# Patient Record
Sex: MALE | Race: BLACK OR AFRICAN AMERICAN | NOT HISPANIC OR LATINO | Employment: FULL TIME | URBAN - METROPOLITAN AREA
[De-identification: names, ages, dates, MRNs, and addresses within clinical notes are randomized per-mention and may not be internally consistent; named-entity substitution may affect disease eponyms.]

---

## 2024-02-22 ENCOUNTER — APPOINTMENT (OUTPATIENT)
Dept: CARDIOLOGY | Facility: HOSPITAL | Age: 54
End: 2024-02-22
Payer: COMMERCIAL

## 2024-02-22 ENCOUNTER — HOSPITAL ENCOUNTER (INPATIENT)
Facility: HOSPITAL | Age: 54
LOS: 2 days | Discharge: HOME | End: 2024-02-24
Attending: EMERGENCY MEDICINE | Admitting: INTERNAL MEDICINE
Payer: COMMERCIAL

## 2024-02-22 ENCOUNTER — APPOINTMENT (OUTPATIENT)
Dept: RADIOLOGY | Facility: HOSPITAL | Age: 54
End: 2024-02-22
Payer: COMMERCIAL

## 2024-02-22 DIAGNOSIS — K66.1 HEMOPERITONEUM: ICD-10-CM

## 2024-02-22 DIAGNOSIS — R10.84 GENERALIZED ABDOMINAL PAIN: Primary | ICD-10-CM

## 2024-02-22 DIAGNOSIS — R79.89 ELEVATED TROPONIN: ICD-10-CM

## 2024-02-22 DIAGNOSIS — I21.4 NSTEMI (NON-ST ELEVATED MYOCARDIAL INFARCTION) (MULTI): ICD-10-CM

## 2024-02-22 LAB
ABO GROUP (TYPE) IN BLOOD: NORMAL
ALBUMIN SERPL BCP-MCNC: 4.4 G/DL (ref 3.4–5)
ALP SERPL-CCNC: 47 U/L (ref 33–120)
ALT SERPL W P-5'-P-CCNC: 13 U/L (ref 10–52)
AMPHETAMINES UR QL SCN: ABNORMAL
AMPHETAMINES UR QL SCN: ABNORMAL
ANION GAP SERPL CALC-SCNC: 14 MMOL/L (ref 10–20)
ANTIBODY SCREEN: NORMAL
AORTIC VALVE MEAN GRADIENT: 8 MMHG
AORTIC VALVE PEAK VELOCITY: 1.85 M/S
APPEARANCE UR: CLEAR
APTT PPP: 22 SECONDS (ref 27–38)
AST SERPL W P-5'-P-CCNC: 24 U/L (ref 9–39)
AV PEAK GRADIENT: 13.7 MMHG
AVA (PEAK VEL): 3.41 CM2
AVA (VTI): 3.62 CM2
BARBITURATES UR QL SCN: ABNORMAL
BARBITURATES UR QL SCN: ABNORMAL
BASOPHILS # BLD AUTO: 0.02 X10*3/UL (ref 0–0.1)
BASOPHILS NFR BLD AUTO: 0.1 %
BENZODIAZ UR QL SCN: ABNORMAL
BENZODIAZ UR QL SCN: ABNORMAL
BILIRUB SERPL-MCNC: 0.8 MG/DL (ref 0–1.2)
BILIRUB UR STRIP.AUTO-MCNC: NEGATIVE MG/DL
BUN SERPL-MCNC: 31 MG/DL (ref 6–23)
BZE UR QL SCN: ABNORMAL
BZE UR QL SCN: ABNORMAL
CALCIUM SERPL-MCNC: 8.8 MG/DL (ref 8.6–10.3)
CANNABINOIDS UR QL SCN: ABNORMAL
CANNABINOIDS UR QL SCN: ABNORMAL
CARDIAC TROPONIN I PNL SERPL HS: 1074 NG/L (ref 0–20)
CARDIAC TROPONIN I PNL SERPL HS: 1103 NG/L (ref 0–20)
CARDIAC TROPONIN I PNL SERPL HS: 1252 NG/L (ref 0–20)
CARDIAC TROPONIN I PNL SERPL HS: 1300 NG/L (ref 0–20)
CHLORIDE SERPL-SCNC: 94 MMOL/L (ref 98–107)
CHOLEST SERPL-MCNC: 198 MG/DL (ref 0–199)
CHOLESTEROL/HDL RATIO: 5.6
CO2 SERPL-SCNC: 29 MMOL/L (ref 21–32)
COLOR UR: YELLOW
CREAT SERPL-MCNC: 1.14 MG/DL (ref 0.5–1.3)
CRP SERPL-MCNC: 1.12 MG/DL
EGFRCR SERPLBLD CKD-EPI 2021: 76 ML/MIN/1.73M*2
EJECTION FRACTION APICAL 4 CHAMBER: 70
EJECTION FRACTION: 68 %
EOSINOPHIL # BLD AUTO: 0 X10*3/UL (ref 0–0.7)
EOSINOPHIL NFR BLD AUTO: 0 %
ERYTHROCYTE [DISTWIDTH] IN BLOOD BY AUTOMATED COUNT: 15.1 % (ref 11.5–14.5)
ERYTHROCYTE [DISTWIDTH] IN BLOOD BY AUTOMATED COUNT: 15.2 % (ref 11.5–14.5)
ERYTHROCYTE [SEDIMENTATION RATE] IN BLOOD BY WESTERGREN METHOD: 28 MM/H (ref 0–20)
FENTANYL+NORFENTANYL UR QL SCN: ABNORMAL
FENTANYL+NORFENTANYL UR QL SCN: ABNORMAL
GLUCOSE BLD MANUAL STRIP-MCNC: 131 MG/DL (ref 74–99)
GLUCOSE SERPL-MCNC: 111 MG/DL (ref 74–99)
GLUCOSE UR STRIP.AUTO-MCNC: NEGATIVE MG/DL
HCT VFR BLD AUTO: 28.2 % (ref 41–52)
HCT VFR BLD AUTO: 36 % (ref 41–52)
HDLC SERPL-MCNC: 35.3 MG/DL
HGB BLD-MCNC: 11.7 G/DL (ref 13.5–17.5)
HGB BLD-MCNC: 9.5 G/DL (ref 13.5–17.5)
HOLD SPECIMEN: NORMAL
IMM GRANULOCYTES # BLD AUTO: 0.15 X10*3/UL (ref 0–0.7)
IMM GRANULOCYTES NFR BLD AUTO: 0.7 % (ref 0–0.9)
KETONES UR STRIP.AUTO-MCNC: ABNORMAL MG/DL
LACTATE SERPL-SCNC: 1.5 MMOL/L (ref 0.4–2)
LEFT ATRIUM VOLUME AREA LENGTH INDEX BSA: 23.1 ML/M2
LEFT VENTRICLE INTERNAL DIMENSION DIASTOLE: 4.4 CM (ref 3.5–6)
LEFT VENTRICULAR OUTFLOW TRACT DIAMETER: 2.2 CM
LEUKOCYTE ESTERASE UR QL STRIP.AUTO: NEGATIVE
LIPASE SERPL-CCNC: 51 U/L (ref 9–82)
LYMPHOCYTES # BLD AUTO: 2.86 X10*3/UL (ref 1.2–4.8)
LYMPHOCYTES NFR BLD AUTO: 13.8 %
MCH RBC QN AUTO: 22.5 PG (ref 26–34)
MCH RBC QN AUTO: 23.7 PG (ref 26–34)
MCHC RBC AUTO-ENTMCNC: 32.5 G/DL (ref 32–36)
MCHC RBC AUTO-ENTMCNC: 33.7 G/DL (ref 32–36)
MCV RBC AUTO: 69 FL (ref 80–100)
MCV RBC AUTO: 70 FL (ref 80–100)
MITRAL VALVE E/A RATIO: 0.75
MITRAL VALVE E/E' RATIO: 5.65
MONOCYTES # BLD AUTO: 2.18 X10*3/UL (ref 0.1–1)
MONOCYTES NFR BLD AUTO: 10.5 %
NEUTROPHILS # BLD AUTO: 15.48 X10*3/UL (ref 1.2–7.7)
NEUTROPHILS NFR BLD AUTO: 74.9 %
NITRITE UR QL STRIP.AUTO: NEGATIVE
NON-HDL CHOLESTEROL: 163 MG/DL (ref 0–149)
NRBC BLD-RTO: 0 /100 WBCS (ref 0–0)
NRBC BLD-RTO: 0.1 /100 WBCS (ref 0–0)
OPIATES UR QL SCN: ABNORMAL
OPIATES UR QL SCN: ABNORMAL
OXYCODONE+OXYMORPHONE UR QL SCN: ABNORMAL
OXYCODONE+OXYMORPHONE UR QL SCN: ABNORMAL
PCP UR QL SCN: ABNORMAL
PCP UR QL SCN: ABNORMAL
PH UR STRIP.AUTO: 6 [PH]
PLATELET # BLD AUTO: 229 X10*3/UL (ref 150–450)
PLATELET # BLD AUTO: 277 X10*3/UL (ref 150–450)
POTASSIUM SERPL-SCNC: 3.5 MMOL/L (ref 3.5–5.3)
PROT SERPL-MCNC: 7.7 G/DL (ref 6.4–8.2)
PROT UR STRIP.AUTO-MCNC: NEGATIVE MG/DL
RBC # BLD AUTO: 4.01 X10*6/UL (ref 4.5–5.9)
RBC # BLD AUTO: 5.2 X10*6/UL (ref 4.5–5.9)
RBC # UR STRIP.AUTO: NEGATIVE /UL
RH FACTOR (ANTIGEN D): NORMAL
RIGHT VENTRICLE FREE WALL PEAK S': 14.6 CM/S
SODIUM SERPL-SCNC: 133 MMOL/L (ref 136–145)
SP GR UR STRIP.AUTO: 1.02
TRICUSPID ANNULAR PLANE SYSTOLIC EXCURSION: 1.9 CM
TSH SERPL-ACNC: 1.28 MIU/L (ref 0.44–3.98)
UFH PPP CHRO-ACNC: 0.5 IU/ML
UROBILINOGEN UR STRIP.AUTO-MCNC: <2 MG/DL
WBC # BLD AUTO: 16.7 X10*3/UL (ref 4.4–11.3)
WBC # BLD AUTO: 20.7 X10*3/UL (ref 4.4–11.3)

## 2024-02-22 PROCEDURE — 74176 CT ABD & PELVIS W/O CONTRAST: CPT | Performed by: RADIOLOGY

## 2024-02-22 PROCEDURE — 76000 FLUOROSCOPY <1 HR PHYS/QHP: CPT | Performed by: RADIOLOGY

## 2024-02-22 PROCEDURE — 99291 CRITICAL CARE FIRST HOUR: CPT | Mod: 25 | Performed by: EMERGENCY MEDICINE

## 2024-02-22 PROCEDURE — 2500000004 HC RX 250 GENERAL PHARMACY W/ HCPCS (ALT 636 FOR OP/ED): Performed by: RADIOLOGY

## 2024-02-22 PROCEDURE — 80307 DRUG TEST PRSMV CHEM ANLYZR: CPT

## 2024-02-22 PROCEDURE — 85652 RBC SED RATE AUTOMATED: CPT | Performed by: NURSE PRACTITIONER

## 2024-02-22 PROCEDURE — 36415 COLL VENOUS BLD VENIPUNCTURE: CPT

## 2024-02-22 PROCEDURE — 84484 ASSAY OF TROPONIN QUANT: CPT

## 2024-02-22 PROCEDURE — 2500000004 HC RX 250 GENERAL PHARMACY W/ HCPCS (ALT 636 FOR OP/ED): Performed by: EMERGENCY MEDICINE

## 2024-02-22 PROCEDURE — 83605 ASSAY OF LACTIC ACID: CPT

## 2024-02-22 PROCEDURE — 2780000003 HC OR 278 NO HCPCS: Performed by: RADIOLOGY

## 2024-02-22 PROCEDURE — 71046 X-RAY EXAM CHEST 2 VIEWS: CPT

## 2024-02-22 PROCEDURE — 83690 ASSAY OF LIPASE: CPT

## 2024-02-22 PROCEDURE — 74176 CT ABD & PELVIS W/O CONTRAST: CPT

## 2024-02-22 PROCEDURE — 82947 ASSAY GLUCOSE BLOOD QUANT: CPT

## 2024-02-22 PROCEDURE — 36246 INS CATH ABD/L-EXT ART 2ND: CPT | Performed by: RADIOLOGY

## 2024-02-22 PROCEDURE — 99153 MOD SED SAME PHYS/QHP EA: CPT | Performed by: RADIOLOGY

## 2024-02-22 PROCEDURE — 93306 TTE W/DOPPLER COMPLETE: CPT

## 2024-02-22 PROCEDURE — 86140 C-REACTIVE PROTEIN: CPT | Performed by: NURSE PRACTITIONER

## 2024-02-22 PROCEDURE — 86900 BLOOD TYPING SEROLOGIC ABO: CPT

## 2024-02-22 PROCEDURE — 1200000002 HC GENERAL ROOM WITH TELEMETRY DAILY

## 2024-02-22 PROCEDURE — C1887 CATHETER, GUIDING: HCPCS | Performed by: RADIOLOGY

## 2024-02-22 PROCEDURE — 83718 ASSAY OF LIPOPROTEIN: CPT | Performed by: NURSE PRACTITIONER

## 2024-02-22 PROCEDURE — 37244 VASC EMBOLIZE/OCCLUDE BLEED: CPT | Performed by: RADIOLOGY

## 2024-02-22 PROCEDURE — 2500000004 HC RX 250 GENERAL PHARMACY W/ HCPCS (ALT 636 FOR OP/ED)

## 2024-02-22 PROCEDURE — 2550000001 HC RX 255 CONTRASTS: Performed by: RADIOLOGY

## 2024-02-22 PROCEDURE — 85730 THROMBOPLASTIN TIME PARTIAL: CPT | Performed by: EMERGENCY MEDICINE

## 2024-02-22 PROCEDURE — 81003 URINALYSIS AUTO W/O SCOPE: CPT

## 2024-02-22 PROCEDURE — 84443 ASSAY THYROID STIM HORMONE: CPT | Performed by: NURSE PRACTITIONER

## 2024-02-22 PROCEDURE — 96366 THER/PROPH/DIAG IV INF ADDON: CPT | Mod: 59

## 2024-02-22 PROCEDURE — 96375 TX/PRO/DX INJ NEW DRUG ADDON: CPT | Mod: 59

## 2024-02-22 PROCEDURE — 2500000005 HC RX 250 GENERAL PHARMACY W/O HCPCS: Performed by: RADIOLOGY

## 2024-02-22 PROCEDURE — 93005 ELECTROCARDIOGRAM TRACING: CPT

## 2024-02-22 PROCEDURE — 99152 MOD SED SAME PHYS/QHP 5/>YRS: CPT | Performed by: RADIOLOGY

## 2024-02-22 PROCEDURE — 76937 US GUIDE VASCULAR ACCESS: CPT | Performed by: RADIOLOGY

## 2024-02-22 PROCEDURE — 74174 CTA ABD&PLVS W/CONTRAST: CPT

## 2024-02-22 PROCEDURE — 85027 COMPLETE CBC AUTOMATED: CPT

## 2024-02-22 PROCEDURE — 99222 1ST HOSP IP/OBS MODERATE 55: CPT | Performed by: SURGERY

## 2024-02-22 PROCEDURE — 99223 1ST HOSP IP/OBS HIGH 75: CPT

## 2024-02-22 PROCEDURE — 96365 THER/PROPH/DIAG IV INF INIT: CPT | Mod: 59

## 2024-02-22 PROCEDURE — 85025 COMPLETE CBC W/AUTO DIFF WBC: CPT

## 2024-02-22 PROCEDURE — C1760 CLOSURE DEV, VASC: HCPCS | Performed by: RADIOLOGY

## 2024-02-22 PROCEDURE — 93306 TTE W/DOPPLER COMPLETE: CPT | Performed by: INTERNAL MEDICINE

## 2024-02-22 PROCEDURE — 04L43DZ OCCLUSION OF SPLENIC ARTERY WITH INTRALUMINAL DEVICE, PERCUTANEOUS APPROACH: ICD-10-PCS | Performed by: RADIOLOGY

## 2024-02-22 PROCEDURE — 85520 HEPARIN ASSAY: CPT | Performed by: EMERGENCY MEDICINE

## 2024-02-22 PROCEDURE — 71046 X-RAY EXAM CHEST 2 VIEWS: CPT | Performed by: RADIOLOGY

## 2024-02-22 PROCEDURE — 84484 ASSAY OF TROPONIN QUANT: CPT | Performed by: NURSE PRACTITIONER

## 2024-02-22 PROCEDURE — 2720000007 HC OR 272 NO HCPCS: Performed by: RADIOLOGY

## 2024-02-22 PROCEDURE — 80053 COMPREHEN METABOLIC PANEL: CPT

## 2024-02-22 PROCEDURE — 2500000004 HC RX 250 GENERAL PHARMACY W/ HCPCS (ALT 636 FOR OP/ED): Performed by: SURGERY

## 2024-02-22 PROCEDURE — 2550000001 HC RX 255 CONTRASTS: Performed by: EMERGENCY MEDICINE

## 2024-02-22 PROCEDURE — C1769 GUIDE WIRE: HCPCS | Performed by: RADIOLOGY

## 2024-02-22 PROCEDURE — 96376 TX/PRO/DX INJ SAME DRUG ADON: CPT | Mod: 59

## 2024-02-22 PROCEDURE — 71250 CT THORAX DX C-: CPT | Performed by: RADIOLOGY

## 2024-02-22 PROCEDURE — 80307 DRUG TEST PRSMV CHEM ANLYZR: CPT | Performed by: NURSE PRACTITIONER

## 2024-02-22 PROCEDURE — 99223 1ST HOSP IP/OBS HIGH 75: CPT | Performed by: INTERNAL MEDICINE

## 2024-02-22 PROCEDURE — 87040 BLOOD CULTURE FOR BACTERIA: CPT | Mod: AHULAB

## 2024-02-22 RX ORDER — ACETAMINOPHEN 650 MG/1
650 SUPPOSITORY RECTAL EVERY 4 HOURS PRN
Status: DISCONTINUED | OUTPATIENT
Start: 2024-02-22 | End: 2024-02-24 | Stop reason: HOSPADM

## 2024-02-22 RX ORDER — LIDOCAINE HYDROCHLORIDE 10 MG/ML
INJECTION, SOLUTION EPIDURAL; INFILTRATION; INTRACAUDAL; PERINEURAL AS NEEDED
Status: DISCONTINUED | OUTPATIENT
Start: 2024-02-22 | End: 2024-02-24 | Stop reason: HOSPADM

## 2024-02-22 RX ORDER — HEPARIN SODIUM 10000 [USP'U]/100ML
0-4000 INJECTION, SOLUTION INTRAVENOUS CONTINUOUS
Status: DISCONTINUED | OUTPATIENT
Start: 2024-02-22 | End: 2024-02-22

## 2024-02-22 RX ORDER — POLYETHYLENE GLYCOL 3350 17 G/17G
17 POWDER, FOR SOLUTION ORAL DAILY
Status: DISCONTINUED | OUTPATIENT
Start: 2024-02-23 | End: 2024-02-24 | Stop reason: HOSPADM

## 2024-02-22 RX ORDER — PROTAMINE SULFATE 10 MG/ML
25 INJECTION, SOLUTION INTRAVENOUS ONCE
Status: COMPLETED | OUTPATIENT
Start: 2024-02-22 | End: 2024-02-22

## 2024-02-22 RX ORDER — ACETAMINOPHEN 325 MG/1
650 TABLET ORAL EVERY 4 HOURS PRN
Status: DISCONTINUED | OUTPATIENT
Start: 2024-02-22 | End: 2024-02-24 | Stop reason: HOSPADM

## 2024-02-22 RX ORDER — ACETAMINOPHEN 160 MG/5ML
650 SOLUTION ORAL EVERY 4 HOURS PRN
Status: DISCONTINUED | OUTPATIENT
Start: 2024-02-22 | End: 2024-02-24 | Stop reason: HOSPADM

## 2024-02-22 RX ORDER — OMEPRAZOLE 40 MG/1
40 CAPSULE, DELAYED RELEASE ORAL DAILY PRN
COMMUNITY
End: 2024-03-17 | Stop reason: HOSPADM

## 2024-02-22 RX ORDER — HYDROMORPHONE HYDROCHLORIDE 1 MG/ML
1 INJECTION, SOLUTION INTRAMUSCULAR; INTRAVENOUS; SUBCUTANEOUS EVERY 30 MIN PRN
Status: COMPLETED | OUTPATIENT
Start: 2024-02-22 | End: 2024-02-23

## 2024-02-22 RX ORDER — FENTANYL CITRATE 50 UG/ML
INJECTION, SOLUTION INTRAMUSCULAR; INTRAVENOUS AS NEEDED
Status: DISCONTINUED | OUTPATIENT
Start: 2024-02-22 | End: 2024-02-24 | Stop reason: HOSPADM

## 2024-02-22 RX ORDER — SODIUM CHLORIDE 9 MG/ML
INJECTION, SOLUTION INTRAVENOUS CONTINUOUS PRN
Status: DISCONTINUED | OUTPATIENT
Start: 2024-02-22 | End: 2024-02-24 | Stop reason: HOSPADM

## 2024-02-22 RX ORDER — MORPHINE SULFATE 4 MG/ML
4 INJECTION, SOLUTION INTRAMUSCULAR; INTRAVENOUS ONCE
Status: COMPLETED | OUTPATIENT
Start: 2024-02-22 | End: 2024-02-22

## 2024-02-22 RX ORDER — HYDROMORPHONE HYDROCHLORIDE 1 MG/ML
INJECTION, SOLUTION INTRAMUSCULAR; INTRAVENOUS; SUBCUTANEOUS
Status: COMPLETED
Start: 2024-02-22 | End: 2024-02-22

## 2024-02-22 RX ORDER — HEPARIN SODIUM 5000 [USP'U]/ML
2000-4000 INJECTION, SOLUTION INTRAVENOUS; SUBCUTANEOUS EVERY 4 HOURS PRN
Status: DISCONTINUED | OUTPATIENT
Start: 2024-02-22 | End: 2024-02-22

## 2024-02-22 RX ORDER — MIDAZOLAM HYDROCHLORIDE 1 MG/ML
INJECTION, SOLUTION INTRAMUSCULAR; INTRAVENOUS AS NEEDED
Status: DISCONTINUED | OUTPATIENT
Start: 2024-02-22 | End: 2024-02-24 | Stop reason: HOSPADM

## 2024-02-22 RX ORDER — HEPARIN SODIUM 5000 [USP'U]/ML
4000 INJECTION, SOLUTION INTRAVENOUS; SUBCUTANEOUS ONCE
Status: COMPLETED | OUTPATIENT
Start: 2024-02-22 | End: 2024-02-22

## 2024-02-22 RX ORDER — ONDANSETRON HYDROCHLORIDE 2 MG/ML
4 INJECTION, SOLUTION INTRAVENOUS EVERY 30 MIN PRN
Status: DISCONTINUED | OUTPATIENT
Start: 2024-02-22 | End: 2024-02-24 | Stop reason: HOSPADM

## 2024-02-22 RX ORDER — ONDANSETRON HYDROCHLORIDE 2 MG/ML
4 INJECTION, SOLUTION INTRAVENOUS ONCE
Status: COMPLETED | OUTPATIENT
Start: 2024-02-22 | End: 2024-02-22

## 2024-02-22 RX ADMIN — PROTAMINE SULFATE 25 MG: 10 INJECTION, SOLUTION INTRAVENOUS at 17:54

## 2024-02-22 RX ADMIN — IOHEXOL 90 ML: 350 INJECTION, SOLUTION INTRAVENOUS at 15:52

## 2024-02-22 RX ADMIN — FENTANYL CITRATE 50 MCG: 50 INJECTION INTRAMUSCULAR; INTRAVENOUS at 22:06

## 2024-02-22 RX ADMIN — HYDROMORPHONE HYDROCHLORIDE 1 MG: 1 INJECTION, SOLUTION INTRAMUSCULAR; INTRAVENOUS; SUBCUTANEOUS at 12:21

## 2024-02-22 RX ADMIN — HEPARIN SODIUM 4000 UNITS: 5000 INJECTION INTRAVENOUS; SUBCUTANEOUS at 10:06

## 2024-02-22 RX ADMIN — ONDANSETRON 4 MG: 2 INJECTION INTRAMUSCULAR; INTRAVENOUS at 09:46

## 2024-02-22 RX ADMIN — MIDAZOLAM HYDROCHLORIDE 0.5 MG: 1 INJECTION, SOLUTION INTRAMUSCULAR; INTRAVENOUS at 22:06

## 2024-02-22 RX ADMIN — SODIUM CHLORIDE 1000 ML: 9 INJECTION, SOLUTION INTRAVENOUS at 09:46

## 2024-02-22 RX ADMIN — SODIUM CHLORIDE 100 ML/HR: 9 INJECTION, SOLUTION INTRAVENOUS at 21:30

## 2024-02-22 RX ADMIN — LIDOCAINE HYDROCHLORIDE 10 ML: 10 INJECTION, SOLUTION EPIDURAL; INFILTRATION; INTRACAUDAL; PERINEURAL at 21:38

## 2024-02-22 RX ADMIN — MIDAZOLAM HYDROCHLORIDE 0.5 MG: 1 INJECTION, SOLUTION INTRAMUSCULAR; INTRAVENOUS at 21:37

## 2024-02-22 RX ADMIN — MIDAZOLAM HYDROCHLORIDE 0.5 MG: 1 INJECTION, SOLUTION INTRAMUSCULAR; INTRAVENOUS at 22:27

## 2024-02-22 RX ADMIN — HYDROMORPHONE HYDROCHLORIDE 1 MG: 1 INJECTION, SOLUTION INTRAMUSCULAR; INTRAVENOUS; SUBCUTANEOUS at 19:40

## 2024-02-22 RX ADMIN — FENTANYL CITRATE 50 MCG: 50 INJECTION INTRAMUSCULAR; INTRAVENOUS at 22:27

## 2024-02-22 RX ADMIN — HEPARIN SODIUM 1000 UNITS/HR: 10000 INJECTION, SOLUTION INTRAVENOUS at 10:06

## 2024-02-22 RX ADMIN — MORPHINE SULFATE 4 MG: 4 INJECTION, SOLUTION INTRAMUSCULAR; INTRAVENOUS at 09:46

## 2024-02-22 RX ADMIN — IOHEXOL 50 ML: 350 INJECTION, SOLUTION INTRAVENOUS at 22:30

## 2024-02-22 RX ADMIN — FENTANYL CITRATE 50 MCG: 50 INJECTION INTRAMUSCULAR; INTRAVENOUS at 21:37

## 2024-02-22 SDOH — SOCIAL STABILITY: SOCIAL INSECURITY: HAS ANYONE EVER THREATENED TO HURT YOUR FAMILY OR YOUR PETS?: NO

## 2024-02-22 SDOH — SOCIAL STABILITY: SOCIAL NETWORK
DO YOU BELONG TO ANY CLUBS OR ORGANIZATIONS SUCH AS CHURCH GROUPS UNIONS, FRATERNAL OR ATHLETIC GROUPS, OR SCHOOL GROUPS?: NO

## 2024-02-22 SDOH — SOCIAL STABILITY: SOCIAL INSECURITY
WITHIN THE LAST YEAR, HAVE TO BEEN RAPED OR FORCED TO HAVE ANY KIND OF SEXUAL ACTIVITY BY YOUR PARTNER OR EX-PARTNER?: NO

## 2024-02-22 SDOH — HEALTH STABILITY: MENTAL HEALTH: HOW MANY STANDARD DRINKS CONTAINING ALCOHOL DO YOU HAVE ON A TYPICAL DAY?: PATIENT DOES NOT DRINK

## 2024-02-22 SDOH — ECONOMIC STABILITY: FOOD INSECURITY: WITHIN THE PAST 12 MONTHS, THE FOOD YOU BOUGHT JUST DIDN'T LAST AND YOU DIDN'T HAVE MONEY TO GET MORE.: NEVER TRUE

## 2024-02-22 SDOH — SOCIAL STABILITY: SOCIAL INSECURITY: DO YOU FEEL ANYONE HAS EXPLOITED OR TAKEN ADVANTAGE OF YOU FINANCIALLY OR OF YOUR PERSONAL PROPERTY?: NO

## 2024-02-22 SDOH — HEALTH STABILITY: MENTAL HEALTH: HOW OFTEN DO YOU HAVE A DRINK CONTAINING ALCOHOL?: NEVER

## 2024-02-22 SDOH — SOCIAL STABILITY: SOCIAL NETWORK
IN A TYPICAL WEEK, HOW MANY TIMES DO YOU TALK ON THE PHONE WITH FAMILY, FRIENDS, OR NEIGHBORS?: MORE THAN THREE TIMES A WEEK

## 2024-02-22 SDOH — SOCIAL STABILITY: SOCIAL NETWORK: HOW OFTEN DO YOU ATTENT MEETINGS OF THE CLUB OR ORGANIZATION YOU BELONG TO?: NEVER

## 2024-02-22 SDOH — SOCIAL STABILITY: SOCIAL INSECURITY: WITHIN THE LAST YEAR, HAVE YOU BEEN HUMILIATED OR EMOTIONALLY ABUSED IN OTHER WAYS BY YOUR PARTNER OR EX-PARTNER?: NO

## 2024-02-22 SDOH — SOCIAL STABILITY: SOCIAL INSECURITY: WITHIN THE LAST YEAR, HAVE YOU BEEN AFRAID OF YOUR PARTNER OR EX-PARTNER?: NO

## 2024-02-22 SDOH — ECONOMIC STABILITY: TRANSPORTATION INSECURITY
IN THE PAST 12 MONTHS, HAS THE LACK OF TRANSPORTATION KEPT YOU FROM MEDICAL APPOINTMENTS OR FROM GETTING MEDICATIONS?: NO

## 2024-02-22 SDOH — SOCIAL STABILITY: SOCIAL NETWORK: ARE YOU MARRIED, WIDOWED, DIVORCED, SEPARATED, NEVER MARRIED, OR LIVING WITH A PARTNER?: LIVING WITH PARTNER

## 2024-02-22 SDOH — ECONOMIC STABILITY: TRANSPORTATION INSECURITY
IN THE PAST 12 MONTHS, HAS LACK OF TRANSPORTATION KEPT YOU FROM MEETINGS, WORK, OR FROM GETTING THINGS NEEDED FOR DAILY LIVING?: NO

## 2024-02-22 SDOH — ECONOMIC STABILITY: INCOME INSECURITY: IN THE PAST 12 MONTHS, HAS THE ELECTRIC, GAS, OIL, OR WATER COMPANY THREATENED TO SHUT OFF SERVICE IN YOUR HOME?: NO

## 2024-02-22 SDOH — SOCIAL STABILITY: SOCIAL INSECURITY
WITHIN THE LAST YEAR, HAVE YOU BEEN KICKED, HIT, SLAPPED, OR OTHERWISE PHYSICALLY HURT BY YOUR PARTNER OR EX-PARTNER?: NO

## 2024-02-22 SDOH — ECONOMIC STABILITY: FOOD INSECURITY: WITHIN THE PAST 12 MONTHS, YOU WORRIED THAT YOUR FOOD WOULD RUN OUT BEFORE YOU GOT MONEY TO BUY MORE.: NEVER TRUE

## 2024-02-22 SDOH — ECONOMIC STABILITY: HOUSING INSECURITY
IN THE LAST 12 MONTHS, WAS THERE A TIME WHEN YOU DID NOT HAVE A STEADY PLACE TO SLEEP OR SLEPT IN A SHELTER (INCLUDING NOW)?: NO

## 2024-02-22 SDOH — SOCIAL STABILITY: SOCIAL INSECURITY: HAVE YOU HAD THOUGHTS OF HARMING ANYONE ELSE?: NO

## 2024-02-22 SDOH — HEALTH STABILITY: MENTAL HEALTH
STRESS IS WHEN SOMEONE FEELS TENSE, NERVOUS, ANXIOUS, OR CAN'T SLEEP AT NIGHT BECAUSE THEIR MIND IS TROUBLED. HOW STRESSED ARE YOU?: NOT AT ALL

## 2024-02-22 SDOH — HEALTH STABILITY: PHYSICAL HEALTH: ON AVERAGE, HOW MANY MINUTES DO YOU ENGAGE IN EXERCISE AT THIS LEVEL?: 30 MIN

## 2024-02-22 SDOH — HEALTH STABILITY: PHYSICAL HEALTH: ON AVERAGE, HOW MANY DAYS PER WEEK DO YOU ENGAGE IN MODERATE TO STRENUOUS EXERCISE (LIKE A BRISK WALK)?: 2 DAYS

## 2024-02-22 SDOH — HEALTH STABILITY: MENTAL HEALTH: HOW OFTEN DO YOU HAVE 6 OR MORE DRINKS ON ONE OCCASION?: NEVER

## 2024-02-22 SDOH — SOCIAL STABILITY: SOCIAL INSECURITY: ABUSE: ADULT

## 2024-02-22 SDOH — SOCIAL STABILITY: SOCIAL INSECURITY: ARE YOU OR HAVE YOU BEEN THREATENED OR ABUSED PHYSICALLY, EMOTIONALLY, OR SEXUALLY BY ANYONE?: NO

## 2024-02-22 SDOH — ECONOMIC STABILITY: HOUSING INSECURITY: IN THE LAST 12 MONTHS, HOW MANY PLACES HAVE YOU LIVED?: 1

## 2024-02-22 SDOH — SOCIAL STABILITY: SOCIAL INSECURITY: ARE THERE ANY APPARENT SIGNS OF INJURIES/BEHAVIORS THAT COULD BE RELATED TO ABUSE/NEGLECT?: NO

## 2024-02-22 SDOH — ECONOMIC STABILITY: INCOME INSECURITY: IN THE LAST 12 MONTHS, WAS THERE A TIME WHEN YOU WERE NOT ABLE TO PAY THE MORTGAGE OR RENT ON TIME?: NO

## 2024-02-22 SDOH — ECONOMIC STABILITY: INCOME INSECURITY: HOW HARD IS IT FOR YOU TO PAY FOR THE VERY BASICS LIKE FOOD, HOUSING, MEDICAL CARE, AND HEATING?: NOT HARD AT ALL

## 2024-02-22 SDOH — SOCIAL STABILITY: SOCIAL INSECURITY: DO YOU FEEL UNSAFE GOING BACK TO THE PLACE WHERE YOU ARE LIVING?: NO

## 2024-02-22 SDOH — SOCIAL STABILITY: SOCIAL NETWORK: HOW OFTEN DO YOU GET TOGETHER WITH FRIENDS OR RELATIVES?: MORE THAN THREE TIMES A WEEK

## 2024-02-22 SDOH — SOCIAL STABILITY: SOCIAL INSECURITY: WERE YOU ABLE TO COMPLETE ALL THE BEHAVIORAL HEALTH SCREENINGS?: YES

## 2024-02-22 SDOH — SOCIAL STABILITY: SOCIAL INSECURITY: DOES ANYONE TRY TO KEEP YOU FROM HAVING/CONTACTING OTHER FRIENDS OR DOING THINGS OUTSIDE YOUR HOME?: NO

## 2024-02-22 SDOH — SOCIAL STABILITY: SOCIAL NETWORK: HOW OFTEN DO YOU ATTEND CHURCH OR RELIGIOUS SERVICES?: NEVER

## 2024-02-22 ASSESSMENT — PATIENT HEALTH QUESTIONNAIRE - PHQ9
2. FEELING DOWN, DEPRESSED OR HOPELESS: NOT AT ALL
SUM OF ALL RESPONSES TO PHQ9 QUESTIONS 1 & 2: 0
1. LITTLE INTEREST OR PLEASURE IN DOING THINGS: NOT AT ALL

## 2024-02-22 ASSESSMENT — COGNITIVE AND FUNCTIONAL STATUS - GENERAL
MOBILITY SCORE: 18
STANDING UP FROM CHAIR USING ARMS: A LITTLE
HELP NEEDED FOR BATHING: A LITTLE
WALKING IN HOSPITAL ROOM: A LITTLE
EATING MEALS: A LITTLE
PERSONAL GROOMING: A LITTLE
WALKING IN HOSPITAL ROOM: A LITTLE
EATING MEALS: A LITTLE
MOVING FROM LYING ON BACK TO SITTING ON SIDE OF FLAT BED WITH BEDRAILS: A LITTLE
DAILY ACTIVITIY SCORE: 18
DRESSING REGULAR UPPER BODY CLOTHING: A LITTLE
PERSONAL GROOMING: A LITTLE
TOILETING: A LITTLE
MOVING TO AND FROM BED TO CHAIR: A LITTLE
CLIMB 3 TO 5 STEPS WITH RAILING: A LITTLE
DRESSING REGULAR LOWER BODY CLOTHING: A LITTLE
HELP NEEDED FOR BATHING: A LITTLE
TURNING FROM BACK TO SIDE WHILE IN FLAT BAD: A LITTLE
DRESSING REGULAR UPPER BODY CLOTHING: A LITTLE
TOILETING: A LITTLE
MOBILITY SCORE: 18
MOVING TO AND FROM BED TO CHAIR: A LITTLE
PATIENT BASELINE BEDBOUND: NO
PATIENT BASELINE BEDBOUND: NO
STANDING UP FROM CHAIR USING ARMS: A LITTLE
CLIMB 3 TO 5 STEPS WITH RAILING: A LITTLE
TURNING FROM BACK TO SIDE WHILE IN FLAT BAD: A LITTLE
DAILY ACTIVITIY SCORE: 18
DRESSING REGULAR LOWER BODY CLOTHING: A LITTLE
MOVING FROM LYING ON BACK TO SITTING ON SIDE OF FLAT BED WITH BEDRAILS: A LITTLE

## 2024-02-22 ASSESSMENT — PAIN SCALES - GENERAL
PAINLEVEL_OUTOF10: 4
PAINLEVEL_OUTOF10: 8
PAINLEVEL_OUTOF10: 10 - WORST POSSIBLE PAIN
PAINLEVEL_OUTOF10: 10 - WORST POSSIBLE PAIN
PAINLEVEL_OUTOF10: 4
PAINLEVEL_OUTOF10: 10 - WORST POSSIBLE PAIN

## 2024-02-22 ASSESSMENT — ACTIVITIES OF DAILY LIVING (ADL)
PATIENT'S MEMORY ADEQUATE TO SAFELY COMPLETE DAILY ACTIVITIES?: YES
GROOMING: INDEPENDENT
BATHING: INDEPENDENT
LACK_OF_TRANSPORTATION: NO
TOILETING: INDEPENDENT
FEEDING YOURSELF: INDEPENDENT
ADEQUATE_TO_COMPLETE_ADL: YES
DRESSING YOURSELF: INDEPENDENT
HEARING - RIGHT EAR: FUNCTIONAL
JUDGMENT_ADEQUATE_SAFELY_COMPLETE_DAILY_ACTIVITIES: YES
WALKS IN HOME: INDEPENDENT
HEARING - LEFT EAR: FUNCTIONAL

## 2024-02-22 ASSESSMENT — LIFESTYLE VARIABLES
SKIP TO QUESTIONS 9-10: 1
HOW OFTEN DO YOU HAVE A DRINK CONTAINING ALCOHOL: NEVER
SKIP TO QUESTIONS 9-10: 1
HOW MANY STANDARD DRINKS CONTAINING ALCOHOL DO YOU HAVE ON A TYPICAL DAY: PATIENT DOES NOT DRINK
AUDIT-C TOTAL SCORE: 0
HOW OFTEN DO YOU HAVE 6 OR MORE DRINKS ON ONE OCCASION: NEVER
AUDIT-C TOTAL SCORE: 0
AUDIT-C TOTAL SCORE: 0

## 2024-02-22 ASSESSMENT — PAIN - FUNCTIONAL ASSESSMENT
PAIN_FUNCTIONAL_ASSESSMENT: 0-10

## 2024-02-22 ASSESSMENT — ENCOUNTER SYMPTOMS
CARDIOVASCULAR NEGATIVE: 1
EYES NEGATIVE: 1
CONSTITUTIONAL NEGATIVE: 1
GASTROINTESTINAL NEGATIVE: 1
RESPIRATORY NEGATIVE: 1

## 2024-02-22 ASSESSMENT — COLUMBIA-SUICIDE SEVERITY RATING SCALE - C-SSRS
1. IN THE PAST MONTH, HAVE YOU WISHED YOU WERE DEAD OR WISHED YOU COULD GO TO SLEEP AND NOT WAKE UP?: NO
6. HAVE YOU EVER DONE ANYTHING, STARTED TO DO ANYTHING, OR PREPARED TO DO ANYTHING TO END YOUR LIFE?: NO
2. HAVE YOU ACTUALLY HAD ANY THOUGHTS OF KILLING YOURSELF?: NO

## 2024-02-22 ASSESSMENT — PAIN DESCRIPTION - LOCATION
LOCATION: ABDOMEN
LOCATION: ABDOMEN

## 2024-02-22 NOTE — CONSULTS
Inpatient consult to Cardiology  Consult performed by: DONTE Montgomery-CNP  Consult ordered by: Jennifer Ohara PA-C  Reason for consult: NSTEMI        History Of Present Illness:      Douglas Wilson is a 53 y.o. male with past medical history significant for hypertension, hyperlipidemia, BPH, opioid use disorder, obstructive sleep apnea, GERD, minor CAD per CTA 4/2022 presented to emergency room complaining of abdominal pain associated with nausea and vomiting. Lab work remarkable for troponin 1300, leukocytosis. He was started on Heparin gtt. Cardiology is consulted for evaluation of NSTEMI. He was also treated with Zofran, Morphine, IVF in ED.    Diffuse abdominal pain associated with nausea vomiting and poor p.o. intake which is started since 4 days ago and progressively got worse.  Today he also reports new onset left-sided chest pain, described as sharp, pressure without any other associated symptoms.  He is awake while describing his chest pain and states it has been happening intermittently since this morning, unsure how long it lasts.  Also complains of bilateral toes as well as fingers coolness and tingling which is also new since 3 days ago.  He denies any prior cardiac history.  He relapsed on Sunday and used heroin.  Denies any cocaine or other illicit drug use.  He has not been taking any statins even though he was advised to take at some point.  His family is present at bedside and reports him having difficulty urinating since 2 years ago.    He also complains of chills and generally feels unwell and weak.      Past Cardiology Tests (Last 3 Years):  EKG:  Results for orders placed during the hospital encounter of 02/22/24    ECG 12 lead (Preliminary)  This result has not been signed. Information might be incomplete.    Narrative  Normal sinus rhythm  Right atrial enlargement  Minimal voltage criteria for LVH, may be normal variant ( Sokolow-Miranda )  ST & T wave abnormality, consider lateral  ischemia  Abnormal ECG  When compared with ECG of 07-JUL-2022 00:38,  Previous ECG has undetermined rhythm, needs review  ST now depressed in Inferior leads  ST depression has replaced ST elevation in Lateral leads  T wave inversion now evident in Lateral leads    Echo:  January 5, 2022  1. The left ventricular systolic function is normal with a 55-60% estimated ejection fraction.   2. RVSP within normal limits.   3. Compared with study from 9/29/2020, no significant change.      Cath:  No results found for this or any previous visit.    CTA coronary arteries with heart flow 4/19/2022      1. Minor coronary artery disease.  2. The proximal LAD has a small/focal region of calcified plaque with  minimal luminal stenosis (<25%).  3. The LM, LCx and RCA have no significant atherosclerotic change or  stenotic disease.  4. Right-dominant coronary artery system.         Stress Test:  8/2022 [facility]      This was a normal exercise stress test without evidence of stress induced    ischemia.    Prognosis is suggested to be good from this test ( functional capacity >/=    10 METs ).      Authenticated by:      Electronically signed by AGGIE POLK MD(Interpreting    physician) on 08/11/2022 02:33 PM     Cardiac Imaging:  No results found for this or any previous visit.      Past Medical History:    Minimal non obstructive CAD per Coronary CTA 4/2022  HLD  HTN  LUC  Opioid use disorder     Past Surgical History:  He has a past surgical history that includes Other surgical history (01/24/2022) and CT angio coronary art with heartflow if score >30% (4/19/2022).      Social History:  Reports relapse and consume heroin this past Sunday    Family History:  Denies any premature CAD or sudden cardiac death     Allergies:  Patient has no known allergies.    ROS:  10 point review of systems including (Constitutional, Eyes, ENMT, Respiratory, Cardiac, Gastrointestinal, Neurological, Psychiatric, and Hematologic) was performed  "and is otherwise negative.    Objective Data:  Last Recorded Vitals:  Vitals:    24 0900 24 0930 24 1000 24 1030   BP: (!) 149/95 (!) 162/106 (!) 148/91 (!) 161/91   Pulse: 97 93 95 92   Resp: (!) 22 (!) 26 (!) 22 11   Temp:       SpO2: 99%  99% 98%   Weight:       Height:            Weight  Av.4 kg (175 lb)  Min: 79.4 kg (175 lb)  Max: 79.4 kg (175 lb)      LABS:  CMP:  Results from last 7 days   Lab Units 24  0824   SODIUM mmol/L 133*   POTASSIUM mmol/L 3.5   CHLORIDE mmol/L 94*   CO2 mmol/L 29   ANION GAP mmol/L 14   BUN mg/dL 31*   CREATININE mg/dL 1.14   EGFR mL/min/1.73m*2 76   ALBUMIN g/dL 4.4   ALT U/L 13   AST U/L 24   BILIRUBIN TOTAL mg/dL 0.8   LIPASE U/L 51     CBC:  Results from last 7 days   Lab Units 24  0824   WBC AUTO x10*3/uL 20.7*   HEMOGLOBIN g/dL 11.7*   HEMATOCRIT % 36.0*   PLATELETS AUTO x10*3/uL 277   MCV fL 69*     COAG:     ABO: No results found for: \"ABO\"  HEME/ENDO:     CARDIAC:   Results from last 7 days   Lab Units 24  0824   TROPHS ng/L 1,300*             Last I/O:  No intake or output data in the 24 hours ending 24 1033  Net IO Since Admission: No IO data has been entered for this period [24 1033]      Imaging Results:  ECG 12 lead    Result Date: 2024  Normal sinus rhythm Right atrial enlargement Minimal voltage criteria for LVH, may be normal variant ( Sokolow-Miranda ) ST & T wave abnormality, consider lateral ischemia Abnormal ECG When compared with ECG of 2022 00:38, Previous ECG has undetermined rhythm, needs review ST now depressed in Inferior leads ST depression has replaced ST elevation in Lateral leads T wave inversion now evident in Lateral leads    XR chest 2 views    Result Date: 2024  Interpreted By:  Bry Keller, STUDY: XR CHEST 2 VIEWS;  2024 9:09 am   INDICATION: Signs/Symptoms:abd pain vomiting leukocytosis.   COMPARISON: 2022   ACCESSION NUMBER(S): [id number]   ORDERING CLINICIAN: " OSCAR SILVEIRA   FINDINGS: CARDIOMEDIASTINAL SILHOUETTE AND VASCULATURE:   Cardiac size:  Within normal limits.   Aortic shadow:  Within normal limits.   Mediastinal contours: Within normal limits.   Pulmonary vasculature:  The central vasculature is unremarkable   LUNGS: Lungs are clear.   ABDOMEN AND OTHER FINDINGS: No remarkable upper abdominal findings.   BONES: No acute osseous changes.       1.  No active cardiopulmonary disease.  There has not been significant interval change from the prior exam.   Signed by: Bry Keller 2/22/2024 9:23 AM Dictation workstation:   [id number]      Inpatient Medications:  Scheduled medications   Medication Dose Route Frequency    sodium chloride  1,000 mL intravenous Once     PRN medications   Medication    heparin     Continuous Medications   Medication Dose Last Rate    heparin  0-4,000 Units/hr 1,000 Units/hr (02/22/24 1006)       Outpatient Medications:  No current outpatient medications    Physical Exam:  General:  Patient is awake, alert, and oriented.  He appears uncomfortable and complaining of ongoing abdominal pain.  HEENT:  Pupils equal and reactive.  Normocephalic.  Moist mucosa.    Neck:  No thyromegaly.  Normal Jugular Venous Pressure.  Cardiovascular:  Regular rate and rhythm.  Normal S1 and S2.  Pulmonary:  Clear to auscultation bilaterally.  Abdomen: Diffuse pain, faint bowel sounds  Lower Extremities: No edema, bilateral toes and fingers cool to touch, difficult to palpate pulses  Neurologic:  Cranial nerves intact.  No focal deficit.   Skin: Skin warm and dry, normal skin turgor.   Psychiatric: Anxious     Assessment/Plan     Douglas Wilson is a 53 y.o. male with past medical history significant for hypertension, hyperlipidemia, BPH, opioid use disorder, obstructive sleep apnea, GERD, minor CAD per CTA 4/2022 presented to emergency room complaining of abdominal pain associated with nausea and vomiting. Lab work remarkable for troponin 1300, leukocytosis. He  was started on Heparin gtt. Cardiology is consulted for evaluation of NSTEMI.    I reviewed ECG. SR with NS ST-T abnormalities. These abnormalities seen on pt's prior ECGs in  system.   I reviewed tele.  Sinus rhythm and no significant events.  I reviewed CXR radiology image and interpretation.    Elevated troponin 1300 on initial check, complaining of new onset chest pain this morning, somewhat labile describing his chest pain.  ECG negative for any acute ischemic changes.  Presenting symptoms overall concerning for some GI/ etiology also with leukocytosis, chills and diffuse abdominal pain.  Coronary CTA April 2022 showed minimal CAD.  No prior cardiac history.  Hypertension also given significant pain, continue to monitor and adjust meds as necessary  Dyslipidemia per prior history however has not been taking any statin  Substance abuse admits to relapse and how in consumption on Sunday  Leukocytosis/chills suspect underlying infectious/inflammatory etiology, workup in progress, defer per primary team    Recommendation    Check troponin to establish trend if remains flat or down trended no further check is needed  Checking echocardiogram given degree of troponin elevation  Checking lipids, inflammatory markers  Awaiting CT chest abdomen and pelvis, also complaining of bilateral toes and finger being cold and tingly  Chest pain vague description, low suspicion for MI due to CAD given minimal CAD reported in coronary CT 2 years ago however with degree of troponin elevation will need to further investigate as above.    Continue heparin drip for now and discontinue once CT chest abdomen and pelvis results back and no acute process reported.        Code Status:  No Order      DONTE Montgomery-KATHARINA    STAFF ADDENDUM:    Both the ZEYAD and I have had a face to face encounter with the patient today. I have examined the patient and edited the documented physical examination as necessary.  I personally reviewed the  patient's vital signs, telemetry, recent labs, medications, orders, EKGs, and pertinent cardiac imaging/ echocardiography.  I have reviewed the ZEYAD's encounter note, approve the ZEYAD's documentation and have edited the note to reflect my diagnostic and therapeutic plan.      53 y.o. male with past medical history significant for hypertension, hyperlipidemia, BPH, opioid use disorder, obstructive sleep apnea, GERD, minor CAD per CTA 4/2022 presented to emergency room complaining of abdominal pain associated with nausea and vomiting.  Initial troponin of 1300 on arrival and now around 1100.    He reports history of chronic heroin use and stopped using about 4 days ago.  Shortly thereafter symptoms started.  He has improved with Dilaudid here but continues to have abdominal pain, low-grade fever.  Mentions some mild sharp chest pain today.    Presentation to this point more consistent with acute abdominal pathology and may be demand ischemia.  Suspicion on the lower end this is true ACS although cannot dismiss current troponin level.  Agree with plan for CT abdomen and pelvis.  Continue heparin infusion for now and will also get updated echocardiogram.    Tremayne Sepulveda, DO

## 2024-02-22 NOTE — CONSULTS
Reason For Consult  Consults       History Of Present Illness  Douglas Wilson is a 53 y.o. male presenting with abdominal pain and chest discomfort.  History significant for heroin use Sunday patient does report a fall from a tub at some point following that not sure the date.  Reports history of alcohol tobacco and significant NSAID use.  During workup he was found to have significantly elevated troponins and imaging concerning for hemoperitoneum.  Cardiology is following.  On exam bedside reports minimal chest discomfort.  Ongoing abdominal pain.  He has been made n.p.o. but appetite is present and is passing gas.     Past Medical History  He has no past medical history on file.    Surgical History  He has a past surgical history that includes Other surgical history (01/24/2022) and CT angio coronary art with heartflow if score >30% (4/19/2022).     Social History  He has no history on file for tobacco use, alcohol use, and drug use.    Family History  No family history on file.     Allergies  Patient has no known allergies.    Review of Systems  Review of Systems   Constitutional: Negative.    HENT: Negative.     Eyes: Negative.    Respiratory: Negative.     Cardiovascular: Negative.    Gastrointestinal: Negative.    Genitourinary: Negative.    Skin: Negative.    All other systems reviewed and are negative.        Physical Exam  Physical Exam  Constitutional:       Appearance: Normal appearance.   HENT:      Head: Normocephalic.   Eyes:      Pupils: Pupils are equal, round, and reactive to light.   Cardiovascular:      Rate and Rhythm: Normal rate.   Pulmonary:      Effort: Pulmonary effort is normal.   Abdominal:      General: Abdomen is flat. Bowel sounds are normal.      Palpations: Abdomen is soft.      Comments: No concern for peritonitis currently.  Soft nondistended, no guarding   Skin:     General: Skin is warm.   Neurological:      General: No focal deficit present.      Mental Status: He is alert.        "    Last Recorded Vitals  Blood pressure 138/75, pulse 79, temperature 36.9 °C (98.4 °F), resp. rate 19, height 1.778 m (5' 10\"), weight 79.4 kg (175 lb), SpO2 96 %.    Relevant Results  CT angio chest abdomen pelvis    Result Date: 2/22/2024  Interpreted By:  Pillo Patel, STUDY: CT ANGIO CHEST ABDOMEN PELVIS;  2/22/2024 4:03 pm   INDICATION: Signs/Symptoms:abdominal pain, possible hemmorhaging into abdominal cavity.   COMPARISON: CT of the chest, abdomen, and pelvis without contrast from the same day taken at 1345 hours.   ACCESSION NUMBER(S): [id number]   ORDERING CLINICIAN: FRANCA WAY   TECHNIQUE: Axial non-contrast images of the chest, abdomen, and pelvis with coronal and sagittal reformatted images. Axial CT images of the chest, abdomen and pelvis after the intravenous administration of 90 mL of Omnipaque 350 using angiographic technique with coronal and sagittal reformatted images. MIP images were provided and reviewed. 3D reconstructions were created on a separate independent workstation and reviewed.   FINDINGS: VASCULATURE:   PULMONARY ARTERIES: No acute pulmonary embolism.   THORACIC AORTA: No thoracic aortic aneurysm or dissection. No significant thoracic aortic atherosclerosis.   ABDOMINAL AORTA: No abdominal aortic aneurysm or dissection. No significant abdominal aortic atherosclerosis.   ABDOMINAL AND PELVIC ARTERIES: There is minor calcific disease at the origin of the bilateral common iliac arteries without significant stenosis. There is marked tortuosity of the pelvic vascularity.     CT CHEST:   MEDIASTINUM AND LYMPH NODES: No enlarged intrathoracic or axillary lymph nodes. No pneumomediastinum.   HEART: Normal size. No coronary artery calcifications. No significant pericardial effusion.   LUNG, PLEURA, LARGE AIRWAYS: There is minor bibasilar atelectasis, right worse than left. There is opacification and mild wall thickening of the small airways leading to the posterior right lower lobe. " The central airways are otherwise patent. No consolidation, pulmonary edema, pleural effusion, or pneumothorax.   OSSEOUS STRUCTURES/CHEST WALL: No acute osseous abnormality.     CT ABDOMEN/PELVIS:   LIVER: The liver is mildly enlarged, but without focal parenchymal abnormality. There is a small amount of high-density fluid surrounding the liver, consistent with hemorrhage.   BILE DUCTS: No significant intrahepatic or extrahepatic dilatation.   GALLBLADDER: No significant abnormality.   PANCREAS: No significant abnormality.   SPLEEN: There is striated appearance of the spleen. There is a 6 mm ovoid hyperdensity, compatible with a small aneurysm. The spleen is surrounded by a small amount of hyperdense fluid consistent with hemorrhage. Delayed imaging does not demonstrate active contrast extravasation to suggest active hemorrhage.   ADRENALS: No significant abnormality.   KIDNEYS, URETERS, BLADDER: No significant abnormality. The distended urinary bladder is within normal limits.   REPRODUCTIVE ORGANS: No significant abnormality.   VESSELS: (See above). No additional significant abnormality.   RETROPERITONEUM/LYMPH NODES: No enlarged lymph nodes.   BOWEL/MESENTERY/PERITONEUM: No inflammatory bowel wall thickening or dilatation. Normal appendix. There is a small hiatal hernia.   There is a small to moderate amount of high-density fluid within the abdomen, along the paracolic gutters, and deep in the pelvis, consistent with hemorrhage.     ABDOMINAL WALL: No significant abnormality.   OSSEOUS STRUCTURES: No acute osseous abnormality.       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 The spleen has a striated appearance which is likely related to phase of contrast, if there is recent history of trauma, small lacerations can have a similar appearance.   Small to moderate amount of hemoperitoneum surrounding the spleen, liver, and extending into the pelvis.   Bibasilar atelectasis with bronchial wall  thickening and opacification of the small airways leading to the right lower lobe which can be seen in the setting of aspiration/mucoid secretions.   Signed by: Pillo Patel 2/22/2024 4:57 PM Dictation workstation:   [id number]    Transthoracic Echo (TTE) Complete    Result Date: 2/22/2024   [facility], [address] [place] [address]              Tel [phone] and Fax [phone] TRANSTHORACIC ECHOCARDIOGRAM REPORT  Patient Name:      DONALD Haney Physician:    [id number] Tremayne Sepulveda DO Study Date:        2/22/2024            Ordering Provider:    [id number] DYKES MRN/PID:           [id number]             Fellow: Accession#:        [id number]         Nurse: Date of Birth/Age: 1970 / 53 years Sonographer:          Nirmala Cee RDCS Gender:            M                    Additional Staff: Height:            177.80 cm            Admit Date:           2/22/2024 Weight:            79.38 kg             Admission Status:     Inpatient -                                                               Routine BSA / BMI:         1.97 m2 / 25.11      Encounter#:           [id number]                    kg/m2                                         Department Location:  [facility]                                                               Invasive Blood Pressure: 132 /67 mmHg Study Type:    TRANSTHORACIC ECHO (TTE) COMPLETE Diagnosis/ICD: Elevated Troponin-R79.89 Indication:    Elevated Troponin CPT Code:      Echo Complete w Full Doppler-[id number] Patient History: Pertinent History: HTN and Hyperlipidemia. Opioid Use Disorder, GERD. Study Detail: The following Echo studies were performed: 2D, M-Mode, Doppler and               color flow. Technically challenging study due to prominent lung               artifact.  PHYSICIAN INTERPRETATION: Left Ventricle:  The left ventricular systolic function is hyperdynamic, with an estimated ejection fraction of 70-75%. There are no regional wall motion abnormalities. The left ventricular cavity size is normal. Spectral Doppler shows a normal pattern of left ventricular diastolic filling. Left Atrium: The left atrium is normal in size. Right Ventricle: The right ventricle is normal in size. There is normal right ventricular global systolic function. Right Atrium: The right atrium is normal in size. Aortic Valve: The aortic valve appears structurally normal. There is no evidence of aortic valve regurgitation. The peak instantaneous gradient of the aortic valve is 13.7 mmHg. The mean gradient of the aortic valve is 8.0 mmHg. Mitral Valve: The mitral valve is normal in structure. There is no evidence of mitral valve regurgitation. Tricuspid Valve: The tricuspid valve is structurally normal. No evidence of tricuspid regurgitation. Pulmonic Valve: The pulmonic valve is structurally normal. There is no indication of pulmonic valve regurgitation. Pericardium: There is no pericardial effusion noted. Aorta: The aortic root is normal. In comparison to the previous echocardiogram(s): Compared with study from 1/5/2022,.  CONCLUSIONS:  1. Left ventricular systolic function is hyperdynamic with a 70-75% estimated ejection fraction.  2. No valvular abnormalities. QUANTITATIVE DATA SUMMARY: 2D MEASUREMENTS:                          Normal Ranges: Ao Root d:     3.20 cm   (2.0-3.7cm) LAs:           3.20 cm   (2.7-4.0cm) IVSd:          1.10 cm   (0.6-1.1cm) LVPWd:         1.00 cm   (0.6-1.1cm) LVIDd:         4.40 cm   (3.9-5.9cm) LVIDs:         2.70 cm LV Mass Index: 80.2 g/m2 LV % FS        38.6 % LA VOLUME:                               Normal Ranges: LA Vol A4C:        46.6 ml    (22+/-6mL/m2) LA Vol A2C:        44.3 ml LA Vol BP:         45.5 ml LA Vol Index A4C:  23.6ml/m2 LA Vol Index A2C:  22.5 ml/m2 LA Vol Index BP:   23.1 ml/m2 LA Area  A4C:       15.5 cm2 LA Area A2C:       15.1 cm2 LA Major Axis A4C: 4.4 cm LA Major Axis A2C: 4.4 cm LA Volume Index:   23.1 ml/m2 RA VOLUME BY A/L METHOD:                               Normal Ranges: RA Vol A4C:        41.9 ml    (8.3-19.5ml) RA Vol Index A4C:  21.2 ml/m2 RA Area A4C:       15.2 cm2 RA Major Axis A4C: 4.7 cm M-MODE MEASUREMENTS:                  Normal Ranges: Ao Root: 3.20 cm (2.0-3.7cm) LAs:     3.70 cm (2.7-4.0cm) AORTA MEASUREMENTS:                      Normal Ranges: Ao Sinus, d: 3.20 cm (2.1-3.5cm) Ao STJ, d:   2.75 cm (1.7-3.4cm) Asc Ao, d:   3.20 cm (2.1-3.4cm) LV SYSTOLIC FUNCTION BY 2D PLANIMETRY (MOD):                     Normal Ranges: EF-A4C View: 70.0 % (>=55%) EF-A2C View: 68.1 % EF-Biplane:  68.1 % LV DIASTOLIC FUNCTION:                              Normal Ranges: MV Peak E:        0.62 m/s   (0.7-1.2 m/s) MV Peak A:        0.82 m/s   (0.42-0.7 m/s) E/A Ratio:        0.75       (1.0-2.2) MV e'             0.11 m/s   (>8.0) MV lateral e'     0.11 m/s MV medial e'      0.05 m/s MV A Dur:         92.00 msec E/e' Ratio:       5.65       (<8.0) a'                0.11 m/s PulmV Sys Griffin:    80.00 cm/s PulmV [place] Griffin:   46.90 cm/s PulmV S/D Griffin:    1.70 PulmV A Revs Griffin: 35.00 cm/s PulmV A Revs Dur: 95.00 msec MITRAL VALVE:                 Normal Ranges: MV DT: 288 msec (150-240msec) AORTIC VALVE:                                    Normal Ranges: AoV Vmax:                1.85 m/s  (<=1.7m/s) AoV Peak P.7 mmHg (<20mmHg) AoV Mean P.0 mmHg  (1.7-11.5mmHg) LVOT Max Griffin:            1.66 m/s  (<=1.1m/s) AoV VTI:                 25.30 cm  (18-25cm) LVOT VTI:                24.10 cm LVOT Diameter:           2.20 cm   (1.8-2.4cm) AoV Area, VTI:           3.62 cm2  (2.5-5.5cm2) AoV Area,Vmax:           3.41 cm2  (2.5-4.5cm2) AoV Dimensionless Index: 0.95  RIGHT VENTRICLE: RV Basal 3.84 cm RV Mid   2.61 cm RV Major 7.2 cm TAPSE:   18.5 mm RV s'    0.15 m/s TRICUSPID  VALVE/RVSP:                           Normal Ranges: Est. RA Pressure: 3 mmHg IVC Diam:         1.33 cm PULMONIC VALVE:                         Normal Ranges: PV Accel Time: 50 msec  (>120ms) PV Max Griffin:    1.1 m/s  (0.6-0.9m/s) PV Max P.8 mmHg Pulmonary Veins: PulmV A Revs Dur: 95.00 msec PulmV A Revs Griffin: 35.00 cm/s PulmV [place] Griffin:   46.90 cm/s PulmV S/D Griffin:    1.70 PulmV Sys Griffin:    80.00 cm/s  [id number] Tremayne Sepulveda  Electronically signed on 2024 at 4:23:21 PM  ** Final **     CT chest abdomen pelvis wo IV contrast    Result Date: 2024  Interpreted By:  Elijah Gong, STUDY: CT CHEST ABDOMEN PELVIS WO CONTRAST; 2024 1:51 pm   INDICATION: Signs/Symptoms:abdominal pain, vomiting, elevated troponin, leukocytosis.   COMPARISON: CT of the abdomen and pelvis dated 08/15/2022   ACCESSION NUMBER(S): [id number]   ORDERING CLINICIAN: OSCAR SILVEIRA   TECHNIQUE: Contiguous axial CT images were obtained at 3mm slice thickness through the chest, abdomen and pelvis without intravenous contrast administration. Coronal and sagittal reconstructions at 3 mm slice thickness were then performed. Intravenous contrast was not given per the referring physician's request.   FINDINGS: The study is severely limited by the lack of intravenous contrast.   CHEST:   CHEST WALL AND LOWER NECK: Evaluation of the visualized neck base demonstrates no gross mass or lymphadenopathy.   MEDIASTINUM AND MICKY: There is no gross axillary or mediastinal lymphadenopathy identified. Evaluation of the micky is limited by the lack of intravenous contrast.   HEART: The heart is within normal limits for size. No pericardial effusion is identified.   VESSELS: The thoracic aorta is within normal limits for course and caliber, without evidence of aneurysm.   LUNG/PLEURA/LARGE AIRWAYS: Mild dependent atelectasis is seen in the lungs bilaterally. There is no focal infiltrate, pleural effusion or pneumothorax identified. No discrete pulmonary  nodules or masses are seen.   ABDOMEN:   LIVER: The liver is within normal limits for appearance, without evidence of focal masses.   BILE DUCTS: No definite intra or extrahepatic biliary dilatation is identified.   GALLBLADDER: The gallbladder is nondilated. No definite calcified gallstones are seen.   PANCREAS: The pancreas is within normal limits for appearance, without evidence of focal masses.   SPLEEN: The spleen is within normal limits for size. No focal splenic mass is seen.   ADRENAL GLANDS: No definite adrenal nodules or masses are seen bilaterally.   KIDNEYS AND URETERS: There is no hydronephrosis, hydroureter or renal/ ureteral calculus identified bilaterally. No definite focal renal mass is seen, though evaluation is severely limited by the lack of intravenous contrast..   PELVIS:   BLADDER: The urinary bladder is grossly unremarkable for CT appearance.   REPRODUCTIVE ORGANS: The prostate is within normal limits for appearance.   BOWEL: The colon and small bowel are within normal limits for course, caliber and appearance, without evidence of wall thickening or obstruction. The appendix is decompressed. No CT evidence of acute diverticulitis or appendicitis is seen.   VESSELS: Scattered atherosclerotic calcifications are seen throughout the infrarenal abdominal aorta and iliac arteries. The abdominal aorta is within normal limits for course, caliber and appearance, without evidence of aneurysm.   PERITONEUM/RETROPERITONEUM/LYMPH NODES: There is no free intraperitoneal air identified. Hyperdense free fluid is seen within the abdomen, greatest in the left upper quadrant, right lower quadrant and lower pelvis, concerning for intraperitoneal hemorrhage. No gross mesenteric or retroperitoneal lymphadenopathy is identified.   BONE AND SOFT TISSUE: There is no evidence of acute fracture identified. No evidence of abdominal wall mass or hernia is identified.       CHEST: 1. No focal infiltrate, pulmonary nodule  or lymphadenopathy identified.   ABDOMEN-PELVIS: 1. Hyperdense free fluid within the abdomen, concerning for intraperitoneal hemorrhage. The source of this hemorrhage cannot be elucidated on CT. 2. No evidence of bowel obstruction or free intraperitoneal air.   MACRO: None   Signed by: Elijah Gong 2/22/2024 2:09 PM Dictation workstation:   [id number]    ECG 12 lead    Result Date: 2/22/2024  Normal sinus rhythm Nonspecific ST and T wave abnormality Abnormal ECG When compared with ECG of 22-FEB-2024 08:52, (unconfirmed) Nonspecific T wave abnormality has replaced inverted T waves in Lateral leads QT has lengthened    ECG 12 lead    Result Date: 2/22/2024  Normal sinus rhythm Right atrial enlargement Minimal voltage criteria for LVH, may be normal variant ( Sokolow-Miranda ) ST & T wave abnormality, consider lateral ischemia Abnormal ECG When compared with ECG of 07-JUL-2022 00:38, Previous ECG has undetermined rhythm, needs review ST now depressed in Inferior leads ST depression has replaced ST elevation in Lateral leads T wave inversion now evident in Lateral leads    XR chest 2 views    Result Date: 2/22/2024  Interpreted By:  Bry Keller, STUDY: XR CHEST 2 VIEWS;  2/22/2024 9:09 am   INDICATION: Signs/Symptoms:abd pain vomiting leukocytosis.   COMPARISON: 07/06/2022   ACCESSION NUMBER(S): [id number]   ORDERING CLINICIAN: OSCAR SILVEIRA   FINDINGS: CARDIOMEDIASTINAL SILHOUETTE AND VASCULATURE:   Cardiac size:  Within normal limits.   Aortic shadow:  Within normal limits.   Mediastinal contours: Within normal limits.   Pulmonary vasculature:  The central vasculature is unremarkable   LUNGS: Lungs are clear.   ABDOMEN AND OTHER FINDINGS: No remarkable upper abdominal findings.   BONES: No acute osseous changes.       1.  No active cardiopulmonary disease.  There has not been significant interval change from the prior exam.   Signed by: Bry Keller 2/22/2024 9:23 AM Dictation workstation:   [id number]         Assessment/Plan   Problem List Items Addressed This Visit          Gastrointestinal and Abdominal    * (Principal) Generalized abdominal pain - Primary     Other Visit Diagnoses       Elevated troponin        Relevant Orders    Transthoracic Echo (TTE) Complete (Completed)    NSTEMI (non-ST elevated myocardial infarction) (CMS/HCC)        Hemoperitoneum               CT with concern for hemoperitoneum.  CTA without active extravasation, no significant increase in hemoperitoneum, suspected splenic aneurysm versus small laceration noted consistent with trauma earlier this week.  Discussed protamine administration for heparin reversal with both cardiology and pharmacy with plan to give 25 mg for heparin reversal given concern for potential ongoing bleeding.  Will repeat serial exams and H&H.  Will consult interventional radiology.  Discussed possibility of surgical intervention should bleeding persist    I spent 60 minutes in the professional and overall care of this patient.      Tanmay Paul MD

## 2024-02-22 NOTE — PROGRESS NOTES
02/22/24 1036   [facility] Disability Status   Are you deaf or do you have serious difficulty hearing? N   Are you blind or do you have serious difficulty seeing, even when wearing glasses? N   Because of a physical, mental, or emotional condition, do you have serious difficulty concentrating, remembering, or making decisions? (5 years old or older) N   Do you have serious difficulty walking or climbing stairs? N   Do you have serious difficulty dressing or bathing? N   Because of a physical, mental, or emotional condition, do you have serious difficulty doing errands alone such as visiting the doctor? N

## 2024-02-22 NOTE — Clinical Note
Sheath was exchanged in the right femoral artery with MICRO KIT, 5FR X 10CM,  0.018IN, W/40CM NI/AU TIP, ECHO.

## 2024-02-22 NOTE — ED TRIAGE NOTES
C/O DYSURIA/ABD PAIN/N/V, ONSET 4 DAYS, DENIES CP/SOB/PALPITATIONS/D/F/CHILLS, DENIES HEMATOCHEZIA/HEMATEMESIS/HEMATURIA

## 2024-02-22 NOTE — ED PROVIDER NOTES
HPI   Chief Complaint   Patient presents with    Abdominal Pain       HPI  HISTORY OF PRESENT ILLNESS:  53 y.o. male presenting to the ED with complaint of abdominal pain for the past 4 days.  He reports generalized abdominal pain that he describes as aching or stabbing, states it has been constant and progressively worsening, and severe.  Does not radiate into the back or flanks.  He also reports nausea, and states that he vomited 4 days ago when his symptoms began, but has not vomited since.  He states that he has not eating or drinking much due to the pain.  He states he had some constipation initially, but had a normal bowel limit this morning.  No melena or hematochezia.  No hematemesis.  He does endorse some urinary frequency, states that he was urinating more often than normal.  Denies dysuria, hematuria, or urgency.  Denies fevers or chills.  No chest pain or shortness of breath.  He denies ever having similar symptoms in the past.  He denies any history of prior abdominal surgeries.  Patient denies any past medical history.  He denies taking any daily medications.  He states that he does not drink alcohol, and does not smoke.  Denies use of any illicit substances.  No trauma or injury. No other complaints or symptoms voiced.    12 point review of systems was performed and is negative unless otherwise specified in HPI.    PMH: GERD, LUC, BPH, HLD, hx NSTEMI  Family history: noncontributory  Social history: non smoker, no ETOH, no illicit substances, hx cocaine dependence, hx nicotine dependence, hx opioid use  No past medical history on file.      Labs Reviewed   COMPREHENSIVE METABOLIC PANEL - Abnormal       Result Value    Glucose 111 (*)     Sodium 133 (*)     Potassium 3.5      Chloride 94 (*)     Bicarbonate 29      Anion Gap 14      Urea Nitrogen 31 (*)     Creatinine 1.14      eGFR 76      Calcium 8.8      Albumin 4.4      Alkaline Phosphatase 47      Total Protein 7.7      AST 24      Bilirubin,  Total 0.8      ALT 13     CBC WITH AUTO DIFFERENTIAL - Abnormal    WBC 20.7 (*)     nRBC 0.1 (*)     RBC 5.20      Hemoglobin 11.7 (*)     Hematocrit 36.0 (*)     MCV 69 (*)     MCH 22.5 (*)     MCHC 32.5      RDW 15.2 (*)     Platelets 277      Neutrophils % 74.9      Immature Granulocytes %, Automated 0.7      Lymphocytes % 13.8      Monocytes % 10.5      Eosinophils % 0.0      Basophils % 0.1      Neutrophils Absolute 15.48 (*)     Immature Granulocytes Absolute, Automated 0.15      Lymphocytes Absolute 2.86      Monocytes Absolute 2.18 (*)     Eosinophils Absolute 0.00      Basophils Absolute 0.02     URINALYSIS WITH REFLEX CULTURE AND MICROSCOPIC - Abnormal    Color, Urine Yellow      Appearance, Urine Clear      Specific Gravity, Urine 1.017      pH, Urine 6.0      Protein, Urine NEGATIVE      Glucose, Urine NEGATIVE      Blood, Urine NEGATIVE      Ketones, Urine 20 (1+) (*)     Bilirubin, Urine NEGATIVE      Urobilinogen, Urine <2.0      Nitrite, Urine NEGATIVE      Leukocyte Esterase, Urine NEGATIVE     DRUG SCREEN,URINE - Abnormal    Amphetamine Screen, Urine Presumptive Negative      Barbiturate Screen, Urine Presumptive Negative      Benzodiazepines Screen, Urine Presumptive Negative      Cannabinoid Screen, Urine Presumptive Positive (*)     Cocaine Metabolite Screen, Urine Presumptive Negative      Fentanyl Screen, Urine Presumptive Positive (*)     Opiate Screen, Urine Presumptive Positive (*)     Oxycodone Screen, Urine Presumptive Negative      PCP Screen, Urine Presumptive Negative      Narrative:     Drug screen results are presumptive and should not be used to assess   compliance with prescribed medication. Contact the performing [facility] laboratory   to add-on definitive confirmatory testing if clinically indicated.    Toxicology screening results are reported qualitatively. The concentration must   be greater than or equal to the cutoff to be reported as positive. The concentration   at which the  screening test can detect an individual drug or metabolite varies.   The absence of expected drug(s) and/or drug metabolite(s) may indicate non-compliance,   inappropriate timing of specimen collection relative to drug administration, poor drug   absorption, diluted/adulterated urine, or limitations of testing. For medical purposes   only; not valid for forensic use.    Interpretive questions should be directed to the laboratory medical directors.   TROPONIN I, HIGH SENSITIVITY - Abnormal    Troponin I, High Sensitivity 1,300 (*)     Narrative:     Less than 99th percentile of normal range cutoff-  Female and children under 18 years old <14 ng/L; Male <21 ng/L: Negative  Repeat testing should be performed if clinically indicated.     Female and children under 18 years old 14-50 ng/L; Male 21-50 ng/L:  Consistent with possible cardiac damage and possible increased clinical   risk. Serial measurements may help to assess extent of myocardial damage.     >50 ng/L: Consistent with cardiac damage, increased clinical risk and  myocardial infarction. Serial measurements may help assess extent of   myocardial damage.      NOTE: Children less than 1 year old may have higher baseline troponin   levels and results should be interpreted in conjunction with the overall   clinical context.     NOTE: Troponin I testing is performed using a different   testing methodology at [facility] than at other   [facility]. Direct result comparisons should only   be made within the same method.   SERIAL TROPONIN-INITIAL - Abnormal    Troponin I, High Sensitivity 1,074 (*)     Narrative:     Less than 99th percentile of normal range cutoff-  Female and children under 18 years old <14 ng/L; Male <21 ng/L: Negative  Repeat testing should be performed if clinically indicated.     Female and children under 18 years old 14-50 ng/L; Male 21-50 ng/L:  Consistent with possible cardiac damage and possible increased clinical   risk.  Serial measurements may help to assess extent of myocardial damage.     >50 ng/L: Consistent with cardiac damage, increased clinical risk and  myocardial infarction. Serial measurements may help assess extent of   myocardial damage.      NOTE: Children less than 1 year old may have higher baseline troponin   levels and results should be interpreted in conjunction with the overall   clinical context.     NOTE: Troponin I testing is performed using a different   testing methodology at [facility] than at other   [facility]. Direct result comparisons should only   be made within the same method.   SERIAL TROPONIN, 1 HOUR - Abnormal    Troponin I, High Sensitivity 1,103 (*)     Narrative:     Less than 99th percentile of normal range cutoff-  Female and children under 18 years old <14 ng/L; Male <21 ng/L: Negative  Repeat testing should be performed if clinically indicated.     Female and children under 18 years old 14-50 ng/L; Male 21-50 ng/L:  Consistent with possible cardiac damage and possible increased clinical   risk. Serial measurements may help to assess extent of myocardial damage.     >50 ng/L: Consistent with cardiac damage, increased clinical risk and  myocardial infarction. Serial measurements may help assess extent of   myocardial damage.      NOTE: Children less than 1 year old may have higher baseline troponin   levels and results should be interpreted in conjunction with the overall   clinical context.     NOTE: Troponin I testing is performed using a different   testing methodology at [facility] than at other   [facility]. Direct result comparisons should only   be made within the same method.   APTT - Abnormal    aPTT 22 (*)     Narrative:     The APTT is no longer used for monitoring Unfractionated Heparin Therapy. For monitoring Heparin Therapy, use the Heparin Assay.   LIPID PANEL NON-FASTING - Abnormal    Cholesterol 198      HDL-Cholesterol 35.3       Cholesterol/HDL Ratio 5.6      Non-HDL Cholesterol 163 (*)    C-REACTIVE PROTEIN - Abnormal    C-Reactive Protein 1.12 (*)    SEDIMENTATION RATE, AUTOMATED - Abnormal    Sedimentation Rate 28 (*)    DRUG SCREEN,URINE - Abnormal    Amphetamine Screen, Urine Presumptive Negative      Barbiturate Screen, Urine Presumptive Negative      Benzodiazepines Screen, Urine Presumptive Negative      Cannabinoid Screen, Urine Presumptive Positive (*)     Cocaine Metabolite Screen, Urine Presumptive Negative      Fentanyl Screen, Urine Presumptive Positive (*)     Opiate Screen, Urine Presumptive Positive (*)     Oxycodone Screen, Urine Presumptive Negative      PCP Screen, Urine Presumptive Negative      Narrative:     Drug screen results are presumptive and should not be used to assess   compliance with prescribed medication. Contact the performing [facility] laboratory   to add-on definitive confirmatory testing if clinically indicated.    Toxicology screening results are reported qualitatively. The concentration must   be greater than or equal to the cutoff to be reported as positive. The concentration   at which the screening test can detect an individual drug or metabolite varies.   The absence of expected drug(s) and/or drug metabolite(s) may indicate non-compliance,   inappropriate timing of specimen collection relative to drug administration, poor drug   absorption, diluted/adulterated urine, or limitations of testing. For medical purposes   only; not valid for forensic use.    Interpretive questions should be directed to the laboratory medical directors.   LACTATE - Normal    Lactate 1.5      Narrative:     Venipuncture immediately after or during the administration of Metamizole may lead to falsely low results. Testing should be performed immediately  prior to Metamizole dosing.   LIPASE - Normal    Lipase 51      Narrative:     Venipuncture immediately after or during the administration of Metamizole may lead to falsely low  results. Testing should be performed immediately prior to Metamizole dosing.   BLOOD CULTURE   BLOOD CULTURE   TROPONIN SERIES- (INITIAL, 1 HR)    Narrative:     The following orders were created for panel order Troponin I Series, High Sensitivity (0, 1 HR).  Procedure                               Abnormality         Status                     ---------                               -----------         ------                     Troponin I, High Sensiti...[[id number]]  Abnormal            Final result               Troponin, High Sensitivi...[[id number]]  Abnormal            Final result                 Please view results for these tests on the individual orders.   URINALYSIS WITH REFLEX CULTURE AND MICROSCOPIC    Narrative:     The following orders were created for panel order Urinalysis with Reflex Culture and Microscopic.  Procedure                               Abnormality         Status                     ---------                               -----------         ------                     Urinalysis with Reflex C...[[id number]]  Abnormal            Final result               Extra Urine Gray Tube[[id number]]                            In process                   Please view results for these tests on the individual orders.   EXTRA URINE GRAY TUBE   TROPONIN I, HIGH SENSITIVITY   TSH WITH REFLEX TO FREE T4 IF ABNORMAL     XR chest 2 views   Final Result   1.  No active cardiopulmonary disease.  There has not been   significant interval change from the prior exam.        Signed by: Bry Keller 2/22/2024 9:23 AM   Dictation workstation:   [id number]      CT chest abdomen pelvis wo IV contrast    (Results Pending)   Transthoracic Echo (TTE) Complete    (Results Pending)              [place] Coma Scale Score: 15                     Patient History   No past medical history on file.  Past Surgical History:   Procedure Laterality Date    CT ANGIO HEART CORONARY  4/19/2022    CT HEART CORONARY ANGIOGRAM 4/19/2022  [facility] EMERGENCY LEGACY    OTHER SURGICAL HISTORY  01/24/2022    Inguinal hernia repair laparoscopic     No family history on file.  Social History     Tobacco Use    Smoking status: Not on file    Smokeless tobacco: Not on file   Substance Use Topics    Alcohol use: Not on file    Drug use: Not on file       Physical Exam   ED Triage Vitals [02/22/24 0809]   Temperature Heart Rate Respirations BP   36.9 °C (98.4 °F) 100 20 137/71      Pulse Ox Temp src Heart Rate Source Patient Position   99 % -- -- --      BP Location FiO2 (%)     -- --       Physical Exam  Constitutional:       General: He is not in acute distress.     Appearance: He is ill-appearing. He is not toxic-appearing.   HENT:      Head: Normocephalic and atraumatic.      Nose: No congestion.      Mouth/Throat:      Mouth: Mucous membranes are moist.   Eyes:      General: No scleral icterus.     Extraocular Movements: Extraocular movements intact.   Cardiovascular:      Rate and Rhythm: Regular rhythm. Tachycardia present.      Pulses: Normal pulses.   Pulmonary:      Effort: Pulmonary effort is normal. No respiratory distress.   Abdominal:      General: There is no distension.      Palpations: Abdomen is soft. There is no mass or pulsatile mass.      Tenderness: There is generalized abdominal tenderness. There is guarding.   Musculoskeletal:         General: Normal range of motion.      Cervical back: Normal range of motion and neck supple. No rigidity.      Right lower leg: No edema.      Left lower leg: No edema.   Skin:     General: Skin is warm and dry.      Capillary Refill: Capillary refill takes less than 2 seconds.   Neurological:      General: No focal deficit present.      Mental Status: He is alert and oriented to person, place, and time.      Gait: Gait normal.   Psychiatric:         Mood and Affect: Mood normal.         Behavior: Behavior normal.         Judgment: Judgment normal.       ED Course & MDM   ED Course as of 02/22/24 173   Th Feb  22, 2024 0923 08:52 12 lead EKG interpreted by myself and my ED attending reveals sinus rhythm with a rate of 88 beats per minute.  Right atrial enlargement. High voltage QRS, possible LVH. There are ST depressions in V4-V6. There are no ST elevations or T wave inversions. No acute ischemic changes identified.  [EH]   1144 ECG 12 lead  Repeat ECG ordered interpreted by ED physician demonstrates sinus rhythm.  80 bpm.  Nonspecific ST flattening/depression in the anterior leads extending out laterally to some extent, but no acute ST elevation to suggest STEMI. [KS]   1444 CT scan showing intraperitoneal hemorrhage, discussed with cardiology who agree with stopping heparin with these new findings. Surgical team consulted and new CBC ordered. [EH]   1520 Spoke with CT tech to expedite patient's CTA. [EH]   1730 Consulted IR attending Dr. Joyce, who plans to take the patient for a splenic artery embolization tonight, and said no need for heparin reversal therapy at this time. [EH]      ED Course User Index  [EH] Jennifer Ohara PA-C  [KS] Willian Whalen MD MPH         Diagnoses as of 02/22/24 1731   Generalized abdominal pain   NSTEMI (non-ST elevated myocardial infarction) (CMS/HCC)   Hemoperitoneum       Medical Decision Making  ED course / MDM     Summary:  Patient presented with abdominal pain and nausea for the past 4 days.  Vital signs stable, patient appears uncomfortable, ill-appearing.  Generalized abdominal tenderness, guarding present.  No overt distention.  Lungs clear to auscultation, heart regular rate and rhythm.  Denies any drug or alcohol use, though per chart he has a history of substance abuse.  IV established, labs drawn.  Initial labs show a leukocytosis of 20.7, hemoglobin 11.7, minor electrolyte abnormalities, normal kidney and liver function.  Normal lactate and lipase.  Patient denied any illicit drug use, though urine drug screen shows positive cannabinoids, fentanyl, and opiates.  Initial  EKG shows ST depressions in lateral leads, not seen on prior EKGs.  Troponin elevated at 1300. Patient case discussed with ED attending Dr. Whalen, who also saw and evaluated the patient.   Cardiology attending Dr. Sepulveda consulted at this time, agrees with initiating heparin and plan for admission, with CT scan still pending.  Subsequent EKG shows no STEMI.  Second troponin 1074, third troponin 1103.  Pain controlled with  CT chest abdomen and pelvis shows hyperdense free fluid within the abdomen concerning for intraperitoneal hemorrhage.  Discussed with cardiology team again at this time who agree heparin may be stopped.  Repeat CBC also drawn.  Called surgical team, who are consulted on the case, evaluated the patient and are recommending urgent CTA of the abdomen.  Called CT techs who will prioritize the patient's CT scan. Repeat CBC shows hemoglobin decreased to 9.5.  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  Shows a small to moderate hemoperitoneum surrounding the spleen, liver, and extending into the pelvis.  Discussed with general surgery again, who recommends consulting interventional radiology.  Discussed with interventional radiology attending Dr. Joyce, recommending splenic artery embolization as the aneurysm is the most likely culprit of the hemoperitoneum, will plan to schedule the patient for embolization tonight.  No need for heparin reversal therapy per Dr. Joyce.  Patient remained stable, pain controlled, stable vitals. Agrees with plan for admission. Accepted to Dr. Banda's service.    Impression:  1. See diagnosis     Disposition: Admission    Patient seen and discussed with Dr. Whalen    This note has been transcribed using voice recognition and may contain grammatical errors, misplaced words, incorrect words, incorrect phrases or other errors.   Procedure  Critical Care    Performed by: Willian Whalen MD MPH  Authorized by: Willian GRAHAM  MD Koby MPH    Critical care provider statement:     Critical care time (minutes):  55    Critical care time was exclusive of:  Separately billable procedures and treating other patients    Critical care was necessary to treat or prevent imminent or life-threatening deterioration of the following conditions: NSTEMI, abdominal pain.    Care discussed with: admitting provider          ________________________________________________________________________    ED attending attestation note:    This patient was seen by the advanced practice provider/resident.  I have personally performed a substantive portion of the encounter.  I have seen and examined the patient, agree with the workup, evaluation, MDM, management and diagnosis.  The care plan has been discussed.    I personally saw the patient and made/improved the management of the plan and take responsibility for the patient management.    History: 53-year-old male, presents with abdominal pain and mild chest pressure.  States he had some type of COVID related cardiac issue, but no established cardiac disease.  Abdominal pain diffuse not localizable relatively severe nonradiating otherwise.  No significant bowel or bladder changes.  Exam: Heart regular.  No obvious murmurs or rubs.  Lungs anteriorly were clear.  Abdomen grossly tender in a diffuse fashion, mild voluntary guarding and slight rebound at times.  Bowel sounds normal to hyperactive.  No obvious masses.  MDM: Patient found to have nonischemic EKG on multiple checks, but markedly elevated troponin consistent with NSTEMI.  Still awaiting CT scan of abdomen pelvis, but discussed with cardiology will hospitalize for NSTEMI and abdominal pain.  Heparin initiated per recommendation of cardiology.      Willian Flores MD,MPH  [facility] Emergency Department  [phone]   _________________________________________________________________________      Jennifer Ohara PA-C  02/22/24 1734

## 2024-02-22 NOTE — PROGRESS NOTES
02/22/24 1042   Physical Activity   On average, how many days per week do you engage in moderate to strenuous exercise (like a brisk walk)? 2 days   On average, how many minutes do you engage in exercise at this level? 30 min   Financial Resource Strain   How hard is it for you to pay for the very basics like food, housing, medical care, and heating? Not hard   Housing Stability   In the last 12 months, was there a time when you were not able to pay the mortgage or rent on time? N   In the last 12 months, how many places have you lived? 1   In the last 12 months, was there a time when you did not have a steady place to sleep or slept in a shelter (including now)? N   Transportation Needs   In the past 12 months, has lack of transportation kept you from medical appointments or from getting medications? no   In the past 12 months, has lack of transportation kept you from meetings, work, or from getting things needed for daily living? No   Food Insecurity   Within the past 12 months, you worried that your food would run out before you got the money to buy more. Never true   Within the past 12 months, the food you bought just didn't last and you didn't have money to get more. Never true   Stress   Do you feel stress - tense, restless, nervous, or anxious, or unable to sleep at night because your mind is troubled all the time - these days? Not at all   Social Connections   In a typical week, how many times do you talk on the phone with family, friends, or neighbors? More than 3   How often do you get together with friends or relatives? More than 3   How often do you attend Mu-ism or Muslim services? Never   Do you belong to any clubs or organizations such as Mu-ism groups, unions, fraternal or athletic groups, or school groups? No   How often do you attend meetings of the clubs or organizations you belong to? Never   Are you , , , , never , or living with a partner? Living with    Intimate Partner Violence   Within the last year, have you been afraid of your partner or ex-partner? No   Within the last year, have you been humiliated or emotionally abused in other ways by your partner or ex-partner? No   Within the last year, have you been kicked, hit, slapped, or otherwise physically hurt by your partner or ex-partner? No   Within the last year, have you been raped or forced to have any kind of sexual activity by your partner or ex-partner? No   Alcohol Use   Q1: How often do you have a drink containing alcohol? Never   Q2: How many drinks containing alcohol do you have on a typical day when you are drinking? None   Q3: How often do you have six or more drinks on one occasion? Never   Utilities   In the past 12 months has the electric, gas, oil, or water company threatened to shut off services in your home? No

## 2024-02-22 NOTE — PROGRESS NOTES
Transitional Care Coordination Progress Note:  Plan per Medical/Surgical team: treatment of abd pain, N/V, elevated trops with zofran, IV morphine, Iv fluids, IV heparin gtt, cardio consult, CTSCAN pending   Status: ED  Payor source: caresource  Discharge disposition: Home with sign other   Potential Barriers: trop 1,300  ADOD: 2/24/2024  RICARDO Viramontes RN, BSN Transitional Care Coordinator ED# [phone]      02/22/24 1037   Discharge Planning   Living Arrangements Spouse/significant other   Support Systems Spouse/significant other;Parent   Assistance Needed cardio work up   Type of Residence Private residence   Number of Stairs to Enter Residence 1   Number of Stairs Within Residence 12  (to basement)   Do you have animals or pets at home? No   Home or Post Acute Services None   Patient expects to be discharged to: Home with sign other   Does the patient need discharge transport arranged? Yes   RoundTrip coordination needed? Yes   Has discharge transport been arranged? No   Financial Resource Strain   How hard is it for you to pay for the very basics like food, housing, medical care, and heating? Not hard   Housing Stability   In the last 12 months, was there a time when you were not able to pay the mortgage or rent on time? N   In the last 12 months, how many places have you lived? 1   In the last 12 months, was there a time when you did not have a steady place to sleep or slept in a shelter (including now)? N   Transportation Needs   In the past 12 months, has lack of transportation kept you from medical appointments or from getting medications? no   In the past 12 months, has lack of transportation kept you from meetings, work, or from getting things needed for daily living? No

## 2024-02-22 NOTE — ED NOTES
CHW talked to patient regarding not having a primary care physician. CHW asked the patient if he would be interested in a looking at a list of  physicians to choose from from future services. Patient agreed and was given a  list of  physicians, they are accepting new patients, they are taking [facility] and they are in the area where he lives. Patient expressed appreciation.      Marquita Sheth [facility]  02/22/24 [id number]

## 2024-02-22 NOTE — Clinical Note
Patient ID band present and verified. Patient contact is in patient room. Contact(s) present: family.

## 2024-02-22 NOTE — CONSULTS
"Reason For Consult  Possible intraperitoneal hemorrhage    History Of Present Illness  Douglas Wilson is a 53 y.o. male presenting with 4 days of intermittent sharp diffuse abdominal pain. He has no issues with flatus or bowel movements. He denied any nausea, vomiting, bleeding per rectum. He was initially discovered to have an NSTEMI and begun on heparin. He reports 1 PPD tobacco smoking for a \"long time\". Initial abdominal CT revealed hyperdense free fluid within the abdomen, concerning for intraperitoneal hemorrhage. 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     Past Medical History  He has no past medical history on file.    Surgical History  He has a past surgical history that includes Other surgical history (01/24/2022) and CT angio coronary art with heartflow if score >30% (4/19/2022).     Social History  He has no history on file for tobacco use, alcohol use, and drug use.    Family History  No family history on file.     Allergies  Patient has no known allergies.    Review of Systems  A full 10 point ROS was completed. Nothing positive other than what was mentioned in the HPI.       Physical Exam  Physical Exam  Constitutional:       Appearance: Normal appearance.   HENT:      Head: Normocephalic.      Nose: Nose normal.      Mouth/Throat:      Mouth: Mucous membranes are moist.   Eyes:      Extraocular Movements: Extraocular movements intact.      Pupils: Pupils are equal, round, and reactive to light.   Cardiovascular:      Rate and Rhythm: Normal rate and regular rhythm.      Pulses: Normal pulses.      Heart sounds: Normal heart sounds.   Pulmonary:      Effort: Pulmonary effort is normal.      Breath sounds: Normal breath sounds.   Abdominal:      General: Abdomen is flat. Bowel sounds are normal. There is no distension.      Palpations: Abdomen is soft.      Tenderness: There is abdominal " "tenderness (diffuse pain upon moderate palpation). There is guarding.   Musculoskeletal:         General: No swelling. Normal range of motion.      Cervical back: Normal range of motion.   Skin:     General: Skin is warm.      Capillary Refill: Capillary refill takes less than 2 seconds.   Neurological:      General: No focal deficit present.      Mental Status: He is alert and oriented to person, place, and time.   Psychiatric:         Mood and Affect: Mood normal.         Behavior: Behavior normal.         Thought Content: Thought content normal.         Judgment: Judgment normal.         Last Recorded Vitals  Blood pressure (!) 135/95, pulse 93, temperature 36.9 °C (98.4 °F), resp. rate (!) 32, height 1.778 m (5' 10\"), weight 79.4 kg (175 lb), SpO2 97 %.    Relevant Results  .Scheduled medications  perflutren lipid microspheres, 0.5-10 mL of dilution, intravenous, Once in imaging      Continuous medications     PRN medications  PRN medications: HYDROmorphone, ondansetron    .  Results for orders placed or performed during the hospital encounter of 02/22/24 (from the past 24 hour(s))   Lactate   Result Value Ref Range    Lactate 1.5 0.4 - 2.0 mmol/L   Lipase   Result Value Ref Range    Lipase 51 9 - 82 U/L   Comprehensive Metabolic Panel   Result Value Ref Range    Glucose 111 (H) 74 - 99 mg/dL    Sodium 133 (L) 136 - 145 mmol/L    Potassium 3.5 3.5 - 5.3 mmol/L    Chloride 94 (L) 98 - 107 mmol/L    Bicarbonate 29 21 - 32 mmol/L    Anion Gap 14 10 - 20 mmol/L    Urea Nitrogen 31 (H) 6 - 23 mg/dL    Creatinine 1.14 0.50 - 1.30 mg/dL    eGFR 76 >60 mL/min/1.73m*2    Calcium 8.8 8.6 - 10.3 mg/dL    Albumin 4.4 3.4 - 5.0 g/dL    Alkaline Phosphatase 47 33 - 120 U/L    Total Protein 7.7 6.4 - 8.2 g/dL    AST 24 9 - 39 U/L    Bilirubin, Total 0.8 0.0 - 1.2 mg/dL    ALT 13 10 - 52 U/L   CBC and Auto Differential   Result Value Ref Range    WBC 20.7 (H) 4.4 - 11.3 x10*3/uL    nRBC 0.1 (H) 0.0 - 0.0 /100 WBCs    RBC 5.20 " 4.50 - 5.90 x10*6/uL    Hemoglobin 11.7 (L) 13.5 - 17.5 g/dL    Hematocrit 36.0 (L) 41.0 - 52.0 %    MCV 69 (L) 80 - 100 fL    MCH 22.5 (L) 26.0 - 34.0 pg    MCHC 32.5 32.0 - 36.0 g/dL    RDW 15.2 (H) 11.5 - 14.5 %    Platelets 277 150 - 450 x10*3/uL    Neutrophils % 74.9 40.0 - 80.0 %    Immature Granulocytes %, Automated 0.7 0.0 - 0.9 %    Lymphocytes % 13.8 13.0 - 44.0 %    Monocytes % 10.5 2.0 - 10.0 %    Eosinophils % 0.0 0.0 - 6.0 %    Basophils % 0.1 0.0 - 2.0 %    Neutrophils Absolute 15.48 (H) 1.20 - 7.70 x10*3/uL    Immature Granulocytes Absolute, Automated 0.15 0.00 - 0.70 x10*3/uL    Lymphocytes Absolute 2.86 1.20 - 4.80 x10*3/uL    Monocytes Absolute 2.18 (H) 0.10 - 1.00 x10*3/uL    Eosinophils Absolute 0.00 0.00 - 0.70 x10*3/uL    Basophils Absolute 0.02 0.00 - 0.10 x10*3/uL   Troponin I, High Sensitivity   Result Value Ref Range    Troponin I, High Sensitivity 1,300 (HH) 0 - 20 ng/L   Sedimentation rate, automated   Result Value Ref Range    Sedimentation Rate 28 (H) 0 - 20 mm/h   ECG 12 lead   Result Value Ref Range    Ventricular Rate 88 BPM    Atrial Rate 88 BPM    NJ Interval 128 ms    QRS Duration 76 ms    QT Interval 336 ms    QTC Calculation(Bazett) 406 ms    P Axis 83 degrees    R Axis -1 degrees    T Axis 101 degrees    QRS Count 15 beats    Q Onset 225 ms    P Onset 161 ms    P Offset 204 ms    T Offset 393 ms    QTC Fredericia 381 ms   Blood Culture    Specimen: Peripheral Venipuncture; Blood culture   Result Value Ref Range    Blood Culture Loaded on Instrument - Culture in progress    Blood Culture    Specimen: Peripheral Venipuncture; Blood culture   Result Value Ref Range    Blood Culture Loaded on Instrument - Culture in progress    Troponin I, High Sensitivity, Initial   Result Value Ref Range    Troponin I, High Sensitivity 1,074 (HH) 0 - 20 ng/L   Lipid Panel Non-Fasting   Result Value Ref Range    Cholesterol 198 0 - 199 mg/dL    HDL-Cholesterol 35.3 mg/dL    Cholesterol/HDL Ratio  5.6     Non-HDL Cholesterol 163 (H) 0 - 149 mg/dL   C-reactive protein   Result Value Ref Range    C-Reactive Protein 1.12 (H) <1.00 mg/dL   TSH with reflex to Free T4 if abnormal   Result Value Ref Range    Thyroid Stimulating Hormone 1.28 0.44 - 3.98 mIU/L   aPTT - baseline   Result Value Ref Range    aPTT 22 (L) 27 - 38 seconds   ECG 12 lead   Result Value Ref Range    Ventricular Rate 94 BPM    Atrial Rate 94 BPM    NM Interval 140 ms    QRS Duration 74 ms    QT Interval 362 ms    QTC Calculation(Bazett) 452 ms    P Axis 76 degrees    R Axis 6 degrees    T Axis 104 degrees    QRS Count 15 beats    Q Onset 225 ms    P Onset 155 ms    P Offset 208 ms    T Offset 406 ms    QTC Fredericia 420 ms   Urinalysis with Reflex Culture and Microscopic   Result Value Ref Range    Color, Urine Yellow Straw, Yellow    Appearance, Urine Clear Clear    Specific Gravity, Urine 1.017 1.005 - 1.035    pH, Urine 6.0 5.0, 5.5, 6.0, 6.5, 7.0, 7.5, 8.0    Protein, Urine NEGATIVE NEGATIVE mg/dL    Glucose, Urine NEGATIVE NEGATIVE mg/dL    Blood, Urine NEGATIVE NEGATIVE    Ketones, Urine 20 (1+) (A) NEGATIVE mg/dL    Bilirubin, Urine NEGATIVE NEGATIVE    Urobilinogen, Urine <2.0 <2.0 mg/dL    Nitrite, Urine NEGATIVE NEGATIVE    Leukocyte Esterase, Urine NEGATIVE NEGATIVE   Drug Screen, Urine   Result Value Ref Range    Amphetamine Screen, Urine Presumptive Negative Presumptive Negative    Barbiturate Screen, Urine Presumptive Negative Presumptive Negative    Benzodiazepines Screen, Urine Presumptive Negative Presumptive Negative    Cannabinoid Screen, Urine Presumptive Positive (A) Presumptive Negative    Cocaine Metabolite Screen, Urine Presumptive Negative Presumptive Negative    Fentanyl Screen, Urine Presumptive Positive (A) Presumptive Negative    Opiate Screen, Urine Presumptive Positive (A) Presumptive Negative    Oxycodone Screen, Urine Presumptive Negative Presumptive Negative    PCP Screen, Urine Presumptive Negative  Presumptive Negative   Troponin, High Sensitivity, 1 Hour   Result Value Ref Range    Troponin I, High Sensitivity 1,103 (HH) 0 - 20 ng/L   Drug Screen, Urine   Result Value Ref Range    Amphetamine Screen, Urine Presumptive Negative Presumptive Negative    Barbiturate Screen, Urine Presumptive Negative Presumptive Negative    Benzodiazepines Screen, Urine Presumptive Negative Presumptive Negative    Cannabinoid Screen, Urine Presumptive Positive (A) Presumptive Negative    Cocaine Metabolite Screen, Urine Presumptive Negative Presumptive Negative    Fentanyl Screen, Urine Presumptive Positive (A) Presumptive Negative    Opiate Screen, Urine Presumptive Positive (A) Presumptive Negative    Oxycodone Screen, Urine Presumptive Negative Presumptive Negative    PCP Screen, Urine Presumptive Negative Presumptive Negative   Heparin Assay   Result Value Ref Range    Heparin Unfractionated 0.5 See Comment Below for Therapeutic Ranges IU/mL   Troponin I, High Sensitivity   Result Value Ref Range    Troponin I, High Sensitivity 1,252 (HH) 0 - 20 ng/L   Transthoracic Echo (TTE) Complete   Result Value Ref Range    AV pk norberto 1.85 m/s    AV mn grad 8.0 mmHg    LVOT diam 2.20 cm    LV biplane EF 68 %    MV avg E/e' ratio 5.65     MV E/A ratio 0.75     LA vol index A/L 23.1 ml/m2    Tricuspid annular plane systolic excursion 1.9 cm    RV free wall pk S' 14.60 cm/s    LVIDd 4.40 cm    Aortic Valve Area by Continuity of VTI 3.62 cm2    Aortic Valve Area by Continuity of Peak Velocity 3.41 cm2    AV pk grad 13.7 mmHg    LV A4C EF 70.0    CBC   Result Value Ref Range    WBC 16.7 (H) 4.4 - 11.3 x10*3/uL    nRBC 0.0 0.0 - 0.0 /100 WBCs    RBC 4.01 (L) 4.50 - 5.90 x10*6/uL    Hemoglobin 9.5 (L) 13.5 - 17.5 g/dL    Hematocrit 28.2 (L) 41.0 - 52.0 %    MCV 70 (L) 80 - 100 fL    MCH 23.7 (L) 26.0 - 34.0 pg    MCHC 33.7 32.0 - 36.0 g/dL    RDW 15.1 (H) 11.5 - 14.5 %    Platelets 229 150 - 450 x10*3/uL     .  CT angio chest abdomen pelvis    Final Result   There is a 6 mm aneurysm in the caudal aspect of the spleen. The   spleen has a striated appearance which is likely related to phase of   contrast, if there is recent history of trauma, small lacerations can   have a similar appearance.        Small to moderate amount of hemoperitoneum surrounding the spleen,   liver, and extending into the pelvis.        Bibasilar atelectasis with bronchial wall thickening and   opacification of the small airways leading to the right lower lobe   which can be seen in the setting of aspiration/mucoid secretions.        Signed by: Pillo Patel 2/22/2024 4:57 PM   Dictation workstation:   [id number]      Transthoracic Echo (TTE) Complete   Final Result      CT chest abdomen pelvis wo IV contrast   Final Result   CHEST:   1. No focal infiltrate, pulmonary nodule or lymphadenopathy   identified.        ABDOMEN-PELVIS:   1. Hyperdense free fluid within the abdomen, concerning for   intraperitoneal hemorrhage. The source of this hemorrhage cannot be   elucidated on CT.   2. No evidence of bowel obstruction or free intraperitoneal air.        MACRO:   None        Signed by: Elijah Gong 2/22/2024 2:09 PM   Dictation workstation:   [id number]      XR chest 2 views   Final Result   1.  No active cardiopulmonary disease.  There has not been   significant interval change from the prior exam.        Signed by: Bry Keller 2/22/2024 9:23 AM   Dictation workstation:   [id number]      Consult to Interventional Radiology    (Results Pending)       Assessment/Plan     Patient is a 52 y/o male who presents with diffuse intermittent abdominal pain for around 4 days. He was started on heparin around 10a today for NSTEMI diagnosis. CTA revealing 6mm aneuysm in the caudal aspect of the spleen with small-moderate amount of surrounding hemoperitoneum. Vital signs are stable, he is afebrile. WBC 16.7, Hgb 9.5.    Plan:  I discussed patient's case and reviewed CTA findings with   Beverly. He recommends IR consultation for the management of this discovered splenic aneurysm. Would also consider heparin reversal with protamine if deemed appropriate. The patient will need close H/H trending.    I spent 60 minutes in the professional and overall care of this patient.    Manjit Philippe PA-C

## 2024-02-22 NOTE — PROGRESS NOTES
Home with sign other      02/22/24 1035   Current Planned Discharge Disposition   Current Planned Discharge Disposition Home

## 2024-02-22 NOTE — PROGRESS NOTES
Prior to Admission Medications   Prescriptions Last Dose Informant   omeprazole (PriLOSEC) 40 mg DR capsule Unknown Self   Sig: Take 1 capsule (40 mg) by mouth once daily as needed. Do not crush or chew.      Facility-Administered Medications: None       Interviewed Patient  Danelle Ramey CPhT

## 2024-02-23 ENCOUNTER — APPOINTMENT (OUTPATIENT)
Dept: CARDIOLOGY | Facility: HOSPITAL | Age: 54
End: 2024-02-23
Payer: COMMERCIAL

## 2024-02-23 LAB
ANION GAP SERPL CALC-SCNC: 11 MMOL/L (ref 10–20)
BUN SERPL-MCNC: 18 MG/DL (ref 6–23)
CALCIUM SERPL-MCNC: 8 MG/DL (ref 8.6–10.3)
CARDIAC TROPONIN I PNL SERPL HS: 595 NG/L (ref 0–20)
CHLORIDE SERPL-SCNC: 100 MMOL/L (ref 98–107)
CO2 SERPL-SCNC: 27 MMOL/L (ref 21–32)
CREAT SERPL-MCNC: 0.98 MG/DL (ref 0.5–1.3)
EGFRCR SERPLBLD CKD-EPI 2021: >90 ML/MIN/1.73M*2
ERYTHROCYTE [DISTWIDTH] IN BLOOD BY AUTOMATED COUNT: 15.3 % (ref 11.5–14.5)
GLUCOSE SERPL-MCNC: 96 MG/DL (ref 74–99)
HCT VFR BLD AUTO: 26.9 % (ref 41–52)
HGB BLD-MCNC: 8.9 G/DL (ref 13.5–17.5)
MCH RBC QN AUTO: 23.6 PG (ref 26–34)
MCHC RBC AUTO-ENTMCNC: 33.1 G/DL (ref 32–36)
MCV RBC AUTO: 71 FL (ref 80–100)
NRBC BLD-RTO: 0 /100 WBCS (ref 0–0)
PLATELET # BLD AUTO: 208 X10*3/UL (ref 150–450)
POTASSIUM SERPL-SCNC: 3.7 MMOL/L (ref 3.5–5.3)
RBC # BLD AUTO: 3.77 X10*6/UL (ref 4.5–5.9)
SODIUM SERPL-SCNC: 134 MMOL/L (ref 136–145)
WBC # BLD AUTO: 15 X10*3/UL (ref 4.4–11.3)

## 2024-02-23 PROCEDURE — 93005 ELECTROCARDIOGRAM TRACING: CPT

## 2024-02-23 PROCEDURE — 1200000002 HC GENERAL ROOM WITH TELEMETRY DAILY

## 2024-02-23 PROCEDURE — 99233 SBSQ HOSP IP/OBS HIGH 50: CPT | Performed by: INTERNAL MEDICINE

## 2024-02-23 PROCEDURE — 99232 SBSQ HOSP IP/OBS MODERATE 35: CPT | Performed by: SURGERY

## 2024-02-23 PROCEDURE — 99221 1ST HOSP IP/OBS SF/LOW 40: CPT | Performed by: NURSE PRACTITIONER

## 2024-02-23 PROCEDURE — 84484 ASSAY OF TROPONIN QUANT: CPT | Performed by: INTERNAL MEDICINE

## 2024-02-23 PROCEDURE — 2500000004 HC RX 250 GENERAL PHARMACY W/ HCPCS (ALT 636 FOR OP/ED): Performed by: EMERGENCY MEDICINE

## 2024-02-23 PROCEDURE — 36415 COLL VENOUS BLD VENIPUNCTURE: CPT | Performed by: INTERNAL MEDICINE

## 2024-02-23 PROCEDURE — 85027 COMPLETE CBC AUTOMATED: CPT | Performed by: INTERNAL MEDICINE

## 2024-02-23 PROCEDURE — 2500000001 HC RX 250 WO HCPCS SELF ADMINISTERED DRUGS (ALT 637 FOR MEDICARE OP): Performed by: INTERNAL MEDICINE

## 2024-02-23 PROCEDURE — 80048 BASIC METABOLIC PNL TOTAL CA: CPT | Performed by: INTERNAL MEDICINE

## 2024-02-23 RX ORDER — FOLIC ACID 1 MG/1
1 TABLET ORAL DAILY
Status: DISCONTINUED | OUTPATIENT
Start: 2024-02-24 | End: 2024-02-24 | Stop reason: HOSPADM

## 2024-02-23 RX ORDER — THIAMINE HYDROCHLORIDE 100 MG/ML
100 INJECTION, SOLUTION INTRAMUSCULAR; INTRAVENOUS DAILY
Status: DISCONTINUED | OUTPATIENT
Start: 2024-02-24 | End: 2024-02-24 | Stop reason: HOSPADM

## 2024-02-23 RX ORDER — LOPERAMIDE HYDROCHLORIDE 2 MG/1
2 CAPSULE ORAL 4 TIMES DAILY PRN
Status: DISCONTINUED | OUTPATIENT
Start: 2024-02-23 | End: 2024-02-24 | Stop reason: HOSPADM

## 2024-02-23 RX ORDER — DIAZEPAM 5 MG/1
10 TABLET ORAL EVERY 2 HOUR PRN
Status: DISCONTINUED | OUTPATIENT
Start: 2024-02-23 | End: 2024-02-24 | Stop reason: HOSPADM

## 2024-02-23 RX ORDER — LANOLIN ALCOHOL/MO/W.PET/CERES
100 CREAM (GRAM) TOPICAL DAILY
Status: DISCONTINUED | OUTPATIENT
Start: 2024-02-27 | End: 2024-02-24 | Stop reason: HOSPADM

## 2024-02-23 RX ORDER — MULTIVIT-MIN/IRON FUM/FOLIC AC 7.5 MG-4
1 TABLET ORAL DAILY
Status: DISCONTINUED | OUTPATIENT
Start: 2024-02-24 | End: 2024-02-24 | Stop reason: HOSPADM

## 2024-02-23 RX ADMIN — HYDROMORPHONE HYDROCHLORIDE 1 MG: 1 INJECTION, SOLUTION INTRAMUSCULAR; INTRAVENOUS; SUBCUTANEOUS at 05:34

## 2024-02-23 RX ADMIN — LOPERAMIDE HYDROCHLORIDE 2 MG: 2 CAPSULE ORAL at 22:04

## 2024-02-23 RX ADMIN — ACETAMINOPHEN 650 MG: 325 TABLET ORAL at 07:59

## 2024-02-23 SDOH — SOCIAL STABILITY: SOCIAL INSECURITY: ARE THERE ANY APPARENT SIGNS OF INJURIES/BEHAVIORS THAT COULD BE RELATED TO ABUSE/NEGLECT?: NO

## 2024-02-23 SDOH — SOCIAL STABILITY: SOCIAL INSECURITY: ARE YOU OR HAVE YOU BEEN THREATENED OR ABUSED PHYSICALLY, EMOTIONALLY, OR SEXUALLY BY ANYONE?: NO

## 2024-02-23 SDOH — SOCIAL STABILITY: SOCIAL INSECURITY: DO YOU FEEL ANYONE HAS EXPLOITED OR TAKEN ADVANTAGE OF YOU FINANCIALLY OR OF YOUR PERSONAL PROPERTY?: NO

## 2024-02-23 SDOH — SOCIAL STABILITY: SOCIAL INSECURITY: ABUSE: ADULT

## 2024-02-23 SDOH — SOCIAL STABILITY: SOCIAL INSECURITY: WERE YOU ABLE TO COMPLETE ALL THE BEHAVIORAL HEALTH SCREENINGS?: YES

## 2024-02-23 SDOH — SOCIAL STABILITY: SOCIAL INSECURITY: DOES ANYONE TRY TO KEEP YOU FROM HAVING/CONTACTING OTHER FRIENDS OR DOING THINGS OUTSIDE YOUR HOME?: NO

## 2024-02-23 SDOH — SOCIAL STABILITY: SOCIAL INSECURITY: HAVE YOU HAD THOUGHTS OF HARMING ANYONE ELSE?: YES

## 2024-02-23 SDOH — SOCIAL STABILITY: SOCIAL INSECURITY: HAS ANYONE EVER THREATENED TO HURT YOUR FAMILY OR YOUR PETS?: NO

## 2024-02-23 SDOH — SOCIAL STABILITY: SOCIAL INSECURITY: DO YOU FEEL UNSAFE GOING BACK TO THE PLACE WHERE YOU ARE LIVING?: NO

## 2024-02-23 ASSESSMENT — LIFESTYLE VARIABLES
NAUSEA AND VOMITING: NO NAUSEA AND NO VOMITING
ORIENTATION AND CLOUDING OF SENSORIUM: ORIENTED AND CAN DO SERIAL ADDITIONS
PAROXYSMAL SWEATS: NO SWEAT VISIBLE
AGITATION: NORMAL ACTIVITY
TOTAL SCORE: 0
HOW OFTEN DO YOU HAVE A DRINK CONTAINING ALCOHOL: NEVER
HOW OFTEN DO YOU HAVE 6 OR MORE DRINKS ON ONE OCCASION: NEVER
HOW MANY STANDARD DRINKS CONTAINING ALCOHOL DO YOU HAVE ON A TYPICAL DAY: PATIENT DOES NOT DRINK
SUBSTANCE_ABUSE_PAST_12_MONTHS: NO
TREMOR: NO TREMOR
AUDITORY DISTURBANCES: NOT PRESENT
VISUAL DISTURBANCES: NOT PRESENT
ORIENTATION AND CLOUDING OF SENSORIUM: ORIENTED AND CAN DO SERIAL ADDITIONS
ANXIETY: NO ANXIETY, AT EASE
SKIP TO QUESTIONS 9-10: 1
PRESCIPTION_ABUSE_PAST_12_MONTHS: NO
AUDITORY DISTURBANCES: NOT PRESENT
PAROXYSMAL SWEATS: NO SWEAT VISIBLE
HEADACHE, FULLNESS IN HEAD: NOT PRESENT
AUDIT-C TOTAL SCORE: 0
VISUAL DISTURBANCES: NOT PRESENT
TREMOR: NO TREMOR
HEADACHE, FULLNESS IN HEAD: NOT PRESENT
NAUSEA AND VOMITING: NO NAUSEA AND NO VOMITING
ANXIETY: NO ANXIETY, AT EASE
AUDIT-C TOTAL SCORE: 0
TOTAL SCORE: 0
AGITATION: NORMAL ACTIVITY

## 2024-02-23 ASSESSMENT — ACTIVITIES OF DAILY LIVING (ADL)
JUDGMENT_ADEQUATE_SAFELY_COMPLETE_DAILY_ACTIVITIES: YES
WALKS IN HOME: INDEPENDENT
HEARING - LEFT EAR: FUNCTIONAL
HEARING - RIGHT EAR: FUNCTIONAL
PATIENT'S MEMORY ADEQUATE TO SAFELY COMPLETE DAILY ACTIVITIES?: YES
TOILETING: NEEDS ASSISTANCE
BATHING: NEEDS ASSISTANCE
FEEDING YOURSELF: INDEPENDENT
GROOMING: INDEPENDENT
ADEQUATE_TO_COMPLETE_ADL: YES
DRESSING YOURSELF: INDEPENDENT
LACK_OF_TRANSPORTATION: PATIENT DECLINED

## 2024-02-23 ASSESSMENT — COGNITIVE AND FUNCTIONAL STATUS - GENERAL
EATING MEALS: A LITTLE
DAILY ACTIVITIY SCORE: 24
TURNING FROM BACK TO SIDE WHILE IN FLAT BAD: A LITTLE
MOBILITY SCORE: 18
DAILY ACTIVITIY SCORE: 18
DRESSING REGULAR UPPER BODY CLOTHING: A LITTLE
TOILETING: A LITTLE
CLIMB 3 TO 5 STEPS WITH RAILING: A LITTLE
DRESSING REGULAR LOWER BODY CLOTHING: A LITTLE
MOVING FROM LYING ON BACK TO SITTING ON SIDE OF FLAT BED WITH BEDRAILS: A LITTLE
MOVING TO AND FROM BED TO CHAIR: A LITTLE
MOBILITY SCORE: 24
PERSONAL GROOMING: A LITTLE
STANDING UP FROM CHAIR USING ARMS: A LITTLE
HELP NEEDED FOR BATHING: A LITTLE
WALKING IN HOSPITAL ROOM: A LITTLE

## 2024-02-23 ASSESSMENT — PAIN - FUNCTIONAL ASSESSMENT
PAIN_FUNCTIONAL_ASSESSMENT: 0-10

## 2024-02-23 ASSESSMENT — PAIN SCALES - GENERAL
PAINLEVEL_OUTOF10: 0 - NO PAIN
PAINLEVEL_OUTOF10: 4
PAINLEVEL_OUTOF10: 8
PAINLEVEL_OUTOF10: 0 - NO PAIN
PAINLEVEL_OUTOF10: 0 - NO PAIN

## 2024-02-23 ASSESSMENT — PATIENT HEALTH QUESTIONNAIRE - PHQ9
SUM OF ALL RESPONSES TO PHQ9 QUESTIONS 1 & 2: 0
2. FEELING DOWN, DEPRESSED OR HOPELESS: NOT AT ALL
1. LITTLE INTEREST OR PLEASURE IN DOING THINGS: NOT AT ALL

## 2024-02-23 ASSESSMENT — PAIN DESCRIPTION - LOCATION: LOCATION: GROIN

## 2024-02-23 ASSESSMENT — ENCOUNTER SYMPTOMS: ABDOMINAL PAIN: 1

## 2024-02-23 NOTE — CARE PLAN
The patient's goals for the shift include      The clinical goals for the shift include keep patient hemodynamically stable    Over the shift, the patient did not make progress toward the following goals. Barriers to progression include . Recommendations to address these barriers include .

## 2024-02-23 NOTE — H&P
"    INTERNAL MEDICINE     HISTORY AND PHYSICAL      Chief Complaint:  Abdominal pain    History Of Present Illness  Douglas Wilson is a 53 y.o. male presenting with a 4-day history of abdominal pain.  Patient has a history of heroin abuse.  He states that he began to have abdominal pain and some intermittent chest pain over the past 4 days.  He had some poor p.o. intake, nausea, and vomiting.  He presented to the hospital for evaluation and was noted to have significantly elevated troponins.  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  He underwent interventional radiology procedure.  Subsequently the abdominal pain has significantly improved.  He was admitted for further treatment.     Past Medical History  He has no past medical history on file.    Surgical History  He has a past surgical history that includes Other surgical history (01/24/2022) and CT angio coronary art with heartflow if score >30% (4/19/2022).     Social History  He reports that he has been smoking cigarettes. He has been smoking an average of 1 pack per day. He has never used smokeless tobacco. He reports that he does not drink alcohol and does not use drugs.    Family History  No family history on file.     Allergies  Patient has no known allergies.    Home Medications:  Prior to Admission medications    Medication Sig Start Date End Date Taking? Authorizing Provider   omeprazole (PriLOSEC) 40 mg DR capsule Take 1 capsule (40 mg) by mouth once daily as needed. Do not crush or chew.    Historical Provider, MD        Review of Systems   Gastrointestinal:  Positive for abdominal pain.        Last Recorded Vitals  Blood pressure 143/78, pulse 65, temperature 36.6 °C (97.9 °F), temperature source Temporal, resp. rate 15, height 1.77 m (5' 9.69\"), weight 79.4 kg (175 lb 0.7 oz), SpO2 100 %.    Physical Exam      Constitutional:       Appearance: Middle-aged, well-built, in no " distress  HENT:      Head: Normocephalic and atraumatic.   Eyes:      Extraocular Movements: Extraocular movements intact.      Pupils: Pupils are equal, round, and reactive to light.   Cardiovascular:      Rate and Rhythm: Normal rate and regular rhythm.      Pulses: Normal pulses.      Heart sounds: Normal heart sounds.   Pulmonary:      Effort: Pulmonary effort is normal.      Breath sounds: Normal breath sounds.   Abdominal:      General: Abdomen is flat. Bowel sounds are normal.      Palpations: Abdomen is soft.   Musculoskeletal:         General: Normal range of motion.      Cervical back: Normal range of motion and neck supple.   Skin:     General: Skin is warm and dry.   Neurological:      General: No focal deficit present.      Mental Status: He is alert and oriented to person, place, and time. Mental status is at baseline.       Relevant Results    Results for orders placed or performed during the hospital encounter of 02/22/24 (from the past 24 hour(s))   Troponin I, High Sensitivity   Result Value Ref Range    Troponin I, High Sensitivity 1,252 (HH) 0 - 20 ng/L   Transthoracic Echo (TTE) Complete   Result Value Ref Range    AV pk norberto 1.85 m/s    AV mn grad 8.0 mmHg    LVOT diam 2.20 cm    LV biplane EF 68 %    MV avg E/e' ratio 5.65     MV E/A ratio 0.75     LA vol index A/L 23.1 ml/m2    Tricuspid annular plane systolic excursion 1.9 cm    RV free wall pk S' 14.60 cm/s    LVIDd 4.40 cm    Aortic Valve Area by Continuity of VTI 3.62 cm2    Aortic Valve Area by Continuity of Peak Velocity 3.41 cm2    AV pk grad 13.7 mmHg    LV A4C EF 70.0    CBC   Result Value Ref Range    WBC 16.7 (H) 4.4 - 11.3 x10*3/uL    nRBC 0.0 0.0 - 0.0 /100 WBCs    RBC 4.01 (L) 4.50 - 5.90 x10*6/uL    Hemoglobin 9.5 (L) 13.5 - 17.5 g/dL    Hematocrit 28.2 (L) 41.0 - 52.0 %    MCV 70 (L) 80 - 100 fL    MCH 23.7 (L) 26.0 - 34.0 pg    MCHC 33.7 32.0 - 36.0 g/dL    RDW 15.1 (H) 11.5 - 14.5 %    Platelets 229 150 - 450 x10*3/uL   Type  and Screen   Result Value Ref Range    ABO TYPE B     Rh TYPE POS     ANTIBODY SCREEN NEG    POCT GLUCOSE   Result Value Ref Range    POCT Glucose 131 (H) 74 - 99 mg/dL   CBC   Result Value Ref Range    WBC 15.0 (H) 4.4 - 11.3 x10*3/uL    nRBC 0.0 0.0 - 0.0 /100 WBCs    RBC 3.77 (L) 4.50 - 5.90 x10*6/uL    Hemoglobin 8.9 (L) 13.5 - 17.5 g/dL    Hematocrit 26.9 (L) 41.0 - 52.0 %    MCV 71 (L) 80 - 100 fL    MCH 23.6 (L) 26.0 - 34.0 pg    MCHC 33.1 32.0 - 36.0 g/dL    RDW 15.3 (H) 11.5 - 14.5 %    Platelets 208 150 - 450 x10*3/uL   Basic metabolic panel   Result Value Ref Range    Glucose 96 74 - 99 mg/dL    Sodium 134 (L) 136 - 145 mmol/L    Potassium 3.7 3.5 - 5.3 mmol/L    Chloride 100 98 - 107 mmol/L    Bicarbonate 27 21 - 32 mmol/L    Anion Gap 11 10 - 20 mmol/L    Urea Nitrogen 18 6 - 23 mg/dL    Creatinine 0.98 0.50 - 1.30 mg/dL    eGFR >90 >60 mL/min/1.73m*2    Calcium 8.0 (L) 8.6 - 10.3 mg/dL   Troponin I, High Sensitivity   Result Value Ref Range    Troponin I, High Sensitivity 595 (HH) 0 - 20 ng/L         Principal Problem:    Generalized abdominal pain      ASSESSMENT AND PLAN:    NSTEMI - cardiology following, will monitor on telemetry  Heroin abuse - supportive care, cessation advised  Abdominal pain - better after procedure  Hemoperitoneum -underwent procedure as above, monitor hemoglobin.      Shemar Banda MD  02/23/242:23 PM

## 2024-02-23 NOTE — PROGRESS NOTES
Met with patient and significant other at bedside. Patient refusing treatment/resources at this time. States he has access to treatment, resources, supports if needed.

## 2024-02-23 NOTE — CONSULTS
"Nutrition Assessment Note  Nutrition Assessment      Reason for Assessment  Reason for Assessment: Admission nursing screening    Pt admitted 2/2 abd pain and chest discomfort, found to have splenic artery aneurysm.   S/p embolization yesterday.   Pmh includes heroin use disorder, HLD, HTN, GERD.     Abd pain started four days PTA, last meal tolerated was this past Sunday per pt's wife at bedside.   Pt stated he is hungry, appetite is good, and without GI complaints, denies any GI distress at this time. When met with pt earlier today, pt was still only on clear liquid diet. Pt tolerating, denies N/V. Pt declined need for oral supplement such as Ensure, stated appetite is good at baseline and denies wt loss.     History:  Food and Nutrient History  Energy Intake: Good > 75 %  Food and Nutrient History: per pt, regular diet PTA, except pt does not eat Pork. Pt with good appetite normally, except for past 5-6 days. Pt stated he is hungry, and looking forward to regular food. Pt decline Ensure supplement.       Anthropometrics:  Height: 177 cm (5' 9.69\")  Weight: 79.4 kg (175 lb 0.7 oz)  BMI (Calculated): 25.34    IBW/kg (Dietitian Calculated): 72.7 kg        Dietary Orders (From admission, onward)       Start     Ordered    02/23/24 1423  Adult diet Regular  Diet effective now        Question:  Diet type  Answer:  Regular    02/23/24 1422                  Scheduled medications  perflutren lipid microspheres, 0.5-10 mL of dilution, intravenous, Once in imaging  polyethylene glycol, 17 g, oral, Daily      Continuous medications  sodium chloride 0.9%, , Last Rate: 100 mL/hr (02/22/24 2130)      PRN medications  PRN medications: acetaminophen **OR** acetaminophen **OR** acetaminophen, fentaNYL PF, iohexol, lidocaine PF, midazolam, ondansetron, sodium chloride 0.9%     Latest Reference Range & Units 02/23/24 06:54   GLUCOSE 74 - 99 mg/dL 96   SODIUM 136 - 145 mmol/L 134 (L)   POTASSIUM 3.5 - 5.3 mmol/L 3.7   CHLORIDE 98 - " "107 mmol/L 100   Bicarbonate 21 - 32 mmol/L 27   Anion Gap 10 - 20 mmol/L 11   Blood Urea Nitrogen 6 - 23 mg/dL 18   Creatinine 0.50 - 1.30 mg/dL 0.98   EGFR >60 mL/min/1.73m*2 >90   Calcium 8.6 - 10.3 mg/dL 8.0 (L)   (L): Data is abnormally low        Energy Needs:  Calculated Energy Needs Using Equations  Height: 177 cm (5' 9.69\")  Temp: 36.6 °C (97.9 °F)    Estimated Energy Needs  Total Energy Estimated Needs (kCal): 2400 kCal  Total Estimated Energy Need per Day (kCal/kg): 30 kCal/kgutrition Focused Physical Findings:  Subcutaneous Fat Loss  Orbital Fat Pads: Mild-Moderate (slight dark circles and slight hollowing) (per visualization, pt getting out of bed to walk the halls)  Buccal Fat Pads: Well nourished (full, rounded cheeks)    Muscle Wasting  Temporalis: Mild-Moderate (slight depression)  Pectoralis (Clavicular Region): Well nourished (clavicle not visible)  Deltoid/Trapezius: Well nourished (rounded appearance at arm, shoulder, neck)  Interosseous: Well nourished (muscle bulges)    Edema  Edema: none         Physical Findings (Nutrition Deficiency/Toxicity)  Skin: Negative       Nutrition Diagnosis   Malnutrition Diagnosis  Patient has Malnutrition Diagnosis: No    Patient has Nutrition Diagnosis: Yes  Nutrition Diagnosis 1: Inadequate oral intake  Diagnosis Status (1): New  Related to (1): physiological cause  As Evidenced by (1): abd pain prior to splenic aneurysm embolizsation 2/22/24  Additional Assessment Information (1): abd pain since resolved per pt report          Nutrition Interventions/Recommendations   Nutrition Prescription  Individualized Nutrition Prescription Provided for : regular diet as appropriate, tolerated  1. Regular diet, as tolerated   2. Support for heroin use disorder; per chart review, pt participates in medication assistance treatment.   3. Consider MVI once daily, if appropriate         Additional Interventions: pt declined needing oral nutrition supplements     "     Coordination of Nutrition Care by a Nutrition Professional  Collaboration and Referral of Nutrition Care: Collaboration by nutrition professional with other providers    Education Documentation  No documentation found.      Nutrition Monitoring and Evaluation   Food and Nutrient Related History  Energy Intake: Estimated energy intake  Criteria: monitor    Fluid Intake: Estimated fluid intake  Criteria: monitor         Anthropometrics: Body Composition/Growth/Weight History  Weight: Weight change  Criteria: monitor       Biochemical Data, Medical Tests and Procedures  Electrolyte and Renal Panel: Other (Comment)  Criteria: as indicated    Gastrointestinal Profile: Other (Comment)  Criteria: as indicated    Glucose/Endocrine Profile: Other (Comment)  Criteria: as indicated    Nutritional Anemia Profile: Other (Comment)  Criteria: as indicated    Vitamin Profile: Other (Comment)  Criteria: as indicated    Nutrition Focused Physical Findings  - monitor as appropriate          Follow Up  Time Spent (min): 45 minutes  Last Date of Nutrition Visit: 02/23/24  Nutrition Follow-Up Needed?: Dietitian to reassess per policy  Follow up Comment: oral diet, abd pain resolved; GENOVEVA

## 2024-02-23 NOTE — PROGRESS NOTES
Subjective Data:  Underwent splenic artery embolization  Abdominal pain resolved  Feels great and wants to go home  Asking to eat and was told it is up to me to decide that.  No chest pain or pressure       Objective Data:  Last Recorded Vitals:  Vitals:    24 0600 24 0800 24 0900 24 1000   BP: 136/80 151/86 136/76 143/78   Pulse: 64 64 66 65   Resp: 18 18 15 15   Temp:       TempSrc:       SpO2: 96% 97% 100%    Weight:  79.4 kg (175 lb 0.7 oz)     Height:         Medical Gas Therapy: None (Room air)  O2 Delivery Method: Nasal cannula  Weight  Av.4 kg (175 lb 0.2 oz)  Min: 79.4 kg (175 lb)  Max: 79.4 kg (175 lb 0.7 oz)    LABS:  CMP:  Results from last 7 days   Lab Units 24  0654 24  0824   SODIUM mmol/L 134* 133*   POTASSIUM mmol/L 3.7 3.5   CHLORIDE mmol/L 100 94*   CO2 mmol/L 27 29   ANION GAP mmol/L 11 14   BUN mg/dL 18 31*   CREATININE mg/dL 0.98 1.14   EGFR mL/min/1.73m*2 >90 76   ALBUMIN g/dL  --  4.4   ALT U/L  --  13   AST U/L  --  24   BILIRUBIN TOTAL mg/dL  --  0.8   LIPASE U/L  --  51     CBC:  Results from last 7 days   Lab Units 24  0654 24  1450 24  0824   WBC AUTO x10*3/uL 15.0* 16.7* 20.7*   HEMOGLOBIN g/dL 8.9* 9.5* 11.7*   HEMATOCRIT % 26.9* 28.2* 36.0*   PLATELETS AUTO x10*3/uL 208 229 277   MCV fL 71* 70* 69*     COAG:     ABO:   ABO TYPE   Date Value Ref Range Status   2024 B  Final     HEME/ENDO:  Results from last 7 days   Lab Units 24  0943   TSH mIU/L 1.28      CARDIAC:   Results from last 7 days   Lab Units 24  0654 24  1428 24  1107 24  0943 24  0824   TROPHS ng/L 595* 1,252* 1,103* 1,074* 1,300*      Results from last 7 days   Lab Units 24  0943   CHOLESTEROL/HDL RATIO  5.6        Last I/O:    Intake/Output Summary (Last 24 hours) at 2024 1447  Last data filed at 2024 0300  Gross per 24 hour   Intake 0 ml   Output 655 ml   Net -655 ml     Net IO Since Admission: -655 mL  [02/23/24 1447]      Imaging Results:  IR angiogram aorta abdomen    Result Date: 2/23/2024  Interpreted By:  Pillo Patel, STUDY: IR ANGIOGRAM AORTA ABDOMEN;  2/22/2024 10:34 pm   INDICATION: Signs/Symptoms:splenic aneurysm.   COMPARISON: None.   ACCESSION NUMBER(S): [id number]   ORDERING CLINICIAN: PILLO PATEL   TECHNIQUE: INTERVENTIONALIST(S): Pillo Patel MD   CONSENT: The patient/patient's POA/next of kin was informed of the nature of the proposed procedure. The purposes, alternatives, risks, and benefits were explained and discussed. All questions were answered and consent was obtained.   RADIATION EXPOSURE: Fluoroscopy time: 16.7 min. Dose: 379 mGy. Dose Area Product (DAP): 55344 mGy cm 2.   SEDATION: Moderate conscious IV sedation services (supervision of administration, induction, and maintenance) were provided by the physician performing the procedure with intravenous fentanyl 150 mcg and versed 1.5 mg from 2137 hours to 2227 hours. The physician was assisted by an independent trained observer, an interventional radiology nurse, in the continuous monitoring of patient level of consciousness and physiologic status.   MEDICATION/CONTRAST: No additional   TIME OUT: A time out was performed immediately prior to procedure start with the interventional team, correctly identifying the patient name, date of birth, MRN, procedure, anatomy (including marking of site and side), patient position, procedure consent form, relevant laboratory and imaging test results, antibiotic administration, safety precautions, and procedure-specific equipment needs.   COMPLICATIONS: No immediate adverse events identified.   FINDINGS: The patient was placed supine on the angiographic table in the right groin was prepped and draped in usual sterile fashion. Local anesthesia was achieved 1% lidocaine. Using direct ultrasound guidance, a 21 gauge micropuncture needle was used to access the right common femoral artery and a  microwire was placed. The needle was exchanged for a 4 Citizen of the [place] micropuncture sheath with its match dilator. The inner dilator and wire were removed and a 0.035 inch wire was advanced and in turn the micropuncture sheath was exchanged for a 5 Citizen of the [place] hemostatic sheath, which was placed over the wire.   Contrast injection was performed through the hemostatic sheath in the CASTAÑEDA position while imaging over the right groin.   A C2 cobra catheter was prepared and advanced over a Glidewire. The combination were used to cannulate the celiac artery origin. The wire was removed and contrast injections were performed with both fluoroscopic and DSA imaging over the abdomen. The Glidewire was replaced and the C2 Cobra catheter was advanced into the proximal 3rd of the splenic artery. The wire was removed and contrast injections were performed with DSA imaging over the left upper quadrant.   A microcatheter microwire were prepared and advanced through the C2 Cobra catheter. Multiple 2nd and 3rd order branches off of the splenic artery were cannulated with contrast injections performed while DSA imaging over the spleen.   The microcatheter was retracted to the proximal 3rd of the splenic artery and subsequently coil embolization was carried out. Stasis within the splenic artery was confirmed with a contrast injection via the microcatheter.   The catheters and wires were removed. After removal of the 5 Citizen of the [place] hemostatic sheath, hemostasis was achieved with an Angio-Seal closure device. The patient tolerated the procedure well without immediate complications.     Contrast injection through the 5 Citizen of the [place] hemostatic sheath with imaging over the right groin demonstrates the right common femoral artery to be widely patent without contraindication to the use of a closure device.   Contrast injection with the C2 Cobra catheter at the origin of the celiac artery demonstrates conventional branching pattern and widely patent celiac artery and  its major branches. Splenic artery anatomy is defined with marked tortuosity of the proximal 3rd of the splenic artery.   Contrast injection with the C2 Cobra catheter in the proximal splenic artery demonstrates areas of vasospasm in the middle 3rd of the splenic artery. Its distal branches are patent. There is gradual opacification of an ovoid structure in the caudal aspect of the spleen, coinciding with the recent findings on the CTA. Contrast injection in multiple 2nd and 3rd order branches of the splenic artery did not opacify the ovoid structure directly.   Sequential fluoroscopic images demonstrate interval coil embolization of the proximal 3rd of the splenic artery.   Final contrast injection with the microcatheter in the proximal splenic artery demonstrates stasis of the splenic artery.       1. Technically successful splenic artery embolization, as detailed above. 2. Ovoid enhancing structure in the caudal aspect of the spleen has features suggestive of a small hemangioma. There is no active contrast extravasation.   I was present for and/or performed the critical portions of the procedure and immediately available throughout the entire procedure.   I personally reviewed the image(s)/study and interpretation. I agree with the findings as stated.   Performed and dictated at [facility].   MACRO: None   Signed by: Pillo Patel 2/23/2024 2:31 PM Dictation workstation:   [id number]    CT angio chest abdomen pelvis    Result Date: 2/22/2024  Interpreted By:  Pillo Patel, STUDY: CT ANGIO CHEST ABDOMEN PELVIS;  2/22/2024 4:03 pm   INDICATION: Signs/Symptoms:abdominal pain, possible hemmorhaging into abdominal cavity.   COMPARISON: CT of the chest, abdomen, and pelvis without contrast from the same day taken at 1345 hours.   ACCESSION NUMBER(S): [id number]   ORDERING CLINICIAN: FRANCA WAY   TECHNIQUE: Axial non-contrast images of the chest, abdomen, and pelvis with  coronal and sagittal reformatted images. Axial CT images of the chest, abdomen and pelvis after the intravenous administration of 90 mL of Omnipaque 350 using angiographic technique with coronal and sagittal reformatted images. MIP images were provided and reviewed. 3D reconstructions were created on a separate independent workstation and reviewed.   FINDINGS: VASCULATURE:   PULMONARY ARTERIES: No acute pulmonary embolism.   THORACIC AORTA: No thoracic aortic aneurysm or dissection. No significant thoracic aortic atherosclerosis.   ABDOMINAL AORTA: No abdominal aortic aneurysm or dissection. No significant abdominal aortic atherosclerosis.   ABDOMINAL AND PELVIC ARTERIES: There is minor calcific disease at the origin of the bilateral common iliac arteries without significant stenosis. There is marked tortuosity of the pelvic vascularity.     CT CHEST:   MEDIASTINUM AND LYMPH NODES: No enlarged intrathoracic or axillary lymph nodes. No pneumomediastinum.   HEART: Normal size. No coronary artery calcifications. No significant pericardial effusion.   LUNG, PLEURA, LARGE AIRWAYS: There is minor bibasilar atelectasis, right worse than left. There is opacification and mild wall thickening of the small airways leading to the posterior right lower lobe. The central airways are otherwise patent. No consolidation, pulmonary edema, pleural effusion, or pneumothorax.   OSSEOUS STRUCTURES/CHEST WALL: No acute osseous abnormality.     CT ABDOMEN/PELVIS:   LIVER: The liver is mildly enlarged, but without focal parenchymal abnormality. There is a small amount of high-density fluid surrounding the liver, consistent with hemorrhage.   BILE DUCTS: No significant intrahepatic or extrahepatic dilatation.   GALLBLADDER: No significant abnormality.   PANCREAS: No significant abnormality.   SPLEEN: There is striated appearance of the spleen. There is a 6 mm ovoid hyperdensity, compatible with a small aneurysm. The spleen is surrounded by a  small amount of hyperdense fluid consistent with hemorrhage. Delayed imaging does not demonstrate active contrast extravasation to suggest active hemorrhage.   ADRENALS: No significant abnormality.   KIDNEYS, URETERS, BLADDER: No significant abnormality. The distended urinary bladder is within normal limits.   REPRODUCTIVE ORGANS: No significant abnormality.   VESSELS: (See above). No additional significant abnormality.   RETROPERITONEUM/LYMPH NODES: No enlarged lymph nodes.   BOWEL/MESENTERY/PERITONEUM: No inflammatory bowel wall thickening or dilatation. Normal appendix. There is a small hiatal hernia.   There is a small to moderate amount of high-density fluid within the abdomen, along the paracolic gutters, and deep in the pelvis, consistent with hemorrhage.     ABDOMINAL WALL: No significant abnormality.   OSSEOUS STRUCTURES: No acute osseous abnormality.       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 The spleen has a striated appearance which is likely related to phase of contrast, if there is recent history of trauma, small lacerations can have a similar appearance.   Small to moderate amount of hemoperitoneum surrounding the spleen, liver, and extending into the pelvis.   Bibasilar atelectasis with bronchial wall thickening and opacification of the small airways leading to the right lower lobe which can be seen in the setting of aspiration/mucoid secretions.   Signed by: Pillo Paetl 2/22/2024 4:57 PM Dictation workstation:   [id number]    Transthoracic Echo (TTE) Complete    Result Date: 2/22/2024   [facility], [address] [place] [address]              Tel [phone] and Fax [phone] TRANSTHORACIC ECHOCARDIOGRAM REPORT  Patient Name:      DONALD Haney Physician:    [id number] Tremayne Sepulveda DO Study Date:        2/22/2024            Ordering Provider:    Yon SILVA                                                                DONIS MRN/PID:           [id number]             Fellow: Accession#:        [id number]         Nurse: Date of Birth/Age: 1970 / 53 years Sonographer:          Nirmala Cee RDCS Gender:            M                    Additional Staff: Height:            177.80 cm            Admit Date:           2/22/2024 Weight:            79.38 kg             Admission Status:     Inpatient -                                                               Routine BSA / BMI:         1.97 m2 / 25.11      Encounter#:           [id number]                    kg/m2                                         Department Location:  [facility] Non                                                               Invasive Blood Pressure: 132 /67 mmHg Study Type:    TRANSTHORACIC ECHO (TTE) COMPLETE Diagnosis/ICD: Elevated Troponin-R79.89 Indication:    Elevated Troponin CPT Code:      Echo Complete w Full Doppler-40810 Patient History: Pertinent History: HTN and Hyperlipidemia. Opioid Use Disorder, GERD. Study Detail: The following Echo studies were performed: 2D, M-Mode, Doppler and               color flow. Technically challenging study due to prominent lung               artifact.  PHYSICIAN INTERPRETATION: Left Ventricle: The left ventricular systolic function is hyperdynamic, with an estimated ejection fraction of 70-75%. There are no regional wall motion abnormalities. The left ventricular cavity size is normal. Spectral Doppler shows a normal pattern of left ventricular diastolic filling. Left Atrium: The left atrium is normal in size. Right Ventricle: The right ventricle is normal in size. There is normal right ventricular global systolic function. Right Atrium: The right atrium is normal in size. Aortic Valve: The aortic valve appears structurally normal. There is no evidence of aortic valve regurgitation. The peak instantaneous gradient of the aortic valve is 13.7 mmHg. The mean gradient of  the aortic valve is 8.0 mmHg. Mitral Valve: The mitral valve is normal in structure. There is no evidence of mitral valve regurgitation. Tricuspid Valve: The tricuspid valve is structurally normal. No evidence of tricuspid regurgitation. Pulmonic Valve: The pulmonic valve is structurally normal. There is no indication of pulmonic valve regurgitation. Pericardium: There is no pericardial effusion noted. Aorta: The aortic root is normal. In comparison to the previous echocardiogram(s): Compared with study from 1/5/2022,.  CONCLUSIONS:  1. Left ventricular systolic function is hyperdynamic with a 70-75% estimated ejection fraction.  2. No valvular abnormalities. QUANTITATIVE DATA SUMMARY: 2D MEASUREMENTS:                          Normal Ranges: Ao Root d:     3.20 cm   (2.0-3.7cm) LAs:           3.20 cm   (2.7-4.0cm) IVSd:          1.10 cm   (0.6-1.1cm) LVPWd:         1.00 cm   (0.6-1.1cm) LVIDd:         4.40 cm   (3.9-5.9cm) LVIDs:         2.70 cm LV Mass Index: 80.2 g/m2 LV % FS        38.6 % LA VOLUME:                               Normal Ranges: LA Vol A4C:        46.6 ml    (22+/-6mL/m2) LA Vol A2C:        44.3 ml LA Vol BP:         45.5 ml LA Vol Index A4C:  23.6ml/m2 LA Vol Index A2C:  22.5 ml/m2 LA Vol Index BP:   23.1 ml/m2 LA Area A4C:       15.5 cm2 LA Area A2C:       15.1 cm2 LA Major Axis A4C: 4.4 cm LA Major Axis A2C: 4.4 cm LA Volume Index:   23.1 ml/m2 RA VOLUME BY A/L METHOD:                               Normal Ranges: RA Vol A4C:        41.9 ml    (8.3-19.5ml) RA Vol Index A4C:  21.2 ml/m2 RA Area A4C:       15.2 cm2 RA Major Axis A4C: 4.7 cm M-MODE MEASUREMENTS:                  Normal Ranges: Ao Root: 3.20 cm (2.0-3.7cm) LAs:     3.70 cm (2.7-4.0cm) AORTA MEASUREMENTS:                      Normal Ranges: Ao Sinus, d: 3.20 cm (2.1-3.5cm) Ao STJ, d:   2.75 cm (1.7-3.4cm) Asc Ao, d:   3.20 cm (2.1-3.4cm) LV SYSTOLIC FUNCTION BY 2D PLANIMETRY (MOD):                     Normal Ranges: EF-A4C View: 70.0  % (>=55%) EF-A2C View: 68.1 % EF-Biplane:  68.1 % LV DIASTOLIC FUNCTION:                              Normal Ranges: MV Peak E:        0.62 m/s   (0.7-1.2 m/s) MV Peak A:        0.82 m/s   (0.42-0.7 m/s) E/A Ratio:        0.75       (1.0-2.2) MV e'             0.11 m/s   (>8.0) MV lateral e'     0.11 m/s MV medial e'      0.05 m/s MV A Dur:         92.00 msec E/e' Ratio:       5.65       (<8.0) a'                0.11 m/s PulmV Sys Griffin:    80.00 cm/s PulmV [place] Griffin:   46.90 cm/s PulmV S/D Griffin:    1.70 PulmV A Revs Griffin: 35.00 cm/s PulmV A Revs Dur: 95.00 msec MITRAL VALVE:                 Normal Ranges: MV DT: 288 msec (150-240msec) AORTIC VALVE:                                    Normal Ranges: AoV Vmax:                1.85 m/s  (<=1.7m/s) AoV Peak P.7 mmHg (<20mmHg) AoV Mean P.0 mmHg  (1.7-11.5mmHg) LVOT Max Griffin:            1.66 m/s  (<=1.1m/s) AoV VTI:                 25.30 cm  (18-25cm) LVOT VTI:                24.10 cm LVOT Diameter:           2.20 cm   (1.8-2.4cm) AoV Area, VTI:           3.62 cm2  (2.5-5.5cm2) AoV Area,Vmax:           3.41 cm2  (2.5-4.5cm2) AoV Dimensionless Index: 0.95  RIGHT VENTRICLE: RV Basal 3.84 cm RV Mid   2.61 cm RV Major 7.2 cm TAPSE:   18.5 mm RV s'    0.15 m/s TRICUSPID VALVE/RVSP:                           Normal Ranges: Est. RA Pressure: 3 mmHg IVC Diam:         1.33 cm PULMONIC VALVE:                         Normal Ranges: PV Accel Time: 50 msec  (>120ms) PV Max Griffin:    1.1 m/s  (0.6-0.9m/s) PV Max P.8 mmHg Pulmonary Veins: PulmV A Revs Dur: 95.00 msec PulmV A Revs Griffin: 35.00 cm/s PulmV [place] Griffin:   46.90 cm/s PulmV S/D Griffin:    1.70 PulmV Sys Griffin:    80.00 cm/s  [id number] Tremayne Sepulveda DO Electronically signed on 2024 at 4:23:21 PM  ** Final **     CT chest abdomen pelvis wo IV contrast    Result Date: 2024  Interpreted By:  Elijah Gong, STUDY: CT CHEST ABDOMEN PELVIS WO CONTRAST; 2024 1:51 pm   INDICATION:  Signs/Symptoms:abdominal pain, vomiting, elevated troponin, leukocytosis.   COMPARISON: CT of the abdomen and pelvis dated 08/15/2022   ACCESSION NUMBER(S): [id number]   ORDERING CLINICIAN: OSCAR SILVEIRA   TECHNIQUE: Contiguous axial CT images were obtained at 3mm slice thickness through the chest, abdomen and pelvis without intravenous contrast administration. Coronal and sagittal reconstructions at 3 mm slice thickness were then performed. Intravenous contrast was not given per the referring physician's request.   FINDINGS: The study is severely limited by the lack of intravenous contrast.   CHEST:   CHEST WALL AND LOWER NECK: Evaluation of the visualized neck base demonstrates no gross mass or lymphadenopathy.   MEDIASTINUM AND DINAH: There is no gross axillary or mediastinal lymphadenopathy identified. Evaluation of the dinah is limited by the lack of intravenous contrast.   HEART: The heart is within normal limits for size. No pericardial effusion is identified.   VESSELS: The thoracic aorta is within normal limits for course and caliber, without evidence of aneurysm.   LUNG/PLEURA/LARGE AIRWAYS: Mild dependent atelectasis is seen in the lungs bilaterally. There is no focal infiltrate, pleural effusion or pneumothorax identified. No discrete pulmonary nodules or masses are seen.   ABDOMEN:   LIVER: The liver is within normal limits for appearance, without evidence of focal masses.   BILE DUCTS: No definite intra or extrahepatic biliary dilatation is identified.   GALLBLADDER: The gallbladder is nondilated. No definite calcified gallstones are seen.   PANCREAS: The pancreas is within normal limits for appearance, without evidence of focal masses.   SPLEEN: The spleen is within normal limits for size. No focal splenic mass is seen.   ADRENAL GLANDS: No definite adrenal nodules or masses are seen bilaterally.   KIDNEYS AND URETERS: There is no hydronephrosis, hydroureter or renal/ ureteral calculus identified  bilaterally. No definite focal renal mass is seen, though evaluation is severely limited by the lack of intravenous contrast..   PELVIS:   BLADDER: The urinary bladder is grossly unremarkable for CT appearance.   REPRODUCTIVE ORGANS: The prostate is within normal limits for appearance.   BOWEL: The colon and small bowel are within normal limits for course, caliber and appearance, without evidence of wall thickening or obstruction. The appendix is decompressed. No CT evidence of acute diverticulitis or appendicitis is seen.   VESSELS: Scattered atherosclerotic calcifications are seen throughout the infrarenal abdominal aorta and iliac arteries. The abdominal aorta is within normal limits for course, caliber and appearance, without evidence of aneurysm.   PERITONEUM/RETROPERITONEUM/LYMPH NODES: There is no free intraperitoneal air identified. Hyperdense free fluid is seen within the abdomen, greatest in the left upper quadrant, right lower quadrant and lower pelvis, concerning for intraperitoneal hemorrhage. No gross mesenteric or retroperitoneal lymphadenopathy is identified.   BONE AND SOFT TISSUE: There is no evidence of acute fracture identified. No evidence of abdominal wall mass or hernia is identified.       CHEST: 1. No focal infiltrate, pulmonary nodule or lymphadenopathy identified.   ABDOMEN-PELVIS: 1. Hyperdense free fluid within the abdomen, concerning for intraperitoneal hemorrhage. The source of this hemorrhage cannot be elucidated on CT. 2. No evidence of bowel obstruction or free intraperitoneal air.   MACRO: None   Signed by: Elijah Gong 2/22/2024 2:09 PM Dictation workstation:   [id number]    ECG 12 lead    Result Date: 2/22/2024  Normal sinus rhythm Nonspecific ST and T wave abnormality Abnormal ECG When compared with ECG of 22-FEB-2024 08:52, (unconfirmed) Nonspecific T wave abnormality has replaced inverted T waves in Lateral leads QT has lengthened    ECG 12 lead    Result Date:  2/22/2024  Normal sinus rhythm Right atrial enlargement Minimal voltage criteria for LVH, may be normal variant ( Sokolow-Miranda ) ST & T wave abnormality, consider lateral ischemia Abnormal ECG When compared with ECG of 07-JUL-2022 00:38, Previous ECG has undetermined rhythm, needs review ST now depressed in Inferior leads ST depression has replaced ST elevation in Lateral leads T wave inversion now evident in Lateral leads    XR chest 2 views    Result Date: 2/22/2024  Interpreted By:  Bry Keller, STUDY: XR CHEST 2 VIEWS;  2/22/2024 9:09 am   INDICATION: Signs/Symptoms:abd pain vomiting leukocytosis.   COMPARISON: 07/06/2022   ACCESSION NUMBER(S): [id number]   ORDERING CLINICIAN: OSCAR SILVEIRA   FINDINGS: CARDIOMEDIASTINAL SILHOUETTE AND VASCULATURE:   Cardiac size:  Within normal limits.   Aortic shadow:  Within normal limits.   Mediastinal contours: Within normal limits.   Pulmonary vasculature:  The central vasculature is unremarkable   LUNGS: Lungs are clear.   ABDOMEN AND OTHER FINDINGS: No remarkable upper abdominal findings.   BONES: No acute osseous changes.       1.  No active cardiopulmonary disease.  There has not been significant interval change from the prior exam.   Signed by: Bry Keller 2/22/2024 9:23 AM Dictation workstation:   [id number]       Past Cardiology Tests    Echo:   2/22/2024  1. Left ventricular systolic function is hyperdynamic with a 70-75% estimated ejection fraction.   2. No valvular abnormalities.  Cath:  No results found for this or any previous visit.    Stress Test:  No results found for this or any previous visit.    Cardiac Imaging:  No results found for this or any previous visit.      Inpatient Medications:  Scheduled medications   Medication Dose Route Frequency    perflutren lipid microspheres  0.5-10 mL of dilution intravenous Once in imaging    polyethylene glycol  17 g oral Daily     PRN medications   Medication    acetaminophen    Or    acetaminophen    Or     acetaminophen    fentaNYL PF    iohexol    lidocaine PF    midazolam    ondansetron    sodium chloride 0.9%     Continuous Medications   Medication Dose Last Rate    sodium chloride 0.9%   100 mL/hr (02/22/24 2130)       Physical Exam:  GENERAL: alert, cooperative, pleasant, in no acute distress  SKIN: warm, dry, no rash.  NECK: no JVD, no KIARA  CARDIAC: Regular rate and rhythm with no rubs, murmurs, or gallops  CHEST: Normal respiratory efforts, lungs clear to auscultation bilaterally.  ABDOMEN: soft, nontender, nondistended  EXTREMITIES: no edema  NEURO: Alert and oriented x 3.  Grossly normal.  Moves all 4 extremities.       Assessment/Plan   53 y.o. male with past medical history significant for hypertension, hyperlipidemia, BPH, opioid use disorder, obstructive sleep apnea, GERD, minor CAD per CTA 4/2022 presented to emergency room complaining of abdominal pain associated with nausea and vomiting.  Initial troponin of 1300 on arrival     1.  Elevated troponin-likely secondary to GI bleed and hemoperitoneum.  But Troponin above the thousand cannot entirely rule out ischemia although much less likely due to lack of ischemic symptoms and trivial CAD on CT angio in 2022.  LVEF is hyperdynamic 70-75%    2.  Abdominal bleed status post splenic artery embolization by IR  3. H/o heroin abuse    Recommendations:  Okay to eat from cardiac standpoint  Maintain off antiplatelet and anticoagulation  No further cardiac workup recommended at this time  Likely outpatient stress test in about a month once recovers from acute illness. Card provided and he can call to schedule  Signing off  Please contact us back if can be of further assistance      Tremayne Sepulveda DO

## 2024-02-23 NOTE — PRE-PROCEDURE NOTE
Interventional Radiology Preprocedure Note    Indication for procedure: The primary encounter diagnosis was Generalized abdominal pain. Diagnoses of Elevated troponin, NSTEMI (non-ST elevated myocardial infarction) (CMS/HCC), and Hemoperitoneum were also pertinent to this visit.    Relevant review of systems: NA    Relevant Labs:   Lab Results   Component Value Date    CREATININE 1.14 02/22/2024    EGFR 76 02/22/2024    INR 0.9 09/21/2020    PROTIME 10.2 09/21/2020       Planned Sedation/Anesthesia: Moderate    Airway assessment: normal    Directed physical examination:    RRR, lungs CTA-B    Mallampati: II (hard and soft palate, upper portion of tonsils anduvula visible)    ASA Score: ASA 3 - Patient with moderate systemic disease with functional limitations    Benefits, risks and alternatives of procedure and planned sedation have been discussed with the patient and/or their representative. All questions answered and they agree to proceed.

## 2024-02-23 NOTE — PROGRESS NOTES
"Douglas Wilson is a 53 y.o. male on day 1 of admission presenting with Generalized abdominal pain.    Subjective   Hungry, abdominal pain improved       Objective     Physical Exam  Constitutional:       Appearance: Normal appearance.   HENT:      Head: Normocephalic.   Eyes:      Pupils: Pupils are equal, round, and reactive to light.   Cardiovascular:      Rate and Rhythm: Normal rate.   Pulmonary:      Effort: Pulmonary effort is normal.   Abdominal:      General: Abdomen is flat. Bowel sounds are normal.      Palpations: Abdomen is soft.   Skin:     General: Skin is warm.   Neurological:      General: No focal deficit present.      Mental Status: He is alert.         Last Recorded Vitals  Blood pressure 143/78, pulse 65, temperature 36.6 °C (97.9 °F), temperature source Temporal, resp. rate 15, height 1.77 m (5' 9.69\"), weight 79.4 kg (175 lb 0.7 oz), SpO2 100 %.  Intake/Output last 3 Shifts:  I/O last 3 completed shifts:  In: 0 (0 mL/kg)   Out: 655 (8.3 mL/kg) [Urine:650 (0.2 mL/kg/hr); Blood:5]  Weight: 79.4 kg     Relevant Results           This patient currently has cardiac telemetry ordered; if you would like to modify or discontinue the telemetry order, click here to go to the orders activity to modify/discontinue the order.                 Assessment/Plan   Principal Problem:    Generalized abdominal pain    Status post IR embolization  No need for splenic vaccinations after embolization  No evidence of ongoing bleeding  Surgery will sign off, please call with any concerns       I spent 15 minutes in the professional and overall care of this patient.      Tanmay Paul MD      "

## 2024-02-23 NOTE — CONSULTS
Consults    Reason For Consult  hemoperitoneum    History Of Present Illness  Doug[facility] Wilson is a 53 y.o. male presenting with abdominal pain and chest pain. He has a history of heroin use on Sunday. Notes a fall from a tub, unsure of date. Troponins were elevated on presentation, patient was started on heparin for NSTEMI. CT abdomen demonstrated a hyperdense fluid filled abdomen concerning for intraperitoneal hemorrhage. CTA demonstrated splenic aneurysm without active extravisation. IR was consulted for embolization, which was completed by Dr. Patel overnight 2/22/24. This morning his VSS. Hgb 8.9. Notes improvement in abdominal pain.      Past Medical History  He has no past medical history on file.    Surgical History  He has a past surgical history that includes Other surgical history (01/24/2022) and CT angio coronary art with heartflow if score >30% (4/19/2022).     Social History  He reports that he has been smoking cigarettes. He has been smoking an average of 1 pack per day. He has never used smokeless tobacco. He reports that he does not drink alcohol and does not use drugs.    Family History  No family history on file.     Allergies  Patient has no known allergies.    MEDS:    Current Facility-Administered Medications:     acetaminophen (Tylenol) tablet 650 mg, 650 mg, oral, q4h PRN, 650 mg at 02/23/24 0759 **OR** acetaminophen (Tylenol) oral liquid 650 mg, 650 mg, oral, q4h PRN **OR** acetaminophen (Tylenol) suppository 650 mg, 650 mg, rectal, q4h PRN, Shemar Banda MD    fentaNYL PF (Sublimaze) injection, , , PRN, Pillo Patel MD, 50 mcg at 02/22/24 2227    iohexol (OMNIPaque) 350 mg iodine/mL solution, , , PRN, Pillo Patel MD, 50 mL at 02/22/24 2230    lidocaine PF (Xylocaine) 10 mg/mL (1 %) injection, , , PRN, Pillo Patel MD, 10 mL at 02/22/24 2138    midazolam (Versed) injection, , , PRN, Pillo Patel MD, 0.5 mg at 02/22/24 2227    ondansetron (Zofran) injection 4 mg, 4 mg,  "intravenous, q30 min PRN, Willian Whalen MD MPH    perflutren lipid microspheres (Definity) injection 0.5-10 mL of dilution, 0.5-10 mL of dilution, intravenous, Once in imaging, Radha Rollins APRN-CNP    polyethylene glycol (Glycolax, Miralax) packet 17 g, 17 g, oral, Daily, Shemar Banda MD    sodium chloride 0.9% infusion, , , Continuous PRN, Pillo Patel MD, Last Rate: 100 mL/hr at 02/22/24 2130, 100 mL/hr at 02/22/24 2130    Review of Systems  History obtained from chart review and the patient  General ROS: positive for  - fatigue  Respiratory ROS: no cough, shortness of breath, or wheezing  Cardiovascular ROS: no chest pain or dyspnea on exertion  Gastrointestinal ROS: positive for - abdominal pain  Genitourinary ROS: no dysuria, trouble voiding, or hematuria     Last Recorded Vitals  /78   Pulse 65   Temp 36.6 °C (97.9 °F) (Temporal)   Resp 15   Wt 79.4 kg (175 lb 0.7 oz)   SpO2 100%      Physical Exam  Orientation: oriented to person, place, time, and general circumstances  HEENT: normocephalic, atraumatic  Pulm: normal  Cardiac: Regular rate and rhythm  GI:  soft, mild TTP  Pulses: peripheral pulses symmetrical  Right groin access site soft, no ecchymosis or hematoma.     Relevant Results    LABS:  Lab Results   Component Value Date    WBC 15.0 (H) 02/23/2024    HGB 8.9 (L) 02/23/2024    HCT 26.9 (L) 02/23/2024    MCV 71 (L) 02/23/2024     02/23/2024      Results from last 72 hours   Lab Units 02/23/24  0654   SODIUM mmol/L 134*   POTASSIUM mmol/L 3.7   CHLORIDE mmol/L 100   CO2 mmol/L 27   BUN mg/dL 18   CREATININE mg/dL 0.98   GLUCOSE mg/dL 96   CALCIUM mg/dL 8.0*   ANION GAP mmol/L 11   EGFR mL/min/1.73m*2 >90     Results from last 72 hours   Lab Units 02/22/24  0824   ALK PHOS U/L 47   BILIRUBIN TOTAL mg/dL 0.8   PROTEIN TOTAL g/dL 7.7   ALT U/L 13   AST U/L 24   ALBUMIN g/dL 4.4           No lab exists for component: \"PT\"    MICRO:  No results found for the last 14 " days.      IMAGING:   CT angio chest abdomen pelvis   Final Result   There is a 6 mm aneurysm in the caudal aspect of the spleen. The   spleen has a striated appearance which is likely related to phase of   contrast, if there is recent history of trauma, small lacerations can   have a similar appearance.        Small to moderate amount of hemoperitoneum surrounding the spleen,   liver, and extending into the pelvis.        Bibasilar atelectasis with bronchial wall thickening and   opacification of the small airways leading to the right lower lobe   which can be seen in the setting of aspiration/mucoid secretions.        Signed by: Pillo Patel 2/22/2024 4:57 PM   Dictation workstation:   [id number]      Transthoracic Echo (TTE) Complete   Final Result      CT chest abdomen pelvis wo IV contrast   Final Result   CHEST:   1. No focal infiltrate, pulmonary nodule or lymphadenopathy   identified.        ABDOMEN-PELVIS:   1. Hyperdense free fluid within the abdomen, concerning for   intraperitoneal hemorrhage. The source of this hemorrhage cannot be   elucidated on CT.   2. No evidence of bowel obstruction or free intraperitoneal air.        MACRO:   None        Signed by: Elijah Gong 2/22/2024 2:09 PM   Dictation workstation:   [id number]      XR chest 2 views   Final Result   1.  No active cardiopulmonary disease.  There has not been   significant interval change from the prior exam.        Signed by: Bry Keller 2/22/2024 9:23 AM   Dictation workstation:   [id number]      IR angiogram aorta abdomen    (Results Pending)            Assessment/Plan     This is a 53 year old male presenting with hemoperitoneum secondary to a splenic aneurysm. He underwent a splenic artery embolization on 2/22. He is doing well today. VSS. Hgb stable at 8.9. Suspect this drop from 9.5 is residual from bleeding yesterday. Continued to trend H&H.   Access site benign.     IR to sign off. Please re consult as needed.       Nora GRAHAM  STEPHANIE Garza        Time : Billing Time  Prep time on date of the patient encounter: 10 minutes.   Time spent directly with patient/family/caregiver: 20 minutes.   Documentation time: 10 minutes.   Total time (minutes):  40 minutes  Time Spent with this Patient (minutes).  More than 50% of This Time was Spent in Counseling and/or Coordination of Care

## 2024-02-23 NOTE — POST-PROCEDURE NOTE
Interventional Radiology Brief Postprocedure Note    Attending: Pillo Patel MD    Assistant:   Staff Role   Shemar Burciaga, RN Documenter   José Miguel Mcdermott, BIA Circulator   Pillo Ptael MD No role selected   Leatha Nunez, RT Scrub Person       Diagnosis:   1. Generalized abdominal pain        2. Elevated troponin  Transthoracic Echo (TTE) Complete    Transthoracic Echo (TTE) Complete      3. NSTEMI (non-ST elevated myocardial infarction) (CMS/HCC)        4. Hemoperitoneum            Description of procedure: Splenic artery embolization. Right common femoral arteriotomy closed with an Angio Seal closure device.    Timeout:  Yes    Procedure Area: Procedure Area     Anesthesia:   Conscious Sedation    Complications: None    Estimated Blood Loss: minimal    Medications (Filter: Administrations occurring from 2237 to 2237 on 02/22/24) As of 02/22/24 2237      None          No specimens collected      See detailed result report with images in PACS.    The patient tolerated the procedure well without incident or complication and is in stable condition.

## 2024-02-23 NOTE — PROGRESS NOTES
02/23/24 7083   Discharge Planning   Patient expects to be discharged to: Home        Patient had splenic artery embolization yesterday and his abdominal pain improved. Surgery is following. Cardio is following also for nstemi. Requested from  to follow patient for ETOH and substance use for resources. When medically cleared patient will go home, no needs.

## 2024-02-24 VITALS
HEIGHT: 70 IN | HEART RATE: 72 BPM | TEMPERATURE: 98.4 F | BODY MASS INDEX: 25.06 KG/M2 | WEIGHT: 175.04 LBS | RESPIRATION RATE: 20 BRPM | OXYGEN SATURATION: 98 % | DIASTOLIC BLOOD PRESSURE: 85 MMHG | SYSTOLIC BLOOD PRESSURE: 143 MMHG

## 2024-02-24 LAB
ANION GAP SERPL CALC-SCNC: 13 MMOL/L (ref 10–20)
ATRIAL RATE: 88 BPM
ATRIAL RATE: 94 BPM
BASOPHILS # BLD AUTO: 0.03 X10*3/UL (ref 0–0.1)
BASOPHILS NFR BLD AUTO: 0.2 %
BUN SERPL-MCNC: 12 MG/DL (ref 6–23)
CALCIUM SERPL-MCNC: 8.5 MG/DL (ref 8.6–10.3)
CHLORIDE SERPL-SCNC: 101 MMOL/L (ref 98–107)
CO2 SERPL-SCNC: 26 MMOL/L (ref 21–32)
CREAT SERPL-MCNC: 0.91 MG/DL (ref 0.5–1.3)
EGFRCR SERPLBLD CKD-EPI 2021: >90 ML/MIN/1.73M*2
EOSINOPHIL # BLD AUTO: 0.08 X10*3/UL (ref 0–0.7)
EOSINOPHIL NFR BLD AUTO: 0.6 %
ERYTHROCYTE [DISTWIDTH] IN BLOOD BY AUTOMATED COUNT: 15.4 % (ref 11.5–14.5)
GLUCOSE SERPL-MCNC: 106 MG/DL (ref 74–99)
HCT VFR BLD AUTO: 27.6 % (ref 41–52)
HGB BLD-MCNC: 8.8 G/DL (ref 13.5–17.5)
IMM GRANULOCYTES # BLD AUTO: 0.09 X10*3/UL (ref 0–0.7)
IMM GRANULOCYTES NFR BLD AUTO: 0.7 % (ref 0–0.9)
LYMPHOCYTES # BLD AUTO: 2.39 X10*3/UL (ref 1.2–4.8)
LYMPHOCYTES NFR BLD AUTO: 17.4 %
MCH RBC QN AUTO: 22.8 PG (ref 26–34)
MCHC RBC AUTO-ENTMCNC: 31.9 G/DL (ref 32–36)
MCV RBC AUTO: 72 FL (ref 80–100)
MONOCYTES # BLD AUTO: 1.5 X10*3/UL (ref 0.1–1)
MONOCYTES NFR BLD AUTO: 10.9 %
NEUTROPHILS # BLD AUTO: 9.61 X10*3/UL (ref 1.2–7.7)
NEUTROPHILS NFR BLD AUTO: 70.2 %
NRBC BLD-RTO: 0 /100 WBCS (ref 0–0)
P AXIS: 76 DEGREES
P AXIS: 83 DEGREES
P OFFSET: 204 MS
P OFFSET: 208 MS
P ONSET: 155 MS
P ONSET: 161 MS
PLATELET # BLD AUTO: 242 X10*3/UL (ref 150–450)
POTASSIUM SERPL-SCNC: 3.5 MMOL/L (ref 3.5–5.3)
PR INTERVAL: 128 MS
PR INTERVAL: 140 MS
Q ONSET: 225 MS
Q ONSET: 225 MS
QRS COUNT: 15 BEATS
QRS COUNT: 15 BEATS
QRS DURATION: 74 MS
QRS DURATION: 76 MS
QT INTERVAL: 336 MS
QT INTERVAL: 362 MS
QTC CALCULATION(BAZETT): 406 MS
QTC CALCULATION(BAZETT): 452 MS
QTC FREDERICIA: 381 MS
QTC FREDERICIA: 420 MS
R AXIS: -1 DEGREES
R AXIS: 6 DEGREES
RBC # BLD AUTO: 3.86 X10*6/UL (ref 4.5–5.9)
SODIUM SERPL-SCNC: 136 MMOL/L (ref 136–145)
T AXIS: 101 DEGREES
T AXIS: 104 DEGREES
T OFFSET: 393 MS
T OFFSET: 406 MS
VENTRICULAR RATE: 88 BPM
VENTRICULAR RATE: 94 BPM
WBC # BLD AUTO: 13.7 X10*3/UL (ref 4.4–11.3)

## 2024-02-24 PROCEDURE — 2500000001 HC RX 250 WO HCPCS SELF ADMINISTERED DRUGS (ALT 637 FOR MEDICARE OP): Performed by: INTERNAL MEDICINE

## 2024-02-24 PROCEDURE — 2500000004 HC RX 250 GENERAL PHARMACY W/ HCPCS (ALT 636 FOR OP/ED): Performed by: INTERNAL MEDICINE

## 2024-02-24 PROCEDURE — 80048 BASIC METABOLIC PNL TOTAL CA: CPT | Performed by: INTERNAL MEDICINE

## 2024-02-24 PROCEDURE — 85025 COMPLETE CBC W/AUTO DIFF WBC: CPT | Performed by: INTERNAL MEDICINE

## 2024-02-24 PROCEDURE — 36415 COLL VENOUS BLD VENIPUNCTURE: CPT | Performed by: INTERNAL MEDICINE

## 2024-02-24 RX ADMIN — Medication 1 TABLET: at 09:08

## 2024-02-24 RX ADMIN — FOLIC ACID 1 MG: 1 TABLET ORAL at 09:07

## 2024-02-24 RX ADMIN — THIAMINE HYDROCHLORIDE 100 MG: 100 INJECTION, SOLUTION INTRAMUSCULAR; INTRAVENOUS at 09:08

## 2024-02-24 ASSESSMENT — LIFESTYLE VARIABLES
TOTAL SCORE: 0
PAROXYSMAL SWEATS: NO SWEAT VISIBLE
TREMOR: NO TREMOR
ORIENTATION AND CLOUDING OF SENSORIUM: ORIENTED AND CAN DO SERIAL ADDITIONS
TOTAL SCORE: 0
HEADACHE, FULLNESS IN HEAD: NOT PRESENT
PAROXYSMAL SWEATS: NO SWEAT VISIBLE
VISUAL DISTURBANCES: NOT PRESENT
AGITATION: NORMAL ACTIVITY
VISUAL DISTURBANCES: NOT PRESENT
AUDITORY DISTURBANCES: NOT PRESENT
VISUAL DISTURBANCES: NOT PRESENT
ORIENTATION AND CLOUDING OF SENSORIUM: ORIENTED AND CAN DO SERIAL ADDITIONS
NAUSEA AND VOMITING: NO NAUSEA AND NO VOMITING
TREMOR: NO TREMOR
ORIENTATION AND CLOUDING OF SENSORIUM: ORIENTED AND CAN DO SERIAL ADDITIONS
TOTAL SCORE: 0
HEADACHE, FULLNESS IN HEAD: NOT PRESENT
PAROXYSMAL SWEATS: NO SWEAT VISIBLE
AUDITORY DISTURBANCES: NOT PRESENT
VISUAL DISTURBANCES: NOT PRESENT
NAUSEA AND VOMITING: NO NAUSEA AND NO VOMITING
AUDITORY DISTURBANCES: NOT PRESENT
ANXIETY: NO ANXIETY, AT EASE
AGITATION: NORMAL ACTIVITY
ANXIETY: NO ANXIETY, AT EASE
HEADACHE, FULLNESS IN HEAD: NOT PRESENT
PAROXYSMAL SWEATS: NO SWEAT VISIBLE
NAUSEA AND VOMITING: NO NAUSEA AND NO VOMITING
AUDITORY DISTURBANCES: NOT PRESENT
ORIENTATION AND CLOUDING OF SENSORIUM: ORIENTED AND CAN DO SERIAL ADDITIONS
TREMOR: NO TREMOR
NAUSEA AND VOMITING: NO NAUSEA AND NO VOMITING
TREMOR: NO TREMOR
HEADACHE, FULLNESS IN HEAD: NOT PRESENT
TOTAL SCORE: 0
AGITATION: NORMAL ACTIVITY
ANXIETY: NO ANXIETY, AT EASE
AGITATION: NORMAL ACTIVITY
ANXIETY: NO ANXIETY, AT EASE

## 2024-02-24 NOTE — DISCHARGE INSTRUCTIONS
Follow-up with PCP within 1 week for CBC.  Follow-up with cardiology in a month for a stress test.

## 2024-02-24 NOTE — PROGRESS NOTES
INTERNAL MEDICINE PROGRESS NOTE      HPI:    Lying in bed in no acute distress.  Patient reports no abdominal pain.  He has had no chest pain.  He feels well and is tolerating a diet.  He wants to go home.    Vital signs in last 24 hours:  Temp:  [36.9 °C (98.4 °F)-37.3 °C (99.2 °F)] 36.9 °C (98.4 °F)  Heart Rate:  [68-72] 72  Resp:  [18-23] 20  BP: (137-143)/(75-85) 143/85    Physical Examination:  Physical Exam      Constitutional:       Appearance: Middle-aged, well-built, in no distress  HENT:      Head: Normocephalic and atraumatic.   Eyes:      Extraocular Movements: Extraocular movements intact.      Pupils: Pupils are equal, round, and reactive to light.   Cardiovascular:      Rate and Rhythm: Normal rate and regular rhythm.      Pulses: Normal pulses.      Heart sounds: Normal heart sounds.   Pulmonary:      Effort: Pulmonary effort is normal.      Breath sounds: Normal breath sounds.   Abdominal:      General: Abdomen is flat. Bowel sounds are normal.      Palpations: Abdomen is soft.   Musculoskeletal:         General: Normal range of motion.      Cervical back: Normal range of motion and neck supple.   Skin:     General: Skin is warm and dry.   Neurological:      General: No focal deficit present.      Mental Status: He is alert and oriented to person, place, and time. Mental status is at baseline.      Medications:    Current Facility-Administered Medications:     acetaminophen (Tylenol) tablet 650 mg, 650 mg, oral, q4h PRN, 650 mg at 02/23/24 8839 **OR** acetaminophen (Tylenol) oral liquid 650 mg, 650 mg, oral, q4h PRN **OR** acetaminophen (Tylenol) suppository 650 mg, 650 mg, rectal, q4h PRN, Shemar Banda MD    diazePAM (Valium) tablet 10 mg, 10 mg, oral, q2h PRN, Chucho Porter DO    fentaNYL PF (Sublimaze) injection, , , PRN, Pillo Patel MD, 50 mcg at 02/22/24 2227    folic acid (Folvite) tablet 1 mg, 1 mg, oral, Daily, Chucho Porter DO, 1 mg at 02/24/24 0907    iohexol  (OMNIPaque) 350 mg iodine/mL solution, , , PRN, Pillo Patel MD, 50 mL at 02/22/24 2230    lidocaine PF (Xylocaine) 10 mg/mL (1 %) injection, , , PRN, Pillo Patel MD, 10 mL at 02/22/24 2138    loperamide (Imodium) capsule 2 mg, 2 mg, oral, 4x daily PRN, Shemar Banda MD, 2 mg at 02/23/24 2204    midazolam (Versed) injection, , , PRN, Pillo Patel MD, 0.5 mg at 02/22/24 2227    multivitamin with minerals 1 tablet, 1 tablet, oral, Daily, Chucho G Luis Miguele DO, 1 tablet at 02/24/24 0908    ondansetron (Zofran) injection 4 mg, 4 mg, intravenous, q30 min PRN, Willian Whalen MD MPH    perflutren lipid microspheres (Definity) injection 0.5-10 mL of dilution, 0.5-10 mL of dilution, intravenous, Once in imaging, Radha Rollins, APRN-CNP    polyethylene glycol (Glycolax, Miralax) packet 17 g, 17 g, oral, Daily, Shemar Banda MD    sodium chloride 0.9% infusion, , , Continuous PRN, Pillo Patel MD, Last Rate: 100 mL/hr at 02/24/24 0352, Rate Verify at 02/24/24 0352    [START ON 2/27/2024] thiamine (Vitamin B-1) tablet 100 mg, 100 mg, oral, Daily, Chucho G Tuanomone, DO    thiamine (Vitamin B1) injection 100 mg, 100 mg, intravenous, Daily, Chucho G Salomone, DO, 100 mg at 02/24/24 0908    Laboratory Findings:  Lab Results   Component Value Date    WBC 13.7 (H) 02/24/2024    HGB 8.8 (L) 02/24/2024    HCT 27.6 (L) 02/24/2024    MCV 72 (L) 02/24/2024     02/24/2024     Lab Results   Component Value Date    INR 0.9 09/21/2020    INR 1.0 12/05/2019    PROTIME 10.2 09/21/2020    PROTIME 10.7 12/05/2019     Lab Results   Component Value Date    GLUCOSE 106 (H) 02/24/2024    CALCIUM 8.5 (L) 02/24/2024     02/24/2024    K 3.5 02/24/2024    CO2 26 02/24/2024     02/24/2024    BUN 12 02/24/2024    CREATININE 0.91 02/24/2024       Assessment and Plan:    NSTEMI -likely secondary to substance abuse.  Stress test in 1 month per cardiology.  Heroin abuse - supportive care, cessation advised  Abdominal pain  - better after procedure  Hemoperitoneum -outpatient follow-up with PCP for CBC in a week.    Discharge home today.  Discussed with father at bedside.       Shemar Banda MD  02/24/24  11:12 AM

## 2024-02-24 NOTE — CARE PLAN
The patient's goals for the shift include  pt will be free from injury throughout the shift    The clinical goals for the shift include pt will remain hemodynamically stable    Over the shift, the patient did not make progress toward the following goals. Barriers to progression include . Recommendations to address these barriers include .

## 2024-02-25 ENCOUNTER — APPOINTMENT (OUTPATIENT)
Dept: RADIOLOGY | Facility: HOSPITAL | Age: 54
End: 2024-02-25
Payer: COMMERCIAL

## 2024-02-25 ENCOUNTER — HOSPITAL ENCOUNTER (INPATIENT)
Facility: HOSPITAL | Age: 54
LOS: 3 days | Discharge: HOME | End: 2024-02-29
Attending: EMERGENCY MEDICINE | Admitting: INTERNAL MEDICINE
Payer: COMMERCIAL

## 2024-02-25 DIAGNOSIS — R10.12 LEFT UPPER QUADRANT ABDOMINAL PAIN: ICD-10-CM

## 2024-02-25 DIAGNOSIS — R79.89 ELEVATED TROPONIN: Primary | ICD-10-CM

## 2024-02-25 DIAGNOSIS — I26.99 ACUTE PULMONARY EMBOLISM WITHOUT ACUTE COR PULMONALE, UNSPECIFIED PULMONARY EMBOLISM TYPE (MULTI): ICD-10-CM

## 2024-02-25 DIAGNOSIS — I26.99 OTHER PULMONARY EMBOLISM WITHOUT ACUTE COR PULMONALE, UNSPECIFIED CHRONICITY (MULTI): ICD-10-CM

## 2024-02-25 LAB
ABO GROUP (TYPE) IN BLOOD: NORMAL
ALBUMIN SERPL BCP-MCNC: 4 G/DL (ref 3.4–5)
ALP SERPL-CCNC: 40 U/L (ref 33–120)
ALT SERPL W P-5'-P-CCNC: 16 U/L (ref 10–52)
ANION GAP SERPL CALC-SCNC: 10 MMOL/L (ref 10–20)
ANTIBODY SCREEN: NORMAL
APTT PPP: 24 SECONDS (ref 27–38)
AST SERPL W P-5'-P-CCNC: 37 U/L (ref 9–39)
BASOPHILS # BLD AUTO: 0.03 X10*3/UL (ref 0–0.1)
BASOPHILS NFR BLD AUTO: 0.2 %
BILIRUB SERPL-MCNC: 0.5 MG/DL (ref 0–1.2)
BUN SERPL-MCNC: 11 MG/DL (ref 6–23)
CALCIUM SERPL-MCNC: 8.6 MG/DL (ref 8.6–10.3)
CARDIAC TROPONIN I PNL SERPL HS: 64 NG/L (ref 0–20)
CHLORIDE SERPL-SCNC: 101 MMOL/L (ref 98–107)
CO2 SERPL-SCNC: 29 MMOL/L (ref 21–32)
CREAT SERPL-MCNC: 0.98 MG/DL (ref 0.5–1.3)
EGFRCR SERPLBLD CKD-EPI 2021: >90 ML/MIN/1.73M*2
EOSINOPHIL # BLD AUTO: 0.43 X10*3/UL (ref 0–0.7)
EOSINOPHIL NFR BLD AUTO: 2.9 %
ERYTHROCYTE [DISTWIDTH] IN BLOOD BY AUTOMATED COUNT: 16.3 % (ref 11.5–14.5)
GLUCOSE SERPL-MCNC: 114 MG/DL (ref 74–99)
HCT VFR BLD AUTO: 28.3 % (ref 41–52)
HGB BLD-MCNC: 8.9 G/DL (ref 13.5–17.5)
IMM GRANULOCYTES # BLD AUTO: 0.12 X10*3/UL (ref 0–0.7)
IMM GRANULOCYTES NFR BLD AUTO: 0.8 % (ref 0–0.9)
INR PPP: 1 (ref 0.9–1.1)
LIPASE SERPL-CCNC: 127 U/L (ref 9–82)
LYMPHOCYTES # BLD AUTO: 2.5 X10*3/UL (ref 1.2–4.8)
LYMPHOCYTES NFR BLD AUTO: 17 %
MCH RBC QN AUTO: 23 PG (ref 26–34)
MCHC RBC AUTO-ENTMCNC: 31.4 G/DL (ref 32–36)
MCV RBC AUTO: 73 FL (ref 80–100)
MONOCYTES # BLD AUTO: 1.49 X10*3/UL (ref 0.1–1)
MONOCYTES NFR BLD AUTO: 10.1 %
NEUTROPHILS # BLD AUTO: 10.17 X10*3/UL (ref 1.2–7.7)
NEUTROPHILS NFR BLD AUTO: 69 %
NRBC BLD-RTO: 0 /100 WBCS (ref 0–0)
PLATELET # BLD AUTO: 337 X10*3/UL (ref 150–450)
POTASSIUM SERPL-SCNC: 6.2 MMOL/L (ref 3.5–5.3)
PROT SERPL-MCNC: 7.3 G/DL (ref 6.4–8.2)
PROTHROMBIN TIME: 10.8 SECONDS (ref 9.8–12.8)
RBC # BLD AUTO: 3.87 X10*6/UL (ref 4.5–5.9)
RH FACTOR (ANTIGEN D): NORMAL
SODIUM SERPL-SCNC: 134 MMOL/L (ref 136–145)
WBC # BLD AUTO: 14.7 X10*3/UL (ref 4.4–11.3)

## 2024-02-25 PROCEDURE — 86901 BLOOD TYPING SEROLOGIC RH(D): CPT | Performed by: EMERGENCY MEDICINE

## 2024-02-25 PROCEDURE — 2550000001 HC RX 255 CONTRASTS: Performed by: EMERGENCY MEDICINE

## 2024-02-25 PROCEDURE — 85025 COMPLETE CBC W/AUTO DIFF WBC: CPT | Performed by: EMERGENCY MEDICINE

## 2024-02-25 PROCEDURE — 80053 COMPREHEN METABOLIC PANEL: CPT | Performed by: EMERGENCY MEDICINE

## 2024-02-25 PROCEDURE — 74174 CTA ABD&PLVS W/CONTRAST: CPT | Performed by: STUDENT IN AN ORGANIZED HEALTH CARE EDUCATION/TRAINING PROGRAM

## 2024-02-25 PROCEDURE — 36415 COLL VENOUS BLD VENIPUNCTURE: CPT | Performed by: EMERGENCY MEDICINE

## 2024-02-25 PROCEDURE — 2500000004 HC RX 250 GENERAL PHARMACY W/ HCPCS (ALT 636 FOR OP/ED)

## 2024-02-25 PROCEDURE — 84484 ASSAY OF TROPONIN QUANT: CPT | Performed by: EMERGENCY MEDICINE

## 2024-02-25 PROCEDURE — 74174 CTA ABD&PLVS W/CONTRAST: CPT

## 2024-02-25 PROCEDURE — 85610 PROTHROMBIN TIME: CPT | Performed by: EMERGENCY MEDICINE

## 2024-02-25 PROCEDURE — 83690 ASSAY OF LIPASE: CPT | Performed by: EMERGENCY MEDICINE

## 2024-02-25 PROCEDURE — 99285 EMERGENCY DEPT VISIT HI MDM: CPT | Mod: 25

## 2024-02-25 PROCEDURE — 96374 THER/PROPH/DIAG INJ IV PUSH: CPT

## 2024-02-25 PROCEDURE — 81003 URINALYSIS AUTO W/O SCOPE: CPT | Performed by: EMERGENCY MEDICINE

## 2024-02-25 RX ORDER — HYDROMORPHONE HYDROCHLORIDE 1 MG/ML
1 INJECTION, SOLUTION INTRAMUSCULAR; INTRAVENOUS; SUBCUTANEOUS ONCE
Status: COMPLETED | OUTPATIENT
Start: 2024-02-25 | End: 2024-02-25

## 2024-02-25 RX ORDER — ONDANSETRON HYDROCHLORIDE 2 MG/ML
4 INJECTION, SOLUTION INTRAVENOUS ONCE
Status: DISCONTINUED | OUTPATIENT
Start: 2024-02-25 | End: 2024-02-29 | Stop reason: HOSPADM

## 2024-02-25 RX ORDER — HYDROMORPHONE HYDROCHLORIDE 1 MG/ML
INJECTION, SOLUTION INTRAMUSCULAR; INTRAVENOUS; SUBCUTANEOUS
Status: COMPLETED
Start: 2024-02-25 | End: 2024-02-25

## 2024-02-25 RX ORDER — HYDROMORPHONE HYDROCHLORIDE 1 MG/ML
1 INJECTION, SOLUTION INTRAMUSCULAR; INTRAVENOUS; SUBCUTANEOUS EVERY 30 MIN PRN
Status: DISCONTINUED | OUTPATIENT
Start: 2024-02-25 | End: 2024-02-26

## 2024-02-25 RX ADMIN — HYDROMORPHONE HYDROCHLORIDE 1 MG: 1 INJECTION, SOLUTION INTRAMUSCULAR; INTRAVENOUS; SUBCUTANEOUS at 22:00

## 2024-02-25 RX ADMIN — IOHEXOL 90 ML: 350 INJECTION, SOLUTION INTRAVENOUS at 23:46

## 2024-02-25 ASSESSMENT — COLUMBIA-SUICIDE SEVERITY RATING SCALE - C-SSRS
2. HAVE YOU ACTUALLY HAD ANY THOUGHTS OF KILLING YOURSELF?: NO
6. HAVE YOU EVER DONE ANYTHING, STARTED TO DO ANYTHING, OR PREPARED TO DO ANYTHING TO END YOUR LIFE?: NO
1. IN THE PAST MONTH, HAVE YOU WISHED YOU WERE DEAD OR WISHED YOU COULD GO TO SLEEP AND NOT WAKE UP?: NO

## 2024-02-25 ASSESSMENT — PAIN DESCRIPTION - LOCATION: LOCATION: ABDOMEN

## 2024-02-25 ASSESSMENT — PAIN SCALES - GENERAL: PAINLEVEL_OUTOF10: 10 - WORST POSSIBLE PAIN

## 2024-02-25 NOTE — ED TRIAGE NOTES
Pt coming today after he had aneurysm in his stomach and had a procedure done Thursday and now has left lower abdominal pain. Pt denies any N/V/D but has constipation. Pt also stated that his extremities are cold.

## 2024-02-26 ENCOUNTER — APPOINTMENT (OUTPATIENT)
Dept: RADIOLOGY | Facility: HOSPITAL | Age: 54
End: 2024-02-26
Payer: COMMERCIAL

## 2024-02-26 ENCOUNTER — APPOINTMENT (OUTPATIENT)
Dept: CARDIOLOGY | Facility: HOSPITAL | Age: 54
End: 2024-02-26
Payer: COMMERCIAL

## 2024-02-26 PROBLEM — R79.89 ELEVATED TROPONIN: Status: ACTIVE | Noted: 2024-02-26

## 2024-02-26 LAB
APPEARANCE UR: CLEAR
BACTERIA BLD CULT: NORMAL
BACTERIA BLD CULT: NORMAL
BILIRUB UR STRIP.AUTO-MCNC: NEGATIVE MG/DL
BNP SERPL-MCNC: 69 PG/ML (ref 0–99)
CARDIAC TROPONIN I PNL SERPL HS: 53 NG/L (ref 0–20)
COLOR UR: YELLOW
GLUCOSE UR STRIP.AUTO-MCNC: NEGATIVE MG/DL
KETONES UR STRIP.AUTO-MCNC: NEGATIVE MG/DL
LACTATE SERPL-SCNC: 0.9 MMOL/L (ref 0.4–2)
LEUKOCYTE ESTERASE UR QL STRIP.AUTO: NEGATIVE
NITRITE UR QL STRIP.AUTO: NEGATIVE
PH UR STRIP.AUTO: 6 [PH]
POTASSIUM SERPL-SCNC: 4 MMOL/L (ref 3.5–5.3)
PROT UR STRIP.AUTO-MCNC: NEGATIVE MG/DL
RBC # UR STRIP.AUTO: NEGATIVE /UL
SP GR UR STRIP.AUTO: 1.02
UROBILINOGEN UR STRIP.AUTO-MCNC: <2 MG/DL

## 2024-02-26 PROCEDURE — 93970 EXTREMITY STUDY: CPT

## 2024-02-26 PROCEDURE — 2500000004 HC RX 250 GENERAL PHARMACY W/ HCPCS (ALT 636 FOR OP/ED)

## 2024-02-26 PROCEDURE — 99223 1ST HOSP IP/OBS HIGH 75: CPT | Performed by: NURSE PRACTITIONER

## 2024-02-26 PROCEDURE — 2500000004 HC RX 250 GENERAL PHARMACY W/ HCPCS (ALT 636 FOR OP/ED): Performed by: INTERNAL MEDICINE

## 2024-02-26 PROCEDURE — 84484 ASSAY OF TROPONIN QUANT: CPT | Performed by: EMERGENCY MEDICINE

## 2024-02-26 PROCEDURE — 2500000001 HC RX 250 WO HCPCS SELF ADMINISTERED DRUGS (ALT 637 FOR MEDICARE OP): Performed by: INTERNAL MEDICINE

## 2024-02-26 PROCEDURE — 96376 TX/PRO/DX INJ SAME DRUG ADON: CPT

## 2024-02-26 PROCEDURE — 2500000004 HC RX 250 GENERAL PHARMACY W/ HCPCS (ALT 636 FOR OP/ED): Performed by: EMERGENCY MEDICINE

## 2024-02-26 PROCEDURE — 9420000001 HC RT PATIENT EDUCATION 5 MIN

## 2024-02-26 PROCEDURE — 2500000002 HC RX 250 W HCPCS SELF ADMINISTERED DRUGS (ALT 637 FOR MEDICARE OP, ALT 636 FOR OP/ED): Performed by: INTERNAL MEDICINE

## 2024-02-26 PROCEDURE — 93971 EXTREMITY STUDY: CPT | Performed by: RADIOLOGY

## 2024-02-26 PROCEDURE — 83605 ASSAY OF LACTIC ACID: CPT | Performed by: EMERGENCY MEDICINE

## 2024-02-26 PROCEDURE — 93010 ELECTROCARDIOGRAM REPORT: CPT | Performed by: INTERNAL MEDICINE

## 2024-02-26 PROCEDURE — 84132 ASSAY OF SERUM POTASSIUM: CPT | Performed by: EMERGENCY MEDICINE

## 2024-02-26 PROCEDURE — 99418 PROLNG IP/OBS E/M EA 15 MIN: CPT | Performed by: NURSE PRACTITIONER

## 2024-02-26 PROCEDURE — 94667 MNPJ CHEST WALL 1ST: CPT

## 2024-02-26 PROCEDURE — 93005 ELECTROCARDIOGRAM TRACING: CPT

## 2024-02-26 PROCEDURE — 99222 1ST HOSP IP/OBS MODERATE 55: CPT | Performed by: CLINICAL NURSE SPECIALIST

## 2024-02-26 PROCEDURE — 83880 ASSAY OF NATRIURETIC PEPTIDE: CPT | Performed by: EMERGENCY MEDICINE

## 2024-02-26 PROCEDURE — 36415 COLL VENOUS BLD VENIPUNCTURE: CPT | Performed by: EMERGENCY MEDICINE

## 2024-02-26 PROCEDURE — 1200000002 HC GENERAL ROOM WITH TELEMETRY DAILY

## 2024-02-26 RX ORDER — SODIUM CHLORIDE 9 MG/ML
100 INJECTION, SOLUTION INTRAVENOUS CONTINUOUS
Status: DISCONTINUED | OUTPATIENT
Start: 2024-02-26 | End: 2024-02-29 | Stop reason: HOSPADM

## 2024-02-26 RX ORDER — ACETAMINOPHEN 650 MG/1
650 SUPPOSITORY RECTAL EVERY 4 HOURS PRN
Status: DISCONTINUED | OUTPATIENT
Start: 2024-02-26 | End: 2024-02-26 | Stop reason: SDUPTHER

## 2024-02-26 RX ORDER — ACETAMINOPHEN 160 MG/5ML
650 SOLUTION ORAL EVERY 4 HOURS PRN
Status: DISCONTINUED | OUTPATIENT
Start: 2024-02-26 | End: 2024-02-26 | Stop reason: SDUPTHER

## 2024-02-26 RX ORDER — ACETAMINOPHEN 325 MG/1
650 TABLET ORAL EVERY 4 HOURS PRN
Status: DISCONTINUED | OUTPATIENT
Start: 2024-02-26 | End: 2024-02-29 | Stop reason: HOSPADM

## 2024-02-26 RX ORDER — MULTIVIT-MIN/IRON FUM/FOLIC AC 7.5 MG-4
1 TABLET ORAL DAILY
Status: DISCONTINUED | OUTPATIENT
Start: 2024-02-26 | End: 2024-02-29 | Stop reason: HOSPADM

## 2024-02-26 RX ORDER — HYDROMORPHONE HYDROCHLORIDE 1 MG/ML
INJECTION, SOLUTION INTRAMUSCULAR; INTRAVENOUS; SUBCUTANEOUS
Status: COMPLETED
Start: 2024-02-26 | End: 2024-02-26

## 2024-02-26 RX ORDER — TAMSULOSIN HYDROCHLORIDE 0.4 MG/1
0.4 CAPSULE ORAL DAILY
COMMUNITY

## 2024-02-26 RX ORDER — ACETAMINOPHEN 325 MG/1
650 TABLET ORAL EVERY 4 HOURS PRN
Status: DISCONTINUED | OUTPATIENT
Start: 2024-02-26 | End: 2024-02-26 | Stop reason: SDUPTHER

## 2024-02-26 RX ORDER — DIAZEPAM 2 MG/1
2 TABLET ORAL EVERY 2 HOUR PRN
Status: DISCONTINUED | OUTPATIENT
Start: 2024-02-26 | End: 2024-02-29 | Stop reason: HOSPADM

## 2024-02-26 RX ORDER — THIAMINE HYDROCHLORIDE 100 MG/ML
100 INJECTION, SOLUTION INTRAMUSCULAR; INTRAVENOUS DAILY
Status: COMPLETED | OUTPATIENT
Start: 2024-02-26 | End: 2024-02-27

## 2024-02-26 RX ORDER — ACETAMINOPHEN 650 MG/1
650 SUPPOSITORY RECTAL EVERY 4 HOURS PRN
Status: DISCONTINUED | OUTPATIENT
Start: 2024-02-26 | End: 2024-02-29 | Stop reason: HOSPADM

## 2024-02-26 RX ORDER — HYDROMORPHONE HYDROCHLORIDE 1 MG/ML
1 INJECTION, SOLUTION INTRAMUSCULAR; INTRAVENOUS; SUBCUTANEOUS
Status: DISCONTINUED | OUTPATIENT
Start: 2024-02-26 | End: 2024-02-29 | Stop reason: HOSPADM

## 2024-02-26 RX ORDER — TALC
3 POWDER (GRAM) TOPICAL DAILY
Status: DISCONTINUED | OUTPATIENT
Start: 2024-02-26 | End: 2024-02-29 | Stop reason: HOSPADM

## 2024-02-26 RX ORDER — LOPERAMIDE HYDROCHLORIDE 2 MG/1
2 CAPSULE ORAL 4 TIMES DAILY PRN
Status: DISCONTINUED | OUTPATIENT
Start: 2024-02-26 | End: 2024-02-29 | Stop reason: HOSPADM

## 2024-02-26 RX ORDER — POLYETHYLENE GLYCOL 3350 17 G/17G
17 POWDER, FOR SOLUTION ORAL DAILY
Status: DISCONTINUED | OUTPATIENT
Start: 2024-02-26 | End: 2024-02-29 | Stop reason: HOSPADM

## 2024-02-26 RX ORDER — PANTOPRAZOLE SODIUM 40 MG/1
40 TABLET, DELAYED RELEASE ORAL
Status: DISCONTINUED | OUTPATIENT
Start: 2024-02-26 | End: 2024-02-29 | Stop reason: HOSPADM

## 2024-02-26 RX ORDER — PANTOPRAZOLE SODIUM 40 MG/10ML
40 INJECTION, POWDER, LYOPHILIZED, FOR SOLUTION INTRAVENOUS
Status: DISCONTINUED | OUTPATIENT
Start: 2024-02-26 | End: 2024-02-29 | Stop reason: HOSPADM

## 2024-02-26 RX ORDER — ACETAMINOPHEN 160 MG/5ML
650 SOLUTION ORAL EVERY 4 HOURS PRN
Status: DISCONTINUED | OUTPATIENT
Start: 2024-02-26 | End: 2024-02-29 | Stop reason: HOSPADM

## 2024-02-26 RX ORDER — LANOLIN ALCOHOL/MO/W.PET/CERES
100 CREAM (GRAM) TOPICAL DAILY
Status: DISCONTINUED | OUTPATIENT
Start: 2024-02-28 | End: 2024-02-29 | Stop reason: HOSPADM

## 2024-02-26 RX ORDER — FOLIC ACID 1 MG/1
1 TABLET ORAL DAILY
Status: DISCONTINUED | OUTPATIENT
Start: 2024-02-26 | End: 2024-02-29 | Stop reason: HOSPADM

## 2024-02-26 RX ADMIN — HYDROMORPHONE HYDROCHLORIDE 1 MG: 1 INJECTION, SOLUTION INTRAMUSCULAR; INTRAVENOUS; SUBCUTANEOUS at 03:39

## 2024-02-26 RX ADMIN — HYDROMORPHONE HYDROCHLORIDE 1 MG: 1 INJECTION, SOLUTION INTRAMUSCULAR; INTRAVENOUS; SUBCUTANEOUS at 12:01

## 2024-02-26 RX ADMIN — THIAMINE HYDROCHLORIDE 100 MG: 100 INJECTION, SOLUTION INTRAMUSCULAR; INTRAVENOUS at 08:38

## 2024-02-26 RX ADMIN — HYDROMORPHONE HYDROCHLORIDE 1 MG: 1 INJECTION, SOLUTION INTRAMUSCULAR; INTRAVENOUS; SUBCUTANEOUS at 08:37

## 2024-02-26 RX ADMIN — HYDROMORPHONE HYDROCHLORIDE 1 MG: 1 INJECTION, SOLUTION INTRAMUSCULAR; INTRAVENOUS; SUBCUTANEOUS at 05:52

## 2024-02-26 RX ADMIN — HYDROMORPHONE HYDROCHLORIDE 1 MG: 1 INJECTION, SOLUTION INTRAMUSCULAR; INTRAVENOUS; SUBCUTANEOUS at 00:58

## 2024-02-26 RX ADMIN — HYDROMORPHONE HYDROCHLORIDE 1 MG: 1 INJECTION, SOLUTION INTRAMUSCULAR; INTRAVENOUS; SUBCUTANEOUS at 18:56

## 2024-02-26 RX ADMIN — Medication 1 TABLET: at 08:38

## 2024-02-26 RX ADMIN — HYDROMORPHONE HYDROCHLORIDE 1 MG: 1 INJECTION, SOLUTION INTRAMUSCULAR; INTRAVENOUS; SUBCUTANEOUS at 20:27

## 2024-02-26 RX ADMIN — PANTOPRAZOLE SODIUM 40 MG: 40 TABLET, DELAYED RELEASE ORAL at 08:38

## 2024-02-26 RX ADMIN — HYDROMORPHONE HYDROCHLORIDE 1 MG: 1 INJECTION, SOLUTION INTRAMUSCULAR; INTRAVENOUS; SUBCUTANEOUS at 15:38

## 2024-02-26 RX ADMIN — SODIUM CHLORIDE 100 ML/HR: 9 INJECTION, SOLUTION INTRAVENOUS at 08:50

## 2024-02-26 RX ADMIN — HYDROMORPHONE HYDROCHLORIDE 1 MG: 1 INJECTION, SOLUTION INTRAMUSCULAR; INTRAVENOUS; SUBCUTANEOUS at 21:45

## 2024-02-26 RX ADMIN — POLYETHYLENE GLYCOL 3350 17 G: 17 POWDER, FOR SOLUTION ORAL at 10:02

## 2024-02-26 RX ADMIN — FOLIC ACID 1 MG: 1 TABLET ORAL at 08:38

## 2024-02-26 SDOH — ECONOMIC STABILITY: INCOME INSECURITY: IN THE LAST 12 MONTHS, WAS THERE A TIME WHEN YOU WERE NOT ABLE TO PAY THE MORTGAGE OR RENT ON TIME?: NO

## 2024-02-26 SDOH — HEALTH STABILITY: MENTAL HEALTH: HOW MANY STANDARD DRINKS CONTAINING ALCOHOL DO YOU HAVE ON A TYPICAL DAY?: PATIENT DOES NOT DRINK

## 2024-02-26 SDOH — ECONOMIC STABILITY: FOOD INSECURITY: WITHIN THE PAST 12 MONTHS, YOU WORRIED THAT YOUR FOOD WOULD RUN OUT BEFORE YOU GOT MONEY TO BUY MORE.: NEVER TRUE

## 2024-02-26 SDOH — SOCIAL STABILITY: SOCIAL NETWORK: ARE YOU MARRIED, WIDOWED, DIVORCED, SEPARATED, NEVER MARRIED, OR LIVING WITH A PARTNER?: MARRIED

## 2024-02-26 SDOH — ECONOMIC STABILITY: FOOD INSECURITY: WITHIN THE PAST 12 MONTHS, THE FOOD YOU BOUGHT JUST DIDN'T LAST AND YOU DIDN'T HAVE MONEY TO GET MORE.: NEVER TRUE

## 2024-02-26 SDOH — SOCIAL STABILITY: SOCIAL INSECURITY: WITHIN THE LAST YEAR, HAVE YOU BEEN AFRAID OF YOUR PARTNER OR EX-PARTNER?: NO

## 2024-02-26 SDOH — HEALTH STABILITY: MENTAL HEALTH: HOW OFTEN DO YOU HAVE A DRINK CONTAINING ALCOHOL?: NEVER

## 2024-02-26 SDOH — SOCIAL STABILITY: SOCIAL INSECURITY: WITHIN THE LAST YEAR, HAVE YOU BEEN HUMILIATED OR EMOTIONALLY ABUSED IN OTHER WAYS BY YOUR PARTNER OR EX-PARTNER?: NO

## 2024-02-26 SDOH — HEALTH STABILITY: PHYSICAL HEALTH: ON AVERAGE, HOW MANY DAYS PER WEEK DO YOU ENGAGE IN MODERATE TO STRENUOUS EXERCISE (LIKE A BRISK WALK)?: 2 DAYS

## 2024-02-26 SDOH — ECONOMIC STABILITY: INCOME INSECURITY: IN THE PAST 12 MONTHS, HAS THE ELECTRIC, GAS, OIL, OR WATER COMPANY THREATENED TO SHUT OFF SERVICE IN YOUR HOME?: NO

## 2024-02-26 SDOH — ECONOMIC STABILITY: HOUSING INSECURITY: IN THE LAST 12 MONTHS, HOW MANY PLACES HAVE YOU LIVED?: 1

## 2024-02-26 SDOH — ECONOMIC STABILITY: INCOME INSECURITY: HOW HARD IS IT FOR YOU TO PAY FOR THE VERY BASICS LIKE FOOD, HOUSING, MEDICAL CARE, AND HEATING?: NOT HARD AT ALL

## 2024-02-26 SDOH — HEALTH STABILITY: MENTAL HEALTH: HOW OFTEN DO YOU HAVE 6 OR MORE DRINKS ON ONE OCCASION?: NEVER

## 2024-02-26 SDOH — HEALTH STABILITY: PHYSICAL HEALTH: ON AVERAGE, HOW MANY MINUTES DO YOU ENGAGE IN EXERCISE AT THIS LEVEL?: 60 MIN

## 2024-02-26 ASSESSMENT — ENCOUNTER SYMPTOMS
WEAKNESS: 0
MUSCULOSKELETAL NEGATIVE: 1
JOINT SWELLING: 0
NEUROLOGICAL NEGATIVE: 1
DIARRHEA: 0
BLOOD IN STOOL: 0
BRUISES/BLEEDS EASILY: 0
DIZZINESS: 0
CONSTITUTIONAL NEGATIVE: 1
HEMATURIA: 0
LIGHT-HEADEDNESS: 0
CHEST TIGHTNESS: 0
APPETITE CHANGE: 0
CHILLS: 0
VOMITING: 0
ARTHRALGIAS: 0
ADENOPATHY: 0
RESPIRATORY NEGATIVE: 1
FEVER: 0
EYES NEGATIVE: 1
NAUSEA: 0
ABDOMINAL PAIN: 1
DIAPHORESIS: 0
CARDIOVASCULAR NEGATIVE: 1
CHEST TIGHTNESS: 1
ACTIVITY CHANGE: 1
PALPITATIONS: 0
SHORTNESS OF BREATH: 0
HEADACHES: 0

## 2024-02-26 ASSESSMENT — PAIN SCALES - GENERAL
PAINLEVEL_OUTOF10: 5 - MODERATE PAIN
PAINLEVEL_OUTOF10: 8
PAINLEVEL_OUTOF10: 3
PAINLEVEL_OUTOF10: 8
PAINLEVEL_OUTOF10: 7
PAINLEVEL_OUTOF10: 3
PAINLEVEL_OUTOF10: 2
PAINLEVEL_OUTOF10: 10 - WORST POSSIBLE PAIN
PAINLEVEL_OUTOF10: 10 - WORST POSSIBLE PAIN
PAINLEVEL_OUTOF10: 9
PAINLEVEL_OUTOF10: 1
PAINLEVEL_OUTOF10: 8
PAINLEVEL_OUTOF10: 10 - WORST POSSIBLE PAIN

## 2024-02-26 ASSESSMENT — COGNITIVE AND FUNCTIONAL STATUS - GENERAL
MOBILITY SCORE: 24
DAILY ACTIVITIY SCORE: 24
DAILY ACTIVITIY SCORE: 24

## 2024-02-26 ASSESSMENT — PAIN DESCRIPTION - ORIENTATION
ORIENTATION: LEFT
ORIENTATION: LEFT
ORIENTATION: LEFT;UPPER
ORIENTATION: LEFT

## 2024-02-26 ASSESSMENT — LIFESTYLE VARIABLES
SKIP TO QUESTIONS 9-10: 1
AUDIT-C TOTAL SCORE: 0

## 2024-02-26 ASSESSMENT — PAIN DESCRIPTION - LOCATION
LOCATION: ABDOMEN
LOCATION: CHEST
LOCATION: ABDOMEN

## 2024-02-26 ASSESSMENT — ACTIVITIES OF DAILY LIVING (ADL): LACK_OF_TRANSPORTATION: NO

## 2024-02-26 NOTE — PROGRESS NOTES
Pharmacy Medication History Review    Douglas Wilson is a 53 y.o. male admitted for Elevated troponin. Pharmacy reviewed the patient's [id number] medications and allergies for accuracy.    The list below reflectives the updated PTA list. Please review each medication in order reconciliation for additional clarification and justification.  Prior to Admission Medications   Prescriptions Last Dose Informant     omeprazole (PriLOSEC) 40 mg DR capsule 2/25/2024 Self     Sig: Take 1 capsule (40 mg) by mouth once daily as needed. Do not crush or chew.   tamsulosin (Flomax) 0.4 mg 24 hr capsule 2/25/2024      Sig: Take 1 capsule (0.4 mg) by mouth once daily.      Facility-Administered Medications: None      Allergies  Reviewed by Arash Kerr RN on 2/26/2024   No Known Allergies         Below are additional concerns with the patient's PTA list.  Patient's SO verified all medications and doses.    Nat Fierro CPhT

## 2024-02-26 NOTE — DISCHARGE INSTR - OTHER ORDERS
Your outpatient resources for alcohol dependence are:   Alcoholics Anonymous [phone],    [facility] [phone],   [facility] [phone],   [facility] Mont[facility] [phone],   [facility] [phone]  [facility] [phone] in [facility] peer recovery services [phone]    [facility]  [address], [place] [id number] [phone]  Walk-ins/phone calls accepted 24/7/365.    [facility] outpatient programs for mental health and chemical dependency services  [address] [place], [place] [id number] [phone],   or  [address] [place], [place] [address]   [phone]     [address] for [facility]'s First Call for help, 24/7/365     For Heroine addiction recovery services:    recovery services [phone],   [facility] [phone],   Narcotics anonymous [phone],   [facility] [phone].   [facility] [phone].   [facility] [phone].   [facility] [phone].   [facility] [phone],   [facility] [phone].   [facility] [phone] (Self pay accepted & prescribe suboxone).

## 2024-02-26 NOTE — PROGRESS NOTES
Home with sign other      02/26/24 0638   Current Planned Discharge Disposition   Current Planned Discharge Disposition Home

## 2024-02-26 NOTE — CONSULTS
"Reason For Consult  Possible intraperitoneal Hemorrhage    History Of Present Illness  Douglas Wilson is a 53 y.o. male presenting with recurrent Abdominal pain. He has extensive PMHx, see medicine H&P and previous consult notes from admission 4 days ago. [place] [facility] [place] he was noted to have hemoperitoneum. IR did a embolization of the splenic artery. He was doing better and was discharged on 2/24., He is now back with increased abdominal pain on he left side of his abdomen. CT scan in the ED showed hemoperitoneum being relatively stable compared to prior scan.      Past Medical History  He has no past medical history on file.    Surgical History  He has a past surgical history that includes Other surgical history (01/24/2022) and CT angio coronary art with heartflow if score >30% (4/19/2022).     Social History  He reports that he has been smoking cigarettes. He has been smoking an average of 1 pack per day. He has never used smokeless tobacco. He reports that he does not drink alcohol and does not use drugs.    Family History  No family history on file.     Allergies  Patient has no known allergies.    Review of Systems  A full 10 point ROS was completed. Nothing positive other than what was mentioned in the HPI.      Physical Exam  PE:  Constitutional: A&Ox3, calm and cooperative    Head/Neck: Neck supple, no JVD    Cardiovascular: RRR    Respiratory/Thorax: Non labored breathing on RA    Gastrointestinal: Abdomen nondistended, soft, Left side pain minimal tenderness.    Genitourinary: Voiding independently     Musculoskeletal: ROM intact, no joint swelling, normal strength    Extremities: RODRIGUEZ, No peripheral edema    Neurological: No focal deficits     Psychological: Appropriate mood and behavior    Skin: Warm and dry.       Last Recorded Vitals  Blood pressure 128/75, pulse 56, temperature 36.6 °C (97.9 °F), resp. rate 18, height 1.753 m (5' 9\"), weight 79.4 kg (175 lb), SpO2 98 " %.    Relevant Results  Results for orders placed or performed during the hospital encounter of 02/25/24 (from the past 24 hour(s))   CBC and Auto Differential   Result Value Ref Range    WBC 14.7 (H) 4.4 - 11.3 x10*3/uL    nRBC 0.0 0.0 - 0.0 /100 WBCs    RBC 3.87 (L) 4.50 - 5.90 x10*6/uL    Hemoglobin 8.9 (L) 13.5 - 17.5 g/dL    Hematocrit 28.3 (L) 41.0 - 52.0 %    MCV 73 (L) 80 - 100 fL    MCH 23.0 (L) 26.0 - 34.0 pg    MCHC 31.4 (L) 32.0 - 36.0 g/dL    RDW 16.3 (H) 11.5 - 14.5 %    Platelets 337 150 - 450 x10*3/uL    Neutrophils % 69.0 40.0 - 80.0 %    Immature Granulocytes %, Automated 0.8 0.0 - 0.9 %    Lymphocytes % 17.0 13.0 - 44.0 %    Monocytes % 10.1 2.0 - 10.0 %    Eosinophils % 2.9 0.0 - 6.0 %    Basophils % 0.2 0.0 - 2.0 %    Neutrophils Absolute 10.17 (H) 1.20 - 7.70 x10*3/uL    Immature Granulocytes Absolute, Automated 0.12 0.00 - 0.70 x10*3/uL    Lymphocytes Absolute 2.50 1.20 - 4.80 x10*3/uL    Monocytes Absolute 1.49 (H) 0.10 - 1.00 x10*3/uL    Eosinophils Absolute 0.43 0.00 - 0.70 x10*3/uL    Basophils Absolute 0.03 0.00 - 0.10 x10*3/uL   Comprehensive metabolic panel   Result Value Ref Range    Glucose 114 (H) 74 - 99 mg/dL    Sodium 134 (L) 136 - 145 mmol/L    Potassium 6.2 (HH) 3.5 - 5.3 mmol/L    Chloride 101 98 - 107 mmol/L    Bicarbonate 29 21 - 32 mmol/L    Anion Gap 10 10 - 20 mmol/L    Urea Nitrogen 11 6 - 23 mg/dL    Creatinine 0.98 0.50 - 1.30 mg/dL    eGFR >90 >60 mL/min/1.73m*2    Calcium 8.6 8.6 - 10.3 mg/dL    Albumin 4.0 3.4 - 5.0 g/dL    Alkaline Phosphatase 40 33 - 120 U/L    Total Protein 7.3 6.4 - 8.2 g/dL    AST 37 9 - 39 U/L    Bilirubin, Total 0.5 0.0 - 1.2 mg/dL    ALT 16 10 - 52 U/L   Lipase   Result Value Ref Range    Lipase 127 (H) 9 - 82 U/L   Coagulation Screen   Result Value Ref Range    Protime 10.8 9.8 - 12.8 seconds    INR 1.0 0.9 - 1.1    aPTT 24 (L) 27 - 38 seconds   Troponin I, High Sensitivity, Initial   Result Value Ref Range    Troponin I, High Sensitivity 64  (HH) 0 - 20 ng/L   Type and Screen   Result Value Ref Range    ABO TYPE B     Rh TYPE POS     ANTIBODY SCREEN NEG    Urinalysis with Reflex Culture and Microscopic   Result Value Ref Range    Color, Urine Yellow Straw, Yellow    Appearance, Urine Clear Clear    Specific Gravity, Urine 1.019 1.005 - 1.035    pH, Urine 6.0 5.0, 5.5, 6.0, 6.5, 7.0, 7.5, 8.0    Protein, Urine NEGATIVE NEGATIVE mg/dL    Glucose, Urine NEGATIVE NEGATIVE mg/dL    Blood, Urine NEGATIVE NEGATIVE    Ketones, Urine NEGATIVE NEGATIVE mg/dL    Bilirubin, Urine NEGATIVE NEGATIVE    Urobilinogen, Urine <2.0 <2.0 mg/dL    Nitrite, Urine NEGATIVE NEGATIVE    Leukocyte Esterase, Urine NEGATIVE NEGATIVE   Troponin, High Sensitivity, 1 Hour   Result Value Ref Range    Troponin I, High Sensitivity 53 (HH) 0 - 20 ng/L   Potassium   Result Value Ref Range    Potassium 4.0 3.5 - 5.3 mmol/L   B-type natriuretic peptide   Result Value Ref Range    BNP 69 0 - 99 pg/mL   Lactate   Result Value Ref Range    Lactate 0.9 0.4 - 2.0 mmol/L   ECG 12 lead   Result Value Ref Range    Ventricular Rate 56 BPM    Atrial Rate 56 BPM    WY Interval 148 ms    QRS Duration 78 ms    QT Interval 448 ms    QTC Calculation(Bazett) 432 ms    P Axis 69 degrees    R Axis 13 degrees    T Axis 56 degrees    QRS Count 9 beats    Q Onset 223 ms    P Onset 149 ms    P Offset 203 ms    T Offset 447 ms    QTC Fredericia 438 ms     Vascular US lower extremity venous duplex bilateral   Final Result   1.  No sonographic evidence for deep vein thrombosis within the   evaluated veins of the bilateral lower extremities.             MACRO:   None        Signed by: Lilia Bean 2/26/2024 3:16 AM   Dictation workstation:   [id number]      CT angio abdomen pelvis w and or wo IV IV contrast   Final Result   1. Acute segmental and subsegmental pulmonary emboli within the right   lower lobe, right middle lobe, and left lower lobe. No evidence of   right heart strain.        2. Status post splenic  "artery embolization with coils involving the   mid splenic artery; however, distal to the last embolization \", the   splenic artery remains opacified and the known splenic artery   pseudoaneurysm remains opacified, again measuring ~ 0.7 cm. No   active extravasation from the spleen/splenic pseudoaneurysm or along   the course of the splenic artery within limitations of obscured   visualization by beam hardening artifact. This supports relatively   stable hemoglobin level since initial study.        3. Moderate volume hemoperitoneum, overall similar in quantity to   02/22/2024. There is an increased amount of blood products in the   rectovesical recess and decreased amount of blood products in the   upper abdomen around the liver, spleen. Decreased amount of   perisplenic hemorrhage favors absence of active bleeding.        4. Small hematoma surrounding the right common femoral artery   measuring 2.9 cm x 1.9 cm without active bleeding.        5. Diffuse gastric fold thickening similar to prior study relating to   a chronic gastritis of indeterminate etiology.        MACRO:   Shemar Caicedo discussed the significance and urgency of this   critical finding by telephone with  JOE GONZALEZ on 2/26/2024 at   1:01 am.  (**-RCF-**) Findings:  See findings.        Signed by: Shemar Caicedo 2/26/2024 1:13 AM   Dictation workstation:   [id number]      Transthoracic Echo (TTE) Complete    (Results Pending)         Assessment/Plan   Plan: Hemoperitoneum, abdominal pain  -  PRN pain control, Tramadol and Bentyl  - CT scan reviewed with Dr. Paul, Hemoperitoneum stable compared to prior imagining.   - No acute indication for surgical intervention  - Would be okay from surgical standpoint to start anticoagulation, would defer to hematology for management  - Pulmonology following  - Continue supportive care per medicine team.       Dispo: Discussed with Dr. Paul no acute intervention indicated at this time. Surgery to " sign off. Please re consult with changes in patient condition.    I spent 60 minutes in the professional and overall care of this patient.      Abdoulaye Plummer PA-C

## 2024-02-26 NOTE — CONSULTS
Reason For Consult  Pulmonary Embolism    History Of Present Illness  Douglas Wilson is a 53 y.o. 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  Patient's exam showed abdominal tenderness in the left upper quadrant with guarding and rebound tenderness.  Patient denied nausea vomiting.  EKG with sinus rhythm, bradycardia without acute ischemia; troponins 64-53. Labs significant for leukocytosis & lipase 127.  CT A/P (2/25/24) showing coils in the mid splenic artery with continued opacification of the pseudoaneurysm without evidence of active extravasation with moderate volume hemoperitoneum similar in quantity to study on 2/22 and acute segmental and subsegmental pulmonary emboli within the right lower lobe, right middle lobe and left lower lobe doubt evidence of right heart strain.  PERT team was activated recommending admission to telemetry, holding off on anticoagulation, evaluation with echo, ultrasound BLE, BNP (69), lactate (0.9) and consulting hematology and pulmonology.    Patient seen and examined with parents at bedside.  Patient reports that he was discharged on Saturday and was doing all right but on Sunday developed an acute, intermittent LUQ pressure/squeezing type pain.  Patient denies signs of active bleeding. He reported no active chest pain or shortness of breath.  Additionally denied cough, fever, chills, nausea and emesis.  Denied hemoptysis.  Patient reports a significant smoking history of 1 pack a day for 30 years.  Denies other lung issues and takes no pulmonary medications.  Currently patient is stable on room air, denies shortness of breath at rest or with exertion.  Denies active chest pain, cough and hemoptysis.  When the patient attempted to stand up to walk to the couch  "patient did report some tightness and discomfort throughout his chest describing it as \"gassy pain\".  By the time he walked over to the couch and sat down he reported it resolved.  Currently denies fever, chills, nausea and emesis.      Past Medical History  He has no past medical history on file.    Surgical History  He has a past surgical history that includes Other surgical history (01/24/2022) and CT angio coronary art with heartflow if score >30% (4/19/2022).     Social History  He reports that he has been smoking cigarettes. He has been smoking an average of 1 pack per day. He has never used smokeless tobacco. He reports that he does not drink alcohol and does not use drugs.  Patient reports 1 pack a day x 30 years currently smoking.  Denies EtOH and recreational drug use (history of cocaine and opioid use).  Works as a  without environmental exposures.  Has a fiancé, 1 adult child and 1 minor child.    Family History  No family history on file.     Allergies  Patient has no known allergies.    Review of Systems  Review of Systems   Constitutional:  Positive for activity change. Negative for diaphoresis and fever.   Respiratory:  Positive for chest tightness. Negative for shortness of breath.    Cardiovascular:  Negative for chest pain and leg swelling.   Gastrointestinal:  Positive for abdominal pain. Negative for nausea and vomiting.         Physical Exam    Constitutional:   Average build, NAD, cooperative  HENT: Atraumatic, moist mucous membranes  Eyes: PERRL, nonicteric  Neck: Supple, no JVD  Cardiovascular: S1, S2 distinct, regular, HR 60s, no murmur appreciated  Pulmonary: Fair air entry with clear lung fields no rhonchi, crackles or wheezes noted; no conversational dyspnea noted  Abdominal: Soft, + tenderness LUQ, nondistended, + BS  Musculoskeletal: Good active range of motion with good strength  Extremities: No BLE edema  Lymphadenopathy: No nuchal LAP  Skin: Warm and " "dry  Neurological: Alert and oriented x 3, speech clear and appropriate; CNs grossly intact  Psychiatric:    Appropriate mood and behavior     Last Recorded Vitals  Blood pressure 109/67, pulse 59, temperature 37.3 °C (99.1 °F), resp. rate 16, height 1.753 m (5' 9.02\"), weight 76.2 kg (168 lb), SpO2 98 %.    Relevant Results  Scheduled medications:  folic acid, 1 mg, oral, Daily  melatonin, 3 mg, oral, Daily  multivitamin with minerals, 1 tablet, oral, Daily  ondansetron, 4 mg, intravenous, Once  pantoprazole, 40 mg, oral, Daily before breakfast   Or  pantoprazole, 40 mg, intravenous, Daily before breakfast  perflutren lipid microspheres, 0.5-10 mL of dilution, intravenous, Once in imaging  perflutren lipid microspheres, 0.5-10 mL of dilution, intravenous, Once in imaging  perflutren protein A microsphere, 0.5 mL, intravenous, Once in imaging  polyethylene glycol, 17 g, oral, Daily  sulfur hexafluoride microsphr, 2 mL, intravenous, Once in imaging  [START ON 2/28/2024] thiamine, 100 mg, oral, Daily  thiamine, 100 mg, intravenous, Daily      PRN medications: acetaminophen **OR** acetaminophen **OR** acetaminophen, diazePAM, HYDROmorphone, HYDROmorphone, HYDROmorphone, loperamide     Results for orders placed or performed during the hospital encounter of 02/25/24 (from the past 24 hour(s))   CBC and Auto Differential   Result Value Ref Range    WBC 14.7 (H) 4.4 - 11.3 x10*3/uL    nRBC 0.0 0.0 - 0.0 /100 WBCs    RBC 3.87 (L) 4.50 - 5.90 x10*6/uL    Hemoglobin 8.9 (L) 13.5 - 17.5 g/dL    Hematocrit 28.3 (L) 41.0 - 52.0 %    MCV 73 (L) 80 - 100 fL    MCH 23.0 (L) 26.0 - 34.0 pg    MCHC 31.4 (L) 32.0 - 36.0 g/dL    RDW 16.3 (H) 11.5 - 14.5 %    Platelets 337 150 - 450 x10*3/uL    Neutrophils % 69.0 40.0 - 80.0 %    Immature Granulocytes %, Automated 0.8 0.0 - 0.9 %    Lymphocytes % 17.0 13.0 - 44.0 %    Monocytes % 10.1 2.0 - 10.0 %    Eosinophils % 2.9 0.0 - 6.0 %    Basophils % 0.2 0.0 - 2.0 %    Neutrophils Absolute " 10.17 (H) 1.20 - 7.70 x10*3/uL    Immature Granulocytes Absolute, Automated 0.12 0.00 - 0.70 x10*3/uL    Lymphocytes Absolute 2.50 1.20 - 4.80 x10*3/uL    Monocytes Absolute 1.49 (H) 0.10 - 1.00 x10*3/uL    Eosinophils Absolute 0.43 0.00 - 0.70 x10*3/uL    Basophils Absolute 0.03 0.00 - 0.10 x10*3/uL   Comprehensive metabolic panel   Result Value Ref Range    Glucose 114 (H) 74 - 99 mg/dL    Sodium 134 (L) 136 - 145 mmol/L    Potassium 6.2 (HH) 3.5 - 5.3 mmol/L    Chloride 101 98 - 107 mmol/L    Bicarbonate 29 21 - 32 mmol/L    Anion Gap 10 10 - 20 mmol/L    Urea Nitrogen 11 6 - 23 mg/dL    Creatinine 0.98 0.50 - 1.30 mg/dL    eGFR >90 >60 mL/min/1.73m*2    Calcium 8.6 8.6 - 10.3 mg/dL    Albumin 4.0 3.4 - 5.0 g/dL    Alkaline Phosphatase 40 33 - 120 U/L    Total Protein 7.3 6.4 - 8.2 g/dL    AST 37 9 - 39 U/L    Bilirubin, Total 0.5 0.0 - 1.2 mg/dL    ALT 16 10 - 52 U/L   Lipase   Result Value Ref Range    Lipase 127 (H) 9 - 82 U/L   Coagulation Screen   Result Value Ref Range    Protime 10.8 9.8 - 12.8 seconds    INR 1.0 0.9 - 1.1    aPTT 24 (L) 27 - 38 seconds   Troponin I, High Sensitivity, Initial   Result Value Ref Range    Troponin I, High Sensitivity 64 (HH) 0 - 20 ng/L   Type and Screen   Result Value Ref Range    ABO TYPE B     Rh TYPE POS     ANTIBODY SCREEN NEG    Urinalysis with Reflex Culture and Microscopic   Result Value Ref Range    Color, Urine Yellow Straw, Yellow    Appearance, Urine Clear Clear    Specific Gravity, Urine 1.019 1.005 - 1.035    pH, Urine 6.0 5.0, 5.5, 6.0, 6.5, 7.0, 7.5, 8.0    Protein, Urine NEGATIVE NEGATIVE mg/dL    Glucose, Urine NEGATIVE NEGATIVE mg/dL    Blood, Urine NEGATIVE NEGATIVE    Ketones, Urine NEGATIVE NEGATIVE mg/dL    Bilirubin, Urine NEGATIVE NEGATIVE    Urobilinogen, Urine <2.0 <2.0 mg/dL    Nitrite, Urine NEGATIVE NEGATIVE    Leukocyte Esterase, Urine NEGATIVE NEGATIVE   Troponin, High Sensitivity, 1 Hour   Result Value Ref Range    Troponin I, High  Sensitivity 53 (HH) 0 - 20 ng/L   Potassium   Result Value Ref Range    Potassium 4.0 3.5 - 5.3 mmol/L   B-type natriuretic peptide   Result Value Ref Range    BNP 69 0 - 99 pg/mL   Lactate   Result Value Ref Range    Lactate 0.9 0.4 - 2.0 mmol/L   ECG 12 lead   Result Value Ref Range    Ventricular Rate 56 BPM    Atrial Rate 56 BPM    OR Interval 148 ms    QRS Duration 78 ms    QT Interval 448 ms    QTC Calculation(Bazett) 432 ms    P Axis 69 degrees    R Axis 13 degrees    T Axis 56 degrees    QRS Count 9 beats    Q Onset 223 ms    P Onset 149 ms    P Offset 203 ms    T Offset 447 ms    QTC Fredericia 438 ms      ECG 12 lead  Result Date: 2/26/2024  Sinus bradycardia Minimal voltage criteria for LVH, may be normal variant ( Sokolow-Miranda ) Borderline ECG When compared with ECG of 22-FEB-2024 09:39, Vent. rate has decreased BY  38 BPM ST no longer depressed in Inferior leads ST no longer depressed in Lateral leads Nonspecific T wave abnormality, improved in Lateral leads    Vascular US lower extremity venous duplex bilateral  Result Date: 2/26/2024  Interpreted By:  Lilia Bean, STUDY: [facility] US LOWER EXTREMITY VENOUS DUPLEX BILATERAL;  2/26/2024 2:57 am   INDICATION: Signs/Symptoms:edema. Right lower extremity pain/edema. Recent procedure.   COMPARISON: None.   ACCESSION NUMBER(S): [id number]   ORDERING CLINICIAN: JOE GONZALEZ   TECHNIQUE: Vascular ultrasound of the bilateral lower extremities was performed. Real-time compression views as well as Gray scale, color Doppler and spectral Doppler waveform analysis was performed.   FINDINGS: Evaluation of the visualized portions of the bilateral common femoral vein, proximal, mid, and distal femoral vein, and popliteal vein were performed.  Evaluation of the visualized portions of the  posterior tibial and peroneal veins were also performed.   The evaluated veins demonstrate normal compressibility. There is intact venous flow demonstrating normal respiratory  variability and normal augmentation of flow with calf compression. Therefore, there is no ultrasonographic evidence for deep vein thrombosis within the evaluated veins.       1.  No sonographic evidence for deep vein thrombosis within the evaluated veins of the bilateral lower extremities.     MACRO: None   Signed by: Lilia Bean 2/26/2024 3:16 AM Dictation workstation:   [id number]    CT angio abdomen pelvis w and or wo IV IV contrast  Result Date: 2/26/2024  Interpreted By:  Shemar Caicedo, STUDY: CT ANGIO ABDOMEN PELVIS W AND/OR WO IV IV CONTRAST;  2/25/2024 11:52 pm   INDICATION: Signs/Symptoms:recent coiling splenic aneusym.   COMPARISON: CT angiography chest/abdomen/pelvis 02/22/2024   ACCESSION NUMBER(S): [id number]   ORDERING CLINICIAN: JOE GONZALEZ   TECHNIQUE: Contiguous non-contrast axial images of the abdomen and pelvis were initially obtained.   Thin-section axial images of the abdomen and pelvis were obtained in the arterial and portal venous phase after intravenous administration of 90 mL Omnipaque 350 contrast. Sagittal and coronal reformatted images were provided. MIP images and 3D reconstructions were created on an independent workstation and reviewed.   FINDINGS: VASCULATURE:   ABDOMINAL AORTA: No abdominal aortic aneurysm or dissection. Minimal distal abdominal aortic atherosclerosis.   ABDOMINAL and PELVIC ARTERIES: There are new postprocedural changes of a splenic artery coil embolization. Approximately 2 cm distal to the splenic artery origin, there is occlusion of the splenic artery followed by numerous embolization coils within the mid splenic artery. However, distal to the last coil, the splenic artery is persistently opacified (axial image 134-136, series 306) and remains opacified to the splenic hilum. 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 No evidence of arterial bleeding along the splenic embolization coils, limited  by beam hardening artifact in the setting of metallic hardware.     CT ABDOMEN/PELVIS:   LOWER CHEST: There are scattered segmental and subsegmental filling defects within the right middle and bilateral lower lobe pulmonary artery branches concerning for pulmonary emboli that are new from prior study. The heart is normal size. There is mild bibasilar atelectasis.   ABDOMINAL WALL: Small acute hematoma adjacent to the right common femoral artery measuring approximately 2.9 cm x 1.9 cm. No active bleeding.   LIVER: Normal homogeneous enhancement. Stable 0.6 cm simple cyst adjacent to the gallbladder fossa. No suspicious lesions. The liver is mildly enlarged.   BILE DUCTS: No significant intrahepatic or extrahepatic dilatation.   GALLBLADDER: No significant abnormality.   SPLEEN: The spleen is normal in size. There is homogeneous enhancement on portal phase images. There is persistent subtle arterial enhancement at the site of known pseudo aneurysm, again measuring approximately 0.7 cm (axial image 58/254, series 606; axial image 107/493, series 609). There is no evidence of active extravasation from the pseudoaneurysm. There is a small amount of acute hemorrhage around the spleen that is significantly decreased in quantity from 02/22/2024, most pronounced between the left hemidiaphragm in the superior pole of the spleen and gastric fundus. (Please see above for arterial vascular findings.)   PANCREAS: No significant abnormality.   ADRENALS: No significant abnormality.   KIDNEYS, URETERS, BLADDER: No significant abnormality.   REPRODUCTIVE ORGANS: No significant abnormality.   VESSELS: (See above for arterial). No additional significant abnormality; specifically, the portal and hepatic veins, splenic vein, and superior mesenteric vein is principal branches are patent. The IVC is normal in caliber.   LYMPH NODES/RETROPERITONEUM: No enlarged lymph nodes. No acute retroperitoneal abnormality.   BOWEL/MESENTERY/PERITONEUM:  "There is diffuse marked thickening of the gastric rugal folds similar to prior study. The small bowel is nondilated without evidence of inflammatory change. There is a moderate amount of stool in the ascending and proximal transverse colon and mild amount of stool throughout the remainder of the colon without evidence of acute colonic inflammation. Normal appendix.   There is moderate volume hemoperitoneum. This is decreased around the liver and spleen. Hemorrhage in the left upper quadrant mesentery tracks into the pelvis with a similar colonic compared to prior study. Compared to 02/22/2024 there is a slightly increased amount of hemorrhage in the rectovesical recess which could be explained by shifting distribution of hemoperitoneum given the small amount of hemoperitoneum in the upper abdomen.   No free intraperitoneal air.   OSSEOUS STRUCTURES: No acute osseous abnormality.       1. Acute segmental and subsegmental pulmonary emboli within the right lower lobe, right middle lobe, and left lower lobe. No evidence of right heart strain.   2. Status post splenic artery embolization with coils involving the mid splenic artery; however, distal to the last embolization \", the splenic artery remains opacified and the known splenic artery pseudoaneurysm remains opacified, again measuring ~ 0.7 cm. No active extravasation from the spleen/splenic pseudoaneurysm or along the course of the splenic artery within limitations of obscured visualization by beam hardening artifact. This supports relatively stable hemoglobin level since initial study.   3. Moderate volume hemoperitoneum, overall similar in quantity to 02/22/2024. There is an increased amount of blood products in the rectovesical recess and decreased amount of blood products in the upper abdomen around the liver, spleen. Decreased amount of perisplenic hemorrhage favors absence of active bleeding.   4. Small hematoma surrounding the right common femoral artery " measuring 2.9 cm x 1.9 cm without active bleeding.   5. Diffuse gastric fold thickening similar to prior study relating to a chronic gastritis of indeterminate etiology.   MACRO: Shemar Caicedo discussed the significance and urgency of this critical finding by telephone with  JOE GONZALEZ on 2/26/2024 at 1:01 am.  (**-RCF-**) Findings:  See findings.   Signed by: Shemar Caicedo 2/26/2024 1:13 AM Dictation workstation:   [id number]       Assessment/Plan     Douglas Wilson is a 53 y.o. 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  Patient's exam showed abdominal tenderness in the left upper quadrant with guarding and rebound tenderness.  Patient denied nausea vomiting.  EKG with sinus rhythm, bradycardia without acute ischemia; troponins 64-53. Labs significant for leukocytosis & lipase 127.  CT A/P (2/25/24) showing coils in the mid splenic artery with continued opacification of the pseudoaneurysm without evidence of active extravasation with moderate volume hemoperitoneum similar in quantity to study on 2/22 and acute segmental and subsegmental pulmonary emboli within the right lower lobe, right middle lobe and left lower lobe doubt evidence of right heart strain.  PERT team was activated recommending admission to telemetry, holding off on anticoagulation, evaluation with echo, ultrasound BLE, BNP (69), lactate (0.9) and consulting hematology and pulmonology.    Impressions:    Pulmonary emboli multiple segmental and subsegmental seen on CT on 2/25 (not present on CT on 2/22) involving right middle lobe and bilateral lower lobes; CT RV to LV ratio 0.8  -Ultrasound of bilateral lower extremities negative for DVT  -BNP and lactate normal  -Echo on 2/22/2024 with EF 70 to 75%, no impaired  relaxation and normal RVSF    Abnormal chest CT: bibasilar atelectasis, bronchial wall thickening, small airway opacification predominantly RLL: noted on CT on 2/22 and current CT ; low concern for acute URI given no cough or shortness of breath; will monitor as patient with low-grade temp 37.3 with minor leukocytosis     Tobacco abuse: Current every day smoker 1 pack a day x 30 years    Recommendations:  -Recommend reevaluation by echo -pending  -Patient will need anticoagulation after risk stratification based on input from IR/surgery regarding continued opacification of splenic artery pseudoaneurysm  -Monitor blood count trends and for signs and symptoms for acute hemorrhage  -BPH with I-S and Acapella  -Patient would benefit from establishment with primary care physician with outpatient evaluation of pulmonary function tests given significant smoking history    Thank you for the consult.  Pulmonology will follow.    DONTE Coelho-CNS

## 2024-02-26 NOTE — CARE PLAN
"PULMONARY EMBOLISM RESPONSE TEAM (PERT) NOTE    This note represents a summary of a virtual evaluation by the pulmonary embolism response team requested by ED Provider Dr. Fuentes.  Suggestions and impression are summarized from the initial virtual encounter and discussion with the referring medical team. These suggestions supplement but should not be a substitute for bedside evaluation and management by the attending of record.     PERT Members on the Call:  Critical Care Attending: Dr. Bull SAINI Interventional Cardiology Attending: Dr. Marquis Vidales  Vascular Medicine Attending: Dr. Patel  Critical Care Fellow: Dr. Meza    Brief Clinical Summary:  Douglas Wilson is a 53 y.o. male with PMH opioid use and current tobacco use, recent splenic artery aneurysm rupture s/p coil embolization, presenting after recent discharge with abd pain and found to have acute PE on CTA A/P.    Vital Signs  /76 (BP Location: Left arm, Patient Position: Sitting)   Pulse 79   Temp 37.2 °C (99 °F) (Oral)   Resp 20   Ht 1.753 m (5' 9\")   Wt 79.4 kg (175 lb)   BMI 25.84 kg/m²      Laboratory  Recent Labs     02/26/24  0201 02/26/24  0110 02/25/24  2129 02/23/24  0654 02/22/24  0943 02/22/24  0824 07/06/22  2153 04/18/22  2046 03/22/22  1820 01/04/22  1409   TROPHS  --  53* 64* 595*   < > 1,300*   < >  --   --   --    BNP 69  --   --   --   --   --   --  10  --  30   LACTATE 0.9  --   --   --   --  1.5  --   --  1.7  --     < > = values in this interval not displayed.         PESI Score Gwyn LAZARO Et al. Arch Intern Med. 2010;[id number].           PESI Score:  64, consistent with JLPESIRISK: Class I, or Very Low risk (0-1.6% 30-day mortality risk) PE.    CT Scan reviewed:  Clot burden moderate, present in RLL, RML, LLL  RV/LV ratio 0.8 by CT scan    TTE reviewed (if available):  Not available    Venous duplex (if available):  Not available     Contraindications to anticoagulation: recent bleed requiring coil " embolism  Contraindications to thrombolytics: recent bleed requiring coil embolism    Initial Impressions:  Bilateral pulmonary emboli without right heart strain    Initial Suggestions/Plans:  Admit to telemetry unit at [facility] facility.  Consult hematology to determine whether anticoagulation should be started or not per risk/benefits given recent bleed  Additional testing recommended: TTE, LE DVT US, BNP, lactate  Consults suggested: hematology and pulmonary.    To re conference the PERT team please call the  Transfer Center at [phone].

## 2024-02-26 NOTE — PROGRESS NOTES
02/26/24 0645   [facility] Disability Status   Are you deaf or do you have serious difficulty hearing? N   Are you blind or do you have serious difficulty seeing, even when wearing glasses? N   Because of a physical, mental, or emotional condition, do you have serious difficulty concentrating, remembering, or making decisions? (5 years old or older) Y  (ETOH abuse)   Do you have serious difficulty walking or climbing stairs? N   Do you have serious difficulty dressing or bathing? N   Because of a physical, mental, or emotional condition, do you have serious difficulty doing errands alone such as visiting the doctor? Y

## 2024-02-26 NOTE — H&P
INTERNAL MEDICINE     HISTORY AND PHYSICAL      Chief Complaint:  Abdominal pain    History Of Present Illness  Douglas Wilson is a 53 y.o. male presenting with left lower quadrant abdominal pain.  Patient has had recent history of admission for abdominal pain.  During that admission he was found to have significantly elevated troponin.  He was managed conservatively for non-ST elevation MI.  This was felt to be related to ongoing history of substance abuse.  He was initially started on a heparin drip and subsequent imaging revealed an intra-abdominal bleed.  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  He had resolution of abdominal pain following the procedure.  Patient was stable and was discharged home approximately 48 hours ago.  He states that he felt well initially.  Yesterday he developed pain in his left lower quadrant.  He reports no correlation with bowel movements or meals.  He has had no blood in the stool.  He has had no fever or chills.  Pain has worsened progressively over the past 24 hours.  He presented to the emergency room.  He had a CT scan of the abdomen which showed no intra-abdominal pathology but did reveal some pulmonary emboli.  He was admitted for further treatment.  Patient reports no chest pain or shortness of breath.     Past Medical History  None of significance besides the above.    Surgical History  He has a past surgical history that includes Other surgical history (01/24/2022) and CT angio coronary art with heartflow if score >30% (4/19/2022).     Social History  He reports that he has been smoking cigarettes. He has been smoking an average of 1 pack per day. He has never used smokeless tobacco. He reports that he does not drink alcohol and does not use drugs.    Family History  No family history on file.     Allergies  Patient has no known allergies.    Home Medications:  Prior to Admission medications    Medication Sig Start Date End  "Date Taking? Authorizing Provider   omeprazole (PriLOSEC) 40 mg DR capsule Take 1 capsule (40 mg) by mouth once daily as needed. Do not crush or chew.    Historical Provider, MD        Review of Systems   Constitutional: Negative.    HENT: Negative.     Eyes: Negative.    Respiratory: Negative.     Cardiovascular: Negative.    Gastrointestinal:  Positive for abdominal pain.   Musculoskeletal: Negative.    Skin: Negative.    Neurological: Negative.         Last Recorded Vitals  Blood pressure 113/72, pulse 62, temperature 37.2 °C (99 °F), temperature source Oral, resp. rate 18, height 1.753 m (5' 9\"), weight 79.4 kg (175 lb), SpO2 98 %.    Physical Exam      Constitutional:       Appearance: Middle-aged, well-built, in no distress  HENT:      Head: Normocephalic and atraumatic.   Eyes:      Extraocular Movements: Extraocular movements intact.      Pupils: Pupils are equal, round, and reactive to light.   Cardiovascular:      Rate and Rhythm: Normal rate and regular rhythm.      Pulses: Normal pulses.      Heart sounds: Normal heart sounds.   Pulmonary:      Effort: Pulmonary effort is normal.      Breath sounds: Normal breath sounds.   Abdominal:      General: Abdomen is flat. Bowel sounds are normal.      Palpations: Abdomen is soft.  There is tenderness on palpation in the left lower quadrant with no palpable mass.  There is no rebound or guarding.  Musculoskeletal:         General: Normal range of motion.      Cervical back: Normal range of motion and neck supple.   Skin:     General: Skin is warm and dry.   Neurological:      General: No focal deficit present.      Mental Status: He is alert and oriented to person, place, and time. Mental status is at baseline.       Relevant Results    Results for orders placed or performed during the hospital encounter of 02/25/24 (from the past 24 hour(s))   CBC and Auto Differential   Result Value Ref Range    WBC 14.7 (H) 4.4 - 11.3 x10*3/uL    nRBC 0.0 0.0 - 0.0 /100 WBCs "    RBC 3.87 (L) 4.50 - 5.90 x10*6/uL    Hemoglobin 8.9 (L) 13.5 - 17.5 g/dL    Hematocrit 28.3 (L) 41.0 - 52.0 %    MCV 73 (L) 80 - 100 fL    MCH 23.0 (L) 26.0 - 34.0 pg    MCHC 31.4 (L) 32.0 - 36.0 g/dL    RDW 16.3 (H) 11.5 - 14.5 %    Platelets 337 150 - 450 x10*3/uL    Neutrophils % 69.0 40.0 - 80.0 %    Immature Granulocytes %, Automated 0.8 0.0 - 0.9 %    Lymphocytes % 17.0 13.0 - 44.0 %    Monocytes % 10.1 2.0 - 10.0 %    Eosinophils % 2.9 0.0 - 6.0 %    Basophils % 0.2 0.0 - 2.0 %    Neutrophils Absolute 10.17 (H) 1.20 - 7.70 x10*3/uL    Immature Granulocytes Absolute, Automated 0.12 0.00 - 0.70 x10*3/uL    Lymphocytes Absolute 2.50 1.20 - 4.80 x10*3/uL    Monocytes Absolute 1.49 (H) 0.10 - 1.00 x10*3/uL    Eosinophils Absolute 0.43 0.00 - 0.70 x10*3/uL    Basophils Absolute 0.03 0.00 - 0.10 x10*3/uL   Comprehensive metabolic panel   Result Value Ref Range    Glucose 114 (H) 74 - 99 mg/dL    Sodium 134 (L) 136 - 145 mmol/L    Potassium 6.2 (HH) 3.5 - 5.3 mmol/L    Chloride 101 98 - 107 mmol/L    Bicarbonate 29 21 - 32 mmol/L    Anion Gap 10 10 - 20 mmol/L    Urea Nitrogen 11 6 - 23 mg/dL    Creatinine 0.98 0.50 - 1.30 mg/dL    eGFR >90 >60 mL/min/1.73m*2    Calcium 8.6 8.6 - 10.3 mg/dL    Albumin 4.0 3.4 - 5.0 g/dL    Alkaline Phosphatase 40 33 - 120 U/L    Total Protein 7.3 6.4 - 8.2 g/dL    AST 37 9 - 39 U/L    Bilirubin, Total 0.5 0.0 - 1.2 mg/dL    ALT 16 10 - 52 U/L   Lipase   Result Value Ref Range    Lipase 127 (H) 9 - 82 U/L   Coagulation Screen   Result Value Ref Range    Protime 10.8 9.8 - 12.8 seconds    INR 1.0 0.9 - 1.1    aPTT 24 (L) 27 - 38 seconds   Troponin I, High Sensitivity, Initial   Result Value Ref Range    Troponin I, High Sensitivity 64 (HH) 0 - 20 ng/L   Type and Screen   Result Value Ref Range    ABO TYPE B     Rh TYPE POS     ANTIBODY SCREEN NEG    Urinalysis with Reflex Culture and Microscopic   Result Value Ref Range    Color, Urine Yellow Straw, Yellow    Appearance, Urine Clear  Clear    Specific Gravity, Urine 1.019 1.005 - 1.035    pH, Urine 6.0 5.0, 5.5, 6.0, 6.5, 7.0, 7.5, 8.0    Protein, Urine NEGATIVE NEGATIVE mg/dL    Glucose, Urine NEGATIVE NEGATIVE mg/dL    Blood, Urine NEGATIVE NEGATIVE    Ketones, Urine NEGATIVE NEGATIVE mg/dL    Bilirubin, Urine NEGATIVE NEGATIVE    Urobilinogen, Urine <2.0 <2.0 mg/dL    Nitrite, Urine NEGATIVE NEGATIVE    Leukocyte Esterase, Urine NEGATIVE NEGATIVE   Troponin, High Sensitivity, 1 Hour   Result Value Ref Range    Troponin I, High Sensitivity 53 (HH) 0 - 20 ng/L   Potassium   Result Value Ref Range    Potassium 4.0 3.5 - 5.3 mmol/L   B-type natriuretic peptide   Result Value Ref Range    BNP 69 0 - 99 pg/mL   Lactate   Result Value Ref Range    Lactate 0.9 0.4 - 2.0 mmol/L   ECG 12 lead   Result Value Ref Range    Ventricular Rate 56 BPM    Atrial Rate 56 BPM    CA Interval 148 ms    QRS Duration 78 ms    QT Interval 448 ms    QTC Calculation(Bazett) 432 ms    P Axis 69 degrees    R Axis 13 degrees    T Axis 56 degrees    QRS Count 9 beats    Q Onset 223 ms    P Onset 149 ms    P Offset 203 ms    T Offset 447 ms    QTC Fredericia 438 ms         Principal Problem:    Elevated troponin      Problem list:  Principal Problem:    Elevated troponin      ASSESSMENT AND PLAN:    Acute pulmonary embolism -in light of recent history no anticoagulation initiated.  Will consult hematology for input.  Abdominal pain -does not appear to be related to the above.  Will ask general surgery to evaluate.   Elevated troponin -values trending down.  Not symptomatic.  Will monitor.  2D echo ordered.  Substance abuse -cessation of use encouraged.      Shemar Banda MD  02/26/2410:21 AM

## 2024-02-26 NOTE — PROGRESS NOTES
Transitional Care Coordination Progress Note:  Plan per Medical/Surgical team: treatment of abd pain, PE with IV Diaudid, ECHO pending. Cardio & hematology consults for ?anticoagulation  Status:inpatient  [facility]  Discharge disposition: Home with sign other   Potential Barriers: GI bleed last week, ETOH & heroine abuse, K 6.2, trops 64, 52  ADOD: 2/28/2024  RICARDO Viramontes RN, BSN Transitional Care Coordinator ED# [phone]      02/26/24 0645   Discharge Planning   Living Arrangements Spouse/significant other   Support Systems Spouse/significant other   Assistance Needed ETOH abuse recovery resources, anticoagulation plan   Type of Residence Private residence   Number of Stairs to Enter Residence 1   Number of Stairs Within Residence 12  (to basement)   Do you have animals or pets at home? No   Home or Post Acute Services Community services   Patient expects to be discharged to: Home with sign other   Does the patient need discharge transport arranged? Yes   RoundTrip coordination needed? Yes   Has discharge transport been arranged? No   Financial Resource Strain   How hard is it for you to pay for the very basics like food, housing, medical care, and heating? Not hard   Housing Stability   In the last 12 months, was there a time when you were not able to pay the mortgage or rent on time? N   In the last 12 months, how many places have you lived? 1   In the last 12 months, was there a time when you did not have a steady place to sleep or slept in a shelter (including now)? N   Transportation Needs   In the past 12 months, has lack of transportation kept you from medical appointments or from getting medications? no   In the past 12 months, has lack of transportation kept you from meetings, work, or from getting things needed for daily living? No

## 2024-02-26 NOTE — CARE PLAN
The patient's goals for the shift include pain control    The clinical goals for the shift include pain control      Problem: Pain - Adult  Goal: Verbalizes/displays adequate comfort level or baseline comfort level  Outcome: Progressing     Problem: Safety - Adult  Goal: Free from fall injury  Outcome: Progressing     Problem: Discharge Planning  Goal: Discharge to home or other facility with appropriate resources  Outcome: Progressing

## 2024-02-26 NOTE — CONSULTS
Inpatient consult to Hematology  Consult performed by: DONTE Frazier-CNP  Consult ordered by: Abdoulaye Plummer PA-C  Reason for consult: Anticoagulation recommendation  Assessment/Recommendations: Douglas Wilson is a 53 y.o. male presenting with LLQ abdominal pain.  Recent admission on 2/22/24 for abdominal pain. Found to have non-ST elevation MI,  initially started on a heparin drip and subsequent imaging revealed an intra-abdominal bleed. He was diagnosed with a splenic artery aneurysm rupture and had a coil embolization on 2/23/24. He had resolution of abdominal pain following the procedure. Patient was stable and was discharged home on 2/24/24. He states that he felt well initially. Denied blood in the stool, fever or chills. Pain in the left upper quad worsened progressively over 24 hours before returning to ED on 2/26/24.  CTA done in ED to evaluate for any new, active bleeding. There was no active bleeding; however, the radiologist noted that the embolization does not appear to be entirely effective as the aneurysm area is still opacified with contrast. Also revealed Acute segmental and subsegmental pulmonary emboli within the right lower lobe, right middle lobe, and left lower lobe without evidence of right heart strain. ED physician spoke to the PERT team given the complexity of the case. PERT team stated to hold off on anticoagulation and to obtain an echo then talk to hematology and pulmonology here at [facility].     He was admitted to [facility] for further management. Patient denied chest pain or shortness of breath, no new o2 requirement. LLQ pain is better then before IR procedure on 2/24 but persistent. No personal history of thrombophilia,  clots or cancer. Patient is a current smoker and recent use of heroin.  General surgery and pulmonology also consulted. General surgery signed off, no further surgical intervention per gen surg.      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, the radiologist noted that the embolization does not appear to be entirely effective as the aneurysm area is still opacified with contrast per EMR review  - Heparin drip would be the best choice for anticoagulation because it can be abruptly stopped; however, we suggest further risk stratification with IR input and further discussion regarding intervention plan if bleeding re- occurs/gets worse when heparin is started  - Would be okay from surgical standpoint to start anticoagulation per surgery consult, per surgery they deferred management to hematology, of note,  Hematology is only consulted for recommendations, discussed with surgery PA and Dr. Banda and they also noted IR input may be helpful in ensuring patient is cleared to start therapeutic anticoagulation     - I spoke to [facility] regarding consideration for transfer to begin heparin drip at [facility] in a tertiary care center where urgent intervention might be more expeditious if needed, discussed with Dr. Banda  - no current transfer plans needing to be arranged at the moment    Discussed risk of bleeding after starting therapeutic anticoagulation with patient and his Mom and Dad at bedside. Patient is aware that acute PE is treated with therapeutic anticoagulation but the bleeding risk is concerning. I explained that one provider is not able to determine the risk alone and the attending provider will decide the best plan with  input from all the team providers. Patient understands and will further discuss with the team.    Hematology will sign off, please call with further questions or concerns.     Discussed with Dr. Oakes, Dr. Banda, and [facility]              Reason For Consult  Anticoagulation opinion regarding PE with recent bleeding    History Of Present Illness  Douglas Wilson is a 53 y.o. male presenting with LLQ abdominal pain.  Recent admission on 2/22/24 for abdominal pain. Found to have non-ST elevation MI,  initially started on a heparin drip and subsequent imaging revealed an intra-abdominal bleed. He was diagnosed with a splenic artery aneurysm rupture and had a coil embolization on 2/23/24. He had resolution of abdominal pain following the procedure. Patient was stable and was discharged home on 2/24/24. He states that he felt well initially. Denied blood in the stool, fever or chills. Pain in the left upper quad worsened progressively over 24 hours before returning to ED on 2/26/24.  CTA done in ED to evaluate for any new, active bleeding. There was no active bleeding; however, the radiologist noted that the embolization does not appear to be entirely effective as the aneurysm area is still opacified with contrast. Also revealed Acute segmental and subsegmental pulmonary emboli within the right lower lobe, right middle lobe, and left lower lobe without evidence of right heart strain. ED physician spoke to the PERT team given the complexity of the case. PERT team stated to hold off on anticoagulation and to obtain an echo then talk to hematology and pulmonology here at [facility].     He was admitted to [facility] for further management. Patient denied chest pain or shortness of breath, no new o2 requirement. LLQ pain is better then before IR procedure on 2/24 but persistent. No personal history of thrombophilia,  clots or cancer. Patient is a current smoker and recent use of heroin.  General surgery and pulmonology also consulted.  General surgery signed off, no further surgical intervention per gen surg.       Past Medical History  He has no past medical history on file.    Surgical History  He has a past surgical history that includes Other surgical history (01/24/2022) and CT angio coronary art with heartflow if score >30% (4/19/2022).     Social History  He reports that he has been smoking cigarettes. He has been smoking an average of 1 pack per day. He has never used smokeless tobacco. He reports current drug use. Drug: Heroin. He reports that he does not drink alcohol.    Family History  No family history of blood clots, thrombophilia or bleeding disorder  Great grandfather - colon cancer        Allergies  Patient has no known allergies.    Review of Systems   Constitutional:  Positive for activity change. Negative for appetite change, chills and fever.   HENT:  Negative for nosebleeds.    Eyes:  Negative for visual disturbance.   Respiratory:  Negative for chest tightness and shortness of breath.    Cardiovascular:  Negative for palpitations and leg swelling.   Gastrointestinal:  Positive for abdominal pain. Negative for blood in stool, diarrhea, nausea and vomiting.   Genitourinary:  Negative for hematuria.   Musculoskeletal:  Negative for arthralgias and joint swelling.   Skin:  Negative for rash.   Neurological:  Negative for dizziness, weakness, light-headedness and headaches.   Hematological:  Negative for adenopathy. Does not bruise/bleed easily.        Physical Exam  Vitals and nursing note reviewed.   Constitutional:       General: He is not in acute distress.     Appearance: He is not ill-appearing.      Comments: Appears uncomfortable   HENT:      Mouth/Throat:      Mouth: Mucous membranes are moist.   Eyes:      General: No scleral icterus.  Cardiovascular:      Rate and Rhythm: Normal rate.   Pulmonary:      Effort: No respiratory distress.      Breath sounds: Normal breath sounds.   Abdominal:      Tenderness: There is  "abdominal tenderness. There is guarding.      Comments: LUQ tenderness to palpation, area is firm to touch on exam, no overlying ecchymosis   Musculoskeletal:         General: No swelling, tenderness, deformity or signs of injury.      Cervical back: Neck supple.      Right lower leg: No edema.      Left lower leg: No edema.   Skin:     General: Skin is warm and dry.      Findings: No bruising or rash.   Neurological:      General: No focal deficit present.      Mental Status: He is alert and oriented to person, place, and time.   Psychiatric:         Mood and Affect: Mood normal.         Behavior: Behavior normal.         Thought Content: Thought content normal.         Judgment: Judgment normal.          Last Recorded Vitals  Blood pressure 109/67, pulse 59, temperature 37.3 °C (99.1 °F), resp. rate 16, height 1.753 m (5' 9.02\"), weight 76.2 kg (168 lb), SpO2 98 %.    Relevant Results  Results for orders placed or performed during the hospital encounter of 02/25/24 (from the past 96 hour(s))   CBC and Auto Differential   Result Value Ref Range    WBC 14.7 (H) 4.4 - 11.3 x10*3/uL    nRBC 0.0 0.0 - 0.0 /100 WBCs    RBC 3.87 (L) 4.50 - 5.90 x10*6/uL    Hemoglobin 8.9 (L) 13.5 - 17.5 g/dL    Hematocrit 28.3 (L) 41.0 - 52.0 %    MCV 73 (L) 80 - 100 fL    MCH 23.0 (L) 26.0 - 34.0 pg    MCHC 31.4 (L) 32.0 - 36.0 g/dL    RDW 16.3 (H) 11.5 - 14.5 %    Platelets 337 150 - 450 x10*3/uL    Neutrophils % 69.0 40.0 - 80.0 %    Immature Granulocytes %, Automated 0.8 0.0 - 0.9 %    Lymphocytes % 17.0 13.0 - 44.0 %    Monocytes % 10.1 2.0 - 10.0 %    Eosinophils % 2.9 0.0 - 6.0 %    Basophils % 0.2 0.0 - 2.0 %    Neutrophils Absolute 10.17 (H) 1.20 - 7.70 x10*3/uL    Immature Granulocytes Absolute, Automated 0.12 0.00 - 0.70 x10*3/uL    Lymphocytes Absolute 2.50 1.20 - 4.80 x10*3/uL    Monocytes Absolute 1.49 (H) 0.10 - 1.00 x10*3/uL    Eosinophils Absolute 0.43 0.00 - 0.70 x10*3/uL    Basophils Absolute 0.03 0.00 - 0.10 x10*3/uL "   Comprehensive metabolic panel   Result Value Ref Range    Glucose 114 (H) 74 - 99 mg/dL    Sodium 134 (L) 136 - 145 mmol/L    Potassium 6.2 (HH) 3.5 - 5.3 mmol/L    Chloride 101 98 - 107 mmol/L    Bicarbonate 29 21 - 32 mmol/L    Anion Gap 10 10 - 20 mmol/L    Urea Nitrogen 11 6 - 23 mg/dL    Creatinine 0.98 0.50 - 1.30 mg/dL    eGFR >90 >60 mL/min/1.73m*2    Calcium 8.6 8.6 - 10.3 mg/dL    Albumin 4.0 3.4 - 5.0 g/dL    Alkaline Phosphatase 40 33 - 120 U/L    Total Protein 7.3 6.4 - 8.2 g/dL    AST 37 9 - 39 U/L    Bilirubin, Total 0.5 0.0 - 1.2 mg/dL    ALT 16 10 - 52 U/L   Lipase   Result Value Ref Range    Lipase 127 (H) 9 - 82 U/L   Coagulation Screen   Result Value Ref Range    Protime 10.8 9.8 - 12.8 seconds    INR 1.0 0.9 - 1.1    aPTT 24 (L) 27 - 38 seconds   Troponin I, High Sensitivity, Initial   Result Value Ref Range    Troponin I, High Sensitivity 64 (HH) 0 - 20 ng/L   Type and Screen   Result Value Ref Range    ABO TYPE B     Rh TYPE POS     ANTIBODY SCREEN NEG    Urinalysis with Reflex Culture and Microscopic   Result Value Ref Range    Color, Urine Yellow Straw, Yellow    Appearance, Urine Clear Clear    Specific Gravity, Urine 1.019 1.005 - 1.035    pH, Urine 6.0 5.0, 5.5, 6.0, 6.5, 7.0, 7.5, 8.0    Protein, Urine NEGATIVE NEGATIVE mg/dL    Glucose, Urine NEGATIVE NEGATIVE mg/dL    Blood, Urine NEGATIVE NEGATIVE    Ketones, Urine NEGATIVE NEGATIVE mg/dL    Bilirubin, Urine NEGATIVE NEGATIVE    Urobilinogen, Urine <2.0 <2.0 mg/dL    Nitrite, Urine NEGATIVE NEGATIVE    Leukocyte Esterase, Urine NEGATIVE NEGATIVE   Troponin, High Sensitivity, 1 Hour   Result Value Ref Range    Troponin I, High Sensitivity 53 (HH) 0 - 20 ng/L   Potassium   Result Value Ref Range    Potassium 4.0 3.5 - 5.3 mmol/L   B-type natriuretic peptide   Result Value Ref Range    BNP 69 0 - 99 pg/mL   Lactate   Result Value Ref Range    Lactate 0.9 0.4 - 2.0 mmol/L   ECG 12 lead   Result Value Ref Range    Ventricular Rate 56 BPM  "   Atrial Rate 56 BPM    KY Interval 148 ms    QRS Duration 78 ms    QT Interval 448 ms    QTC Calculation(Bazett) 432 ms    P Axis 69 degrees    R Axis 13 degrees    T Axis 56 degrees    QRS Count 9 beats    Q Onset 223 ms    P Onset 149 ms    P Offset 203 ms    T Offset 447 ms    QTC Fredericia 438 ms           === 02/25/24 ===    CT ABDOMEN PELVIS ANGIOGRAM W AND/OR WO IV CONTRAST    - Impression -  1. Acute segmental and subsegmental pulmonary emboli within the right  lower lobe, right middle lobe, and left lower lobe. No evidence of  right heart strain.    2. Status post splenic artery embolization with coils involving the  mid splenic artery; however, distal to the last embolization \", the  splenic artery remains opacified and the known splenic artery  pseudoaneurysm remains opacified, again measuring ~ 0.7 cm. No  active extravasation from the spleen/splenic pseudoaneurysm or along  the course of the splenic artery within limitations of obscured  visualization by beam hardening artifact. This supports relatively  stable hemoglobin level since initial study.    3. Moderate volume hemoperitoneum, overall similar in quantity to  02/22/2024. There is an increased amount of blood products in the  rectovesical recess and decreased amount of blood products in the  upper abdomen around the liver, spleen. Decreased amount of  perisplenic hemorrhage favors absence of active bleeding.    4. Small hematoma surrounding the right common femoral artery  measuring 2.9 cm x 1.9 cm without active bleeding.    5. Diffuse gastric fold thickening similar to prior study relating to  a chronic gastritis of indeterminate etiology.    MACRO:  Shemar Caicedo discussed the significance and urgency of this  critical finding by telephone with  JOE GONZALEZ on 2/26/2024 at  1:01 am.  (**-RCF-**) Findings:  See findings.    Signed by: Shemar Caicedo 2/26/2024 1:13 AM  Dictation workstation:   [id number]       US " 2/26/24  IMPRESSION:  1.  No sonographic evidence for deep vein thrombosis within the  evaluated veins of the bilateral lower extremities.    I spent 90 minutes in the professional and overall care of this patient.

## 2024-02-26 NOTE — ED PROVIDER NOTES
HPI   Chief Complaint   Patient presents with    Abdominal Pain       HPI  The patient presents with sudden left upper quadrant pain in the setting of recent splenic artery aneurysm rupture 4 days ago.  He states that his left upper quadrant.  He denies any vomiting or diarrhea.  He states he is making bowel movements.  He was sent home with a pain medication this is pain was improved after he had the procedure.                   [place] Coma Scale Score: 15                     Patient History   No past medical history on file.  Past Surgical History:   Procedure Laterality Date    CT ANGIO CORONARY ART WITH HEARTFLOW IF SCORE >30%  4/19/2022    CT HEART CORONARY ANGIOGRAM 4/19/2022 [facility]    OTHER SURGICAL HISTORY  01/24/2022    Inguinal hernia repair laparoscopic     No family history on file.  Social History     Tobacco Use    Smoking status: Every Day     Packs/day: 1     Types: Cigarettes    Smokeless tobacco: Never   Vaping Use    Vaping Use: Not on file   Substance Use Topics    Alcohol use: Never    Drug use: Never       Physical Exam   ED Triage Vitals   Temperature Heart Rate Respirations BP   02/25/24 1822 02/25/24 1822 02/25/24 1822 02/25/24 1822   37.2 °C (99 °F) 79 20 118/76      Pulse Ox Temp Source Heart Rate Source Patient Position   02/26/24 0425 02/25/24 1822 02/25/24 1822 02/25/24 1822   98 % Oral Monitor Sitting      BP Location FiO2 (%)     02/25/24 1822 --     Left arm        Physical Exam  Vitals and nursing note reviewed.   Constitutional:       General: He is not in acute distress.     Appearance: He is well-developed.   HENT:      Head: Normocephalic and atraumatic.   Eyes:      Conjunctiva/sclera: Conjunctivae normal.   Cardiovascular:      Rate and Rhythm: Normal rate and regular rhythm.      Heart sounds: No murmur heard.  Pulmonary:      Effort: Pulmonary effort is normal. No respiratory distress.      Breath sounds: Normal breath sounds.   Abdominal:      Palpations:  Abdomen is soft.      Tenderness: There is abdominal tenderness in the left upper quadrant. There is guarding and rebound.   Musculoskeletal:         General: No swelling.      Cervical back: Neck supple.   Skin:     General: Skin is warm and dry.      Capillary Refill: Capillary refill takes less than 2 seconds.   Neurological:      Mental Status: He is alert.   Psychiatric:         Mood and Affect: Mood normal.         ED Course & MDM   Diagnoses as of 02/26/24 0434   Elevated troponin   Left upper quadrant abdominal pain   Other pulmonary embolism without acute cor pulmonale, unspecified chronicity (CMS/HCC)       Medical Decision Making  Patient guarding or rebound of his left upper quadrant.  The patient was found to have positive fast in his Morison pouch.  However this still could be residual from bleeding from his rupture 4 days ago.  Did obtain a CTA to see if there is any new active bleeding.  There is no active bleeding.  However there is no signs of a pulmonary embolism.  The radiologist noted that the embolization does not appear to be entirely effective as the aneurysm area is still opacified with contrast.  However there is no active count extravasation.  I spoke to the PERT team given this complex case.  They stated to hold off on anticoagulation and to get an echo and talk to hematology and pulmonology here at [facility].  Okay to stay med/tele.    EKG interpreted by myself.  Normal sinus rhythm at a rate of 56 bpm.  Normal intervals.  Normal axis.  No signs of acute ischemia.      Procedure  Procedures     José Luis Fuentes MD  02/26/24 4658

## 2024-02-27 ENCOUNTER — APPOINTMENT (OUTPATIENT)
Dept: CARDIOLOGY | Facility: HOSPITAL | Age: 54
End: 2024-02-27
Payer: COMMERCIAL

## 2024-02-27 PROBLEM — I26.99 ACUTE PULMONARY EMBOLISM WITHOUT ACUTE COR PULMONALE (MULTI): Status: ACTIVE | Noted: 2024-02-27

## 2024-02-27 PROBLEM — D62 ACUTE BLOOD LOSS ANEMIA: Status: ACTIVE | Noted: 2024-02-27

## 2024-02-27 LAB
ANION GAP SERPL CALC-SCNC: 10 MMOL/L (ref 10–20)
ATRIAL RATE: 56 BPM
ATRIAL RATE: 59 BPM
BASOPHILS # BLD AUTO: 0.04 X10*3/UL (ref 0–0.1)
BASOPHILS NFR BLD AUTO: 0.3 %
BUN SERPL-MCNC: 11 MG/DL (ref 6–23)
CALCIUM SERPL-MCNC: 8.9 MG/DL (ref 8.6–10.3)
CHLORIDE SERPL-SCNC: 102 MMOL/L (ref 98–107)
CO2 SERPL-SCNC: 28 MMOL/L (ref 21–32)
CREAT SERPL-MCNC: 0.9 MG/DL (ref 0.5–1.3)
EGFRCR SERPLBLD CKD-EPI 2021: >90 ML/MIN/1.73M*2
EJECTION FRACTION APICAL 4 CHAMBER: 56.2
EJECTION FRACTION: 63 %
EOSINOPHIL # BLD AUTO: 0.51 X10*3/UL (ref 0–0.7)
EOSINOPHIL NFR BLD AUTO: 4 %
ERYTHROCYTE [DISTWIDTH] IN BLOOD BY AUTOMATED COUNT: 16.9 % (ref 11.5–14.5)
GLUCOSE SERPL-MCNC: 103 MG/DL (ref 74–99)
HCT VFR BLD AUTO: 27.2 % (ref 41–52)
HGB BLD-MCNC: 8.6 G/DL (ref 13.5–17.5)
IMM GRANULOCYTES # BLD AUTO: 0.11 X10*3/UL (ref 0–0.7)
IMM GRANULOCYTES NFR BLD AUTO: 0.9 % (ref 0–0.9)
LYMPHOCYTES # BLD AUTO: 1.77 X10*3/UL (ref 1.2–4.8)
LYMPHOCYTES NFR BLD AUTO: 13.9 %
MCH RBC QN AUTO: 23.2 PG (ref 26–34)
MCHC RBC AUTO-ENTMCNC: 31.6 G/DL (ref 32–36)
MCV RBC AUTO: 73 FL (ref 80–100)
MONOCYTES # BLD AUTO: 1.22 X10*3/UL (ref 0.1–1)
MONOCYTES NFR BLD AUTO: 9.6 %
NEUTROPHILS # BLD AUTO: 9.11 X10*3/UL (ref 1.2–7.7)
NEUTROPHILS NFR BLD AUTO: 71.3 %
NRBC BLD-RTO: 0 /100 WBCS (ref 0–0)
P AXIS: 69 DEGREES
P AXIS: 71 DEGREES
P OFFSET: 202 MS
P OFFSET: 203 MS
P ONSET: 147 MS
P ONSET: 149 MS
PLATELET # BLD AUTO: 354 X10*3/UL (ref 150–450)
POTASSIUM SERPL-SCNC: 4.7 MMOL/L (ref 3.5–5.3)
PR INTERVAL: 148 MS
PR INTERVAL: 150 MS
Q ONSET: 222 MS
Q ONSET: 223 MS
QRS COUNT: 10 BEATS
QRS COUNT: 9 BEATS
QRS DURATION: 78 MS
QRS DURATION: 78 MS
QT INTERVAL: 442 MS
QT INTERVAL: 448 MS
QTC CALCULATION(BAZETT): 432 MS
QTC CALCULATION(BAZETT): 437 MS
QTC FREDERICIA: 438 MS
QTC FREDERICIA: 439 MS
R AXIS: 13 DEGREES
R AXIS: 9 DEGREES
RBC # BLD AUTO: 3.71 X10*6/UL (ref 4.5–5.9)
RIGHT VENTRICLE PEAK SYSTOLIC PRESSURE: 21.3 MMHG
SODIUM SERPL-SCNC: 135 MMOL/L (ref 136–145)
T AXIS: 43 DEGREES
T AXIS: 56 DEGREES
T OFFSET: 443 MS
T OFFSET: 447 MS
UFH PPP CHRO-ACNC: 0.1 IU/ML
VENTRICULAR RATE: 56 BPM
VENTRICULAR RATE: 59 BPM
WBC # BLD AUTO: 12.8 X10*3/UL (ref 4.4–11.3)

## 2024-02-27 PROCEDURE — 1200000002 HC GENERAL ROOM WITH TELEMETRY DAILY

## 2024-02-27 PROCEDURE — 85520 HEPARIN ASSAY: CPT | Performed by: INTERNAL MEDICINE

## 2024-02-27 PROCEDURE — 2500000004 HC RX 250 GENERAL PHARMACY W/ HCPCS (ALT 636 FOR OP/ED): Performed by: INTERNAL MEDICINE

## 2024-02-27 PROCEDURE — 93306 TTE W/DOPPLER COMPLETE: CPT | Performed by: INTERNAL MEDICINE

## 2024-02-27 PROCEDURE — 99232 SBSQ HOSP IP/OBS MODERATE 35: CPT | Performed by: CLINICAL NURSE SPECIALIST

## 2024-02-27 PROCEDURE — 85025 COMPLETE CBC W/AUTO DIFF WBC: CPT | Performed by: NURSE PRACTITIONER

## 2024-02-27 PROCEDURE — 93306 TTE W/DOPPLER COMPLETE: CPT

## 2024-02-27 PROCEDURE — 80048 BASIC METABOLIC PNL TOTAL CA: CPT | Performed by: INTERNAL MEDICINE

## 2024-02-27 PROCEDURE — 99232 SBSQ HOSP IP/OBS MODERATE 35: CPT | Performed by: NURSE PRACTITIONER

## 2024-02-27 PROCEDURE — 2500000001 HC RX 250 WO HCPCS SELF ADMINISTERED DRUGS (ALT 637 FOR MEDICARE OP): Performed by: INTERNAL MEDICINE

## 2024-02-27 PROCEDURE — 36415 COLL VENOUS BLD VENIPUNCTURE: CPT | Performed by: INTERNAL MEDICINE

## 2024-02-27 PROCEDURE — 2500000002 HC RX 250 W HCPCS SELF ADMINISTERED DRUGS (ALT 637 FOR MEDICARE OP, ALT 636 FOR OP/ED): Performed by: INTERNAL MEDICINE

## 2024-02-27 PROCEDURE — 36415 COLL VENOUS BLD VENIPUNCTURE: CPT | Performed by: NURSE PRACTITIONER

## 2024-02-27 PROCEDURE — 2500000004 HC RX 250 GENERAL PHARMACY W/ HCPCS (ALT 636 FOR OP/ED): Performed by: NURSE PRACTITIONER

## 2024-02-27 RX ORDER — HEPARIN SODIUM 5000 [USP'U]/ML
3000-6000 INJECTION, SOLUTION INTRAVENOUS; SUBCUTANEOUS EVERY 4 HOURS PRN
Status: ACTIVE | OUTPATIENT
Start: 2024-02-27 | End: 2024-02-28

## 2024-02-27 RX ORDER — HEPARIN SODIUM 10000 [USP'U]/100ML
0-4500 INJECTION, SOLUTION INTRAVENOUS CONTINUOUS
Status: DISPENSED | OUTPATIENT
Start: 2024-02-27 | End: 2024-02-28

## 2024-02-27 RX ADMIN — HYDROMORPHONE HYDROCHLORIDE 1 MG: 1 INJECTION, SOLUTION INTRAMUSCULAR; INTRAVENOUS; SUBCUTANEOUS at 14:31

## 2024-02-27 RX ADMIN — THIAMINE HYDROCHLORIDE 100 MG: 100 INJECTION, SOLUTION INTRAMUSCULAR; INTRAVENOUS at 08:11

## 2024-02-27 RX ADMIN — Medication 3 MG: at 20:57

## 2024-02-27 RX ADMIN — HYDROMORPHONE HYDROCHLORIDE 1 MG: 1 INJECTION, SOLUTION INTRAMUSCULAR; INTRAVENOUS; SUBCUTANEOUS at 13:24

## 2024-02-27 RX ADMIN — HYDROMORPHONE HYDROCHLORIDE 1 MG: 1 INJECTION, SOLUTION INTRAMUSCULAR; INTRAVENOUS; SUBCUTANEOUS at 05:53

## 2024-02-27 RX ADMIN — POLYETHYLENE GLYCOL 3350 17 G: 17 POWDER, FOR SOLUTION ORAL at 08:11

## 2024-02-27 RX ADMIN — HEPARIN SODIUM 1400 UNITS/HR: 10000 INJECTION, SOLUTION INTRAVENOUS at 18:24

## 2024-02-27 RX ADMIN — FOLIC ACID 1 MG: 1 TABLET ORAL at 08:11

## 2024-02-27 RX ADMIN — HYDROMORPHONE HYDROCHLORIDE 1 MG: 1 INJECTION, SOLUTION INTRAMUSCULAR; INTRAVENOUS; SUBCUTANEOUS at 17:29

## 2024-02-27 RX ADMIN — PANTOPRAZOLE SODIUM 40 MG: 40 TABLET, DELAYED RELEASE ORAL at 05:51

## 2024-02-27 RX ADMIN — Medication 1 TABLET: at 08:12

## 2024-02-27 ASSESSMENT — COGNITIVE AND FUNCTIONAL STATUS - GENERAL
DAILY ACTIVITIY SCORE: 24
MOBILITY SCORE: 24
MOBILITY SCORE: 24
DAILY ACTIVITIY SCORE: 24

## 2024-02-27 ASSESSMENT — PAIN SCALES - GENERAL
PAINLEVEL_OUTOF10: 1
PAINLEVEL_OUTOF10: 7
PAINLEVEL_OUTOF10: 9
PAINLEVEL_OUTOF10: 9
PAINLEVEL_OUTOF10: 2
PAINLEVEL_OUTOF10: 2
PAINLEVEL_OUTOF10: 9
PAINLEVEL_OUTOF10: 3
PAINLEVEL_OUTOF10: 0 - NO PAIN

## 2024-02-27 ASSESSMENT — PAIN - FUNCTIONAL ASSESSMENT
PAIN_FUNCTIONAL_ASSESSMENT: 0-10

## 2024-02-27 ASSESSMENT — PAIN DESCRIPTION - LOCATION
LOCATION: ABDOMEN

## 2024-02-27 ASSESSMENT — PAIN DESCRIPTION - ORIENTATION
ORIENTATION: LEFT
ORIENTATION: LEFT;UPPER
ORIENTATION: LEFT
ORIENTATION: LEFT;UPPER

## 2024-02-27 NOTE — PROGRESS NOTES
"Douglas Wilson is a 53 y.o. male on day 1 of admission presenting with Elevated troponin.    Subjective   Patient seen and examined with mother at bedside.  Patient appears to be feeling worse today reporting pain in his calf and just feeling generally bad.  Denies active chest pain and shortness of breath.  Denies fever, chills, nausea and emesis.  Continues to have pain in the left upper quadrant of his abdomen and with deep inspiration.  Discussed use of incentive spirometer and Acapella with patient and his mother owing to the atelectasis that he has in his bases.  Continues to be stable on room air.    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  Neck: Supple, no JVD  Cardiovascular: S1, S2 distinct, regular, HR 60s, no murmur appreciated  Pulmonary: Fair air entry with clear lung fields no rhonchi, crackles or wheezes noted; no conversational dyspnea noted  Abdominal: Soft, + tenderness LUQ, nondistended, + BS  Musculoskeletal: Good active range of motion with good strength  Extremities: No BLE edema  Lymphadenopathy: No nuchal LAP  Skin: Warm and dry  Neurological: Alert and oriented x 3, speech clear and appropriate; CNs grossly intact  Psychiatric:    Appropriate mood and behavior    Last Recorded Vitals  Blood pressure 142/81, pulse 60, temperature 35.8 °C (96.5 °F), resp. rate 18, height 1.753 m (5' 9.02\"), weight 76.2 kg (168 lb), SpO2 98 %.  Intake/Output last 3 Shifts:  I/O last 3 completed shifts:  In: 240 (3.1 mL/kg) [P.O.:240]  Out: - (0 mL/kg)   Weight: 76.2 kg     Relevant Results    Scheduled Medications:  folic acid, 1 mg, oral, Daily  melatonin, 3 mg, oral, Daily  multivitamin with minerals, 1 tablet, oral, Daily  ondansetron, 4 mg, intravenous, Once  pantoprazole, 40 mg, oral, Daily before breakfast   Or  pantoprazole, 40 mg, intravenous, Daily before " breakfast  perflutren lipid microspheres, 0.5-10 mL of dilution, intravenous, Once in imaging  perflutren lipid microspheres, 0.5-10 mL of dilution, intravenous, Once in imaging  perflutren protein A microsphere, 0.5 mL, intravenous, Once in imaging  polyethylene glycol, 17 g, oral, Daily  sulfur hexafluoride microsphr, 2 mL, intravenous, Once in imaging  [START ON 2/28/2024] thiamine, 100 mg, oral, Daily       PRN medications: acetaminophen **OR** acetaminophen **OR** acetaminophen, diazePAM, HYDROmorphone, HYDROmorphone, HYDROmorphone, loperamide   Results for orders placed or performed during the hospital encounter of 02/25/24 (from the past 24 hour(s))   Electrocardiogram, 12-lead PRN ACS symptoms   Result Value Ref Range    Ventricular Rate 59 BPM    Atrial Rate 59 BPM    TN Interval 150 ms    QRS Duration 78 ms    QT Interval 442 ms    QTC Calculation(Bazett) 437 ms    P Axis 71 degrees    R Axis 9 degrees    T Axis 43 degrees    QRS Count 10 beats    Q Onset 222 ms    P Onset 147 ms    P Offset 202 ms    T Offset 443 ms    QTC Fredericia 439 ms   CBC and Auto Differential   Result Value Ref Range    WBC 12.8 (H) 4.4 - 11.3 x10*3/uL    nRBC 0.0 0.0 - 0.0 /100 WBCs    RBC 3.71 (L) 4.50 - 5.90 x10*6/uL    Hemoglobin 8.6 (L) 13.5 - 17.5 g/dL    Hematocrit 27.2 (L) 41.0 - 52.0 %    MCV 73 (L) 80 - 100 fL    MCH 23.2 (L) 26.0 - 34.0 pg    MCHC 31.6 (L) 32.0 - 36.0 g/dL    RDW 16.9 (H) 11.5 - 14.5 %    Platelets 354 150 - 450 x10*3/uL    Neutrophils % 71.3 40.0 - 80.0 %    Immature Granulocytes %, Automated 0.9 0.0 - 0.9 %    Lymphocytes % 13.9 13.0 - 44.0 %    Monocytes % 9.6 2.0 - 10.0 %    Eosinophils % 4.0 0.0 - 6.0 %    Basophils % 0.3 0.0 - 2.0 %    Neutrophils Absolute 9.11 (H) 1.20 - 7.70 x10*3/uL    Immature Granulocytes Absolute, Automated 0.11 0.00 - 0.70 x10*3/uL    Lymphocytes Absolute 1.77 1.20 - 4.80 x10*3/uL    Monocytes Absolute 1.22 (H) 0.10 - 1.00 x10*3/uL    Eosinophils Absolute 0.51 0.00 - 0.70  x10*3/uL    Basophils Absolute 0.04 0.00 - 0.10 x10*3/uL   Basic metabolic panel   Result Value Ref Range    Glucose 103 (H) 74 - 99 mg/dL    Sodium 135 (L) 136 - 145 mmol/L    Potassium 4.7 3.5 - 5.3 mmol/L    Chloride 102 98 - 107 mmol/L    Bicarbonate 28 21 - 32 mmol/L    Anion Gap 10 10 - 20 mmol/L    Urea Nitrogen 11 6 - 23 mg/dL    Creatinine 0.90 0.50 - 1.30 mg/dL    eGFR >90 >60 mL/min/1.73m*2    Calcium 8.9 8.6 - 10.3 mg/dL      Electrocardiogram, 12-lead PRN ACS symptoms  Result Date: 2/27/2024  Sinus bradycardia Possible Left atrial enlargement Left ventricular hypertrophy Abnormal ECG When compared with ECG of 26-FEB-2024 02:05, (unconfirmed) No significant change was found    ECG 12 lead  Result Date: 2/26/2024  Sinus bradycardia Minimal voltage criteria for LVH, may be normal variant ( Sokolow-Miranda ) Borderline ECG When compared with ECG of 22-FEB-2024 09:39, Vent. rate has decreased BY  38 BPM ST no longer depressed in Inferior leads ST no longer depressed in Lateral leads Nonspecific T wave abnormality, improved in Lateral leads    Assessment/Plan   Douglas Wilson is a 53 y.o. 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  Patient's exam showed abdominal tenderness in the left upper quadrant with guarding and rebound tenderness.  Patient denied nausea vomiting.  EKG with sinus rhythm, bradycardia without acute ischemia; troponins 64-53. Labs significant for leukocytosis & lipase 127.  CT A/P (2/25/24) showing coils in the mid splenic artery with continued opacification of the pseudoaneurysm without evidence of active extravasation with moderate volume hemoperitoneum similar in quantity to study on 2/22 and acute segmental and subsegmental pulmonary emboli within the right lower lobe, right  middle lobe and left lower lobe doubt evidence of right heart strain.  PERT team was activated recommending admission to telemetry, holding off on anticoagulation, evaluation with echo, ultrasound BLE, BNP (69), lactate (0.9) and consulting hematology and pulmonology.     Impressions:     Pulmonary emboli multiple segmental and subsegmental seen on CT on 2/25 (not present on CT on 2/22) involving right middle lobe and bilateral lower lobes; CT RV to LV ratio 0.8  -Ultrasound of bilateral lower extremities negative for DVT  -BNP and lactate normal  -Echo on 2/22/2024 with EF 70 to 75%, no impaired relaxation and normal RVSF     Abnormal chest CT: bibasilar atelectasis, bronchial wall thickening, small airway opacification predominantly RLL: noted on CT on 2/22 and current CT ; low concern for acute URI given no cough or shortness of breath; will monitor as patient with low-grade temp 37.3 with minor leukocytosis      Tobacco abuse: Current every day smoker 1 pack a day x 30 years     Recommendations:  -Recommend reevaluation by echo -pending  -Patient will need anticoagulation after risk stratification based on input from IR/surgery regarding continued opacification of splenic artery pseudoaneurysm after coiling; IR consult pending  -Monitor blood count trends and for signs and symptoms for acute hemorrhage; hemoglobin remained stable without acute signs /symptoms of bleeding  -BPH with I-S and Acapella  -Patient would benefit from establishment with primary care physician with outpatient evaluation of pulmonary function tests given significant smoking history     Thank you for the consult.  Pulmonology will follow.    DONTE Coelho-CNS

## 2024-02-27 NOTE — CONSULTS
Consults    Reason For Consult  Splenic aneurysm    History Of Present Illness  Douglas Wilson is a 53 y.o. male known to our service with a recent admission for splenic aneurysm with hemoperitoneum. He underwent a successful splenic artery embolization and was discharged home in stable condition. He was re admitted the following day, with complaints of abdominal pain. Repeat CTA was completed, which demonstrated acute PE in the right middle and lower lobe, as well as the left lower lobe. Notable hemoperitoneum, though unchanged from prior. Small right femoral hematoma without signs of active bleed. Report notes opacified splenic artery aneurysm with no active extravasation. IR consulted for eval of recent CTA, given acute PE and need for anticoagulation.      Past Medical History  He has no past medical history on file.    Surgical History  He has a past surgical history that includes Other surgical history (01/24/2022) and CT angio coronary art with heartflow if score >30% (4/19/2022).     Social History  He reports that he has been smoking cigarettes. He has been smoking an average of 1 pack per day. He has never used smokeless tobacco. He reports current drug use. Drug: Heroin. He reports that he does not drink alcohol.    Family History  No family history on file.     Allergies  Patient has no known allergies.    MEDS:    Current Facility-Administered Medications:     acetaminophen (Tylenol) tablet 650 mg, 650 mg, oral, q4h PRN **OR** acetaminophen (Tylenol) oral liquid 650 mg, 650 mg, oral, q4h PRN **OR** acetaminophen (Tylenol) suppository 650 mg, 650 mg, rectal, q4h PRN, Chucho Porter DO    diazePAM (Valium) tablet 2 mg, 2 mg, oral, q2h PRN, Chucho Porter, DO    folic acid (Folvite) tablet 1 mg, 1 mg, oral, Daily, Chucho Porter DO, 1 mg at 02/27/24 0811    HYDROmorphone (Dilaudid) injection 0.4 mg, 0.4 mg, intravenous, q4h PRN, Chucho Porter DO    HYDROmorphone (Dilaudid) injection 1 mg, 1 mg,  intravenous, q1h PRN, Chucho Porter DO, 1 mg at 02/27/24 1431    HYDROmorphone PF (Dilaudid) injection 0.2 mg, 0.2 mg, intravenous, q4h PRN, Chucho BOLDEN Luis Miguele, DO    loperamide (Imodium) capsule 2 mg, 2 mg, oral, 4x daily PRN, Chucho Bolanose, DO    melatonin tablet 3 mg, 3 mg, oral, Daily, Chucho BOLDEN Luis Miguele, DO    multivitamin with minerals 1 tablet, 1 tablet, oral, Daily, Chucho Bolanose, DO, 1 tablet at 02/27/24 0812    ondansetron (Zofran) injection 4 mg, 4 mg, intravenous, Once, Chucho BOLDEN Luis Miguelrosetta DO    pantoprazole (ProtoNix) EC tablet 40 mg, 40 mg, oral, Daily before breakfast, 40 mg at 02/27/24 0551 **OR** pantoprazole (ProtoNix) injection 40 mg, 40 mg, intravenous, Daily before breakfast, Chucho BOLDEN Luis Miguele, DO    perflutren lipid microspheres (Definity) injection 0.5-10 mL of dilution, 0.5-10 mL of dilution, intravenous, Once in imaging, José Luis Fuentes MD    perflutren lipid microspheres (Definity) injection 0.5-10 mL of dilution, 0.5-10 mL of dilution, intravenous, Once in imaging, Chucho BOLDEN Luis Miguele, DO    perflutren protein A microsphere (Optison) injection 0.5 mL, 0.5 mL, intravenous, Once in imaging, José Luis Fuentes MD    polyethylene glycol (Glycolax, Miralax) packet 17 g, 17 g, oral, Daily, Chucho Porter DO, 17 g at 02/27/24 0811    sodium chloride 0.9% infusion, 100 mL/hr, intravenous, Continuous, Chucho Dickersonmillierosetta DO, Last Rate: 100 mL/hr at 02/26/24 0850, 100 mL/hr at 02/26/24 0850    sulfur hexafluoride microsphr (Lumason) injection 24.28 mg, 2 mL, intravenous, Once in imaging, José Luis Fuentes MD    [START ON 2/28/2024] thiamine (Vitamin B-1) tablet 100 mg, 100 mg, oral, Daily, Chucho Porter DO    Review of Systems  History obtained from the patient  General ROS: negative  Respiratory ROS: negative  Cardiovascular ROS: negative  Gastrointestinal ROS: positive for - abdominal pain  Genitourinary ROS: no dysuria, trouble voiding, or hematuria     Last Recorded Vitals  /77   Pulse  "62   Temp 36.7 °C (98 °F)   Resp 18   Wt 76.2 kg (168 lb)   SpO2 97%      Physical Exam  Orientation: oriented to person, place, time, and general circumstances  HEENT: normocephalic, atraumatic  Pulm: normal  Cardiac: Regular rate and rhythm  GI:  soft. Mild TTP LUQ.   Pulses: peripheral pulses symmetrical and Right femoral artery is without palpable hematoma or ecchymosis. Access site benign.     Relevant Results    LABS:  Lab Results   Component Value Date    WBC 12.8 (H) 02/27/2024    HGB 8.6 (L) 02/27/2024    HCT 27.2 (L) 02/27/2024    MCV 73 (L) 02/27/2024     02/27/2024      Results from last 72 hours   Lab Units 02/27/24  0556   SODIUM mmol/L 135*   POTASSIUM mmol/L 4.7   CHLORIDE mmol/L 102   CO2 mmol/L 28   BUN mg/dL 11   CREATININE mg/dL 0.90   GLUCOSE mg/dL 103*   CALCIUM mg/dL 8.9   ANION GAP mmol/L 10   EGFR mL/min/1.73m*2 >90     Results from last 72 hours   Lab Units 02/25/24 2129   ALK PHOS U/L 40   BILIRUBIN TOTAL mg/dL 0.5   PROTEIN TOTAL g/dL 7.3   ALT U/L 16   AST U/L 37   ALBUMIN g/dL 4.0     Results from last 72 hours   Lab Units 02/25/24 2129   INR  1.0       MICRO:  No results found for the last 14 days.      IMAGING:   Transthoracic Echo (TTE) Complete         Vascular US lower extremity venous duplex bilateral   Final Result   1.  No sonographic evidence for deep vein thrombosis within the   evaluated veins of the bilateral lower extremities.             MACRO:   None        Signed by: Lilia Bean 2/26/2024 3:16 AM   Dictation workstation:   [id number]      CT angio abdomen pelvis w and or wo IV IV contrast   Final Result   1. Acute segmental and subsegmental pulmonary emboli within the right   lower lobe, right middle lobe, and left lower lobe. No evidence of   right heart strain.        2. Status post splenic artery embolization with coils involving the   mid splenic artery; however, distal to the last embolization \", the   splenic artery remains opacified and the " known splenic artery   pseudoaneurysm remains opacified, again measuring ~ 0.7 cm. No   active extravasation from the spleen/splenic pseudoaneurysm or along   the course of the splenic artery within limitations of obscured   visualization by beam hardening artifact. This supports relatively   stable hemoglobin level since initial study.        3. Moderate volume hemoperitoneum, overall similar in quantity to   02/22/2024. There is an increased amount of blood products in the   rectovesical recess and decreased amount of blood products in the   upper abdomen around the liver, spleen. Decreased amount of   perisplenic hemorrhage favors absence of active bleeding.        4. Small hematoma surrounding the right common femoral artery   measuring 2.9 cm x 1.9 cm without active bleeding.        5. Diffuse gastric fold thickening similar to prior study relating to   a chronic gastritis of indeterminate etiology.        MACRO:   Shemar Caicedo discussed the significance and urgency of this   critical finding by telephone with  JOE GONZALEZ on 2/26/2024 at   1:01 am.  (**-RCF-**) Findings:  See findings.        Signed by: Shemar Caicedo 2/26/2024 1:13 AM   Dictation workstation:   [id number]      Consult to Interventional Radiology    (Results Pending)       Assessment/Plan     53 year old male with recent admission for splenic artery aneurysm and hemoperitenum. Now with acute PE RML, RLL, LLL.     I reviewed the CTA with IR attending Dr. Wright. No splenic artery aneurysm is appreciated. Stable coil embolization. No active bleed. Stable hemoperitoenum. Small deep hematoma adjacent to right common fem.     Ok to initiate heparin for treatment of PE. Low probability of re bleed from splenic artery.   Continue to monitor H&H, vital signs for signs of bleeding.       Nora Garza, APRN-CNP        Time : Billing Time  Prep time on date of the patient encounter: 10 minutes.   Time spent directly with  patient/family/caregiver: 20 minutes.   Documentation time: 10 minutes.   Total time (minutes):  40 minutes  Time Spent with this Patient (minutes).  More than 50% of This Time was Spent in Counseling and/or Coordination of Care

## 2024-02-27 NOTE — PROGRESS NOTES
INTERNAL MEDICINE PROGRESS NOTE      HPI:    Patient reports pain is a 6 out of 10 today.  He has had no bowel movement.  He is tolerating a diet.  He complains of some right calf pain.    Vital signs in last 24 hours:  Temp:  [35.8 °C (96.5 °F)-37.8 °C (100.1 °F)] 35.8 °C (96.5 °F)  Heart Rate:  [59-67] 60  Resp:  [16-18] 18  BP: (109-153)/(67-83) 142/81    Physical Examination:  Physical Exam     Constitutional:       Appearance: Middle-aged, well-built, in no distress  HENT:      Head: Normocephalic and atraumatic.   Eyes:      Extraocular Movements: Extraocular movements intact.      Pupils: Pupils are equal, round, and reactive to light.   Cardiovascular:      Rate and Rhythm: Normal rate and regular rhythm.      Pulses: Normal pulses.      Heart sounds: Normal heart sounds.   Pulmonary:      Effort: Pulmonary effort is normal.      Breath sounds: Normal breath sounds.   Abdominal:      General: Abdomen is flat. Bowel sounds are normal.      Palpations: Abdomen is soft.  There is tenderness on palpation in the left lower quadrant with no palpable mass.  There is no rebound or guarding.  Musculoskeletal:         General: Normal range of motion.      Cervical back: Normal range of motion and neck supple.   Skin:     General: Skin is warm and dry.   Neurological:      General: No focal deficit present.      Mental Status: He is alert and oriented to person, place, and time. Mental status is at baseline.        Medications:    Current Facility-Administered Medications:     acetaminophen (Tylenol) tablet 650 mg, 650 mg, oral, q4h PRN **OR** acetaminophen (Tylenol) oral liquid 650 mg, 650 mg, oral, q4h PRN **OR** acetaminophen (Tylenol) suppository 650 mg, 650 mg, rectal, q4h PRN, Chucho G Salomone, DO    diazePAM (Valium) tablet 2 mg, 2 mg, oral, q2h PRN, Chucho G Salomone, DO    folic acid (Folvite) tablet 1 mg, 1 mg, oral, Daily, Chucho G Salomone, DO, 1 mg at 02/27/24 0811    HYDROmorphone (Dilaudid)  injection 0.4 mg, 0.4 mg, intravenous, q4h PRN, Chucho BOLDEN Salomone, DO    HYDROmorphone (Dilaudid) injection 1 mg, 1 mg, intravenous, q1h PRN, Chucho BOLDEN Salomone, DO, 1 mg at 02/27/24 0553    HYDROmorphone PF (Dilaudid) injection 0.2 mg, 0.2 mg, intravenous, q4h PRN, Chucho BOLDEN Salomone, DO    loperamide (Imodium) capsule 2 mg, 2 mg, oral, 4x daily PRN, Chucho Dickersonomone, DO    melatonin tablet 3 mg, 3 mg, oral, Daily, Chucho BOLDEN Salomone, DO    multivitamin with minerals 1 tablet, 1 tablet, oral, Daily, Chucho Bolanose DO, 1 tablet at 02/27/24 0812    ondansetron (Zofran) injection 4 mg, 4 mg, intravenous, Once, Chucho BOLDEN Tuanomone, DO    pantoprazole (ProtoNix) EC tablet 40 mg, 40 mg, oral, Daily before breakfast, 40 mg at 02/27/24 0551 **OR** pantoprazole (ProtoNix) injection 40 mg, 40 mg, intravenous, Daily before breakfast, Chucho BOLDEN Salomone, DO    perflutren lipid microspheres (Definity) injection 0.5-10 mL of dilution, 0.5-10 mL of dilution, intravenous, Once in imaging, José Luis Fuentes MD    perflutren lipid microspheres (Definity) injection 0.5-10 mL of dilution, 0.5-10 mL of dilution, intravenous, Once in imaging, Chucho BOLDEN Luis Miguele, DO    perflutren protein A microsphere (Optison) injection 0.5 mL, 0.5 mL, intravenous, Once in imaging, José Luis Fuentes MD    polyethylene glycol (Glycolax, Miralax) packet 17 g, 17 g, oral, Daily, Chucho Dickersonmilliee, DO, 17 g at 02/27/24 0811    sodium chloride 0.9% infusion, 100 mL/hr, intravenous, Continuous, Chucho BOLDEN Salmilliee, DO, Last Rate: 100 mL/hr at 02/26/24 0850, 100 mL/hr at 02/26/24 0850    sulfur hexafluoride microsphr (Lumason) injection 24.28 mg, 2 mL, intravenous, Once in imaging, José Luis Fuentes MD    [START ON 2/28/2024] thiamine (Vitamin B-1) tablet 100 mg, 100 mg, oral, Daily, Chucho Porter DO    Laboratory Findings:  Lab Results   Component Value Date    WBC 12.8 (H) 02/27/2024    HGB 8.6 (L) 02/27/2024    HCT 27.2 (L) 02/27/2024    MCV 73 (L) 02/27/2024      02/27/2024     Lab Results   Component Value Date    INR 1.0 02/25/2024    INR 0.9 09/21/2020    INR 1.0 12/05/2019    PROTIME 10.8 02/25/2024    PROTIME 10.2 09/21/2020    PROTIME 10.7 12/05/2019     Lab Results   Component Value Date    GLUCOSE 103 (H) 02/27/2024    CALCIUM 8.9 02/27/2024     (L) 02/27/2024    K 4.7 02/27/2024    CO2 28 02/27/2024     02/27/2024    BUN 11 02/27/2024    CREATININE 0.90 02/27/2024       Assessment and Plan:    Acute pulmonary embolism -will start on heparin drip if cleared by interventional radiology after evaluation of imaging.  Ultrasound negative for DVT.  Abdominal pain -no intervention per general surgery.  Better today.  Continue to monitor.  Elevated troponin -no intervention per cardiology.  Substance abuse -cessation of use encouraged.     Patient seen with mother at bedside.      Shemar Banda MD  02/27/24  11:29 AM

## 2024-02-27 NOTE — PROGRESS NOTES
02/27/24 1309   Discharge Planning   Patient expects to be discharged to: Home     2/27/24 13:09 per notes Acute PE, not medically cleared for Discharge. Alisha DUPREE RN

## 2024-02-27 NOTE — DISCHARGE SUMMARY
Discharge Diagnosis  Generalized abdominal pain    Issues Requiring Follow-Up      Discharge Meds     Your medication list        CONTINUE taking these medications        Instructions Last Dose Given Next Dose Due   omeprazole 40 mg DR capsule  Commonly known as: PriLOSEC                    Test Results Pending At Discharge  Pending Labs       No current pending labs.            Hospital Course   Douglas Wilson is a 53 y.o. male presenting with a 4-day history of abdominal pain.  Patient has a history of heroin abuse.  He states that he began to have abdominal pain and some intermittent chest pain over the past 4 days.  He had some poor p.o. intake, nausea, and vomiting.  He presented to the hospital for evaluation and was noted to have significantly elevated troponins.  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  He underwent interventional radiology procedure.  Subsequently the abdominal pain has significantly improved.  He was admitted for further treatment.     The patient was encourage drug cessation, recommended outpatient stress test in 1 month per cardiology recommendations. He was recommended follow up with his PCP in 1 week for repeat CBC to monitor his hemoperitoneum. He was stable for discharge home with outpatient follow up.     Pertinent Physical Exam At Time of Discharge      Outpatient Follow-Up  No future appointments.    Time and care for discharge management > 30 minutes.     Shemar Banda MD

## 2024-02-28 LAB
ANION GAP SERPL CALC-SCNC: 10 MMOL/L (ref 10–20)
BASOPHILS # BLD AUTO: 0.06 X10*3/UL (ref 0–0.1)
BASOPHILS NFR BLD AUTO: 0.4 %
BUN SERPL-MCNC: 12 MG/DL (ref 6–23)
CALCIUM SERPL-MCNC: 8.4 MG/DL (ref 8.6–10.3)
CHLORIDE SERPL-SCNC: 101 MMOL/L (ref 98–107)
CO2 SERPL-SCNC: 30 MMOL/L (ref 21–32)
CREAT SERPL-MCNC: 0.89 MG/DL (ref 0.5–1.3)
EGFRCR SERPLBLD CKD-EPI 2021: >90 ML/MIN/1.73M*2
EOSINOPHIL # BLD AUTO: 0.43 X10*3/UL (ref 0–0.7)
EOSINOPHIL NFR BLD AUTO: 3.2 %
ERYTHROCYTE [DISTWIDTH] IN BLOOD BY AUTOMATED COUNT: 17 % (ref 11.5–14.5)
GLUCOSE SERPL-MCNC: 115 MG/DL (ref 74–99)
HCT VFR BLD AUTO: 30 % (ref 41–52)
HGB BLD-MCNC: 9.3 G/DL (ref 13.5–17.5)
IMM GRANULOCYTES # BLD AUTO: 0.15 X10*3/UL (ref 0–0.7)
IMM GRANULOCYTES NFR BLD AUTO: 1.1 % (ref 0–0.9)
LYMPHOCYTES # BLD AUTO: 2.19 X10*3/UL (ref 1.2–4.8)
LYMPHOCYTES NFR BLD AUTO: 16.4 %
MCH RBC QN AUTO: 23.2 PG (ref 26–34)
MCHC RBC AUTO-ENTMCNC: 31 G/DL (ref 32–36)
MCV RBC AUTO: 75 FL (ref 80–100)
MONOCYTES # BLD AUTO: 1.24 X10*3/UL (ref 0.1–1)
MONOCYTES NFR BLD AUTO: 9.3 %
NEUTROPHILS # BLD AUTO: 9.3 X10*3/UL (ref 1.2–7.7)
NEUTROPHILS NFR BLD AUTO: 69.6 %
NRBC BLD-RTO: 0.1 /100 WBCS (ref 0–0)
PLATELET # BLD AUTO: 426 X10*3/UL (ref 150–450)
POTASSIUM SERPL-SCNC: 4.4 MMOL/L (ref 3.5–5.3)
RBC # BLD AUTO: 4.01 X10*6/UL (ref 4.5–5.9)
SODIUM SERPL-SCNC: 137 MMOL/L (ref 136–145)
UFH PPP CHRO-ACNC: 0.4 IU/ML
UFH PPP CHRO-ACNC: 0.5 IU/ML
WBC # BLD AUTO: 13.4 X10*3/UL (ref 4.4–11.3)

## 2024-02-28 PROCEDURE — 1200000002 HC GENERAL ROOM WITH TELEMETRY DAILY

## 2024-02-28 PROCEDURE — 36415 COLL VENOUS BLD VENIPUNCTURE: CPT | Performed by: INTERNAL MEDICINE

## 2024-02-28 PROCEDURE — 2500000004 HC RX 250 GENERAL PHARMACY W/ HCPCS (ALT 636 FOR OP/ED): Performed by: NURSE PRACTITIONER

## 2024-02-28 PROCEDURE — 2500000004 HC RX 250 GENERAL PHARMACY W/ HCPCS (ALT 636 FOR OP/ED): Performed by: INTERNAL MEDICINE

## 2024-02-28 PROCEDURE — 85520 HEPARIN ASSAY: CPT | Performed by: INTERNAL MEDICINE

## 2024-02-28 PROCEDURE — 94668 MNPJ CHEST WALL SBSQ: CPT

## 2024-02-28 PROCEDURE — 2500000001 HC RX 250 WO HCPCS SELF ADMINISTERED DRUGS (ALT 637 FOR MEDICARE OP): Performed by: INTERNAL MEDICINE

## 2024-02-28 PROCEDURE — C9113 INJ PANTOPRAZOLE SODIUM, VIA: HCPCS | Performed by: INTERNAL MEDICINE

## 2024-02-28 PROCEDURE — 80048 BASIC METABOLIC PNL TOTAL CA: CPT | Performed by: INTERNAL MEDICINE

## 2024-02-28 PROCEDURE — 85025 COMPLETE CBC W/AUTO DIFF WBC: CPT | Performed by: INTERNAL MEDICINE

## 2024-02-28 RX ADMIN — Medication 1 TABLET: at 08:22

## 2024-02-28 RX ADMIN — FOLIC ACID 1 MG: 1 TABLET ORAL at 08:22

## 2024-02-28 RX ADMIN — HYDROMORPHONE HYDROCHLORIDE 1 MG: 1 INJECTION, SOLUTION INTRAMUSCULAR; INTRAVENOUS; SUBCUTANEOUS at 17:07

## 2024-02-28 RX ADMIN — HEPARIN SODIUM 1600 UNITS/HR: 10000 INJECTION, SOLUTION INTRAVENOUS at 09:02

## 2024-02-28 RX ADMIN — HYDROMORPHONE HYDROCHLORIDE 1 MG: 1 INJECTION, SOLUTION INTRAMUSCULAR; INTRAVENOUS; SUBCUTANEOUS at 21:11

## 2024-02-28 RX ADMIN — Medication 3 MG: at 21:11

## 2024-02-28 RX ADMIN — HYDROMORPHONE HYDROCHLORIDE 0.4 MG: 1 INJECTION, SOLUTION INTRAMUSCULAR; INTRAVENOUS; SUBCUTANEOUS at 15:13

## 2024-02-28 RX ADMIN — PANTOPRAZOLE SODIUM 40 MG: 40 INJECTION, POWDER, FOR SOLUTION INTRAVENOUS at 06:15

## 2024-02-28 RX ADMIN — APIXABAN 10 MG: 5 TABLET, FILM COATED ORAL at 18:02

## 2024-02-28 RX ADMIN — Medication 100 MG: at 08:22

## 2024-02-28 ASSESSMENT — PAIN DESCRIPTION - LOCATION
LOCATION: ABDOMEN
LOCATION: ABDOMEN

## 2024-02-28 ASSESSMENT — PAIN - FUNCTIONAL ASSESSMENT
PAIN_FUNCTIONAL_ASSESSMENT: 0-10

## 2024-02-28 ASSESSMENT — COGNITIVE AND FUNCTIONAL STATUS - GENERAL
MOBILITY SCORE: 24
DAILY ACTIVITIY SCORE: 24
DAILY ACTIVITIY SCORE: 24
MOBILITY SCORE: 24

## 2024-02-28 ASSESSMENT — PAIN SCALES - GENERAL
PAINLEVEL_OUTOF10: 6
PAINLEVEL_OUTOF10: 8
PAINLEVEL_OUTOF10: 0 - NO PAIN
PAINLEVEL_OUTOF10: 5 - MODERATE PAIN
PAINLEVEL_OUTOF10: 4

## 2024-02-28 ASSESSMENT — PAIN DESCRIPTION - ORIENTATION
ORIENTATION: LEFT;LOWER
ORIENTATION: LEFT;LOWER

## 2024-02-28 NOTE — PROGRESS NOTES
02/28/24 1315   Discharge Planning   Patient expects to be discharged to: Home     2/28/24 13:16 per notes, will continue to monitor for 24 hrs with a possible discharge date of tomorrow. Alisha DUPREE RN

## 2024-02-28 NOTE — PROGRESS NOTES
INTERNAL MEDICINE PROGRESS NOTE      HPI:    Patient has tolerated heparin drip so far.  He reports 2 bowel movements yesterday with no bloody stool.  Abdominal pain has decreased.    Vital signs in last 24 hours:  Temp:  [36.5 °C (97.7 °F)-37.3 °C (99.1 °F)] 36.5 °C (97.7 °F)  Heart Rate:  [62-64] 63  Resp:  [18] 18  BP: (121-143)/(75-83) 142/83    Physical Examination:  Physical Exam     Constitutional:       Appearance: Middle-aged, well-built, in no distress  HENT:      Head: Normocephalic and atraumatic.   Eyes:      Extraocular Movements: Extraocular movements intact.      Pupils: Pupils are equal, round, and reactive to light.   Cardiovascular:      Rate and Rhythm: Normal rate and regular rhythm.      Pulses: Normal pulses.      Heart sounds: Normal heart sounds.   Pulmonary:      Effort: Pulmonary effort is normal.      Breath sounds: Normal breath sounds.   Abdominal:      General: Abdomen is flat. Bowel sounds are normal.      Palpations: Abdomen is soft.  Minimal tenderness left lower quadrant with no mass, rebound or guarding.  Musculoskeletal:         General: Normal range of motion.      Cervical back: Normal range of motion and neck supple.   Skin:     General: Skin is warm and dry.   Neurological:      General: No focal deficit present.      Mental Status: He is alert and oriented to person, place, and time. Mental status is at baseline.        Medications:    Current Facility-Administered Medications:     acetaminophen (Tylenol) tablet 650 mg, 650 mg, oral, q4h PRN **OR** acetaminophen (Tylenol) oral liquid 650 mg, 650 mg, oral, q4h PRN **OR** acetaminophen (Tylenol) suppository 650 mg, 650 mg, rectal, q4h PRN, Chucho Porter DO    apixaban (Eliquis) tablet 10 mg, 10 mg, oral, BID **FOLLOWED BY** [START ON 3/6/2024] apixaban (Eliquis) tablet 5 mg, 5 mg, oral, BID, Shemar Banda MD    diazePAM (Valium) tablet 2 mg, 2 mg, oral, q2h PRN, Chucho Porter DO    folic acid (Folvite)  tablet 1 mg, 1 mg, oral, Daily, Chucho BOLDEN Salomone, DO, 1 mg at 02/28/24 0822    heparin (porcine) injection 3,000-6,000 Units, 3,000-6,000 Units, intravenous, q4h PRN, STEPHANIE Frazier    heparin 25,000 Units in dextrose 5% 250 mL (100 Units/mL) infusion (premix), 0-4,500 Units/hr, intravenous, Continuous, STEPHANIE Frazier, Last Rate: 16 mL/hr at 02/28/24 1018, 1,600 Units/hr at 02/28/24 1018    HYDROmorphone (Dilaudid) injection 0.4 mg, 0.4 mg, intravenous, q4h PRN, Chucho BOLDEN Salomone, DO    HYDROmorphone (Dilaudid) injection 1 mg, 1 mg, intravenous, q1h PRN, Chucho BOLDEN Salmilliee, DO, 1 mg at 02/27/24 1729    HYDROmorphone PF (Dilaudid) injection 0.2 mg, 0.2 mg, intravenous, q4h PRN, Chucho BOLDEN Salomone, DO    loperamide (Imodium) capsule 2 mg, 2 mg, oral, 4x daily PRN, Chucho YUAN Salomone, DO    melatonin tablet 3 mg, 3 mg, oral, Daily, Chucho BOLDEN Salomone, DO, 3 mg at 02/27/24 2057    multivitamin with minerals 1 tablet, 1 tablet, oral, Daily, Chucho BOLDEN Salomone, DO, 1 tablet at 02/28/24 0822    ondansetron (Zofran) injection 4 mg, 4 mg, intravenous, Once, Chucho YUAN Salomone, DO    pantoprazole (ProtoNix) EC tablet 40 mg, 40 mg, oral, Daily before breakfast, 40 mg at 02/27/24 0551 **OR** pantoprazole (ProtoNix) injection 40 mg, 40 mg, intravenous, Daily before breakfast, Chucho BOLDEN Salomone, DO, 40 mg at 02/28/24 0615    perflutren lipid microspheres (Definity) injection 0.5-10 mL of dilution, 0.5-10 mL of dilution, intravenous, Once in imaging, José Luis Fuentes MD    perflutren lipid microspheres (Definity) injection 0.5-10 mL of dilution, 0.5-10 mL of dilution, intravenous, Once in imaging, Chucho Porter DO    perflutren protein A microsphere (Optison) injection 0.5 mL, 0.5 mL, intravenous, Once in imaging, José Luis Fuentes MD    polyethylene glycol (Glycolax, Miralax) packet 17 g, 17 g, oral, Daily, Chucho G DO German, 17 g at 02/27/24 0811    sodium chloride 0.9% infusion, 100 mL/hr, intravenous, Continuous,  Chucho Porter DO, Last Rate: 100 mL/hr at 02/28/24 1018, 100 mL/hr at 02/28/24 1018    sulfur hexafluoride microsphr (Lumason) injection 24.28 mg, 2 mL, intravenous, Once in imaging, José Luis Fuentes MD    thiamine (Vitamin B-1) tablet 100 mg, 100 mg, oral, Daily, Chucho Porter DO, 100 mg at 02/28/24 0822    Laboratory Findings:  Lab Results   Component Value Date    WBC 13.4 (H) 02/28/2024    HGB 9.3 (L) 02/28/2024    HCT 30.0 (L) 02/28/2024    MCV 75 (L) 02/28/2024     02/28/2024     Lab Results   Component Value Date    INR 1.0 02/25/2024    INR 0.9 09/21/2020    INR 1.0 12/05/2019    PROTIME 10.8 02/25/2024    PROTIME 10.2 09/21/2020    PROTIME 10.7 12/05/2019     Lab Results   Component Value Date    GLUCOSE 115 (H) 02/28/2024    CALCIUM 8.4 (L) 02/28/2024     02/28/2024    K 4.4 02/28/2024    CO2 30 02/28/2024     02/28/2024    BUN 12 02/28/2024    CREATININE 0.89 02/28/2024       Assessment and Plan:    Acute pulmonary embolism -appreciate hematology and interventional radiology input.  Continue with heparin drip.  Will initiate Eliquis tonight.  Abdominal pain -no intervention per general surgery.  Better today.  Continue to monitor.  Elevated troponin -no intervention per cardiology.  Substance abuse -cessation of use encouraged.     Patient seen with mother at bedside.  Will monitor for at least 24 more hours and plan to discharge      Shemar Banda MD  02/28/24  10:27 AM

## 2024-02-29 VITALS
SYSTOLIC BLOOD PRESSURE: 107 MMHG | HEART RATE: 89 BPM | HEIGHT: 69 IN | WEIGHT: 168 LBS | RESPIRATION RATE: 18 BRPM | BODY MASS INDEX: 24.88 KG/M2 | DIASTOLIC BLOOD PRESSURE: 66 MMHG | OXYGEN SATURATION: 100 % | TEMPERATURE: 97.4 F

## 2024-02-29 LAB
ANION GAP SERPL CALC-SCNC: 9 MMOL/L (ref 10–20)
BASOPHILS # BLD AUTO: 0.06 X10*3/UL (ref 0–0.1)
BASOPHILS NFR BLD AUTO: 0.5 %
BUN SERPL-MCNC: 12 MG/DL (ref 6–23)
CALCIUM SERPL-MCNC: 8.4 MG/DL (ref 8.6–10.3)
CHLORIDE SERPL-SCNC: 99 MMOL/L (ref 98–107)
CO2 SERPL-SCNC: 32 MMOL/L (ref 21–32)
CREAT SERPL-MCNC: 0.9 MG/DL (ref 0.5–1.3)
EGFRCR SERPLBLD CKD-EPI 2021: >90 ML/MIN/1.73M*2
EOSINOPHIL # BLD AUTO: 0.4 X10*3/UL (ref 0–0.7)
EOSINOPHIL NFR BLD AUTO: 3.1 %
ERYTHROCYTE [DISTWIDTH] IN BLOOD BY AUTOMATED COUNT: 16.2 % (ref 11.5–14.5)
GLUCOSE SERPL-MCNC: 105 MG/DL (ref 74–99)
HCT VFR BLD AUTO: 25.5 % (ref 41–52)
HGB BLD-MCNC: 8.2 G/DL (ref 13.5–17.5)
IMM GRANULOCYTES # BLD AUTO: 0.17 X10*3/UL (ref 0–0.7)
IMM GRANULOCYTES NFR BLD AUTO: 1.3 % (ref 0–0.9)
LYMPHOCYTES # BLD AUTO: 3.08 X10*3/UL (ref 1.2–4.8)
LYMPHOCYTES NFR BLD AUTO: 24 %
MCH RBC QN AUTO: 22.9 PG (ref 26–34)
MCHC RBC AUTO-ENTMCNC: 32.2 G/DL (ref 32–36)
MCV RBC AUTO: 71 FL (ref 80–100)
MONOCYTES # BLD AUTO: 1.53 X10*3/UL (ref 0.1–1)
MONOCYTES NFR BLD AUTO: 11.9 %
NEUTROPHILS # BLD AUTO: 7.58 X10*3/UL (ref 1.2–7.7)
NEUTROPHILS NFR BLD AUTO: 59.2 %
NRBC BLD-RTO: 0.2 /100 WBCS (ref 0–0)
PLATELET # BLD AUTO: 401 X10*3/UL (ref 150–450)
POTASSIUM SERPL-SCNC: 4.7 MMOL/L (ref 3.5–5.3)
RBC # BLD AUTO: 3.58 X10*6/UL (ref 4.5–5.9)
SODIUM SERPL-SCNC: 135 MMOL/L (ref 136–145)
UFH PPP CHRO-ACNC: 0.4 IU/ML
WBC # BLD AUTO: 12.8 X10*3/UL (ref 4.4–11.3)

## 2024-02-29 PROCEDURE — 2500000004 HC RX 250 GENERAL PHARMACY W/ HCPCS (ALT 636 FOR OP/ED): Performed by: INTERNAL MEDICINE

## 2024-02-29 PROCEDURE — 2500000001 HC RX 250 WO HCPCS SELF ADMINISTERED DRUGS (ALT 637 FOR MEDICARE OP): Performed by: INTERNAL MEDICINE

## 2024-02-29 PROCEDURE — 85520 HEPARIN ASSAY: CPT | Performed by: INTERNAL MEDICINE

## 2024-02-29 PROCEDURE — 2500000002 HC RX 250 W HCPCS SELF ADMINISTERED DRUGS (ALT 637 FOR MEDICARE OP, ALT 636 FOR OP/ED): Performed by: INTERNAL MEDICINE

## 2024-02-29 PROCEDURE — 80048 BASIC METABOLIC PNL TOTAL CA: CPT | Performed by: INTERNAL MEDICINE

## 2024-02-29 PROCEDURE — 85025 COMPLETE CBC W/AUTO DIFF WBC: CPT | Performed by: INTERNAL MEDICINE

## 2024-02-29 PROCEDURE — 36415 COLL VENOUS BLD VENIPUNCTURE: CPT | Performed by: INTERNAL MEDICINE

## 2024-02-29 RX ADMIN — APIXABAN 10 MG: 5 TABLET, FILM COATED ORAL at 08:33

## 2024-02-29 RX ADMIN — HYDROMORPHONE HYDROCHLORIDE 0.4 MG: 1 INJECTION, SOLUTION INTRAMUSCULAR; INTRAVENOUS; SUBCUTANEOUS at 09:00

## 2024-02-29 RX ADMIN — Medication 100 MG: at 08:33

## 2024-02-29 RX ADMIN — Medication 1 TABLET: at 08:33

## 2024-02-29 RX ADMIN — PANTOPRAZOLE SODIUM 40 MG: 40 TABLET, DELAYED RELEASE ORAL at 06:35

## 2024-02-29 RX ADMIN — HYDROMORPHONE HYDROCHLORIDE 1 MG: 1 INJECTION, SOLUTION INTRAMUSCULAR; INTRAVENOUS; SUBCUTANEOUS at 01:13

## 2024-02-29 RX ADMIN — FOLIC ACID 1 MG: 1 TABLET ORAL at 08:33

## 2024-02-29 ASSESSMENT — PAIN SCALES - GENERAL
PAINLEVEL_OUTOF10: 7
PAINLEVEL_OUTOF10: 5 - MODERATE PAIN

## 2024-02-29 ASSESSMENT — COGNITIVE AND FUNCTIONAL STATUS - GENERAL
DAILY ACTIVITIY SCORE: 24
MOBILITY SCORE: 24

## 2024-02-29 ASSESSMENT — PAIN DESCRIPTION - ORIENTATION: ORIENTATION: LEFT;LOWER

## 2024-02-29 ASSESSMENT — PAIN - FUNCTIONAL ASSESSMENT
PAIN_FUNCTIONAL_ASSESSMENT: 0-10

## 2024-02-29 ASSESSMENT — PAIN DESCRIPTION - LOCATION: LOCATION: ABDOMEN

## 2024-02-29 NOTE — CARE PLAN
Problem: Pain - Adult  Goal: Verbalizes/displays adequate comfort level or baseline comfort level  Outcome: Progressing     Problem: Safety - Adult  Goal: Free from fall injury  Outcome: Progressing     Problem: Discharge Planning  Goal: Discharge to home or other facility with appropriate resources  Outcome: Progressing     Problem: Chronic Conditions and Co-morbidities  Goal: Patient's chronic conditions and co-morbidity symptoms are monitored and maintained or improved  Outcome: Progressing     Problem: Pain  Goal: Takes deep breaths with improved pain control throughout the shift  Outcome: Progressing  Goal: Turns in bed with improved pain control throughout the shift  Outcome: Progressing  Goal: Walks with improved pain control throughout the shift  Outcome: Progressing  Goal: Performs ADL's with improved pain control throughout shift  Outcome: Progressing  Goal: Participates in PT with improved pain control throughout the shift  Outcome: Progressing  Goal: Free from opioid side effects throughout the shift  Outcome: Progressing  Goal: Free from acute confusion related to pain meds throughout the shift  Outcome: Progressing     Problem: Skin  Goal: Decreased wound size/increased tissue granulation at next dressing change  Outcome: Progressing  Goal: Participates in plan/prevention/treatment measures  Outcome: Progressing  Goal: Prevent/manage excess moisture  Outcome: Progressing  Goal: Prevent/minimize sheer/friction injuries  Outcome: Progressing  Goal: Promote/optimize nutrition  Outcome: Progressing  Goal: Promote skin healing  Outcome: Progressing     Problem: Pain  Goal: My pain/discomfort is manageable  Outcome: Progressing     Problem: Safety  Goal: Patient will be injury free during hospitalization  Outcome: Progressing  Goal: I will remain free of falls  Outcome: Progressing     Problem: Daily Care  Goal: Daily care needs are met  Outcome: Progressing     Problem: Psychosocial Needs  Goal:  Demonstrates ability to cope with hospitalization/illness  Outcome: Progressing  Goal: Collaborate with me, my family, and caregiver to identify my specific goals  Outcome: Progressing     Problem: Discharge Barriers  Goal: My discharge needs are met  Outcome: Progressing     Problem: Fall/Injury  Goal: Not fall by end of shift  Outcome: Progressing  Goal: Be free from injury by end of the shift  Outcome: Progressing  Goal: Verbalize understanding of personal risk factors for fall in the hospital  Outcome: Progressing  Goal: Verbalize understanding of risk factor reduction measures to prevent injury from fall in the home  Outcome: Progressing  Goal: Use assistive devices by end of the shift  Outcome: Progressing  Goal: Pace activities to prevent fatigue by end of the shift  Outcome: Progressing     Problem: Respiratory  Goal: Clear secretions with interventions this shift  Outcome: Progressing  Goal: Minimize anxiety/maximize coping throughout shift  Outcome: Progressing  Goal: Minimal/no exertional discomfort or dyspnea this shift  Outcome: Progressing  Goal: No signs of respiratory distress (eg. Use of accessory muscles. Peds grunting)  Outcome: Progressing  Goal: Patent airway maintained this shift  Outcome: Progressing  Goal: Tolerate mechanical ventilation evidenced by VS/agitation level this shift  Outcome: Progressing  Goal: Tolerate pulmonary toileting this shift  Outcome: Progressing  Goal: Verbalize decreased shortness of breath this shift  Outcome: Progressing  Goal: Wean oxygen to maintain O2 saturation per order/standard this shift  Outcome: Progressing  Goal: Increase self care and/or family involvement in next 24 hours  Outcome: Progressing   The patient's goals for the shift include pain control    The clinical goals for the shift include pt will remain safe and HDS

## 2024-02-29 NOTE — PROGRESS NOTES
INTERNAL MEDICINE PROGRESS NOTE      HPI:    Patient is doing well.  He reports decreasing abdominal pain.  He has had no blood in his stool or urine.    Vital signs in last 24 hours:  Temp:  [36.3 °C (97.4 °F)-36.9 °C (98.4 °F)] 36.3 °C (97.4 °F)  Heart Rate:  [62-89] 89  Resp:  [18] 18  BP: (101-147)/(62-89) 107/66    Physical Examination:  Physical Exam     Constitutional:       Appearance: Middle-aged, well-built, in no distress  HENT:      Head: Normocephalic and atraumatic.   Eyes:      Extraocular Movements: Extraocular movements intact.      Pupils: Pupils are equal, round, and reactive to light.   Cardiovascular:      Rate and Rhythm: Normal rate and regular rhythm.      Pulses: Normal pulses.      Heart sounds: Normal heart sounds.   Pulmonary:      Effort: Pulmonary effort is normal.      Breath sounds: Normal breath sounds.   Abdominal:      General: Abdomen is flat. Bowel sounds are normal.      Palpations: Abdomen is soft.  Minimal tenderness left lower quadrant with no mass, rebound or guarding.  Musculoskeletal:         General: Normal range of motion.      Cervical back: Normal range of motion and neck supple.   Skin:     General: Skin is warm and dry.   Neurological:      General: No focal deficit present.      Mental Status: He is alert and oriented to person, place, and time. Mental status is at baseline.        Medications:    Current Facility-Administered Medications:     acetaminophen (Tylenol) tablet 650 mg, 650 mg, oral, q4h PRN **OR** acetaminophen (Tylenol) oral liquid 650 mg, 650 mg, oral, q4h PRN **OR** acetaminophen (Tylenol) suppository 650 mg, 650 mg, rectal, q4h PRN, Chucho Porter DO    apixaban (Eliquis) tablet 10 mg, 10 mg, oral, BID, 10 mg at 02/29/24 0833 **FOLLOWED BY** [START ON 3/6/2024] apixaban (Eliquis) tablet 5 mg, 5 mg, oral, BID, Shemar Banda MD    diazePAM (Valium) tablet 2 mg, 2 mg, oral, q2h PRN, Chucho Porter DO    folic acid (Folvite) tablet 1  mg, 1 mg, oral, Daily, Chucho BOLDEN Salomone, DO, 1 mg at 02/29/24 0833    HYDROmorphone (Dilaudid) injection 0.4 mg, 0.4 mg, intravenous, q4h PRN, Chucho BOLDEN Salomone, DO, 0.4 mg at 02/29/24 0900    HYDROmorphone (Dilaudid) injection 1 mg, 1 mg, intravenous, q1h PRN, Chucho BOLDEN Salomone, DO, 1 mg at 02/29/24 0113    HYDROmorphone PF (Dilaudid) injection 0.2 mg, 0.2 mg, intravenous, q4h PRN, Chucho BOLDEN Salomone, DO    loperamide (Imodium) capsule 2 mg, 2 mg, oral, 4x daily PRN, Chucho BOLDEN Salomone, DO    melatonin tablet 3 mg, 3 mg, oral, Daily, Chucho BOLDEN Salomone, DO, 3 mg at 02/28/24 2111    multivitamin with minerals 1 tablet, 1 tablet, oral, Daily, Chucho BOLDEN Salomone, DO, 1 tablet at 02/29/24 0833    ondansetron (Zofran) injection 4 mg, 4 mg, intravenous, Once, Chucho BOLDEN Salomone, DO    pantoprazole (ProtoNix) EC tablet 40 mg, 40 mg, oral, Daily before breakfast, 40 mg at 02/29/24 0635 **OR** pantoprazole (ProtoNix) injection 40 mg, 40 mg, intravenous, Daily before breakfast, Chucho BOLDEN Salomone, DO, 40 mg at 02/28/24 0615    perflutren lipid microspheres (Definity) injection 0.5-10 mL of dilution, 0.5-10 mL of dilution, intravenous, Once in imaging, José Luis Fuentes MD    perflutren lipid microspheres (Definity) injection 0.5-10 mL of dilution, 0.5-10 mL of dilution, intravenous, Once in imaging, Chucho BOLDEN Tuanomone, DO    perflutren protein A microsphere (Optison) injection 0.5 mL, 0.5 mL, intravenous, Once in imaging, José Luis Fuentes MD    polyethylene glycol (Glycolax, Miralax) packet 17 g, 17 g, oral, Daily, Chucho BOLDEN Salomone, DO, 17 g at 02/27/24 0811    sodium chloride 0.9% infusion, 100 mL/hr, intravenous, Continuous, Chucho Porter DO, Last Rate: 100 mL/hr at 02/28/24 1701, 100 mL/hr at 02/28/24 1701    sulfur hexafluoride microsphr (Lumason) injection 24.28 mg, 2 mL, intravenous, Once in imaging, José Luis Fuentes MD    thiamine (Vitamin B-1) tablet 100 mg, 100 mg, oral, Daily, Chucho Porter DO, 100 mg at 02/29/24  0833    Laboratory Findings:  Lab Results   Component Value Date    WBC 12.8 (H) 02/29/2024    HGB 8.2 (L) 02/29/2024    HCT 25.5 (L) 02/29/2024    MCV 71 (L) 02/29/2024     02/29/2024     Lab Results   Component Value Date    INR 1.0 02/25/2024    INR 0.9 09/21/2020    INR 1.0 12/05/2019    PROTIME 10.8 02/25/2024    PROTIME 10.2 09/21/2020    PROTIME 10.7 12/05/2019     Lab Results   Component Value Date    GLUCOSE 105 (H) 02/29/2024    CALCIUM 8.4 (L) 02/29/2024     (L) 02/29/2024    K 4.7 02/29/2024    CO2 32 02/29/2024    CL 99 02/29/2024    BUN 12 02/29/2024    CREATININE 0.90 02/29/2024       Assessment and Plan:    Acute pulmonary embolism -continue with Eliquis, outpatient follow-up with pulmonary advised.  Abdominal pain -CT noted, analgesics as needed.  Elevated troponin -no intervention per cardiology.  Substance abuse -cessation of use encouraged.     Discharge home today.      Shemar Banda MD  02/29/24  10:51 AM

## 2024-03-02 NOTE — DISCHARGE SUMMARY
Discharge Diagnosis  Elevated troponin    Issues Requiring Follow-Up      Discharge Meds     Your medication list        START taking these medications        Instructions Last Dose Given Next Dose Due   apixaban 5 mg tablet  Commonly known as: Eliquis      Take 2 tablets (10 mg) by mouth 2 times a day for 12 doses.       apixaban 5 mg tablet  Commonly known as: Eliquis  Start taking on: March 6, 2024      Take 1 tablet (5 mg) by mouth 2 times a day. Do not start before March 6, 2024.              CONTINUE taking these medications        Instructions Last Dose Given Next Dose Due   omeprazole 40 mg DR capsule  Commonly known as: PriLOSEC           tamsulosin 0.4 mg 24 hr capsule  Commonly known as: Flomax                     Where to Get Your Medications        These medications were sent to [facility]/pharmacy #[id number] - [facility] [address] AT [facility]  [address], [facility] [address]      Hours: 24-hours Phone: [phone]   apixaban 5 mg tablet  apixaban 5 mg tablet         Test Results Pending At Discharge  Pending Labs       Order Current Status    Extra Urine Gray Tube Collected (02/25/24 9312)    Urinalysis with Reflex Culture and Microscopic In process            Hospital Course   Douglas Wilson is a 53 y.o. male presenting with left lower quadrant abdominal pain.  Patient has had recent history of admission for abdominal pain.  During that admission he was found to have significantly elevated troponin.  He was managed conservatively for non-ST elevation MI.  This was felt to be related to ongoing history of substance abuse.  He was initially started on a heparin drip and subsequent imaging revealed an intra-abdominal bleed.  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  He had resolution of abdominal pain following the procedure.  Patient was stable and was discharged home approximately 48 hours ago.  He states that he felt well  initially.  Yesterday he developed pain in his left lower quadrant.  He reports no correlation with bowel movements or meals.  He has had no blood in the stool.  He has had no fever or chills.  Pain has worsened progressively over the past 24 hours.  He presented to the emergency room.  He had a CT scan of the abdomen which showed no intra-abdominal pathology but did reveal some pulmonary emboli.  He was admitted for further treatment.  Patient reports no chest pain or shortness of breath.     The patient had an acute PE, was seen by heme/onc and pulm. He was started on eliquis, recommended outpatient follow up. He was stable for dc home.       Pertinent Physical Exam At Time of Discharge      Outpatient Follow-Up  No future appointments.    Time and care for discharge management > 30 minutes.     Shemar Banda MD

## 2024-03-12 ENCOUNTER — ANESTHESIA EVENT (OUTPATIENT)
Dept: OPERATING ROOM | Facility: HOSPITAL | Age: 54
End: 2024-03-12
Payer: COMMERCIAL

## 2024-03-12 ENCOUNTER — PREP FOR PROCEDURE (OUTPATIENT)
Dept: SURGERY | Facility: HOSPITAL | Age: 54
End: 2024-03-12

## 2024-03-12 ENCOUNTER — ANESTHESIA (OUTPATIENT)
Dept: OPERATING ROOM | Facility: HOSPITAL | Age: 54
End: 2024-03-12
Payer: COMMERCIAL

## 2024-03-12 ENCOUNTER — HOSPITAL ENCOUNTER (OUTPATIENT)
Facility: HOSPITAL | Age: 54
Setting detail: OBSERVATION
Discharge: HOME | End: 2024-03-13
Attending: EMERGENCY MEDICINE | Admitting: INTERNAL MEDICINE
Payer: COMMERCIAL

## 2024-03-12 ENCOUNTER — APPOINTMENT (OUTPATIENT)
Dept: RADIOLOGY | Facility: HOSPITAL | Age: 54
End: 2024-03-12
Payer: COMMERCIAL

## 2024-03-12 DIAGNOSIS — K35.80 ACUTE APPENDICITIS, UNSPECIFIED ACUTE APPENDICITIS TYPE: Primary | ICD-10-CM

## 2024-03-12 DIAGNOSIS — K35.80 ACUTE APPENDICITIS WITHOUT PERITONITIS: ICD-10-CM

## 2024-03-12 DIAGNOSIS — K35.80 ACUTE APPENDICITIS WITHOUT PERITONITIS: Primary | ICD-10-CM

## 2024-03-12 PROBLEM — K37 APPENDICITIS: Status: ACTIVE | Noted: 2024-03-12

## 2024-03-12 LAB
ABO GROUP (TYPE) IN BLOOD: NORMAL
ALBUMIN SERPL BCP-MCNC: 3.7 G/DL (ref 3.4–5)
ALP SERPL-CCNC: 51 U/L (ref 33–120)
ALT SERPL W P-5'-P-CCNC: 11 U/L (ref 10–52)
ANION GAP BLDV CALCULATED.4IONS-SCNC: 4 MMOL/L (ref 10–25)
ANION GAP SERPL CALC-SCNC: 9 MMOL/L (ref 10–20)
ANTIBODY SCREEN: NORMAL
APPEARANCE UR: CLEAR
AST SERPL W P-5'-P-CCNC: 16 U/L (ref 9–39)
BASE EXCESS BLDV CALC-SCNC: 5.2 MMOL/L (ref -2–3)
BASOPHILS # BLD AUTO: 0.02 X10*3/UL (ref 0–0.1)
BASOPHILS NFR BLD AUTO: 0.2 %
BILIRUB SERPL-MCNC: 0.4 MG/DL (ref 0–1.2)
BILIRUB UR STRIP.AUTO-MCNC: NEGATIVE MG/DL
BODY TEMPERATURE: 37 DEGREES CELSIUS
BUN SERPL-MCNC: 7 MG/DL (ref 6–23)
CA-I BLDV-SCNC: 1.15 MMOL/L (ref 1.1–1.33)
CALCIUM SERPL-MCNC: 8.3 MG/DL (ref 8.6–10.3)
CHLORIDE BLDV-SCNC: 104 MMOL/L (ref 98–107)
CHLORIDE SERPL-SCNC: 104 MMOL/L (ref 98–107)
CO2 SERPL-SCNC: 29 MMOL/L (ref 21–32)
COLOR UR: YELLOW
CREAT SERPL-MCNC: 0.87 MG/DL (ref 0.5–1.3)
EGFRCR SERPLBLD CKD-EPI 2021: >90 ML/MIN/1.73M*2
EOSINOPHIL # BLD AUTO: 0.26 X10*3/UL (ref 0–0.7)
EOSINOPHIL NFR BLD AUTO: 3 %
ERYTHROCYTE [DISTWIDTH] IN BLOOD BY AUTOMATED COUNT: 18.5 % (ref 11.5–14.5)
GLUCOSE BLDV-MCNC: 105 MG/DL (ref 74–99)
GLUCOSE SERPL-MCNC: 97 MG/DL (ref 74–99)
GLUCOSE UR STRIP.AUTO-MCNC: NEGATIVE MG/DL
HCO3 BLDV-SCNC: 31.6 MMOL/L (ref 22–26)
HCT VFR BLD AUTO: 33 % (ref 41–52)
HCT VFR BLD EST: 29 % (ref 41–52)
HGB BLD-MCNC: 10.2 G/DL (ref 13.5–17.5)
HGB BLDV-MCNC: 9.5 G/DL (ref 13.5–17.5)
IMM GRANULOCYTES # BLD AUTO: 0.03 X10*3/UL (ref 0–0.7)
IMM GRANULOCYTES NFR BLD AUTO: 0.3 % (ref 0–0.9)
INHALED O2 CONCENTRATION: 21 %
KETONES UR STRIP.AUTO-MCNC: NEGATIVE MG/DL
LACTATE BLDV-SCNC: 0.8 MMOL/L (ref 0.4–2)
LEUKOCYTE ESTERASE UR QL STRIP.AUTO: NEGATIVE
LIPASE SERPL-CCNC: 11 U/L (ref 9–82)
LYMPHOCYTES # BLD AUTO: 1.22 X10*3/UL (ref 1.2–4.8)
LYMPHOCYTES NFR BLD AUTO: 13.9 %
MCH RBC QN AUTO: 23.3 PG (ref 26–34)
MCHC RBC AUTO-ENTMCNC: 30.9 G/DL (ref 32–36)
MCV RBC AUTO: 76 FL (ref 80–100)
MONOCYTES # BLD AUTO: 0.56 X10*3/UL (ref 0.1–1)
MONOCYTES NFR BLD AUTO: 6.4 %
NEUTROPHILS # BLD AUTO: 6.71 X10*3/UL (ref 1.2–7.7)
NEUTROPHILS NFR BLD AUTO: 76.2 %
NITRITE UR QL STRIP.AUTO: NEGATIVE
NRBC BLD-RTO: 0 /100 WBCS (ref 0–0)
OXYHGB MFR BLDV: 27 % (ref 45–75)
PCO2 BLDV: 56 MM HG (ref 41–51)
PH BLDV: 7.36 PH (ref 7.33–7.43)
PH UR STRIP.AUTO: 6 [PH]
PLATELET # BLD AUTO: 400 X10*3/UL (ref 150–450)
PO2 BLDV: 22 MM HG (ref 35–45)
POTASSIUM BLDV-SCNC: 4.1 MMOL/L (ref 3.5–5.3)
POTASSIUM SERPL-SCNC: 4.1 MMOL/L (ref 3.5–5.3)
PROT SERPL-MCNC: 6.9 G/DL (ref 6.4–8.2)
PROT UR STRIP.AUTO-MCNC: NEGATIVE MG/DL
RBC # BLD AUTO: 4.37 X10*6/UL (ref 4.5–5.9)
RBC # UR STRIP.AUTO: NEGATIVE /UL
RH FACTOR (ANTIGEN D): NORMAL
SAO2 % BLDV: 28 % (ref 45–75)
SODIUM BLDV-SCNC: 135 MMOL/L (ref 136–145)
SODIUM SERPL-SCNC: 138 MMOL/L (ref 136–145)
SP GR UR STRIP.AUTO: 1.02
UROBILINOGEN UR STRIP.AUTO-MCNC: <2 MG/DL
WBC # BLD AUTO: 8.8 X10*3/UL (ref 4.4–11.3)

## 2024-03-12 PROCEDURE — 81003 URINALYSIS AUTO W/O SCOPE: CPT | Performed by: EMERGENCY MEDICINE

## 2024-03-12 PROCEDURE — 96365 THER/PROPH/DIAG IV INF INIT: CPT | Mod: 59

## 2024-03-12 PROCEDURE — A44970 PR LAP,APPENDECTOMY: Performed by: ANESTHESIOLOGY

## 2024-03-12 PROCEDURE — 2500000004 HC RX 250 GENERAL PHARMACY W/ HCPCS (ALT 636 FOR OP/ED): Performed by: ANESTHESIOLOGIST ASSISTANT

## 2024-03-12 PROCEDURE — 71275 CT ANGIOGRAPHY CHEST: CPT

## 2024-03-12 PROCEDURE — 86901 BLOOD TYPING SEROLOGIC RH(D): CPT | Performed by: ANESTHESIOLOGY

## 2024-03-12 PROCEDURE — 96361 HYDRATE IV INFUSION ADD-ON: CPT | Mod: 59

## 2024-03-12 PROCEDURE — 2550000001 HC RX 255 CONTRASTS: Performed by: EMERGENCY MEDICINE

## 2024-03-12 PROCEDURE — 99285 EMERGENCY DEPT VISIT HI MDM: CPT | Mod: 25

## 2024-03-12 PROCEDURE — 7100000001 HC RECOVERY ROOM TIME - INITIAL BASE CHARGE: Performed by: SURGERY

## 2024-03-12 PROCEDURE — 99140 ANES COMP EMERGENCY COND: CPT | Performed by: ANESTHESIOLOGY

## 2024-03-12 PROCEDURE — 83690 ASSAY OF LIPASE: CPT | Performed by: EMERGENCY MEDICINE

## 2024-03-12 PROCEDURE — A4217 STERILE WATER/SALINE, 500 ML: HCPCS | Performed by: SURGERY

## 2024-03-12 PROCEDURE — 3700000002 HC GENERAL ANESTHESIA TIME - EACH INCREMENTAL 1 MINUTE: Performed by: SURGERY

## 2024-03-12 PROCEDURE — 2500000005 HC RX 250 GENERAL PHARMACY W/O HCPCS: Performed by: ANESTHESIOLOGIST ASSISTANT

## 2024-03-12 PROCEDURE — 84132 ASSAY OF SERUM POTASSIUM: CPT | Performed by: EMERGENCY MEDICINE

## 2024-03-12 PROCEDURE — 80053 COMPREHEN METABOLIC PANEL: CPT | Performed by: EMERGENCY MEDICINE

## 2024-03-12 PROCEDURE — 96376 TX/PRO/DX INJ SAME DRUG ADON: CPT | Mod: 59

## 2024-03-12 PROCEDURE — 2720000007 HC OR 272 NO HCPCS: Performed by: SURGERY

## 2024-03-12 PROCEDURE — 3600000004 HC OR TIME - INITIAL BASE CHARGE - PROCEDURE LEVEL FOUR: Performed by: SURGERY

## 2024-03-12 PROCEDURE — 2500000005 HC RX 250 GENERAL PHARMACY W/O HCPCS: Performed by: SURGERY

## 2024-03-12 PROCEDURE — A44970 PR LAP,APPENDECTOMY: Performed by: ANESTHESIOLOGIST ASSISTANT

## 2024-03-12 PROCEDURE — 36415 COLL VENOUS BLD VENIPUNCTURE: CPT | Performed by: EMERGENCY MEDICINE

## 2024-03-12 PROCEDURE — 3700000001 HC GENERAL ANESTHESIA TIME - INITIAL BASE CHARGE: Performed by: SURGERY

## 2024-03-12 PROCEDURE — 74174 CTA ABD&PLVS W/CONTRAST: CPT | Performed by: RADIOLOGY

## 2024-03-12 PROCEDURE — 88304 TISSUE EXAM BY PATHOLOGIST: CPT | Performed by: PATHOLOGY

## 2024-03-12 PROCEDURE — 85025 COMPLETE CBC W/AUTO DIFF WBC: CPT | Performed by: EMERGENCY MEDICINE

## 2024-03-12 PROCEDURE — 7100000002 HC RECOVERY ROOM TIME - EACH INCREMENTAL 1 MINUTE: Performed by: SURGERY

## 2024-03-12 PROCEDURE — 2500000004 HC RX 250 GENERAL PHARMACY W/ HCPCS (ALT 636 FOR OP/ED): Performed by: SURGERY

## 2024-03-12 PROCEDURE — 86920 COMPATIBILITY TEST SPIN: CPT

## 2024-03-12 PROCEDURE — G0378 HOSPITAL OBSERVATION PER HR: HCPCS

## 2024-03-12 PROCEDURE — 71275 CT ANGIOGRAPHY CHEST: CPT | Performed by: RADIOLOGY

## 2024-03-12 PROCEDURE — 44970 LAPAROSCOPY APPENDECTOMY: CPT | Performed by: SURGERY

## 2024-03-12 PROCEDURE — 2500000004 HC RX 250 GENERAL PHARMACY W/ HCPCS (ALT 636 FOR OP/ED): Performed by: ANESTHESIOLOGY

## 2024-03-12 PROCEDURE — 0752T DGTZ GLS MCRSCP SLD LVL III: CPT | Mod: TC,AHULAB | Performed by: SURGERY

## 2024-03-12 PROCEDURE — 99221 1ST HOSP IP/OBS SF/LOW 40: CPT | Performed by: SURGERY

## 2024-03-12 PROCEDURE — 3600000009 HC OR TIME - EACH INCREMENTAL 1 MINUTE - PROCEDURE LEVEL FOUR: Performed by: SURGERY

## 2024-03-12 PROCEDURE — 96375 TX/PRO/DX INJ NEW DRUG ADDON: CPT | Mod: 59

## 2024-03-12 PROCEDURE — 2500000004 HC RX 250 GENERAL PHARMACY W/ HCPCS (ALT 636 FOR OP/ED): Performed by: EMERGENCY MEDICINE

## 2024-03-12 RX ORDER — ONDANSETRON HYDROCHLORIDE 2 MG/ML
4 INJECTION, SOLUTION INTRAVENOUS ONCE
Status: COMPLETED | OUTPATIENT
Start: 2024-03-12 | End: 2024-03-12

## 2024-03-12 RX ORDER — LIDOCAINE HYDROCHLORIDE 20 MG/ML
INJECTION, SOLUTION INFILTRATION; PERINEURAL AS NEEDED
Status: DISCONTINUED | OUTPATIENT
Start: 2024-03-12 | End: 2024-03-12

## 2024-03-12 RX ORDER — SUCCINYLCHOLINE CHLORIDE 20 MG/ML
INJECTION INTRAMUSCULAR; INTRAVENOUS AS NEEDED
Status: DISCONTINUED | OUTPATIENT
Start: 2024-03-12 | End: 2024-03-12

## 2024-03-12 RX ORDER — ONDANSETRON HYDROCHLORIDE 2 MG/ML
4 INJECTION, SOLUTION INTRAVENOUS ONCE AS NEEDED
Status: DISCONTINUED | OUTPATIENT
Start: 2024-03-12 | End: 2024-03-13 | Stop reason: HOSPADM

## 2024-03-12 RX ORDER — LIDOCAINE HYDROCHLORIDE 10 MG/ML
0.1 INJECTION, SOLUTION EPIDURAL; INFILTRATION; INTRACAUDAL; PERINEURAL ONCE
Status: DISCONTINUED | OUTPATIENT
Start: 2024-03-12 | End: 2024-03-13 | Stop reason: HOSPADM

## 2024-03-12 RX ORDER — MORPHINE SULFATE 4 MG/ML
4 INJECTION, SOLUTION INTRAMUSCULAR; INTRAVENOUS ONCE
Status: COMPLETED | OUTPATIENT
Start: 2024-03-12 | End: 2024-03-12

## 2024-03-12 RX ORDER — ACETAMINOPHEN 325 MG/1
950 TABLET ORAL EVERY 6 HOURS PRN
Status: DISCONTINUED | OUTPATIENT
Start: 2024-03-12 | End: 2024-03-13 | Stop reason: HOSPADM

## 2024-03-12 RX ORDER — HYDRALAZINE HYDROCHLORIDE 20 MG/ML
5 INJECTION INTRAMUSCULAR; INTRAVENOUS EVERY 30 MIN PRN
Status: DISCONTINUED | OUTPATIENT
Start: 2024-03-12 | End: 2024-03-13 | Stop reason: HOSPADM

## 2024-03-12 RX ORDER — FENTANYL CITRATE 50 UG/ML
INJECTION, SOLUTION INTRAMUSCULAR; INTRAVENOUS AS NEEDED
Status: DISCONTINUED | OUTPATIENT
Start: 2024-03-12 | End: 2024-03-12

## 2024-03-12 RX ORDER — ONDANSETRON HYDROCHLORIDE 2 MG/ML
4 INJECTION, SOLUTION INTRAVENOUS EVERY 8 HOURS PRN
Status: DISCONTINUED | OUTPATIENT
Start: 2024-03-12 | End: 2024-03-13 | Stop reason: HOSPADM

## 2024-03-12 RX ORDER — LABETALOL HYDROCHLORIDE 5 MG/ML
5 INJECTION, SOLUTION INTRAVENOUS ONCE AS NEEDED
Status: DISCONTINUED | OUTPATIENT
Start: 2024-03-12 | End: 2024-03-13 | Stop reason: HOSPADM

## 2024-03-12 RX ORDER — PROCHLORPERAZINE EDISYLATE 5 MG/ML
10 INJECTION INTRAMUSCULAR; INTRAVENOUS EVERY 6 HOURS PRN
Status: DISCONTINUED | OUTPATIENT
Start: 2024-03-12 | End: 2024-03-13 | Stop reason: HOSPADM

## 2024-03-12 RX ORDER — SODIUM CHLORIDE 9 MG/ML
75 INJECTION, SOLUTION INTRAVENOUS CONTINUOUS
Status: DISCONTINUED | OUTPATIENT
Start: 2024-03-12 | End: 2024-03-12

## 2024-03-12 RX ORDER — OXYCODONE HYDROCHLORIDE 5 MG/1
5 TABLET ORAL EVERY 4 HOURS PRN
Status: DISCONTINUED | OUTPATIENT
Start: 2024-03-12 | End: 2024-03-13 | Stop reason: HOSPADM

## 2024-03-12 RX ORDER — PHENYLEPHRINE HCL IN 0.9% NACL 1 MG/10 ML
SYRINGE (ML) INTRAVENOUS AS NEEDED
Status: DISCONTINUED | OUTPATIENT
Start: 2024-03-12 | End: 2024-03-12

## 2024-03-12 RX ORDER — HYDROMORPHONE HYDROCHLORIDE 1 MG/ML
INJECTION, SOLUTION INTRAMUSCULAR; INTRAVENOUS; SUBCUTANEOUS AS NEEDED
Status: DISCONTINUED | OUTPATIENT
Start: 2024-03-12 | End: 2024-03-12

## 2024-03-12 RX ORDER — ROCURONIUM BROMIDE 10 MG/ML
INJECTION, SOLUTION INTRAVENOUS AS NEEDED
Status: DISCONTINUED | OUTPATIENT
Start: 2024-03-12 | End: 2024-03-12

## 2024-03-12 RX ORDER — IBUPROFEN 200 MG
1 TABLET ORAL ONCE
Status: DISCONTINUED | OUTPATIENT
Start: 2024-03-12 | End: 2024-03-13 | Stop reason: HOSPADM

## 2024-03-12 RX ORDER — DOCUSATE SODIUM 100 MG/1
100 CAPSULE, LIQUID FILLED ORAL 2 TIMES DAILY
Status: DISCONTINUED | OUTPATIENT
Start: 2024-03-12 | End: 2024-03-13 | Stop reason: HOSPADM

## 2024-03-12 RX ORDER — PROPOFOL 10 MG/ML
INJECTION, EMULSION INTRAVENOUS AS NEEDED
Status: DISCONTINUED | OUTPATIENT
Start: 2024-03-12 | End: 2024-03-12

## 2024-03-12 RX ORDER — SODIUM CHLORIDE, SODIUM LACTATE, POTASSIUM CHLORIDE, CALCIUM CHLORIDE 600; 310; 30; 20 MG/100ML; MG/100ML; MG/100ML; MG/100ML
100 INJECTION, SOLUTION INTRAVENOUS CONTINUOUS
Status: DISCONTINUED | OUTPATIENT
Start: 2024-03-12 | End: 2024-03-13 | Stop reason: HOSPADM

## 2024-03-12 RX ORDER — MORPHINE SULFATE 4 MG/ML
4 INJECTION, SOLUTION INTRAMUSCULAR; INTRAVENOUS EVERY 4 HOURS PRN
Status: DISCONTINUED | OUTPATIENT
Start: 2024-03-12 | End: 2024-03-13

## 2024-03-12 RX ORDER — PANTOPRAZOLE SODIUM 40 MG/10ML
40 INJECTION, POWDER, LYOPHILIZED, FOR SOLUTION INTRAVENOUS
Status: DISCONTINUED | OUTPATIENT
Start: 2024-03-13 | End: 2024-03-13 | Stop reason: HOSPADM

## 2024-03-12 RX ORDER — ONDANSETRON 4 MG/1
4 TABLET, FILM COATED ORAL EVERY 8 HOURS PRN
Status: DISCONTINUED | OUTPATIENT
Start: 2024-03-12 | End: 2024-03-13 | Stop reason: HOSPADM

## 2024-03-12 RX ORDER — PANTOPRAZOLE SODIUM 40 MG/1
40 TABLET, DELAYED RELEASE ORAL
Status: DISCONTINUED | OUTPATIENT
Start: 2024-03-13 | End: 2024-03-13 | Stop reason: HOSPADM

## 2024-03-12 RX ORDER — TALC
3 POWDER (GRAM) TOPICAL
Status: DISCONTINUED | OUTPATIENT
Start: 2024-03-13 | End: 2024-03-13 | Stop reason: HOSPADM

## 2024-03-12 RX ORDER — MIDAZOLAM HYDROCHLORIDE 1 MG/ML
INJECTION INTRAMUSCULAR; INTRAVENOUS AS NEEDED
Status: DISCONTINUED | OUTPATIENT
Start: 2024-03-12 | End: 2024-03-12

## 2024-03-12 RX ORDER — ALBUTEROL SULFATE 0.83 MG/ML
2.5 SOLUTION RESPIRATORY (INHALATION) ONCE AS NEEDED
Status: DISCONTINUED | OUTPATIENT
Start: 2024-03-12 | End: 2024-03-13 | Stop reason: HOSPADM

## 2024-03-12 RX ORDER — SODIUM CHLORIDE 0.9 G/100ML
IRRIGANT IRRIGATION AS NEEDED
Status: DISCONTINUED | OUTPATIENT
Start: 2024-03-12 | End: 2024-03-13 | Stop reason: HOSPADM

## 2024-03-12 RX ORDER — BUPIVACAINE HYDROCHLORIDE 5 MG/ML
INJECTION, SOLUTION PERINEURAL AS NEEDED
Status: DISCONTINUED | OUTPATIENT
Start: 2024-03-12 | End: 2024-03-13 | Stop reason: HOSPADM

## 2024-03-12 RX ORDER — ONDANSETRON HYDROCHLORIDE 2 MG/ML
INJECTION, SOLUTION INTRAVENOUS AS NEEDED
Status: DISCONTINUED | OUTPATIENT
Start: 2024-03-12 | End: 2024-03-12

## 2024-03-12 RX ADMIN — FENTANYL CITRATE 50 MCG: 50 INJECTION, SOLUTION INTRAMUSCULAR; INTRAVENOUS at 22:54

## 2024-03-12 RX ADMIN — LIDOCAINE HYDROCHLORIDE 80 MG: 20 INJECTION, SOLUTION INFILTRATION; PERINEURAL at 22:37

## 2024-03-12 RX ADMIN — SODIUM CHLORIDE, SODIUM LACTATE, POTASSIUM CHLORIDE, AND CALCIUM CHLORIDE: 600; 310; 30; 20 INJECTION, SOLUTION INTRAVENOUS at 22:30

## 2024-03-12 RX ADMIN — MIDAZOLAM HYDROCHLORIDE 2 MG: 1 INJECTION, SOLUTION INTRAMUSCULAR; INTRAVENOUS at 22:30

## 2024-03-12 RX ADMIN — DEXAMETHASONE SODIUM PHOSPHATE 4 MG: 4 INJECTION, SOLUTION INTRAMUSCULAR; INTRAVENOUS at 22:51

## 2024-03-12 RX ADMIN — SUGAMMADEX 200 MG: 100 INJECTION, SOLUTION INTRAVENOUS at 23:11

## 2024-03-12 RX ADMIN — ONDANSETRON 4 MG: 2 INJECTION INTRAMUSCULAR; INTRAVENOUS at 22:54

## 2024-03-12 RX ADMIN — ONDANSETRON 4 MG: 2 INJECTION INTRAMUSCULAR; INTRAVENOUS at 13:06

## 2024-03-12 RX ADMIN — HYDROMORPHONE HYDROCHLORIDE 0.5 MG: 1 INJECTION, SOLUTION INTRAMUSCULAR; INTRAVENOUS; SUBCUTANEOUS at 23:10

## 2024-03-12 RX ADMIN — IOHEXOL 90 ML: 350 INJECTION, SOLUTION INTRAVENOUS at 13:48

## 2024-03-12 RX ADMIN — SODIUM CHLORIDE 1000 ML: 9 INJECTION, SOLUTION INTRAVENOUS at 13:05

## 2024-03-12 RX ADMIN — PROPOFOL 50 MG: 10 INJECTION, EMULSION INTRAVENOUS at 22:56

## 2024-03-12 RX ADMIN — FENTANYL CITRATE 50 MCG: 50 INJECTION, SOLUTION INTRAMUSCULAR; INTRAVENOUS at 22:37

## 2024-03-12 RX ADMIN — MORPHINE SULFATE 4 MG: 4 INJECTION, SOLUTION INTRAMUSCULAR; INTRAVENOUS at 13:06

## 2024-03-12 RX ADMIN — GLYCOPYRROLATE 0.2 MG: 0.2 INJECTION, SOLUTION INTRAMUSCULAR; INTRAVITREAL at 22:50

## 2024-03-12 RX ADMIN — PIPERACILLIN SODIUM AND TAZOBACTAM SODIUM 4.5 G: 4; .5 INJECTION, SOLUTION INTRAVENOUS at 16:06

## 2024-03-12 RX ADMIN — SODIUM CHLORIDE, POTASSIUM CHLORIDE, SODIUM LACTATE AND CALCIUM CHLORIDE 100 ML/HR: 600; 310; 30; 20 INJECTION, SOLUTION INTRAVENOUS at 23:50

## 2024-03-12 RX ADMIN — HYDROMORPHONE HYDROCHLORIDE 0.5 MG: 1 INJECTION, SOLUTION INTRAMUSCULAR; INTRAVENOUS; SUBCUTANEOUS at 23:12

## 2024-03-12 RX ADMIN — SUCCINYLCHOLINE CHLORIDE 140 MG: 20 INJECTION, SOLUTION INTRAMUSCULAR; INTRAVENOUS at 22:37

## 2024-03-12 RX ADMIN — Medication 200 MCG: at 22:37

## 2024-03-12 RX ADMIN — PROPOFOL 150 MG: 10 INJECTION, EMULSION INTRAVENOUS at 22:37

## 2024-03-12 RX ADMIN — ROCURONIUM BROMIDE 20 MG: 10 INJECTION, SOLUTION INTRAVENOUS at 22:45

## 2024-03-12 SDOH — HEALTH STABILITY: MENTAL HEALTH: CURRENT SMOKER: 1

## 2024-03-12 ASSESSMENT — PAIN SCALES - GENERAL
PAINLEVEL_OUTOF10: 6
PAINLEVEL_OUTOF10: 0 - NO PAIN

## 2024-03-12 ASSESSMENT — PAIN - FUNCTIONAL ASSESSMENT
PAIN_FUNCTIONAL_ASSESSMENT: 0-10

## 2024-03-12 ASSESSMENT — PAIN DESCRIPTION - PAIN TYPE: TYPE: ACUTE PAIN

## 2024-03-12 ASSESSMENT — COLUMBIA-SUICIDE SEVERITY RATING SCALE - C-SSRS
6. HAVE YOU EVER DONE ANYTHING, STARTED TO DO ANYTHING, OR PREPARED TO DO ANYTHING TO END YOUR LIFE?: NO
2. HAVE YOU ACTUALLY HAD ANY THOUGHTS OF KILLING YOURSELF?: NO
2. HAVE YOU ACTUALLY HAD ANY THOUGHTS OF KILLING YOURSELF?: NO
1. IN THE PAST MONTH, HAVE YOU WISHED YOU WERE DEAD OR WISHED YOU COULD GO TO SLEEP AND NOT WAKE UP?: NO
6. HAVE YOU EVER DONE ANYTHING, STARTED TO DO ANYTHING, OR PREPARED TO DO ANYTHING TO END YOUR LIFE?: NO
6. HAVE YOU EVER DONE ANYTHING, STARTED TO DO ANYTHING, OR PREPARED TO DO ANYTHING TO END YOUR LIFE?: NO
2. HAVE YOU ACTUALLY HAD ANY THOUGHTS OF KILLING YOURSELF?: NO
1. IN THE PAST MONTH, HAVE YOU WISHED YOU WERE DEAD OR WISHED YOU COULD GO TO SLEEP AND NOT WAKE UP?: NO
1. IN THE PAST MONTH, HAVE YOU WISHED YOU WERE DEAD OR WISHED YOU COULD GO TO SLEEP AND NOT WAKE UP?: NO

## 2024-03-12 ASSESSMENT — PAIN DESCRIPTION - ORIENTATION: ORIENTATION: RIGHT

## 2024-03-12 ASSESSMENT — PAIN DESCRIPTION - DESCRIPTORS: DESCRIPTORS: SHARP

## 2024-03-12 ASSESSMENT — PAIN DESCRIPTION - LOCATION: LOCATION: ABDOMEN

## 2024-03-12 NOTE — ED TRIAGE NOTES
PT PRESENTS TO THE ED FOR RIGHT SIDE ABDOMINAL PAIN THAT HAS BEEN GOING ON FOR A COUPLE DAYS. PT ENDORSES THAT HE WAS HERE TWO WEEKS AGO AND THAT HE WAS TOLD THAT HE HAD A SPLENIC ANEURYSM. PT STATES THAT AFTER HE WAS FOUND TO HAVE A COUPLE BLOOD CLOTS IN HIS LUNGS AND PUT ON BLOOD THINNERS. PT STATES THAT HE IS HERE BECAUSE HE WAS HAVING LEFT SIDE ABDOMINAL PAIN AT THAT TIME BUT ITS NOW ON THE RIGHT. PT DENIES CHEST PAIN OR SOB. PT DENIES URINARY ISSUES. PT STATES THAT HE DID HAVE A BOWEL MOVEMENT TODAY BUT HASN'T BEEN ABLE TOO FOR THE LAST COUPLE DAYS. PT STATES THAT ITS WAY DIFFERENT THEN LAST TIME. PT DENIES BLACK STOOLS.

## 2024-03-13 VITALS
HEART RATE: 57 BPM | DIASTOLIC BLOOD PRESSURE: 82 MMHG | SYSTOLIC BLOOD PRESSURE: 139 MMHG | HEIGHT: 69 IN | RESPIRATION RATE: 16 BRPM | TEMPERATURE: 98.1 F | BODY MASS INDEX: 24.88 KG/M2 | WEIGHT: 168 LBS | OXYGEN SATURATION: 96 %

## 2024-03-13 PROBLEM — F11.10 HEROIN ABUSE (MULTI): Status: ACTIVE | Noted: 2024-03-13

## 2024-03-13 PROBLEM — F17.200 NICOTINE USE DISORDER: Status: ACTIVE | Noted: 2024-03-13

## 2024-03-13 LAB
ALBUMIN SERPL BCP-MCNC: 3.2 G/DL (ref 3.4–5)
ALP SERPL-CCNC: 43 U/L (ref 33–120)
ALT SERPL W P-5'-P-CCNC: 12 U/L (ref 10–52)
ANION GAP SERPL CALC-SCNC: 8 MMOL/L (ref 10–20)
AST SERPL W P-5'-P-CCNC: 14 U/L (ref 9–39)
BASOPHILS # BLD AUTO: 0.01 X10*3/UL (ref 0–0.1)
BASOPHILS NFR BLD AUTO: 0.1 %
BILIRUB SERPL-MCNC: 0.3 MG/DL (ref 0–1.2)
BUN SERPL-MCNC: 8 MG/DL (ref 6–23)
CALCIUM SERPL-MCNC: 8.4 MG/DL (ref 8.6–10.3)
CHLORIDE SERPL-SCNC: 103 MMOL/L (ref 98–107)
CO2 SERPL-SCNC: 29 MMOL/L (ref 21–32)
CREAT SERPL-MCNC: 0.93 MG/DL (ref 0.5–1.3)
EGFRCR SERPLBLD CKD-EPI 2021: >90 ML/MIN/1.73M*2
EOSINOPHIL # BLD AUTO: 0.01 X10*3/UL (ref 0–0.7)
EOSINOPHIL NFR BLD AUTO: 0.1 %
ERYTHROCYTE [DISTWIDTH] IN BLOOD BY AUTOMATED COUNT: 18.6 % (ref 11.5–14.5)
FERRITIN SERPL-MCNC: 166 NG/ML (ref 20–300)
GLUCOSE SERPL-MCNC: 150 MG/DL (ref 74–99)
HCT VFR BLD AUTO: 31 % (ref 41–52)
HGB BLD-MCNC: 9.2 G/DL (ref 13.5–17.5)
HOLD SPECIMEN: NORMAL
IMM GRANULOCYTES # BLD AUTO: 0.04 X10*3/UL (ref 0–0.7)
IMM GRANULOCYTES NFR BLD AUTO: 0.5 % (ref 0–0.9)
IRON SATN MFR SERPL: 9 % (ref 25–45)
IRON SERPL-MCNC: 22 UG/DL (ref 35–150)
LYMPHOCYTES # BLD AUTO: 0.56 X10*3/UL (ref 1.2–4.8)
LYMPHOCYTES NFR BLD AUTO: 7 %
MAGNESIUM SERPL-MCNC: 1.9 MG/DL (ref 1.6–2.4)
MCH RBC QN AUTO: 22.4 PG (ref 26–34)
MCHC RBC AUTO-ENTMCNC: 29.7 G/DL (ref 32–36)
MCV RBC AUTO: 76 FL (ref 80–100)
MONOCYTES # BLD AUTO: 0.19 X10*3/UL (ref 0.1–1)
MONOCYTES NFR BLD AUTO: 2.4 %
NEUTROPHILS # BLD AUTO: 7.14 X10*3/UL (ref 1.2–7.7)
NEUTROPHILS NFR BLD AUTO: 89.9 %
NRBC BLD-RTO: 0 /100 WBCS (ref 0–0)
PLATELET # BLD AUTO: 362 X10*3/UL (ref 150–450)
POTASSIUM SERPL-SCNC: 4.4 MMOL/L (ref 3.5–5.3)
PROT SERPL-MCNC: 6.1 G/DL (ref 6.4–8.2)
RBC # BLD AUTO: 4.1 X10*6/UL (ref 4.5–5.9)
SODIUM SERPL-SCNC: 136 MMOL/L (ref 136–145)
TIBC SERPL-MCNC: 244 UG/DL (ref 240–445)
UIBC SERPL-MCNC: 222 UG/DL (ref 110–370)
WBC # BLD AUTO: 8 X10*3/UL (ref 4.4–11.3)

## 2024-03-13 PROCEDURE — 2500000004 HC RX 250 GENERAL PHARMACY W/ HCPCS (ALT 636 FOR OP/ED): Performed by: INTERNAL MEDICINE

## 2024-03-13 PROCEDURE — 85025 COMPLETE CBC W/AUTO DIFF WBC: CPT | Performed by: PHYSICIAN ASSISTANT

## 2024-03-13 PROCEDURE — 82728 ASSAY OF FERRITIN: CPT | Mod: AHULAB | Performed by: INTERNAL MEDICINE

## 2024-03-13 PROCEDURE — 99222 1ST HOSP IP/OBS MODERATE 55: CPT | Performed by: PHYSICIAN ASSISTANT

## 2024-03-13 PROCEDURE — 99239 HOSP IP/OBS DSCHRG MGMT >30: CPT | Performed by: INTERNAL MEDICINE

## 2024-03-13 PROCEDURE — 2500000004 HC RX 250 GENERAL PHARMACY W/ HCPCS (ALT 636 FOR OP/ED): Performed by: ANESTHESIOLOGY

## 2024-03-13 PROCEDURE — 36415 COLL VENOUS BLD VENIPUNCTURE: CPT | Performed by: PHYSICIAN ASSISTANT

## 2024-03-13 PROCEDURE — 2500000005 HC RX 250 GENERAL PHARMACY W/O HCPCS: Performed by: ANESTHESIOLOGY

## 2024-03-13 PROCEDURE — 2500000001 HC RX 250 WO HCPCS SELF ADMINISTERED DRUGS (ALT 637 FOR MEDICARE OP): Performed by: ANESTHESIOLOGY

## 2024-03-13 PROCEDURE — 2500000004 HC RX 250 GENERAL PHARMACY W/ HCPCS (ALT 636 FOR OP/ED): Performed by: PHARMACIST

## 2024-03-13 PROCEDURE — 2500000004 HC RX 250 GENERAL PHARMACY W/ HCPCS (ALT 636 FOR OP/ED): Performed by: PHYSICIAN ASSISTANT

## 2024-03-13 PROCEDURE — 2500000001 HC RX 250 WO HCPCS SELF ADMINISTERED DRUGS (ALT 637 FOR MEDICARE OP): Performed by: PHYSICIAN ASSISTANT

## 2024-03-13 PROCEDURE — G0378 HOSPITAL OBSERVATION PER HR: HCPCS

## 2024-03-13 PROCEDURE — 96375 TX/PRO/DX INJ NEW DRUG ADDON: CPT

## 2024-03-13 PROCEDURE — 80053 COMPREHEN METABOLIC PANEL: CPT | Performed by: PHYSICIAN ASSISTANT

## 2024-03-13 PROCEDURE — 83735 ASSAY OF MAGNESIUM: CPT | Performed by: PHYSICIAN ASSISTANT

## 2024-03-13 PROCEDURE — 83540 ASSAY OF IRON: CPT | Performed by: INTERNAL MEDICINE

## 2024-03-13 RX ORDER — TRAMADOL HYDROCHLORIDE 50 MG/1
50 TABLET ORAL 2 TIMES DAILY PRN
Qty: 8 TABLET | Refills: 0 | Status: SHIPPED | OUTPATIENT
Start: 2024-03-13 | End: 2024-03-17 | Stop reason: HOSPADM

## 2024-03-13 RX ORDER — POLYETHYLENE GLYCOL 3350 17 G/17G
17 POWDER, FOR SOLUTION ORAL DAILY
Status: DISCONTINUED | OUTPATIENT
Start: 2024-03-13 | End: 2024-03-13 | Stop reason: HOSPADM

## 2024-03-13 RX ORDER — OXYCODONE HYDROCHLORIDE 5 MG/1
5 TABLET ORAL EVERY 4 HOURS PRN
Status: DISCONTINUED | OUTPATIENT
Start: 2024-03-13 | End: 2024-03-13 | Stop reason: HOSPADM

## 2024-03-13 RX ADMIN — PIPERACILLIN SODIUM AND TAZOBACTAM SODIUM 3.38 G: 3; .375 INJECTION, SOLUTION INTRAVENOUS at 01:35

## 2024-03-13 RX ADMIN — PANTOPRAZOLE SODIUM 40 MG: 40 TABLET, DELAYED RELEASE ORAL at 06:48

## 2024-03-13 RX ADMIN — HYDROMORPHONE HYDROCHLORIDE 0.5 MG: 1 INJECTION, SOLUTION INTRAMUSCULAR; INTRAVENOUS; SUBCUTANEOUS at 00:28

## 2024-03-13 RX ADMIN — IRON SUCROSE 200 MG: 20 INJECTION, SOLUTION INTRAVENOUS at 08:52

## 2024-03-13 RX ADMIN — OXYCODONE HYDROCHLORIDE 5 MG: 5 TABLET ORAL at 09:12

## 2024-03-13 RX ADMIN — MORPHINE SULFATE 4 MG: 4 INJECTION, SOLUTION INTRAMUSCULAR; INTRAVENOUS at 01:35

## 2024-03-13 RX ADMIN — Medication 2 L/MIN: at 00:15

## 2024-03-13 RX ADMIN — PIPERACILLIN SODIUM AND TAZOBACTAM SODIUM 3.38 G: 3; .375 INJECTION, SOLUTION INTRAVENOUS at 06:48

## 2024-03-13 RX ADMIN — HYDROMORPHONE HYDROCHLORIDE 0.5 MG: 1 INJECTION, SOLUTION INTRAMUSCULAR; INTRAVENOUS; SUBCUTANEOUS at 00:23

## 2024-03-13 RX ADMIN — OXYCODONE HYDROCHLORIDE 5 MG: 5 TABLET ORAL at 00:29

## 2024-03-13 RX ADMIN — APIXABAN 5 MG: 5 TABLET, FILM COATED ORAL at 09:02

## 2024-03-13 SDOH — SOCIAL STABILITY: SOCIAL INSECURITY: HAVE YOU HAD THOUGHTS OF HARMING ANYONE ELSE?: NO

## 2024-03-13 SDOH — SOCIAL STABILITY: SOCIAL INSECURITY: ARE THERE ANY APPARENT SIGNS OF INJURIES/BEHAVIORS THAT COULD BE RELATED TO ABUSE/NEGLECT?: NO

## 2024-03-13 SDOH — SOCIAL STABILITY: SOCIAL INSECURITY: WITHIN THE LAST YEAR, HAVE YOU BEEN HUMILIATED OR EMOTIONALLY ABUSED IN OTHER WAYS BY YOUR PARTNER OR EX-PARTNER?: NO

## 2024-03-13 SDOH — SOCIAL STABILITY: SOCIAL INSECURITY: HAS ANYONE EVER THREATENED TO HURT YOUR FAMILY OR YOUR PETS?: NO

## 2024-03-13 SDOH — SOCIAL STABILITY: SOCIAL INSECURITY: DO YOU FEEL ANYONE HAS EXPLOITED OR TAKEN ADVANTAGE OF YOU FINANCIALLY OR OF YOUR PERSONAL PROPERTY?: NO

## 2024-03-13 SDOH — HEALTH STABILITY: MENTAL HEALTH: HOW OFTEN DO YOU HAVE A DRINK CONTAINING ALCOHOL?: NEVER

## 2024-03-13 SDOH — SOCIAL STABILITY: SOCIAL NETWORK: ARE YOU MARRIED, WIDOWED, DIVORCED, SEPARATED, NEVER MARRIED, OR LIVING WITH A PARTNER?: LIVING WITH PARTNER

## 2024-03-13 SDOH — SOCIAL STABILITY: SOCIAL INSECURITY: DOES ANYONE TRY TO KEEP YOU FROM HAVING/CONTACTING OTHER FRIENDS OR DOING THINGS OUTSIDE YOUR HOME?: NO

## 2024-03-13 SDOH — ECONOMIC STABILITY: INCOME INSECURITY: IN THE LAST 12 MONTHS, WAS THERE A TIME WHEN YOU WERE NOT ABLE TO PAY THE MORTGAGE OR RENT ON TIME?: NO

## 2024-03-13 SDOH — SOCIAL STABILITY: SOCIAL INSECURITY: WITHIN THE LAST YEAR, HAVE YOU BEEN AFRAID OF YOUR PARTNER OR EX-PARTNER?: NO

## 2024-03-13 SDOH — SOCIAL STABILITY: SOCIAL INSECURITY: ABUSE: ADULT

## 2024-03-13 SDOH — SOCIAL STABILITY: SOCIAL INSECURITY: DO YOU FEEL UNSAFE GOING BACK TO THE PLACE WHERE YOU ARE LIVING?: NO

## 2024-03-13 SDOH — SOCIAL STABILITY: SOCIAL INSECURITY: ARE YOU OR HAVE YOU BEEN THREATENED OR ABUSED PHYSICALLY, EMOTIONALLY, OR SEXUALLY BY ANYONE?: NO

## 2024-03-13 SDOH — HEALTH STABILITY: MENTAL HEALTH: HOW MANY STANDARD DRINKS CONTAINING ALCOHOL DO YOU HAVE ON A TYPICAL DAY?: PATIENT DOES NOT DRINK

## 2024-03-13 SDOH — SOCIAL STABILITY: SOCIAL INSECURITY: WERE YOU ABLE TO COMPLETE ALL THE BEHAVIORAL HEALTH SCREENINGS?: YES

## 2024-03-13 ASSESSMENT — PATIENT HEALTH QUESTIONNAIRE - PHQ9
SUM OF ALL RESPONSES TO PHQ9 QUESTIONS 1 & 2: 0
1. LITTLE INTEREST OR PLEASURE IN DOING THINGS: NOT AT ALL
2. FEELING DOWN, DEPRESSED OR HOPELESS: NOT AT ALL

## 2024-03-13 ASSESSMENT — ACTIVITIES OF DAILY LIVING (ADL)
ADEQUATE_TO_COMPLETE_ADL: YES
WALKS IN HOME: INDEPENDENT
BATHING: NEEDS ASSISTANCE
TOILETING: NEEDS ASSISTANCE
GROOMING: INDEPENDENT
DRESSING YOURSELF: INDEPENDENT
HEARING - RIGHT EAR: FUNCTIONAL
FEEDING YOURSELF: INDEPENDENT
JUDGMENT_ADEQUATE_SAFELY_COMPLETE_DAILY_ACTIVITIES: YES
PATIENT'S MEMORY ADEQUATE TO SAFELY COMPLETE DAILY ACTIVITIES?: YES
HEARING - LEFT EAR: FUNCTIONAL

## 2024-03-13 ASSESSMENT — PAIN DESCRIPTION - LOCATION
LOCATION: ABDOMEN

## 2024-03-13 ASSESSMENT — PAIN SCALES - GENERAL
PAINLEVEL_OUTOF10: 1
PAINLEVEL_OUTOF10: 2
PAINLEVEL_OUTOF10: 0 - NO PAIN
PAINLEVEL_OUTOF10: 8
PAINLEVEL_OUTOF10: 9
PAINLEVEL_OUTOF10: 7
PAINLEVEL_OUTOF10: 3
PAINLEVEL_OUTOF10: 1
PAINLEVEL_OUTOF10: 7
PAINLEVEL_OUTOF10: 0 - NO PAIN

## 2024-03-13 ASSESSMENT — COGNITIVE AND FUNCTIONAL STATUS - GENERAL
MOBILITY SCORE: 21
PATIENT BASELINE BEDBOUND: NO
DRESSING REGULAR UPPER BODY CLOTHING: A LITTLE
HELP NEEDED FOR BATHING: A LITTLE
DAILY ACTIVITIY SCORE: 21
MOVING TO AND FROM BED TO CHAIR: A LITTLE
TURNING FROM BACK TO SIDE WHILE IN FLAT BAD: A LITTLE
MOVING FROM LYING ON BACK TO SITTING ON SIDE OF FLAT BED WITH BEDRAILS: A LITTLE
DRESSING REGULAR LOWER BODY CLOTHING: A LITTLE

## 2024-03-13 ASSESSMENT — LIFESTYLE VARIABLES
AUDIT-C TOTAL SCORE: 0
HOW OFTEN DO YOU HAVE 6 OR MORE DRINKS ON ONE OCCASION: NEVER
AUDIT-C TOTAL SCORE: 0
HOW OFTEN DO YOU HAVE A DRINK CONTAINING ALCOHOL: NEVER
SKIP TO QUESTIONS 9-10: 1
HOW MANY STANDARD DRINKS CONTAINING ALCOHOL DO YOU HAVE ON A TYPICAL DAY: PATIENT DOES NOT DRINK

## 2024-03-13 ASSESSMENT — PAIN - FUNCTIONAL ASSESSMENT
PAIN_FUNCTIONAL_ASSESSMENT: 0-10

## 2024-03-13 ASSESSMENT — PAIN DESCRIPTION - ORIENTATION
ORIENTATION: MID
ORIENTATION: MID

## 2024-03-13 NOTE — CARE PLAN
The patient's goals for the shift include      The clinical goals for the shift include pt will be safe and rested thro ughout the shift
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
## 2024-03-13 NOTE — PROGRESS NOTES
Up walking the halls    Doing good    OK to discharge to home    He can follow up with me in 2 weeks

## 2024-03-13 NOTE — H&P
History Of Present Illness  Douglas Wilson is a 53 y.o. 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 Maldonado for laparoscopic appendectomy on 3/12/24  -tolerated the procedure well, recovered in the PACU and was admitted to the regular nursing floor  -overall patient reports he is feeling better  -pain is controlled, and he is tolerating oral intake  -he is eager for discharge home  -he has not passed gas or had BM yet     Past Medical History  NSTEMI  PE  Nicotine use disorder  Heroin use disorder   Splenic artery aneurysm rupture s/p coil embolization     Surgical History  Past Surgical History:   Procedure Laterality Date    APPENDECTOMY  03/12/2024    CT ANGIO CORONARY ART WITH HEARTFLOW IF SCORE >30%  04/19/2022    OTHER SURGICAL HISTORY  01/24/2022    Inguinal hernia repair laparoscopic    SPLENIC ARTERY EMBOLIZATION  02/2024       Social History  Social History     Socioeconomic History    Marital status: Single     Spouse name: None    Number of children: None    Years of education: None    Highest education level: None   Occupational History    None   Tobacco Use    Smoking status: Every Day     Packs/day: 1     Types: Cigarettes    Smokeless tobacco: Never   Vaping Use    Vaping Use: None   Substance and Sexual Activity    Alcohol use: Never    Drug use: Yes     Types: Heroin    Sexual activity: Defer   Other Topics Concern    None   Social History Narrative    None     Social Determinants of Health     Financial Resource Strain: Low Risk  (2/26/2024)    Overall Financial Resource Strain (CARDIA)     Difficulty of Paying Living Expenses: Not hard at all   Food Insecurity: No Food Insecurity (2/26/2024)    Hunger  Vital Sign     Worried About Running Out of Food in the Last Year: Never true     Ran Out of Food in the Last Year: Never true   Transportation Needs: No Transportation Needs (2/26/2024)    PRAPARE - Transportation     Lack of Transportation (Medical): No     Lack of Transportation (Non-Medical): No   Physical Activity: Insufficiently Active (2/26/2024)    Exercise Vital Sign     Days of Exercise per Week: 2 days     Minutes of Exercise per Session: 60 min   Stress: No Stress Concern Present (2/26/2024)    [employer] [place] [employer] - Occupational Stress Questionnaire     Feeling of Stress : Not at all   Social Connections: Moderately Isolated (2/26/2024)    Social Connection and Isolation Panel [NHANES]     Frequency of Communication with Friends and Family: More than three times a week     Frequency of Social Gatherings with Friends and Family: More than three times a week     Attends [employer]ism Services: Never     Active Member of Clubs or Organizations: No     Attends Club or Organization Meetings: Never     Marital Status:    Intimate Partner Violence: Not At Risk (2/26/2024)    Humiliation, Afraid, Rape, and Kick questionnaire     Fear of Current or Ex-Partner: No     Emotionally Abused: No     Physically Abused: No     Sexually Abused: No   Housing Stability: Low Risk  (2/26/2024)    Housing Stability Vital Sign     Unable to Pay for Housing in the Last Year: No     Number of Places Lived in the Last Year: 1     Unstable Housing in the Last Year: No        Family History  No family history on file.     Allergies  Allergies as of 03/12/2024    (No Known Allergies)        Review of Systems  Review of Systems    Relevant results reviewed   PROVIDER notes  Nursing notes  MEDS:  Current Facility-Administered Medications   Medication Dose Route Frequency Provider Last Rate Last Admin    acetaminophen (Tylenol) tablet 975 mg  975 mg oral q6h PRN Alysia Orozco PA-C        apixaban (Eliquis)  tablet 5 mg  5 mg oral BID Alysia Orozco PA-C        docusate sodium (Colace) capsule 100 mg  100 mg oral BID Alysia Orozco PA-C        melatonin tablet 3 mg  3 mg oral Daily Alysia Orozco PA-C        morphine injection 4 mg  4 mg intravenous q4h PRN Alysia Orozco PA-C   4 mg at 03/13/24 0135    nicotine (Nicoderm CQ) 21 mg/24 hr patch 1 patch  1 patch transdermal Once Alysia Orozco PA-C        ondansetron (Zofran) tablet 4 mg  4 mg oral q8h PRN Alysia Orozco PA-C        Or    ondansetron (Zofran) injection 4 mg  4 mg intravenous q8h PRN Alysia Orozco PA-C        oxyCODONE (Roxicodone) immediate release tablet 5 mg  5 mg oral q4h PRN Alysia Orozco PA-C        pantoprazole (ProtoNix) EC tablet 40 mg  40 mg oral Daily before breakfast Alysia Orozco PA-C        Or    pantoprazole (ProtoNix) injection 40 mg  40 mg intravenous Daily before breakfast Alysia Orozco PA-C        piperacillin-tazobactam-dextrose (Zosyn) IV 3.375 g  3.375 g intravenous q6h Priscilla ArellanoD   Stopped at 03/13/24 0205    prochlorperazine (Compazine) injection 10 mg  10 mg intravenous q6h PRN Aylsia Orozco PA-C          LABS:  Results for orders placed or performed during the hospital encounter of 03/12/24 (from the past 24 hour(s))   CBC with Differential   Result Value Ref Range    WBC 8.8 4.4 - 11.3 x10*3/uL    nRBC 0.0 0.0 - 0.0 /100 WBCs    RBC 4.37 (L) 4.50 - 5.90 x10*6/uL    Hemoglobin 10.2 (L) 13.5 - 17.5 g/dL    Hematocrit 33.0 (L) 41.0 - 52.0 %    MCV 76 (L) 80 - 100 fL    MCH 23.3 (L) 26.0 - 34.0 pg    MCHC 30.9 (L) 32.0 - 36.0 g/dL    RDW 18.5 (H) 11.5 - 14.5 %    Platelets 400 150 - 450 x10*3/uL    Neutrophils % 76.2 40.0 - 80.0 %    Immature Granulocytes %, Automated 0.3 0.0 - 0.9 %    Lymphocytes % 13.9 13.0 - 44.0 %    Monocytes % 6.4 2.0 - 10.0 %    Eosinophils % 3.0 0.0 - 6.0 %    Basophils % 0.2 0.0 - 2.0 %    Neutrophils Absolute 6.71 1.20 - 7.70 x10*3/uL    Immature Granulocytes  Absolute, Automated 0.03 0.00 - 0.70 x10*3/uL    Lymphocytes Absolute 1.22 1.20 - 4.80 x10*3/uL    Monocytes Absolute 0.56 0.10 - 1.00 x10*3/uL    Eosinophils Absolute 0.26 0.00 - 0.70 x10*3/uL    Basophils Absolute 0.02 0.00 - 0.10 x10*3/uL   Comprehensive Metabolic Panel   Result Value Ref Range    Glucose 97 74 - 99 mg/dL    Sodium 138 136 - 145 mmol/L    Potassium 4.1 3.5 - 5.3 mmol/L    Chloride 104 98 - 107 mmol/L    Bicarbonate 29 21 - 32 mmol/L    Anion Gap 9 (L) 10 - 20 mmol/L    Urea Nitrogen 7 6 - 23 mg/dL    Creatinine 0.87 0.50 - 1.30 mg/dL    eGFR >90 >60 mL/min/1.73m*2    Calcium 8.3 (L) 8.6 - 10.3 mg/dL    Albumin 3.7 3.4 - 5.0 g/dL    Alkaline Phosphatase 51 33 - 120 U/L    Total Protein 6.9 6.4 - 8.2 g/dL    AST 16 9 - 39 U/L    Bilirubin, Total 0.4 0.0 - 1.2 mg/dL    ALT 11 10 - 52 U/L   Lipase   Result Value Ref Range    Lipase 11 9 - 82 U/L   Blood Gas Venous Full Panel   Result Value Ref Range    POCT pH, Venous 7.36 7.33 - 7.43 pH    POCT pCO2, Venous 56 (H) 41 - 51 mm Hg    POCT pO2, Venous 22 (L) 35 - 45 mm Hg    POCT SO2, Venous 28 (L) 45 - 75 %    POCT Oxy Hemoglobin, Venous 27.0 (L) 45.0 - 75.0 %    POCT Hematocrit Calculated, Venous 29.0 (L) 41.0 - 52.0 %    POCT Sodium, Venous 135 (L) 136 - 145 mmol/L    POCT Potassium, Venous 4.1 3.5 - 5.3 mmol/L    POCT Chloride, Venous 104 98 - 107 mmol/L    POCT Ionized Calicum, Venous 1.15 1.10 - 1.33 mmol/L    POCT Glucose, Venous 105 (H) 74 - 99 mg/dL    POCT Lactate, Venous 0.8 0.4 - 2.0 mmol/L    POCT Base Excess, Venous 5.2 (H) -2.0 - 3.0 mmol/L    POCT HCO3 Calculated, Venous 31.6 (H) 22.0 - 26.0 mmol/L    POCT Hemoglobin, Venous 9.5 (L) 13.5 - 17.5 g/dL    POCT Anion Gap, Venous 4.0 (L) 10.0 - 25.0 mmol/L    Patient Temperature 37.0 degrees Celsius    FiO2 21 %   Urinalysis with Reflex Culture and Microscopic   Result Value Ref Range    Color, Urine Yellow Straw, Yellow    Appearance, Urine Clear Clear    Specific Gravity, Urine 1.020 1.005  "- 1.035    pH, Urine 6.0 5.0, 5.5, 6.0, 6.5, 7.0, 7.5, 8.0    Protein, Urine NEGATIVE NEGATIVE mg/dL    Glucose, Urine NEGATIVE NEGATIVE mg/dL    Blood, Urine NEGATIVE NEGATIVE    Ketones, Urine NEGATIVE NEGATIVE mg/dL    Bilirubin, Urine NEGATIVE NEGATIVE    Urobilinogen, Urine <2.0 <2.0 mg/dL    Nitrite, Urine NEGATIVE NEGATIVE    Leukocyte Esterase, Urine NEGATIVE NEGATIVE   Extra Urine Gray Tube   Result Value Ref Range    Extra Tube Hold for add-ons.    Type and Screen   Result Value Ref Range    ABO TYPE B     Rh TYPE POS     ANTIBODY SCREEN NEG    Prepare RBC: 2 Units   Result Value Ref Range    PRODUCT CODE O7921A49     Unit Number [id number]     Unit ABO B     Unit RH POS     XM INTEP COMP     Dispense Status XM     Blood Expiration Date March 15, 2024 23:59 EDT     PRODUCT BLOOD TYPE 7300     UNIT VOLUME 350     PRODUCT CODE G8361R23     Unit Number [id number]     Unit ABO B     Unit RH POS     XM INTEP COMP     Dispense Status XM     Blood Expiration Date March 29, 2024 23:59 EDT     PRODUCT BLOOD TYPE 7300     UNIT VOLUME 350       IMAGING:  CT angio chest abdomen pelvis   Final Result   1. Findings consistent with acute, uncomplicated appendicitis.        2. Status post splenic artery embolization. Subcentimeter ovoid   hyperdensity in the caudal aspect of the spleen suggestive of a small   aneurysm or hemangioma. This can be confirmed with an ultrasound.        3. Subacute hematoma abutting the greater curvature of the stomach   and extending in the caudal direction throughout the mesentery just   behind the left rectus abdominus muscle.        Signed by: Pillo Patel 3/12/2024 3:15 PM   Dictation workstation:   [id number]             PHYSICAL EXAM  /70   Pulse 61   Temp 37.1 °C (98.8 °F) (Oral)   Resp 16   Ht 1.753 m (5' 9\")   Wt 76.2 kg (168 lb)   SpO2 97%   BMI 24.81 kg/m²   PHYSICAL EXAM:  GENERAL: Alert, NAD, cooperative  HEENT:  NCAT, PERRLA, EOMI, nonicteric sclera, " MMM, neck supple, trachea midline  LUNGS: Unlabored, no significant wheezing, rhonchi, or rales appreciated  CARDS: RRR, no m/g/r appreciated  GI: Soft, mildly distended, appropriately tender, laparotomy sites c/d/I, diminished bowel sounds  MS/Extremities: WWP, no edema, distal pulses intact, moves all extremities  NEURO: A&Ox3, grossly nonfocal exam, speech fluent, follows commands, answers questions appropriately  PSYCH: mood and behavior appropriate    Assessment/Plan:   Douglas Wilson is a 53 y.o. 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 Maldonado for laparoscopic appendectomy on 3/12/24  -tolerated the procedure well, recovered in the PACU and was admitted to the regular nursing floor  -overall patient reports he is feeling better  -pain is controlled, and he is tolerating oral intake  -he is eager for discharge home  -he has not passed gas or had BM yet     Principal Problem:    Acute appendicitis without peritonitis  Active Problems:    Acute pulmonary embolism without acute cor pulmonale (CMS/HCC)    Nicotine use disorder    Heroin abuse (CMS/HCC)  -POD1 s/p laparoscopic appendectomy  -recovering well thus far, tolerating diet, pain controlled  -will continue IV zosyn x 3 doses  -encouraged smoking cessation, nicotine replacement offered. > 3 minutes spent providing smoking cessation counseling  -pain control and anti-emetic   -possible discharge later today/this afternoon pending surgical clearance   -GI ppx: Protonix, bowel regimen  -VTE ppx: Okay to restart Eliquis at 9am.      Total time spent [including but not limited to]: Obtaining and reviewing patient medical records/history, obtaining a separate  history,  examining and assessing the patient, providing  and education, placing pertinent orders for labs/tests/medications,  communicating with the patient/family/health team, documenting in the patient's EMR/formulation of this note, independently interpreting results and data, coordinating care:   55 minutes, with greater than 50% spent in personal discussion with patient and/or family    Alysia Orozco PA-C

## 2024-03-13 NOTE — DISCHARGE SUMMARY
"Admitting Provider: Nneka Marc MD  Discharge Provider: Rangel Moore MD  Primary Care Physician at Discharge: No Assigned PCP Marcy Provider, [phone]   Admission Date: 3/12/2024     Discharge Date: 3/13/2024  Current Planned Discharge Disposition: Home    Discharge Diagnoses  Principal Problem:    Acute appendicitis without peritonitis  Active Problems:    Acute pulmonary embolism without acute cor pulmonale (CMS/[facility])    Nicotine use disorder    Heroin abuse (CMS/[facility])      Hospital Course  53 yoM with appendicitis.    He went for uneventful lap appy. He was feeling well the next day. He has been on eliquis for recent PE; he will resume it tomorrow. Discharged home in stable condition.     Test Results Pending At Discharge  Pending Labs       Order Current Status    Surgical Pathology Exam In process    Ferritin Preliminary result            Pertinent Physical Exam At Time of Discharge  /82 (BP Location: Right arm, Patient Position: Lying)   Pulse 57   Temp 36.7 °C (98.1 °F) (Oral)   Resp 16   Ht 1.753 m (5' 9\")   Wt 76.2 kg (168 lb)   SpO2 96%   BMI 24.81 kg/m²   Physical Exam  Cardiovascular:      Rate and Rhythm: Normal rate and regular rhythm.      Heart sounds: Normal heart sounds.   Pulmonary:      Breath sounds: Normal breath sounds.   Abdominal:      General: Bowel sounds are normal.      Palpations: Abdomen is soft.   Musculoskeletal:         General: Normal range of motion.   Neurological:      General: No focal deficit present.      Mental Status: He is alert and oriented to person, place, and time.   Psychiatric:         Mood and Affect: Mood normal.         Home Medications     Medication List      START taking these medications     traMADol 50 mg tablet; Commonly known as: Ultram; Take 1 tablet (50 mg)   by mouth 2 times a day as needed for severe pain (7 - 10) for up to 4   days.     CHANGE how you take these medications     apixaban 5 mg tablet; Commonly known as: " Eliquis; Take 1 tablet (5 mg)   by mouth 2 times a day. Do not start before March 6, 2024.; What changed:   Another medication with the same name was removed. Continue taking this   medication, and follow the directions you see here.     CONTINUE taking these medications     omeprazole 40 mg DR capsule; Commonly known as: PriLOSEC   tamsulosin 0.4 mg 24 hr capsule; Commonly known as: Flomax       Outpatient Follow-Up  Future Appointments   Date Time Provider Department Center   4/4/2024  4:00 PM Chucho De Leon MD [facility]       Rangel Moore MD    I spent more than 30 min coordinating this patient's discharge.

## 2024-03-13 NOTE — POST-PROCEDURE NOTE
Patient doing alright. Decent bit of surgical pain of his abdomen. -flatus, -BM. Has not voided post-operatively quite yet.    PE:  Constitutional: A&Ox3, calm and cooperative, NAD.  Eyes: PERRL, clear sclera.  ENMT: Moist mucous membranes.  Head/Neck: Neck supple.  Cardiovascular: Normal rate and regular rhythm.   Respiratory/Thorax: CTAB, No rales, rhonchi, or wheezes. Good symmetric chest expansion.   Gastrointestinal: Abdomen slightly distended, soft, appropriately tender, no peritoneal signs, laparotomy sites c/d/i, well approximated with Dermabond, no erythema or drainage.  Musculoskeletal: ROM intact.  Neurological: A&Ox3, No focal deficits.  Psychological: Appropriate mood and behavior.  Skin: Warm and dry.    Radiology and labs reviewed. VSS, afebrile.    Plan:   - Diet: Regular  - IV zosyn  - Continue current pain regimen   - PRN antiemetic   - DVT Proph: SCDs/ ambulate/lovenox  - Monitor VS every 4 hours   - Labs ordered for AM   - IS every hour while awake   - Encourage ambulation / OOB as tolerated   - Instructed on post operative care, s/s of infection  - Monitor lap sites for drainage, erythema, excessive bruising   - Continue supportive care  - F/u with Dr. Maldonado outpatient in 10-14 days once d/c from hospital     Dispo: Continue care on nursing floor. Surgery to follow in AM.    Total time spent 25 minutes, and greater than 50% of time was spent in counseling/coordination of care

## 2024-03-13 NOTE — ANESTHESIA PROCEDURE NOTES
Airway  Date/Time: 3/12/2024 10:39 PM  Urgency: elective    Airway not difficult    Staffing  Performed: PATSY   Authorized by: Jeremy Hernandez MD    Performed by: PATSY Mccain  Patient location during procedure: OR    Indications and Patient Condition  Indications for airway management: anesthesia and airway protection  Spontaneous Ventilation: absent  Sedation level: deep  Preoxygenated: yes  Patient position: sniffing  MILS not maintained throughout  Mask difficulty assessment: 0 - not attempted  Planned trial extubation    Final Airway Details  Final airway type: endotracheal airway      Successful airway: ETT  Cuffed: yes   Successful intubation technique: direct laryngoscopy  Facilitating devices/methods: cricoid pressure  Endotracheal tube insertion site: oral  Blade: Genaro  Blade size: #4  ETT size (mm): 7.5  Cormack-Lehane Classification: grade I - full view of glottis  Placement verified by: chest auscultation, capnometry and palpation of cuff   Measured from: teeth  ETT to teeth (cm): 22  Number of attempts at approach: 1

## 2024-03-13 NOTE — ANESTHESIA POSTPROCEDURE EVALUATION
Patient: Douglas Wilson    Procedure Summary       Date: 03/12/24 Room / Location: [facility] / [facility]    Anesthesia Start: 2230 Anesthesia Stop: 2323    Procedure: Appendectomy Laparoscopy Diagnosis:       Acute appendicitis without peritonitis      (Acute appendicitis without peritonitis [K35.80])    Surgeons: Doni Maldonado MD Responsible Provider: Jeremy Hernandez MD    Anesthesia Type: general ASA Status: 3 - Emergent            Anesthesia Type: general    Vitals Value Taken Time   /56 03/12/24 2216   Temp 36.2 °C (97.2 °F) 03/12/24 2216   Pulse 55 03/12/24 2216   Resp 18 03/12/24 2216   SpO2 98 % 03/12/24 2216       Anesthesia Post Evaluation    Patient participation: complete - patient participated  Level of consciousness: awake  Pain management: adequate  Airway patency: patent  Cardiovascular status: acceptable and hemodynamically stable  Respiratory status: acceptable  Hydration status: acceptable  Postoperative Nausea and Vomiting: none        No notable events documented.

## 2024-03-13 NOTE — CARE PLAN
Problem: Fall/Injury  Goal: Not fall by end of shift  Outcome: Progressing  Goal: Be free from injury by end of the shift  Outcome: Progressing  Goal: Verbalize understanding of personal risk factors for fall in the hospital  Outcome: Progressing  Goal: Verbalize understanding of risk factor reduction measures to prevent injury from fall in the home  Outcome: Progressing  Goal: Use assistive devices by end of the shift  Outcome: Progressing  Goal: Pace activities to prevent fatigue by end of the shift  Outcome: Progressing     Problem: Pain  Goal: Takes deep breaths with improved pain control throughout the shift  Outcome: Progressing  Goal: Turns in bed with improved pain control throughout the shift  Outcome: Progressing  Goal: Walks with improved pain control throughout the shift  Outcome: Progressing  Goal: Performs ADL's with improved pain control throughout shift  Outcome: Progressing  Goal: Participates in PT with improved pain control throughout the shift  Outcome: Progressing  Goal: Free from opioid side effects throughout the shift  Outcome: Progressing  Goal: Free from acute confusion related to pain meds throughout the shift  Outcome: Progressing     Problem: Pain - Adult  Goal: Verbalizes/displays adequate comfort level or baseline comfort level  Outcome: Progressing     Problem: Safety - Adult  Goal: Free from fall injury  Outcome: Progressing     Problem: Discharge Planning  Goal: Discharge to home or other facility with appropriate resources  Outcome: Progressing     Problem: Chronic Conditions and Co-morbidities  Goal: Patient's chronic conditions and co-morbidity symptoms are monitored and maintained or improved  Outcome: Progressing   The patient's goals for the shift include  pain management     The clinical goals for the shift include pt pain will decrease before discharge

## 2024-03-13 NOTE — POST-PROCEDURE NOTE
2317: Patient arrived to [place] after procedure with Anesthesia and procedure RN, procedure discussed, plan reviewed, VSS  2325: family updated via text  2339: vocerad tanmay sepitia lpn  2353: Secure messaged report to tanmay espitia lpn  0006: pts family via phone call  0023: pt c/o pain, medicated per order, see MAR  0058: PA drop at bedside  0103Pt taken off of monitor and transported via cart to [facility]

## 2024-03-13 NOTE — OP NOTE
Appendectomy Laparoscopy Operative Note     Date: 3/12/2024  OR Location: [facility] OR    Name: Douglas Wilson, : 1970, Age: 53 y.o., MRN: [id number], Sex: male    Diagnosis  Pre-op Diagnosis     * Acute appendicitis without peritonitis [K35.80] Post-op Diagnosis     * Acute appendicitis without peritonitis [K35.80]     Procedures  Appendectomy Laparoscopy  16730 - MT LAPAROSCOPIC APPENDECTOMY      Surgeons      * Doni Maldonado - Primary    Resident/Fellow/Other Assistant:  Surgeon(s) and Role:    Procedure Summary  Anesthesia: General  ASA: III  Anesthesia Staff: Anesthesiologist: Jeremy Hernandez MD  C-AA: PATSY Mccain  Estimated Blood Loss: 10 mL  Intra-op Medications: Administrations occurring from  to  on 24:  * No intraprocedure medications in log *           Anesthesia Record               Intraprocedure I/O Totals          Intake    LR bolus 300.00 mL    Total Intake 300 mL       Output    Est. Blood Loss 5 mL    Total Output 5 mL       Net    Net Volume 295 mL          Specimen:   ID Type Source Tests Collected by Time   1 : APPENDIX Tissue APPENDIX SURGICAL PATHOLOGY EXAM Allison Donohue RN 3/12/2024 2301        Staff:   Circulator: Allison Donohue RN  Scrub Person: Adeline Nichols; Bernie Francis         Drains and/or Catheters: * None in log *    Findings: Some bloody ascites fluid, inflamed appendix adherent to the anterior abdominal wall.    Indications: Douglas Wilson is an 53 y.o. male who is having surgery for Acute appendicitis without peritonitis [K35.80].     The patient was seen in the preoperative area. The risks, benefits, complications, treatment options, non-operative alternatives, expected recovery and outcomes were discussed with the patient. The possibilities of reaction to medication, pulmonary aspiration, injury to surrounding structures, bleeding, recurrent infection, the need for additional procedures, failure to diagnose a condition, and creating a complication  requiring transfusion or operation were discussed with the patient. The patient concurred with the proposed plan, giving informed consent.  The site of surgery was properly noted/marked if necessary per policy. The patient has been actively warmed in preoperative area. Preoperative antibiotics have been ordered and given within 1 hours of incision. Venous thrombosis prophylaxis have been ordered including bilateral sequential compression devices     Procedure Details: 53-year-old gentleman who had recent prolonged hospitalization.  He was admitted to the hospital on 2/22/2024 with abdominal pain.   CT scan showed hemoperitoneum.  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  He underwent splenic artery embolization that was successful at stopping the bleeding.  He was discharged to home but came back in the hospital with abdominal pain where he was found to have PE.  He was discharged to home on Eliquis.     He comes in at this time with 48-hour complaint of worsening pain now localized to right lower abdomen.  Denies nausea vomiting or fever.  His pain is persistent and rates it at 7/10 in severity.  Workup is included a CT scan that shows evidence of acute appendicitis.   decision was made to proceed with laparoscopic appendectomy.  Risks, benefits, and alternatives were discussed preoperatively, and he agreed to the operation.     DESCRIPTION OF PROCEDURE:    The patient was taken to the operating room, placed supine on the operating table.  Time-out was established.  General anesthesia was induced.  He received antibiotics.  The abdomen was prepped and draped in a sterile fashion.  We made a supraumbilical midline incision and placed a 12 mm Priya trocar.  We established insufflation.  We placed two 5 mm ports in the left-sided abdominal wall.  He was noted small amount of bloody ascites fluid in the dependent areas of the abdomen.  Likely residual residual blood from recent admission with  hemoperitoneum.  There were some adhesions of omentum to the anterior abdominal wall.  In the right lower abdomen the cecum was densely adherent to the abdominal wall.  We peeled this back to find an inflamed, suppurative but not perforated appendix.  Once the appendix and cecum were released from the abdominal wall we put the appendix on traction.. We divided a plane between the base of the appendix and the mesoappendix and divided the mesoappendix using our LigaSure.  We then divided the appendix at the base of the cecum using a single application of a laparoscopic ETHAN stapler.  We removed the appendiceal specimen using an Endo Catch bag via the umbilical trocar site.  We reestablished insufflation.  Hemostasis at the staple line was good.  We then irrigated the right lower abdomen with normal saline, removed our ports, and then closed our midline fascial defect using 0 Vicryl suture.  Skin was reapproximated using 3-0 and 4-0 subcuticular Vicryl stitches followed by Dermabond glue.  The patient tolerated the procedure without difficulty and was returned to the recovery room in stable condition.        Complications:  None; patient tolerated the procedure well.    Disposition: PACU - hemodynamically stable.  Condition: stable       Attending Attestation: I performed the procedure.    Doni Maldonado  Phone Number: [phone]

## 2024-03-13 NOTE — PROGRESS NOTES
03/13/24 0907   Housing Stability   In the last 12 months, was there a time when you were not able to pay the mortgage or rent on time? N   In the last 12 months, was there a time when you did not have a steady place to sleep or slept in a shelter (including now)? N   Transportation Needs   In the past 12 months, has lack of transportation kept you from medical appointments or from getting medications? no   In the past 12 months, has lack of transportation kept you from meetings, work, or from getting things needed for daily living? No   Social Connections   Are you , , , , never , or living with a partner? Living with   Intimate Partner Violence   Within the last year, have you been afraid of your partner or ex-partner? No   Within the last year, have you been humiliated or emotionally abused in other ways by your partner or ex-partner? No   Within the last year, have you been kicked, hit, slapped, or otherwise physically hurt by your partner or ex-partner? No   Within the last year, have you been raped or forced to have any kind of sexual activity by your partner or ex-partner? No   Alcohol Use   Q1: How often do you have a drink containing alcohol? Never   Q2: How many drinks containing alcohol do you have on a typical day when you are drinking? None     I met with this patient at bedside, he is alertx3 at baseline he is independent with his ADL's stated he will not need transportation home that his fiance will take him home, he does not have any concerns at this time, and is anticipating being discharged home today

## 2024-03-13 NOTE — H&P (VIEW-ONLY)
"Reason For Consult  Abdominal pain    History Of Present Illness  53-year-old gentleman who had recent prolonged hospitalization.  He was admitted to the hospital on 2/22/2024 with abdominal pain.   CT scan showed hemoperitoneum.  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  He underwent splenic artery embolization that was successful at stopping the bleeding.  He was discharged to home but came back in the hospital with abdominal pain where he was found to have PE.  He never had shortness of breath nor required supplemental oxygen.  He was maintained on a heparin drip and he was discharged to home on Eliquis.    He comes in at this time with 48-hour complaint of pain now localized to right lower abdomen.  Denies nausea vomiting or fever.  His pain is persistent and rates it at 7/10 in severity.  Workup is included a CT scan that shows evidence of acute appendicitis.    PMH: as above    Past Medical History  He has no past medical history on file.    Surgical History  He has a past surgical history that includes Other surgical history (01/24/2022) and CT angio coronary art with heartflow if score >30% (4/19/2022).     Social History  He reports that he has been smoking cigarettes. He has been smoking an average of 1 pack per day. He has never used smokeless tobacco. He reports current drug use. Drug: Heroin. He reports that he does not drink alcohol.    Family History  No family history on file.     Allergies  Patient has no known allergies.      Physical Exam    Well-nourished, well-developed. In no distress  Alert and oriented x 3  Lungs are clear to auscultation bilaterally.  Cardiac exam is regular rhythm and rate.  Abdomen is soft moderately tender RLQ nondistended.  Neurologic exam is without focal deficit.      Last Recorded Vitals  Blood pressure 130/80, pulse 64, temperature 36.7 °C (98 °F), resp. rate 16, height 1.753 m (5' 9\"), weight 76.2 kg (168 lb), SpO2 97 %.    Relevant Results  === " 03/12/24 ===    CT ANGIO CHEST ABDOMEN PELVIS    - Impression -  1. Findings consistent with acute, uncomplicated appendicitis.    2. Status post splenic artery embolization. Subcentimeter ovoid  hyperdensity in the caudal aspect of the spleen suggestive of a small  aneurysm or hemangioma. This can be confirmed with an ultrasound.    3. Subacute hematoma abutting the greater curvature of the stomach  and extending in the caudal direction throughout the mesentery just  behind the left rectus abdominus muscle.     Lab Results   Component Value Date    WBC 8.8 03/12/2024    HGB 10.2 (L) 03/12/2024    HCT 33.0 (L) 03/12/2024    MCV 76 (L) 03/12/2024     03/12/2024          I spent 45 minutes in the professional and overall care of this patient.      Doni Maldonado MD

## 2024-03-13 NOTE — ED PROVIDER NOTES
HPI   No chief complaint on file.      HPI: []  53-year-old  male comes in with abdominal pain.  Patient had recently splenic artery aneurysm requiring emergent coil embolization, also had hemoperitoneum, discharged home returns with acute pulm embolism discharged with Eliquis today comes in with abdominal pain.  Over the last 24 hours he developed right lower quadrant abdominal pain.  He has no associated nausea vomit diarrhea no fever no chills no penile discharge no urine frequency urges hematuria no flank pain no trauma no falls.  No syncope or near syncope.    Past history: Splenic artery embolism with rupture, pulm embolism  Social: Patient is a smoker denies Alcortin A drug abuse.  REVIEW OF SYSTEMS:    GENERAL.: No weight loss, fatigue, anorexia, insomnia, fever.    EYES: No vision loss, double vision, drainage, eye pain.    ENT: No pharyngitis, dry mouth.    CARDIOPULMONARY: No chest pain, palpitations, syncope, near syncope. No shortness of breath, cough, hemoptysis.    GI: Positive for abdominal pain, change in bowel habits, melena, hematemesis, hematochezia, nausea, vomiting, diarrhea.    : No discharge, dysuria, frequency, urgency, hematuria.    MS: No limb pain, joint pain, joint swelling.    SKIN: No rashes.    PSYCH: No depression, anxiety, suicidality, homicidality.    Review of systems is otherwise negative unless stated above or in history of present illness.  Social history, family history, allergies reviewed.  PHYSICAL EXAM:    GENERAL: Vitals noted, no distress. Alert and oriented  x 3. Non-toxic.      EENT: TMs clear. Posterior oropharynx unremarkable. No meningismus. No LAD.     NECK: Supple. Nontender. No midline tenderness.     CARDIAC: Regular, rate, rhythm. No murmurs rubs or gallops. No JVD    PULMONARY: Lungs clear bilaterally with good aeration. No wheezes rales or rhonchi. No respiratory distress.     ABDOMEN: Soft, nonsurgical.  Tender right lower quadrant no rebound  positive guarding. No peritoneal signs. Normoactive bowel sounds. No pulsatile masses.  Negative CVA tenderness negative inguinal hernias    : Normal external genitalia circumcised nontender testicles no penile discharge    EXTREMITIES: No peripheral edema. Negative Homans bilaterally, no cords.  2+ bounding pulses well-perfused.    SKIN: No rash. Intact.     NEURO: No focal neurologic deficits, NIH score of 0. Cranial nerves normal as tested from II through XII.     MEDICAL DECISION MAKING:  CBC today shows leukocytosis chemistry LFTs unremarkable CT angio chest abdomen pelvis concerning for acute appendicitis.    Treatment in the ED: On arrival of steps, N.p.o., given IV fluid, intravenous Zofran and morphine and Zosyn.    Consults: Acute care surgery consulted    ED course: Patient remained stable hemodynamic.    Impression: Acute appendicitis    Plan/MDM: 53 year-old  male has had fairly recent martina course including splenic artery aneurysm rupture requiring embolization pulm embolism on Eliquis returns today with what appears to be acute appendicitis, low concern for peritonitis/diverticulitis/bowel perforation/free air, surgery been consulted and patient be taken to the OR for further surgical management.                          [place] Coma Scale Score: 15                     Patient History   History reviewed. No pertinent past medical history.  Past Surgical History:   Procedure Laterality Date   • CT ANGIO CORONARY ART WITH HEARTFLOW IF SCORE >30%  4/19/2022    CT HEART CORONARY ANGIOGRAM 4/19/2022 [facility]   • OTHER SURGICAL HISTORY  01/24/2022    Inguinal hernia repair laparoscopic     No family history on file.  Social History     Tobacco Use   • Smoking status: Every Day     Packs/day: 1     Types: Cigarettes   • Smokeless tobacco: Never   Vaping Use   • Vaping Use: Not on file   Substance Use Topics   • Alcohol use: Never   • Drug use: Yes     Types: Heroin       Physical Exam    ED Triage Vitals   Temperature Heart Rate Respirations BP   03/12/24 1109 03/12/24 1109 03/12/24 1109 03/12/24 1109   36.7 °C (98 °F) 56 16 125/81      Pulse Ox Temp src Heart Rate Source Patient Position   03/12/24 1109 -- 03/12/24 1242 --   98 %  Monitor       BP Location FiO2 (%)     -- --             Physical Exam    ED Course & MDM   ED Course as of 03/12/24 2033   Tue Mar 12, 2024   2027 Patient CBC shows no leukocytosis, chemistries unremarkable abdominal CAT scan concerning for acute appendicitis patient kept n.p.o. given IV fluids intravenous Zosyn morphine and Zofran surgery consulted and patient will be taken to the OR for emergent appendectomy. [MT]      ED Course User Index  [MT] Paola Ortiz MD         Diagnoses as of 03/12/24 2033   Acute appendicitis, unspecified acute appendicitis type       Medical Decision Making      Procedure  Procedures     Paola Ortiz MD  03/12/24 2033       Paola Ortiz MD  03/12/24 2033

## 2024-03-14 ENCOUNTER — HOSPITAL ENCOUNTER (OUTPATIENT)
Facility: HOSPITAL | Age: 54
Setting detail: OBSERVATION
Discharge: HOME | End: 2024-03-17
Attending: EMERGENCY MEDICINE | Admitting: INTERNAL MEDICINE
Payer: COMMERCIAL

## 2024-03-14 ENCOUNTER — APPOINTMENT (OUTPATIENT)
Dept: CARDIOLOGY | Facility: HOSPITAL | Age: 54
End: 2024-03-14
Payer: COMMERCIAL

## 2024-03-14 ENCOUNTER — APPOINTMENT (OUTPATIENT)
Dept: RADIOLOGY | Facility: HOSPITAL | Age: 54
End: 2024-03-14
Payer: COMMERCIAL

## 2024-03-14 DIAGNOSIS — K92.0 HEMATEMESIS, UNSPECIFIED WHETHER NAUSEA PRESENT: ICD-10-CM

## 2024-03-14 DIAGNOSIS — R10.84 GENERALIZED ABDOMINAL PAIN: Primary | ICD-10-CM

## 2024-03-14 DIAGNOSIS — D62 ACUTE BLOOD LOSS ANEMIA: ICD-10-CM

## 2024-03-14 DIAGNOSIS — K92.2 GASTROINTESTINAL HEMORRHAGE, UNSPECIFIED GASTROINTESTINAL HEMORRHAGE TYPE: ICD-10-CM

## 2024-03-14 LAB
ALBUMIN SERPL BCP-MCNC: 3.9 G/DL (ref 3.4–5)
ALBUMIN SERPL BCP-MCNC: 4.2 G/DL (ref 3.4–5)
ALP SERPL-CCNC: 56 U/L (ref 33–120)
ALP SERPL-CCNC: 61 U/L (ref 33–120)
ALT SERPL W P-5'-P-CCNC: 13 U/L (ref 10–52)
ALT SERPL W P-5'-P-CCNC: 14 U/L (ref 10–52)
ANION GAP BLDV CALCULATED.4IONS-SCNC: 11 MMOL/L (ref 10–25)
ANION GAP BLDV CALCULATED.4IONS-SCNC: 13 MMOL/L (ref 10–25)
ANION GAP SERPL CALC-SCNC: 15 MMOL/L (ref 10–20)
ANION GAP SERPL CALC-SCNC: 15 MMOL/L (ref 10–20)
AST SERPL W P-5'-P-CCNC: 13 U/L (ref 9–39)
AST SERPL W P-5'-P-CCNC: 15 U/L (ref 9–39)
ATRIAL RATE: 79 BPM
BASE EXCESS BLDV CALC-SCNC: 1.9 MMOL/L (ref -2–3)
BASE EXCESS BLDV CALC-SCNC: 2.5 MMOL/L (ref -2–3)
BASOPHILS # BLD AUTO: 0.03 X10*3/UL (ref 0–0.1)
BASOPHILS NFR BLD AUTO: 0.2 %
BILIRUB DIRECT SERPL-MCNC: 0.1 MG/DL (ref 0–0.3)
BILIRUB SERPL-MCNC: 0.6 MG/DL (ref 0–1.2)
BILIRUB SERPL-MCNC: 0.6 MG/DL (ref 0–1.2)
BLOOD EXPIRATION DATE: NORMAL
BLOOD EXPIRATION DATE: NORMAL
BODY TEMPERATURE: 37 DEGREES CELSIUS
BODY TEMPERATURE: 37 DEGREES CELSIUS
BUN SERPL-MCNC: 5 MG/DL (ref 6–23)
BUN SERPL-MCNC: 7 MG/DL (ref 6–23)
CA-I BLDV-SCNC: 1.07 MMOL/L (ref 1.1–1.33)
CA-I BLDV-SCNC: 1.11 MMOL/L (ref 1.1–1.33)
CALCIUM SERPL-MCNC: 7.9 MG/DL (ref 8.6–10.3)
CALCIUM SERPL-MCNC: 9.3 MG/DL (ref 8.6–10.3)
CARDIAC TROPONIN I PNL SERPL HS: 46 NG/L (ref 0–20)
CHLORIDE BLDV-SCNC: 97 MMOL/L (ref 98–107)
CHLORIDE BLDV-SCNC: 99 MMOL/L (ref 98–107)
CHLORIDE SERPL-SCNC: 101 MMOL/L (ref 98–107)
CHLORIDE SERPL-SCNC: 96 MMOL/L (ref 98–107)
CO2 SERPL-SCNC: 24 MMOL/L (ref 21–32)
CO2 SERPL-SCNC: 24 MMOL/L (ref 21–32)
CREAT SERPL-MCNC: 0.73 MG/DL (ref 0.5–1.3)
CREAT SERPL-MCNC: 0.73 MG/DL (ref 0.5–1.3)
DISPENSE STATUS: NORMAL
DISPENSE STATUS: NORMAL
EGFRCR SERPLBLD CKD-EPI 2021: >90 ML/MIN/1.73M*2
EGFRCR SERPLBLD CKD-EPI 2021: >90 ML/MIN/1.73M*2
EOSINOPHIL # BLD AUTO: 0 X10*3/UL (ref 0–0.7)
EOSINOPHIL NFR BLD AUTO: 0 %
ERYTHROCYTE [DISTWIDTH] IN BLOOD BY AUTOMATED COUNT: 17.4 % (ref 11.5–14.5)
ERYTHROCYTE [DISTWIDTH] IN BLOOD BY AUTOMATED COUNT: 17.5 % (ref 11.5–14.5)
ERYTHROCYTE [DISTWIDTH] IN BLOOD BY AUTOMATED COUNT: 17.6 % (ref 11.5–14.5)
GLUCOSE BLDV-MCNC: 128 MG/DL (ref 74–99)
GLUCOSE BLDV-MCNC: 128 MG/DL (ref 74–99)
GLUCOSE SERPL-MCNC: 114 MG/DL (ref 74–99)
GLUCOSE SERPL-MCNC: 137 MG/DL (ref 74–99)
HCO3 BLDV-SCNC: 26.3 MMOL/L (ref 22–26)
HCO3 BLDV-SCNC: 26.6 MMOL/L (ref 22–26)
HCT VFR BLD AUTO: 36.8 % (ref 41–52)
HCT VFR BLD AUTO: 37.3 % (ref 41–52)
HCT VFR BLD AUTO: 38 % (ref 41–52)
HCT VFR BLD EST: 36 % (ref 41–52)
HCT VFR BLD EST: 39 % (ref 41–52)
HGB BLD-MCNC: 11.7 G/DL (ref 13.5–17.5)
HGB BLD-MCNC: 12 G/DL (ref 13.5–17.5)
HGB BLD-MCNC: 12.3 G/DL (ref 13.5–17.5)
HGB BLDV-MCNC: 12 G/DL (ref 13.5–17.5)
HGB BLDV-MCNC: 12.9 G/DL (ref 13.5–17.5)
IMM GRANULOCYTES # BLD AUTO: 0.09 X10*3/UL (ref 0–0.7)
IMM GRANULOCYTES NFR BLD AUTO: 0.6 % (ref 0–0.9)
INHALED O2 CONCENTRATION: 21 %
INHALED O2 CONCENTRATION: 21 %
LACTATE BLDV-SCNC: 1.9 MMOL/L (ref 0.4–2)
LACTATE BLDV-SCNC: 2.7 MMOL/L (ref 0.4–2)
LACTATE SERPL-SCNC: 1.1 MMOL/L (ref 0.4–2)
LACTATE SERPL-SCNC: 1.8 MMOL/L (ref 0.4–2)
LIPASE SERPL-CCNC: 7 U/L (ref 9–82)
LYMPHOCYTES # BLD AUTO: 0.95 X10*3/UL (ref 1.2–4.8)
LYMPHOCYTES NFR BLD AUTO: 6.4 %
MCH RBC QN AUTO: 23 PG (ref 26–34)
MCH RBC QN AUTO: 23.1 PG (ref 26–34)
MCH RBC QN AUTO: 23.4 PG (ref 26–34)
MCHC RBC AUTO-ENTMCNC: 31.6 G/DL (ref 32–36)
MCHC RBC AUTO-ENTMCNC: 31.8 G/DL (ref 32–36)
MCHC RBC AUTO-ENTMCNC: 33 G/DL (ref 32–36)
MCV RBC AUTO: 71 FL (ref 80–100)
MCV RBC AUTO: 72 FL (ref 80–100)
MCV RBC AUTO: 73 FL (ref 80–100)
MONOCYTES # BLD AUTO: 0.82 X10*3/UL (ref 0.1–1)
MONOCYTES NFR BLD AUTO: 5.5 %
NEUTROPHILS # BLD AUTO: 12.92 X10*3/UL (ref 1.2–7.7)
NEUTROPHILS NFR BLD AUTO: 87.3 %
NRBC BLD-RTO: 0 /100 WBCS (ref 0–0)
OXYHGB MFR BLDV: 39.4 % (ref 45–75)
OXYHGB MFR BLDV: 68.1 % (ref 45–75)
P AXIS: 79 DEGREES
P OFFSET: 200 MS
P ONSET: 144 MS
PCO2 BLDV: 37 MM HG (ref 41–51)
PCO2 BLDV: 41 MM HG (ref 41–51)
PH BLDV: 7.42 PH (ref 7.33–7.43)
PH BLDV: 7.46 PH (ref 7.33–7.43)
PLATELET # BLD AUTO: 396 X10*3/UL (ref 150–450)
PLATELET # BLD AUTO: 428 X10*3/UL (ref 150–450)
PLATELET # BLD AUTO: 445 X10*3/UL (ref 150–450)
PO2 BLDV: 32 MM HG (ref 35–45)
PO2 BLDV: 43 MM HG (ref 35–45)
POTASSIUM BLDV-SCNC: 3.2 MMOL/L (ref 3.5–5.3)
POTASSIUM BLDV-SCNC: 3.3 MMOL/L (ref 3.5–5.3)
POTASSIUM SERPL-SCNC: 3.1 MMOL/L (ref 3.5–5.3)
POTASSIUM SERPL-SCNC: 3.4 MMOL/L (ref 3.5–5.3)
PR INTERVAL: 160 MS
PRODUCT BLOOD TYPE: 7300
PRODUCT BLOOD TYPE: 7300
PRODUCT CODE: NORMAL
PRODUCT CODE: NORMAL
PROT SERPL-MCNC: 6.8 G/DL (ref 6.4–8.2)
PROT SERPL-MCNC: 8.2 G/DL (ref 6.4–8.2)
Q ONSET: 224 MS
QRS COUNT: 13 BEATS
QRS DURATION: 82 MS
QT INTERVAL: 380 MS
QTC CALCULATION(BAZETT): 435 MS
QTC FREDERICIA: 416 MS
R AXIS: 27 DEGREES
RBC # BLD AUTO: 5.09 X10*6/UL (ref 4.5–5.9)
RBC # BLD AUTO: 5.2 X10*6/UL (ref 4.5–5.9)
RBC # BLD AUTO: 5.26 X10*6/UL (ref 4.5–5.9)
SAO2 % BLDV: 40 % (ref 45–75)
SAO2 % BLDV: 70 % (ref 45–75)
SODIUM BLDV-SCNC: 133 MMOL/L (ref 136–145)
SODIUM BLDV-SCNC: 133 MMOL/L (ref 136–145)
SODIUM SERPL-SCNC: 132 MMOL/L (ref 136–145)
SODIUM SERPL-SCNC: 137 MMOL/L (ref 136–145)
T AXIS: 59 DEGREES
T OFFSET: 414 MS
UNIT ABO: NORMAL
UNIT ABO: NORMAL
UNIT NUMBER: NORMAL
UNIT NUMBER: NORMAL
UNIT RH: NORMAL
UNIT RH: NORMAL
UNIT VOLUME: 350
UNIT VOLUME: 350
VENTRICULAR RATE: 79 BPM
WBC # BLD AUTO: 12.6 X10*3/UL (ref 4.4–11.3)
WBC # BLD AUTO: 13.3 X10*3/UL (ref 4.4–11.3)
WBC # BLD AUTO: 14.8 X10*3/UL (ref 4.4–11.3)
XM INTEP: NORMAL
XM INTEP: NORMAL

## 2024-03-14 PROCEDURE — 96375 TX/PRO/DX INJ NEW DRUG ADDON: CPT

## 2024-03-14 PROCEDURE — G0378 HOSPITAL OBSERVATION PER HR: HCPCS

## 2024-03-14 PROCEDURE — 82248 BILIRUBIN DIRECT: CPT | Performed by: EMERGENCY MEDICINE

## 2024-03-14 PROCEDURE — C9113 INJ PANTOPRAZOLE SODIUM, VIA: HCPCS | Performed by: EMERGENCY MEDICINE

## 2024-03-14 PROCEDURE — 71275 CT ANGIOGRAPHY CHEST: CPT

## 2024-03-14 PROCEDURE — 36415 COLL VENOUS BLD VENIPUNCTURE: CPT | Performed by: EMERGENCY MEDICINE

## 2024-03-14 PROCEDURE — 83605 ASSAY OF LACTIC ACID: CPT | Performed by: EMERGENCY MEDICINE

## 2024-03-14 PROCEDURE — 99291 CRITICAL CARE FIRST HOUR: CPT | Performed by: EMERGENCY MEDICINE

## 2024-03-14 PROCEDURE — 80053 COMPREHEN METABOLIC PANEL: CPT | Performed by: EMERGENCY MEDICINE

## 2024-03-14 PROCEDURE — 83690 ASSAY OF LIPASE: CPT | Performed by: EMERGENCY MEDICINE

## 2024-03-14 PROCEDURE — 84132 ASSAY OF SERUM POTASSIUM: CPT

## 2024-03-14 PROCEDURE — 96361 HYDRATE IV INFUSION ADD-ON: CPT

## 2024-03-14 PROCEDURE — 96376 TX/PRO/DX INJ SAME DRUG ADON: CPT

## 2024-03-14 PROCEDURE — 85025 COMPLETE CBC W/AUTO DIFF WBC: CPT | Performed by: EMERGENCY MEDICINE

## 2024-03-14 PROCEDURE — 93005 ELECTROCARDIOGRAM TRACING: CPT

## 2024-03-14 PROCEDURE — 85027 COMPLETE CBC AUTOMATED: CPT | Mod: 91

## 2024-03-14 PROCEDURE — 85027 COMPLETE CBC AUTOMATED: CPT | Performed by: EMERGENCY MEDICINE

## 2024-03-14 PROCEDURE — 2500000004 HC RX 250 GENERAL PHARMACY W/ HCPCS (ALT 636 FOR OP/ED): Performed by: INTERNAL MEDICINE

## 2024-03-14 PROCEDURE — 2500000004 HC RX 250 GENERAL PHARMACY W/ HCPCS (ALT 636 FOR OP/ED): Performed by: ANESTHESIOLOGY

## 2024-03-14 PROCEDURE — 99222 1ST HOSP IP/OBS MODERATE 55: CPT

## 2024-03-14 PROCEDURE — 2500000004 HC RX 250 GENERAL PHARMACY W/ HCPCS (ALT 636 FOR OP/ED): Performed by: EMERGENCY MEDICINE

## 2024-03-14 PROCEDURE — 84132 ASSAY OF SERUM POTASSIUM: CPT | Performed by: EMERGENCY MEDICINE

## 2024-03-14 PROCEDURE — 84484 ASSAY OF TROPONIN QUANT: CPT | Performed by: EMERGENCY MEDICINE

## 2024-03-14 PROCEDURE — 83605 ASSAY OF LACTIC ACID: CPT

## 2024-03-14 PROCEDURE — 2550000001 HC RX 255 CONTRASTS: Performed by: EMERGENCY MEDICINE

## 2024-03-14 RX ORDER — ONDANSETRON HYDROCHLORIDE 2 MG/ML
4 INJECTION, SOLUTION INTRAVENOUS ONCE
Status: COMPLETED | OUTPATIENT
Start: 2024-03-14 | End: 2024-03-14

## 2024-03-14 RX ORDER — ACETAMINOPHEN 160 MG/5ML
650 SOLUTION ORAL EVERY 4 HOURS PRN
Status: DISCONTINUED | OUTPATIENT
Start: 2024-03-14 | End: 2024-03-17 | Stop reason: HOSPADM

## 2024-03-14 RX ORDER — HYDROMORPHONE HYDROCHLORIDE 2 MG/ML
2 INJECTION, SOLUTION INTRAMUSCULAR; INTRAVENOUS; SUBCUTANEOUS ONCE
Status: COMPLETED | OUTPATIENT
Start: 2024-03-14 | End: 2024-03-14

## 2024-03-14 RX ORDER — KETOROLAC TROMETHAMINE 30 MG/ML
15 INJECTION, SOLUTION INTRAMUSCULAR; INTRAVENOUS ONCE
Status: COMPLETED | OUTPATIENT
Start: 2024-03-14 | End: 2024-03-14

## 2024-03-14 RX ORDER — SODIUM CHLORIDE 9 MG/ML
50 INJECTION, SOLUTION INTRAVENOUS CONTINUOUS
Status: DISCONTINUED | OUTPATIENT
Start: 2024-03-15 | End: 2024-03-16

## 2024-03-14 RX ORDER — HYDROMORPHONE HYDROCHLORIDE 1 MG/ML
2 INJECTION, SOLUTION INTRAMUSCULAR; INTRAVENOUS; SUBCUTANEOUS ONCE
Status: COMPLETED | OUTPATIENT
Start: 2024-03-14 | End: 2024-03-14

## 2024-03-14 RX ORDER — POLYETHYLENE GLYCOL 3350 17 G/17G
17 POWDER, FOR SOLUTION ORAL DAILY
Status: DISCONTINUED | OUTPATIENT
Start: 2024-03-15 | End: 2024-03-15

## 2024-03-14 RX ORDER — HYDROMORPHONE HYDROCHLORIDE 1 MG/ML
1 INJECTION, SOLUTION INTRAMUSCULAR; INTRAVENOUS; SUBCUTANEOUS ONCE
Status: COMPLETED | OUTPATIENT
Start: 2024-03-14 | End: 2024-03-14

## 2024-03-14 RX ORDER — ACETAMINOPHEN 650 MG/1
650 SUPPOSITORY RECTAL EVERY 4 HOURS PRN
Status: DISCONTINUED | OUTPATIENT
Start: 2024-03-14 | End: 2024-03-17 | Stop reason: HOSPADM

## 2024-03-14 RX ORDER — HYDROMORPHONE HYDROCHLORIDE 1 MG/ML
1 INJECTION, SOLUTION INTRAMUSCULAR; INTRAVENOUS; SUBCUTANEOUS EVERY 4 HOURS PRN
Status: DISCONTINUED | OUTPATIENT
Start: 2024-03-14 | End: 2024-03-15

## 2024-03-14 RX ORDER — MORPHINE SULFATE 4 MG/ML
4 INJECTION, SOLUTION INTRAMUSCULAR; INTRAVENOUS ONCE
Status: COMPLETED | OUTPATIENT
Start: 2024-03-14 | End: 2024-03-14

## 2024-03-14 RX ORDER — PROCHLORPERAZINE EDISYLATE 5 MG/ML
10 INJECTION INTRAMUSCULAR; INTRAVENOUS ONCE
Status: DISCONTINUED | OUTPATIENT
Start: 2024-03-14 | End: 2024-03-17 | Stop reason: HOSPADM

## 2024-03-14 RX ORDER — POTASSIUM CHLORIDE 14.9 MG/ML
20 INJECTION INTRAVENOUS
Status: COMPLETED | OUTPATIENT
Start: 2024-03-15 | End: 2024-03-15

## 2024-03-14 RX ORDER — PANTOPRAZOLE SODIUM 40 MG/10ML
80 INJECTION, POWDER, LYOPHILIZED, FOR SOLUTION INTRAVENOUS 2 TIMES DAILY
Status: DISCONTINUED | OUTPATIENT
Start: 2024-03-14 | End: 2024-03-17

## 2024-03-14 RX ORDER — HYDRALAZINE HYDROCHLORIDE 20 MG/ML
10 INJECTION INTRAMUSCULAR; INTRAVENOUS EVERY 6 HOURS PRN
Status: DISCONTINUED | OUTPATIENT
Start: 2024-03-14 | End: 2024-03-17 | Stop reason: HOSPADM

## 2024-03-14 RX ORDER — ACETAMINOPHEN 325 MG/1
650 TABLET ORAL EVERY 4 HOURS PRN
Status: DISCONTINUED | OUTPATIENT
Start: 2024-03-14 | End: 2024-03-17 | Stop reason: HOSPADM

## 2024-03-14 RX ORDER — PANTOPRAZOLE SODIUM 40 MG/10ML
80 INJECTION, POWDER, LYOPHILIZED, FOR SOLUTION INTRAVENOUS ONCE
Status: COMPLETED | OUTPATIENT
Start: 2024-03-14 | End: 2024-03-14

## 2024-03-14 RX ORDER — ONDANSETRON HYDROCHLORIDE 2 MG/ML
4 INJECTION, SOLUTION INTRAVENOUS EVERY 4 HOURS PRN
Status: DISCONTINUED | OUTPATIENT
Start: 2024-03-14 | End: 2024-03-17 | Stop reason: HOSPADM

## 2024-03-14 RX ORDER — HYDRALAZINE HYDROCHLORIDE 20 MG/ML
5 INJECTION INTRAMUSCULAR; INTRAVENOUS ONCE
Status: COMPLETED | OUTPATIENT
Start: 2024-03-14 | End: 2024-03-14

## 2024-03-14 RX ADMIN — HYDROMORPHONE HYDROCHLORIDE 2 MG: 2 INJECTION INTRAMUSCULAR; INTRAVENOUS; SUBCUTANEOUS at 17:15

## 2024-03-14 RX ADMIN — HYDROMORPHONE HYDROCHLORIDE 1 MG: 1 INJECTION, SOLUTION INTRAMUSCULAR; INTRAVENOUS; SUBCUTANEOUS at 14:22

## 2024-03-14 RX ADMIN — ONDANSETRON 4 MG: 2 INJECTION INTRAMUSCULAR; INTRAVENOUS at 20:06

## 2024-03-14 RX ADMIN — KETOROLAC TROMETHAMINE 15 MG: 30 INJECTION, SOLUTION INTRAMUSCULAR at 21:28

## 2024-03-14 RX ADMIN — HYDRALAZINE HYDROCHLORIDE 5 MG: 20 INJECTION, SOLUTION INTRAMUSCULAR; INTRAVENOUS at 17:15

## 2024-03-14 RX ADMIN — HYDRALAZINE HYDROCHLORIDE 10 MG: 20 INJECTION INTRAMUSCULAR; INTRAVENOUS at 21:49

## 2024-03-14 RX ADMIN — HYDROMORPHONE HYDROCHLORIDE 1 MG: 1 INJECTION, SOLUTION INTRAMUSCULAR; INTRAVENOUS; SUBCUTANEOUS at 13:01

## 2024-03-14 RX ADMIN — IOHEXOL 90 ML: 350 INJECTION, SOLUTION INTRAVENOUS at 15:21

## 2024-03-14 RX ADMIN — ONDANSETRON 4 MG: 2 INJECTION INTRAMUSCULAR; INTRAVENOUS at 13:01

## 2024-03-14 RX ADMIN — HYDROMORPHONE HYDROCHLORIDE 2 MG: 1 INJECTION, SOLUTION INTRAMUSCULAR; INTRAVENOUS; SUBCUTANEOUS at 21:28

## 2024-03-14 RX ADMIN — HYDROMORPHONE HYDROCHLORIDE 1 MG: 1 INJECTION, SOLUTION INTRAMUSCULAR; INTRAVENOUS; SUBCUTANEOUS at 20:05

## 2024-03-14 RX ADMIN — SODIUM CHLORIDE, POTASSIUM CHLORIDE, SODIUM LACTATE AND CALCIUM CHLORIDE 2000 ML: 600; 310; 30; 20 INJECTION, SOLUTION INTRAVENOUS at 13:02

## 2024-03-14 RX ADMIN — PANTOPRAZOLE SODIUM 80 MG: 40 INJECTION, POWDER, FOR SOLUTION INTRAVENOUS at 13:01

## 2024-03-14 RX ADMIN — MORPHINE SULFATE 4 MG: 4 INJECTION, SOLUTION INTRAMUSCULAR; INTRAVENOUS at 16:03

## 2024-03-14 ASSESSMENT — PAIN SCALES - GENERAL
PAINLEVEL_OUTOF10: 10 - WORST POSSIBLE PAIN
PAINLEVEL_OUTOF10: 8
PAINLEVEL_OUTOF10: 10 - WORST POSSIBLE PAIN
PAINLEVEL_OUTOF10: 10 - WORST POSSIBLE PAIN

## 2024-03-14 ASSESSMENT — PAIN DESCRIPTION - LOCATION
LOCATION: ABDOMEN

## 2024-03-14 ASSESSMENT — COGNITIVE AND FUNCTIONAL STATUS - GENERAL
DAILY ACTIVITIY SCORE: 24
MOBILITY SCORE: 24

## 2024-03-14 ASSESSMENT — PAIN DESCRIPTION - PAIN TYPE: TYPE: ACUTE PAIN

## 2024-03-14 ASSESSMENT — PAIN - FUNCTIONAL ASSESSMENT
PAIN_FUNCTIONAL_ASSESSMENT: 0-10
PAIN_FUNCTIONAL_ASSESSMENT: 0-10
PAIN_FUNCTIONAL_ASSESSMENT: VAS (VISUAL ANALOG SCALE)
PAIN_FUNCTIONAL_ASSESSMENT: 0-10

## 2024-03-14 ASSESSMENT — COLUMBIA-SUICIDE SEVERITY RATING SCALE - C-SSRS
6. HAVE YOU EVER DONE ANYTHING, STARTED TO DO ANYTHING, OR PREPARED TO DO ANYTHING TO END YOUR LIFE?: NO
2. HAVE YOU ACTUALLY HAD ANY THOUGHTS OF KILLING YOURSELF?: NO
1. IN THE PAST MONTH, HAVE YOU WISHED YOU WERE DEAD OR WISHED YOU COULD GO TO SLEEP AND NOT WAKE UP?: NO

## 2024-03-14 NOTE — ED NOTES
CHW talked to  patient and family members, at the bedside, regarding not having a primary care physician. Family member expressed that the patient has a future appointment with a new physician from  named [facility] on April 8, and [facility]. CHW updated patient chart with the physician name added. Family expressed appreciation.     Marquita Sheth, [facility]  03/14/24 1921

## 2024-03-14 NOTE — ED NOTES
RN attempted to call report and receiving RN was in report at this time. She will call back in 15 minutes.      Kaleigh Escalante RN  03/14/24 7924

## 2024-03-14 NOTE — PROGRESS NOTES
Pharmacy Medication History Review    Douglas Wilson is a 53 y.o. male admitted for No Principal Problem: There is no principal problem currently on the Problem List. Please update the Problem List and refresh.. Pharmacy reviewed the patient's [id number] medications and allergies for accuracy.    The list below reflectives the updated PTA list. Please review each medication in order reconciliation for additional clarification and justification.  Prior to Admission Medications   Prescriptions Last Dose Informant   apixaban (Eliquis) 5 mg tablet 3/14/2024 Family Member   Sig: Take 1 tablet (5 mg) by mouth 2 times a day. Do not start before March 6, 2024.   omeprazole (PriLOSEC) 40 mg DR capsule Unknown Self, Family Member   Sig: Take 1 capsule (40 mg) by mouth once daily as needed. Do not crush or chew.   tamsulosin (Flomax) 0.4 mg 24 hr capsule Unknown Family Member   Sig: Take 1 capsule (0.4 mg) by mouth once daily. Med has not been filled since April. Caregiver confirmed that patient actually takes at home   traMADol (Ultram) 50 mg tablet 3/14/2024 Family Member   Sig: Take 1 tablet (50 mg) by mouth 2 times a day as needed for severe pain (7 - 10) for up to 4 days.      Facility-Administered Medications: None         The list below reflectives the updated allergy list. Please review each documented allergy for additional clarification and justification.  Allergies  Reviewed by Kylah Boss RN on 3/14/2024   No Known Allergies         Below are additional concerns with the patient's PTA list.  Spoke with patient    Danelle Ramey

## 2024-03-15 ENCOUNTER — ANESTHESIA EVENT (OUTPATIENT)
Dept: GASTROENTEROLOGY | Facility: HOSPITAL | Age: 54
End: 2024-03-15
Payer: COMMERCIAL

## 2024-03-15 ENCOUNTER — APPOINTMENT (OUTPATIENT)
Dept: GASTROENTEROLOGY | Facility: HOSPITAL | Age: 54
End: 2024-03-15
Payer: COMMERCIAL

## 2024-03-15 ENCOUNTER — HOSPITAL ENCOUNTER (OUTPATIENT)
Dept: CARDIOLOGY | Facility: HOSPITAL | Age: 54
Discharge: HOME | End: 2024-03-15
Payer: COMMERCIAL

## 2024-03-15 ENCOUNTER — ANESTHESIA (OUTPATIENT)
Dept: GASTROENTEROLOGY | Facility: HOSPITAL | Age: 54
End: 2024-03-15
Payer: COMMERCIAL

## 2024-03-15 LAB
ANION GAP SERPL CALC-SCNC: 14 MMOL/L (ref 10–20)
BASOPHILS # BLD AUTO: 0.02 X10*3/UL (ref 0–0.1)
BASOPHILS NFR BLD AUTO: 0.2 %
BUN SERPL-MCNC: 10 MG/DL (ref 6–23)
CALCIUM SERPL-MCNC: 9.3 MG/DL (ref 8.6–10.3)
CHLORIDE SERPL-SCNC: 98 MMOL/L (ref 98–107)
CO2 SERPL-SCNC: 25 MMOL/L (ref 21–32)
CREAT SERPL-MCNC: 0.88 MG/DL (ref 0.5–1.3)
EGFRCR SERPLBLD CKD-EPI 2021: >90 ML/MIN/1.73M*2
EOSINOPHIL # BLD AUTO: 0.01 X10*3/UL (ref 0–0.7)
EOSINOPHIL NFR BLD AUTO: 0.1 %
ERYTHROCYTE [DISTWIDTH] IN BLOOD BY AUTOMATED COUNT: 18 % (ref 11.5–14.5)
GLUCOSE SERPL-MCNC: 113 MG/DL (ref 74–99)
HCT VFR BLD AUTO: 38.1 % (ref 41–52)
HGB BLD-MCNC: 11.9 G/DL (ref 13.5–17.5)
IMM GRANULOCYTES # BLD AUTO: 0.05 X10*3/UL (ref 0–0.7)
IMM GRANULOCYTES NFR BLD AUTO: 0.4 % (ref 0–0.9)
LYMPHOCYTES # BLD AUTO: 1.71 X10*3/UL (ref 1.2–4.8)
LYMPHOCYTES NFR BLD AUTO: 14.6 %
MCH RBC QN AUTO: 22.5 PG (ref 26–34)
MCHC RBC AUTO-ENTMCNC: 31.2 G/DL (ref 32–36)
MCV RBC AUTO: 72 FL (ref 80–100)
MONOCYTES # BLD AUTO: 1.37 X10*3/UL (ref 0.1–1)
MONOCYTES NFR BLD AUTO: 11.7 %
NEUTROPHILS # BLD AUTO: 8.52 X10*3/UL (ref 1.2–7.7)
NEUTROPHILS NFR BLD AUTO: 73 %
NRBC BLD-RTO: 0 /100 WBCS (ref 0–0)
PLATELET # BLD AUTO: 414 X10*3/UL (ref 150–450)
POTASSIUM SERPL-SCNC: 4.3 MMOL/L (ref 3.5–5.3)
RBC # BLD AUTO: 5.29 X10*6/UL (ref 4.5–5.9)
SODIUM SERPL-SCNC: 133 MMOL/L (ref 136–145)
WBC # BLD AUTO: 11.7 X10*3/UL (ref 4.4–11.3)

## 2024-03-15 PROCEDURE — 2500000004 HC RX 250 GENERAL PHARMACY W/ HCPCS (ALT 636 FOR OP/ED)

## 2024-03-15 PROCEDURE — 88305 TISSUE EXAM BY PATHOLOGIST: CPT | Performed by: PATHOLOGY

## 2024-03-15 PROCEDURE — 80048 BASIC METABOLIC PNL TOTAL CA: CPT | Performed by: INTERNAL MEDICINE

## 2024-03-15 PROCEDURE — 93005 ELECTROCARDIOGRAM TRACING: CPT

## 2024-03-15 PROCEDURE — 96365 THER/PROPH/DIAG IV INF INIT: CPT

## 2024-03-15 PROCEDURE — 2500000004 HC RX 250 GENERAL PHARMACY W/ HCPCS (ALT 636 FOR OP/ED): Performed by: INTERNAL MEDICINE

## 2024-03-15 PROCEDURE — 96366 THER/PROPH/DIAG IV INF ADDON: CPT

## 2024-03-15 PROCEDURE — 88305 TISSUE EXAM BY PATHOLOGIST: CPT | Mod: TC,AHULAB | Performed by: INTERNAL MEDICINE

## 2024-03-15 PROCEDURE — 3700000002 HC GENERAL ANESTHESIA TIME - EACH INCREMENTAL 1 MINUTE

## 2024-03-15 PROCEDURE — 3700000001 HC GENERAL ANESTHESIA TIME - INITIAL BASE CHARGE

## 2024-03-15 PROCEDURE — C9113 INJ PANTOPRAZOLE SODIUM, VIA: HCPCS | Performed by: INTERNAL MEDICINE

## 2024-03-15 PROCEDURE — 7100000010 HC PHASE TWO TIME - EACH INCREMENTAL 1 MINUTE

## 2024-03-15 PROCEDURE — 43239 EGD BIOPSY SINGLE/MULTIPLE: CPT | Performed by: INTERNAL MEDICINE

## 2024-03-15 PROCEDURE — G0378 HOSPITAL OBSERVATION PER HR: HCPCS

## 2024-03-15 PROCEDURE — A43239 PR EDG TRANSORAL BIOPSY SINGLE/MULTIPLE: Performed by: ANESTHESIOLOGIST ASSISTANT

## 2024-03-15 PROCEDURE — 36415 COLL VENOUS BLD VENIPUNCTURE: CPT | Performed by: INTERNAL MEDICINE

## 2024-03-15 PROCEDURE — 7100000009 HC PHASE TWO TIME - INITIAL BASE CHARGE

## 2024-03-15 PROCEDURE — 96376 TX/PRO/DX INJ SAME DRUG ADON: CPT

## 2024-03-15 PROCEDURE — 99222 1ST HOSP IP/OBS MODERATE 55: CPT | Performed by: INTERNAL MEDICINE

## 2024-03-15 PROCEDURE — 2500000004 HC RX 250 GENERAL PHARMACY W/ HCPCS (ALT 636 FOR OP/ED): Performed by: ANESTHESIOLOGIST ASSISTANT

## 2024-03-15 PROCEDURE — 85025 COMPLETE CBC W/AUTO DIFF WBC: CPT | Performed by: INTERNAL MEDICINE

## 2024-03-15 PROCEDURE — A43239 PR EDG TRANSORAL BIOPSY SINGLE/MULTIPLE: Performed by: ANESTHESIOLOGY

## 2024-03-15 RX ORDER — MIDAZOLAM HYDROCHLORIDE 1 MG/ML
INJECTION INTRAMUSCULAR; INTRAVENOUS AS NEEDED
Status: DISCONTINUED | OUTPATIENT
Start: 2024-03-15 | End: 2024-03-15

## 2024-03-15 RX ORDER — HYDROMORPHONE HYDROCHLORIDE 1 MG/ML
2 INJECTION, SOLUTION INTRAMUSCULAR; INTRAVENOUS; SUBCUTANEOUS
Status: DISCONTINUED | OUTPATIENT
Start: 2024-03-15 | End: 2024-03-17 | Stop reason: HOSPADM

## 2024-03-15 RX ORDER — POLYETHYLENE GLYCOL 3350 17 G/17G
17 POWDER, FOR SOLUTION ORAL 2 TIMES DAILY
Status: DISCONTINUED | OUTPATIENT
Start: 2024-03-15 | End: 2024-03-17 | Stop reason: HOSPADM

## 2024-03-15 RX ORDER — PROPOFOL 10 MG/ML
INJECTION, EMULSION INTRAVENOUS AS NEEDED
Status: DISCONTINUED | OUTPATIENT
Start: 2024-03-15 | End: 2024-03-15

## 2024-03-15 RX ORDER — SODIUM CHLORIDE, SODIUM LACTATE, POTASSIUM CHLORIDE, CALCIUM CHLORIDE 600; 310; 30; 20 MG/100ML; MG/100ML; MG/100ML; MG/100ML
20 INJECTION, SOLUTION INTRAVENOUS CONTINUOUS
Status: CANCELLED | OUTPATIENT
Start: 2024-03-15

## 2024-03-15 RX ORDER — TAMSULOSIN HYDROCHLORIDE 0.4 MG/1
0.4 CAPSULE ORAL DAILY
Status: DISCONTINUED | OUTPATIENT
Start: 2024-03-15 | End: 2024-03-17 | Stop reason: HOSPADM

## 2024-03-15 RX ORDER — SODIUM CHLORIDE, SODIUM LACTATE, POTASSIUM CHLORIDE, CALCIUM CHLORIDE 600; 310; 30; 20 MG/100ML; MG/100ML; MG/100ML; MG/100ML
INJECTION, SOLUTION INTRAVENOUS CONTINUOUS PRN
Status: DISCONTINUED | OUTPATIENT
Start: 2024-03-15 | End: 2024-03-15

## 2024-03-15 RX ADMIN — HYDRALAZINE HYDROCHLORIDE 10 MG: 20 INJECTION INTRAMUSCULAR; INTRAVENOUS at 12:01

## 2024-03-15 RX ADMIN — HYDROMORPHONE HYDROCHLORIDE 2 MG: 1 INJECTION, SOLUTION INTRAMUSCULAR; INTRAVENOUS; SUBCUTANEOUS at 12:02

## 2024-03-15 RX ADMIN — PROPOFOL 20 MG: 10 INJECTION, EMULSION INTRAVENOUS at 15:14

## 2024-03-15 RX ADMIN — SODIUM CHLORIDE 50 ML/HR: 9 INJECTION, SOLUTION INTRAVENOUS at 00:13

## 2024-03-15 RX ADMIN — HYDROMORPHONE HYDROCHLORIDE 1 MG: 1 INJECTION, SOLUTION INTRAMUSCULAR; INTRAVENOUS; SUBCUTANEOUS at 00:13

## 2024-03-15 RX ADMIN — PROPOFOL 50 MG: 10 INJECTION, EMULSION INTRAVENOUS at 15:12

## 2024-03-15 RX ADMIN — POLYETHYLENE GLYCOL 3350 17 G: 17 POWDER, FOR SOLUTION ORAL at 22:12

## 2024-03-15 RX ADMIN — SODIUM CHLORIDE 50 ML/HR: 9 INJECTION, SOLUTION INTRAVENOUS at 22:07

## 2024-03-15 RX ADMIN — POTASSIUM CHLORIDE 20 MEQ: 14.9 INJECTION, SOLUTION INTRAVENOUS at 00:13

## 2024-03-15 RX ADMIN — PANTOPRAZOLE SODIUM 80 MG: 40 INJECTION, POWDER, FOR SOLUTION INTRAVENOUS at 00:02

## 2024-03-15 RX ADMIN — HYDROMORPHONE HYDROCHLORIDE 1 MG: 1 INJECTION, SOLUTION INTRAMUSCULAR; INTRAVENOUS; SUBCUTANEOUS at 09:08

## 2024-03-15 RX ADMIN — ONDANSETRON 4 MG: 2 INJECTION INTRAMUSCULAR; INTRAVENOUS at 00:12

## 2024-03-15 RX ADMIN — PANTOPRAZOLE SODIUM 80 MG: 40 INJECTION, POWDER, FOR SOLUTION INTRAVENOUS at 22:08

## 2024-03-15 RX ADMIN — PROPOFOL 20 MG: 10 INJECTION, EMULSION INTRAVENOUS at 15:16

## 2024-03-15 RX ADMIN — POTASSIUM CHLORIDE 20 MEQ: 14.9 INJECTION, SOLUTION INTRAVENOUS at 02:10

## 2024-03-15 RX ADMIN — PANTOPRAZOLE SODIUM 80 MG: 40 INJECTION, POWDER, FOR SOLUTION INTRAVENOUS at 09:06

## 2024-03-15 RX ADMIN — MIDAZOLAM HYDROCHLORIDE 2 MG: 1 INJECTION INTRAMUSCULAR; INTRAVENOUS at 15:05

## 2024-03-15 RX ADMIN — SODIUM CHLORIDE, POTASSIUM CHLORIDE, SODIUM LACTATE AND CALCIUM CHLORIDE: 600; 310; 30; 20 INJECTION, SOLUTION INTRAVENOUS at 15:08

## 2024-03-15 RX ADMIN — HYDROMORPHONE HYDROCHLORIDE 2 MG: 1 INJECTION, SOLUTION INTRAMUSCULAR; INTRAVENOUS; SUBCUTANEOUS at 17:27

## 2024-03-15 ASSESSMENT — COGNITIVE AND FUNCTIONAL STATUS - GENERAL
DAILY ACTIVITIY SCORE: 24
MOBILITY SCORE: 24
MOBILITY SCORE: 24
DAILY ACTIVITIY SCORE: 24

## 2024-03-15 ASSESSMENT — ENCOUNTER SYMPTOMS
NEUROLOGICAL NEGATIVE: 1
NAUSEA: 1
RESPIRATORY NEGATIVE: 1
CONSTITUTIONAL NEGATIVE: 1
VOMITING: 1
MUSCULOSKELETAL NEGATIVE: 1
ABDOMINAL PAIN: 1
EYES NEGATIVE: 1
CARDIOVASCULAR NEGATIVE: 1

## 2024-03-15 ASSESSMENT — PAIN SCALES - GENERAL
PAINLEVEL_OUTOF10: 5 - MODERATE PAIN
PAINLEVEL_OUTOF10: 8
PAINLEVEL_OUTOF10: 0 - NO PAIN
PAINLEVEL_OUTOF10: 8
PAINLEVEL_OUTOF10: 10 - WORST POSSIBLE PAIN
PAINLEVEL_OUTOF10: 0 - NO PAIN
PAIN_LEVEL: 0

## 2024-03-15 ASSESSMENT — COLUMBIA-SUICIDE SEVERITY RATING SCALE - C-SSRS
1. IN THE PAST MONTH, HAVE YOU WISHED YOU WERE DEAD OR WISHED YOU COULD GO TO SLEEP AND NOT WAKE UP?: NO
2. HAVE YOU ACTUALLY HAD ANY THOUGHTS OF KILLING YOURSELF?: NO
6. HAVE YOU EVER DONE ANYTHING, STARTED TO DO ANYTHING, OR PREPARED TO DO ANYTHING TO END YOUR LIFE?: NO

## 2024-03-15 ASSESSMENT — ACTIVITIES OF DAILY LIVING (ADL)
ADEQUATE_TO_COMPLETE_ADL: YES
PATIENT'S MEMORY ADEQUATE TO SAFELY COMPLETE DAILY ACTIVITIES?: YES
JUDGMENT_ADEQUATE_SAFELY_COMPLETE_DAILY_ACTIVITIES: YES

## 2024-03-15 ASSESSMENT — PAIN - FUNCTIONAL ASSESSMENT
PAIN_FUNCTIONAL_ASSESSMENT: 0-10
PAIN_FUNCTIONAL_ASSESSMENT: VAS (VISUAL ANALOG SCALE)
PAIN_FUNCTIONAL_ASSESSMENT: 0-10
PAIN_FUNCTIONAL_ASSESSMENT: 0-10
PAIN_FUNCTIONAL_ASSESSMENT: VAS (VISUAL ANALOG SCALE)

## 2024-03-15 ASSESSMENT — PAIN DESCRIPTION - LOCATION: LOCATION: ABDOMEN

## 2024-03-15 ASSESSMENT — PAIN SCALES - WONG BAKER: WONGBAKER_NUMERICALRESPONSE: HURTS WORST

## 2024-03-15 NOTE — ANESTHESIA PREPROCEDURE EVALUATION
Patient: Douglas Wilson    Procedure Information       Date/Time: 03/15/24 1500    Scheduled providers: Husam Puckett MD; Willis Manning MD; Forest Duenas RN; Mahogany Nagy MA; Jose Angel Wade RN    Procedure: EGD    Location: [facility]                                                                                           Pre-Anesthesia Evaluation    Douglas Wilson is a 53 y.o. male presents for procedure stated above. He is admitted to the hospital for Gastrointestinal hemorrhage, unspecified gastrointestinal hemorrhage type [K92.2]. Today is      Medical History reviewed  Past Medical History:   Diagnosis Date   • Acute appendicitis without peritonitis 03/12/2024   • NSTEMI (non-ST elevated myocardial infarction) (CMS/[facility])    • Pulmonary embolism (CMS/[facility])    • Splenic artery aneurysm (CMS/[facility]) 02/2024    rupture s/p coil embolization       Surgical History reviewed  Past Surgical History:   Procedure Laterality Date   • APPENDECTOMY  03/12/2024   • CT ANGIO CORONARY ART WITH HEARTFLOW IF SCORE >30%  04/19/2022   • OTHER SURGICAL HISTORY  01/24/2022    Inguinal hernia repair laparoscopic   • SPLENIC ARTERY EMBOLIZATION  02/2024       Social History reviewed  He reports that he has been smoking cigarettes. He has been smoking an average of 1 pack per day. He has never used smokeless tobacco. He reports current drug use. Drug: Heroin. He reports that he does not drink alcohol.    Allergies and Medications reviewed  Patient has no known allergies.    No current facility-administered medications for this visit.  No current outpatient medications on file.    Facility-Administered Medications Ordered in Other Visits:   •  acetaminophen (Tylenol) tablet 650 mg, 650 mg, oral, q4h PRN **OR** acetaminophen (Tylenol) oral liquid 650 mg, 650 mg, oral, q4h PRN **OR** acetaminophen (Tylenol) suppository 650 mg, 650 mg, rectal, q4h PRN, Shemar Banda MD  •  hydrALAZINE (Apresoline) injection 10  "mg, 10 mg, intravenous, q6h PRN, Shemar Banda MD, 10 mg at 03/15/24 1201  •  HYDROmorphone (Dilaudid) injection 2 mg, 2 mg, intravenous, q3h PRN, Shemar Banda MD, 2 mg at 03/15/24 1202  •  ondansetron (Zofran) injection 4 mg, 4 mg, intravenous, q4h PRN, Shemar Banda MD, 4 mg at 03/15/24 0012  •  pantoprazole (ProtoNix) injection 80 mg, 80 mg, intravenous, BID, Shemar Banda MD, 80 mg at 03/15/24 0906  •  polyethylene glycol (Glycolax, Miralax) packet 17 g, 17 g, oral, Daily, Shemar Banda MD  •  prochlorperazine (Compazine) injection 10 mg, 10 mg, intravenous, Once, Richard Dennis, PharmD  •  sodium chloride 0.9% infusion, 50 mL/hr, intravenous, Continuous, Rianna Tim, APRN-CNP, Last Rate: 50 mL/hr at 03/15/24 0013, 50 mL/hr at 03/15/24 0013  •  tamsulosin (Flomax) 24 hr capsule 0.4 mg, 0.4 mg, oral, Daily, Shemar Banda MD    Prior to Admission medications    Medication Sig Start Date End Date Taking? Authorizing Provider   apixaban (Eliquis) 5 mg tablet Take 1 tablet (5 mg) by mouth 2 times a day. Do not start before March 6, 2024. 3/6/24 4/5/24  Shemar Banda MD   omeprazole (PriLOSEC) 40 mg DR capsule Take 1 capsule (40 mg) by mouth once daily as needed. Do not crush or chew.    Historical Provider, MD   tamsulosin (Flomax) 0.4 mg 24 hr capsule Take 1 capsule (0.4 mg) by mouth once daily. Med has not been filled since April. Caregiver confirmed that patient actually takes at home    Historical Provider, MD   traMADol (Ultram) 50 mg tablet Take 1 tablet (50 mg) by mouth 2 times a day as needed for severe pain (7 - 10) for up to 4 days. 3/13/24 3/17/24  Rangel Moore MD   apixaban (Eliquis) 5 mg tablet Take 2 tablets (10 mg) by mouth 2 times a day for 12 doses. 2/29/24 3/13/24  Shemar Banda MD         Relevant Results reviewed      No results found for: \"ABORH\"    Results from last 7 days   Lab Units 03/15/24  0525 03/14/24  2145 03/14/24  1713   WBC AUTO x10*3/uL 11.7* " "12.6* 13.3*   HEMOGLOBIN g/dL 11.9* 12.3* 12.0*   HEMATOCRIT % 38.1* 37.3* 38.0*   PLATELETS AUTO x10*3/uL 414 396 428     Results from last 7 days   Lab Units 03/15/24  0525 03/14/24  2145 03/14/24  1300 03/13/24  0537   SODIUM mmol/L 133* 132* 137 136   POTASSIUM mmol/L 4.3 3.1* 3.4* 4.4   CHLORIDE mmol/L 98 96* 101 103   CO2 mmol/L 25 24 24 29   BUN mg/dL 10 7 5* 8   CREATININE mg/dL 0.88 0.73 0.73 0.93   CALCIUM mg/dL 9.3 9.3 7.9* 8.4*   PROTEIN TOTAL g/dL  --  8.2 6.8 6.1*   BILIRUBIN TOTAL mg/dL  --  0.6 0.6 0.3   ALK PHOS U/L  --  61 56 43   ALT U/L  --  13 14 12   AST U/L  --  13 15 14   GLUCOSE mg/dL 113* 137* 114* 150*         Lab Results   Component Value Date/Time    PROTIME 10.8 02/25/2024 2129    INR 1.0 02/25/2024 2129     No results found for: \"TROPONINT\"  Results from last 7 days   Lab Units 03/14/24  1300   TROPHS ng/L 46*              No results found for: \"HEPCAB\"  No components found for: \"HIV\"         Past Cardiology Tests (Last 3 Years):  EKG:  Results for orders placed during the hospital encounter of 03/14/24    ECG 12 lead    Narrative  Normal sinus rhythm  Minimal voltage criteria for LVH, may be normal variant ( Sokolow-Miranda )  Borderline ECG  When compared with ECG of 26-FEB-2024 20:15,  T wave amplitude has increased in Inferior leads  Nonspecific T wave abnormality no longer evident in Anterior leads  See ED provider note for full interpretation and clinical correlation  Confirmed by Mireille Chambers ([id number]) on 3/14/2024 4:24:19 PM      Results for orders placed during the hospital encounter of 02/25/24    Electrocardiogram, 12-lead PRN ACS symptoms    Narrative  Sinus bradycardia  Possible Left atrial enlargement  Left ventricular hypertrophy  Abnormal ECG  Confirmed by Jeremy Spear ([id number]) on 2/27/2024 5:02:13 PM      ECG 12 lead    Narrative  Sinus bradycardia  Minimal voltage criteria for LVH, may be normal variant ( Sokolow-Miranda )  Borderline ECG  When compared with ECG " of 22-FEB-2024 09:39,  Vent. rate has decreased BY  38 BPM  ST no longer depressed in Inferior leads  ST no longer depressed in Lateral leads  Nonspecific T wave abnormality, improved in Lateral leads  See ED provider note for full interpretation and clinical correlation  Confirmed by Hui Carrillo ([id number]) on 2/27/2024 4:21:29 PM      Results for orders placed during the hospital encounter of 02/22/24    ECG 12 lead    Narrative  Normal sinus rhythm  Nonspecific ST and T wave abnormality  Abnormal ECG  When compared with ECG of 22-FEB-2024 08:52, (unconfirmed)  Nonspecific T wave abnormality has replaced inverted T waves in Lateral leads  QT has lengthened  See ED provider note for full interpretation and clinical correlation  Confirmed by Hui Carrillo ([id number]) on 2/24/2024 10:56:17 AM      ECG 12 lead    Narrative  Normal sinus rhythm  Right atrial enlargement  Minimal voltage criteria for LVH, may be normal variant ( Sokolow-Miranda )  ST & T wave abnormality, consider lateral ischemia  Abnormal ECG  When compared with ECG of 07-JUL-2022 00:38,  Previous ECG has undetermined rhythm, needs review  ST now depressed in Inferior leads  ST depression has replaced ST elevation in Lateral leads  T wave inversion now evident in Lateral leads  See ED provider note for full interpretation and clinical correlation  Confirmed by Hui Carrillo ([id number]) on 2/24/2024 10:42:06 AM    No results found for this or any previous visit.  Echo:  Results for orders placed during the hospital encounter of 02/25/24    Transthoracic Echo (TTE) Complete    Narrative  [facility], [address] [place] [address]  Tel [phone] and Fax [phone]    TRANSTHORACIC ECHOCARDIOGRAM REPORT      Patient Name:      DONALD Haney Physician: [id number] Helio Barfield DO  Study Date:        2/27/2024           Ordering Provider: [id number] FNIESSE HALE  MRN/PID:           [id number]            Fellow:  Accession#:        [id number]         Nurse:  Date of Birth/Age: 1970 / 53      Sonographer:       Doni Panchal RDCS  years  Gender:            M                   Additional Staff:  Height:            177.80 cm           Admit Date:        2/25/2024  Weight:            79.38 kg            Admission Status:  Inpatient - Routine  BSA / BMI:         1.97 m2 / 25.11     Encounter#:        [id number]  kg/m2  Blood Pressure: 127 /77 mmHg    Study Type:    TRANSTHORACIC ECHO (TTE) LIMITED  Diagnosis/ICD: Elevated Troponin-R79.89  Indication:    Elevated Troponin  CPT Code:      Echo Limited-70228; Color Doppler-99260; Doppler Limited-98512    Patient History:  Pertinent History: HTN and Hyperlipidemia.    Study Detail: The following Echo studies were performed: 2D, M-Mode, Doppler and  color flow. Technically challenging study due to prominent lung  artifact.      PHYSICIAN INTERPRETATION:  Left Ventricle: The left ventricular systolic function is normal, with an estimated ejection fraction of 60-65%. The calculated ejection fraction is normal at 63 % using the Villagomez's Bi-plane MOD calculation. There are no regional wall motion abnormalities. The left ventricular cavity size is normal. Left ventricular diastolic filling was not assessed. The LVEF is no longer hyperdynamic on todays study.  Left Atrium: The left atrium is normal in size.  Right Ventricle: The right ventricle was not assessed. There is normal right ventricular global systolic function.  Right Atrium: The right atrium was not assessed.  Aortic Valve: The aortic valve appears structurally normal. There is no evidence of aortic valve regurgitation.  Mitral Valve: The mitral valve is normal in structure. There is trace mitral valve regurgitation.  Tricuspid Valve: The tricuspid valve is structurally normal. There is trace tricuspid regurgitation. The Doppler estimated RVSP is within normal limits at 21.3 mmHg.  Pulmonic Valve: The pulmonic valve is not well visualized. Pulmonic valve  regurgitation was not assessed.  Pericardium: There is no pericardial effusion noted.  Aorta: The aortic root was not assessed.  Systemic Veins: The inferior vena cava size appears small. There is IVC inspiratory collapse greater than 50%.  In comparison to the previous echocardiogram(s): Compared with study from 2/22/2024,. The LVEF is no longer hyperdynamic. No focal wall motion abnormalities.      CONCLUSIONS:  1. Left ventricular systolic function is normal with a 60-65% estimated ejection fraction.  2. RVSP within normal limits.  3. The LVEF is no longer hyperdynamic. No focal wall motion abnormalities.    QUANTITATIVE DATA SUMMARY:  LV SYSTOLIC FUNCTION BY 2D PLANIMETRY (MOD):  Normal Ranges:  EF-A4C View: 56.2 % (>=55%)  EF-A2C View: 67.9 %  EF-Biplane:  62.7 %    TRICUSPID VALVE/RVSP:  Normal Ranges:  Peak TR Velocity: 2.14 m/s  Est. RA Pressure: 3 mmHg  RV Syst Pressure: 21.3 mmHg (< 30mmHg)  IVC Diam:         1.16 cm      [id number] Helio Barfield DO  Electronically signed on 2/27/2024 at 4:52:15 PM        ** Final **      Results for orders placed during the hospital encounter of 02/22/24    Transthoracic Echo (TTE) Complete    Narrative  [facility], [address] [place] [address]  Tel [phone] and Fax [phone]    TRANSTHORACIC ECHOCARDIOGRAM REPORT      Patient Name:      DONALD Haney Physician:    [id number] Tremayne Sepulveda DO  Study Date:        2/22/2024            Ordering Provider:    [id number] SHIRA DYKES  MRN/PID:           [id number]             Fellow:  Accession#:        [id number]         Nurse:  Date of Birth/Age: 1970 / 53 years Sonographer:          Nirmala Cee RDCS  Gender:            M                    Additional Staff:  Height:            177.80 cm            Admit Date:           2/22/2024  Weight:            79.38 kg             Admission Status:     Inpatient -  Routine  BSA / BMI:         1.97 m2 / 25.11      Encounter#:            [id number]  kg/m2  Department Location:  Critical access hospital Non  Invasive  Blood Pressure: 132 /67 mmHg    Study Type:    TRANSTHORACIC ECHO (TTE) COMPLETE  Diagnosis/ICD: Elevated Troponin-R79.89  Indication:    Elevated Troponin  CPT Code:      Echo Complete w Full Doppler-74115    Patient History:  Pertinent History: HTN and Hyperlipidemia. Opioid Use Disorder, GERD.    Study Detail: The following Echo studies were performed: 2D, M-Mode, Doppler and  color flow. Technically challenging study due to prominent lung  artifact.      PHYSICIAN INTERPRETATION:  Left Ventricle: The left ventricular systolic function is hyperdynamic, with an estimated ejection fraction of 70-75%. There are no regional wall motion abnormalities. The left ventricular cavity size is normal. Spectral Doppler shows a normal pattern of left ventricular diastolic filling.  Left Atrium: The left atrium is normal in size.  Right Ventricle: The right ventricle is normal in size. There is normal right ventricular global systolic function.  Right Atrium: The right atrium is normal in size.  Aortic Valve: The aortic valve appears structurally normal. There is no evidence of aortic valve regurgitation. The peak instantaneous gradient of the aortic valve is 13.7 mmHg. The mean gradient of the aortic valve is 8.0 mmHg.  Mitral Valve: The mitral valve is normal in structure. There is no evidence of mitral valve regurgitation.  Tricuspid Valve: The tricuspid valve is structurally normal. No evidence of tricuspid regurgitation.  Pulmonic Valve: The pulmonic valve is structurally normal. There is no indication of pulmonic valve regurgitation.  Pericardium: There is no pericardial effusion noted.  Aorta: The aortic root is normal.  In comparison to the previous echocardiogram(s): Compared with study from 1/5/2022,.      CONCLUSIONS:  1. Left ventricular systolic function is hyperdynamic with a 70-75% estimated ejection fraction.  2. No valvular  abnormalities.    QUANTITATIVE DATA SUMMARY:  2D MEASUREMENTS:  Normal Ranges:  Ao Root d:     3.20 cm   (2.0-3.7cm)  LAs:           3.20 cm   (2.7-4.0cm)  IVSd:          1.10 cm   (0.6-1.1cm)  LVPWd:         1.00 cm   (0.6-1.1cm)  LVIDd:         4.40 cm   (3.9-5.9cm)  LVIDs:         2.70 cm  LV Mass Index: 80.2 g/m2  LV % FS        38.6 %    LA VOLUME:  Normal Ranges:  LA Vol A4C:        46.6 ml    (22+/-6mL/m2)  LA Vol A2C:        44.3 ml  LA Vol BP:         45.5 ml  LA Vol Index A4C:  23.6ml/m2  LA Vol Index A2C:  22.5 ml/m2  LA Vol Index BP:   23.1 ml/m2  LA Area A4C:       15.5 cm2  LA Area A2C:       15.1 cm2  LA Major Axis A4C: 4.4 cm  LA Major Axis A2C: 4.4 cm  LA Volume Index:   23.1 ml/m2    RA VOLUME BY A/L METHOD:  Normal Ranges:  RA Vol A4C:        41.9 ml    (8.3-19.5ml)  RA Vol Index A4C:  21.2 ml/m2  RA Area A4C:       15.2 cm2  RA Major Axis A4C: 4.7 cm    M-MODE MEASUREMENTS:  Normal Ranges:  Ao Root: 3.20 cm (2.0-3.7cm)  LAs:     3.70 cm (2.7-4.0cm)    AORTA MEASUREMENTS:  Normal Ranges:  Ao Sinus, d: 3.20 cm (2.1-3.5cm)  Ao STJ, d:   2.75 cm (1.7-3.4cm)  Asc Ao, d:   3.20 cm (2.1-3.4cm)    LV SYSTOLIC FUNCTION BY 2D PLANIMETRY (MOD):  Normal Ranges:  EF-A4C View: 70.0 % (>=55%)  EF-A2C View: 68.1 %  EF-Biplane:  68.1 %    LV DIASTOLIC FUNCTION:  Normal Ranges:  MV Peak E:        0.62 m/s   (0.7-1.2 m/s)  MV Peak A:        0.82 m/s   (0.42-0.7 m/s)  E/A Ratio:        0.75       (1.0-2.2)  MV e'             0.11 m/s   (>8.0)  MV lateral e'     0.11 m/s  MV medial e'      0.05 m/s  MV A Dur:         92.00 msec  E/e' Ratio:       5.65       (<8.0)  a'                0.11 m/s  PulmV Sys Griffin:    80.00 cm/s  PulmV Del Rio Griffin:   46.90 cm/s  PulmV S/D Grfifin:    1.70  PulmV A Revs Griffin: 35.00 cm/s  PulmV A Revs Dur: 95.00 msec    MITRAL VALVE:  Normal Ranges:  MV DT: 288 msec (150-240msec)    AORTIC VALVE:  Normal Ranges:  AoV Vmax:                1.85 m/s  (<=1.7m/s)  AoV Peak P.7 mmHg  (<20mmHg)  AoV Mean P.0 mmHg  (1.7-11.5mmHg)  LVOT Max Griffin:            1.66 m/s  (<=1.1m/s)  AoV VTI:                 25.30 cm  (18-25cm)  LVOT VTI:                24.10 cm  LVOT Diameter:           2.20 cm   (1.8-2.4cm)  AoV Area, VTI:           3.62 cm2  (2.5-5.5cm2)  AoV Area,Vmax:           3.41 cm2  (2.5-4.5cm2)  AoV Dimensionless Index: 0.95      RIGHT VENTRICLE:  RV Basal 3.84 cm  RV Mid   2.61 cm  RV Major 7.2 cm  TAPSE:   18.5 mm  RV s'    0.15 m/s    TRICUSPID VALVE/RVSP:  Normal Ranges:  Est. RA Pressure: 3 mmHg  IVC Diam:         1.33 cm    PULMONIC VALVE:  Normal Ranges:  PV Accel Time: 50 msec  (>120ms)  PV Max Griffin:    1.1 m/s  (0.6-0.9m/s)  PV Max P.8 mmHg    Pulmonary Veins:  PulmV A Revs Dur: 95.00 msec  PulmV A Revs Griffin: 35.00 cm/s  PulmV Del Rio Griffin:   46.90 cm/s  PulmV S/D Griffin:    1.70  PulmV Sys Griffin:    80.00 cm/s      [id number] Tremayne Sepulveda DO  Electronically signed on 2024 at 4:23:21 PM        ** Final **    No results found for this or any previous visit.  Ejection Fractions:  LV biplane EF   Date/Time Value Ref Range Status   2024 04:16 PM 63 %    2024 02:50 PM 68 %      Cath:  No results found for this or any previous visit.    Stress Test:  No results found for this or any previous visit from the past 1000 days.                                             Visit Vitals  Smoking Status Every Day                           Relevant Problems   Cardiovascular   (+) Acute pulmonary embolism without acute cor pulmonale (CMS/HCC)      Neuro/Psych   (+) Heroin abuse (CMS/HCC)      Pulmonary   (+) Acute pulmonary embolism without acute cor pulmonale (CMS/HCC)      Hematology   (+) Acute blood loss anemia       Clinical information reviewed:                   NPO Detail:  No data recorded     Physical Exam    Airway  Mallampati: II  TM distance: >3 FB  Neck ROM: full     Cardiovascular   Rhythm: regular  Rate: normal     Dental    Pulmonary   Comments: Normal RR and  effort   Abdominal          Anesthesia Plan    History of general anesthesia?: yes  History of complications of general anesthesia?: no    ASA 4     MAC     intravenous induction   Anesthetic plan and risks discussed with patient.    Plan discussed with CRNA and CAA.

## 2024-03-15 NOTE — PROGRESS NOTES
03/15/24 1112   Discharge Planning   Patient expects to be discharged to: [facility]          Patient came in with abdominal pain, n/v blood. H/H is stable. GI is following and plan is for upper endoscopy this afternoon. When medically cleared patient will go home with no needs.

## 2024-03-15 NOTE — SIGNIFICANT EVENT
Called to [facility] for acute pain. Pt is s/p appy 3/12, splenic a. Aneurysm coiling c/b acute PE. Family not satisfied with pain regimen. On review I have noted multiple doses of hydromorphone (last 1mg, 2005; total 5mg) with temporary relief. I have ordered a one time 15mg toradol and hydromorphone 2mg x1. Defer further management to Dr. Banda (notified via secure chat)

## 2024-03-15 NOTE — CONSULTS
Reason For Consult  Abdominal Pain    History Of Present Illness  Douglas Wilson is a 53 y.o. male presenting with diffuse abdominal pain, nausea and vomiting blood. Of note, he did undergo a splenic artery embolization due to a splenic aneurysm on 2/22 with IR complicated by an acute PE, diagnosed on 2/26. He did recently undergo a laparoscopic appendectomy with Dr. Maldonado on 3/12. He was discharged yesterday. He now comes back with diffuse abdominal pain, nausea and vomiting blood x2. His family member stated that this morning he developed the diffuse abdominal pain and vomited 2 times. States the vomit looked dark brown/red without clots.     Upon my examination, he is seen moaning in pain and a rapid response was called prior to my arrival due to acute pain. ICU attending, Dr. Reyez was at the bedside and did order pain medications and did message Dr. Banda.    He is afebrile and is not tachycardic, but is noted to be hypertensive. Most recent (at 2145) WBC 12.6, H/H 12.3/37.3. CT angio C/A/P demonstrated an evolving hematoma/seroma along the left side of the abdomen within the mesentery. Due to the patient's abdominal pain, recent surgical procedure and CT findings, general surgery was consulted.       Past Medical History  He has a past medical history of Acute appendicitis without peritonitis (03/12/2024), NSTEMI (non-ST elevated myocardial infarction) (CMS/HCC), Pulmonary embolism (CMS/HCC), and Splenic artery aneurysm (CMS/HCC) (02/2024).    Surgical History  He has a past surgical history that includes Other surgical history (01/24/2022); CT angio coronary art with heartflow if score >30% (04/19/2022); Appendectomy (03/12/2024); and Splenic artery embolization (02/2024).     Social History  He reports that he has been smoking cigarettes. He has been smoking an average of 1 pack per day. He has never used smokeless tobacco. He reports current drug use. Drug: Heroin. He reports that he does not drink  "alcohol.    Family History  No family history on file.     Allergies  Patient has no known allergies.    Review of Systems  ABD: + pain, N/V.      Physical Exam  Constitutional: Awake/alert/oriented x3. Moaning in pain.   Skin: Warm and dry.  Eyes: EOMI, clear sclera.  ENMT: Mucus membranes moist, no apparent injury.  Head/Neck: Neck supple, no apparent injury.  Respiratory: Unlabored breathing.  Cardiovascular: RRR.  GI: Non distended, soft, mildly tender to palpation to RLQ and LLQ. Lap sites C/D/I without drainage. Non-peritonitic.   Musculoskeletal: ROM intact.  Extremities: RODRIGUEZ.  Neurological: Alert and oriented x3.     Last Recorded Vitals  Blood pressure (!) 119/118, pulse 87, temperature 36.2 °C (97.1 °F), temperature source Temporal, resp. rate 20, height 1.778 m (5' 10\"), weight 76.2 kg (168 lb), SpO2 99 %.    Relevant Results  Results for orders placed or performed during the hospital encounter of 03/14/24 (from the past 24 hour(s))   CBC and Auto Differential   Result Value Ref Range    WBC 14.8 (H) 4.4 - 11.3 x10*3/uL    nRBC 0.0 0.0 - 0.0 /100 WBCs    RBC 5.09 4.50 - 5.90 x10*6/uL    Hemoglobin 11.7 (L) 13.5 - 17.5 g/dL    Hematocrit 36.8 (L) 41.0 - 52.0 %    MCV 72 (L) 80 - 100 fL    MCH 23.0 (L) 26.0 - 34.0 pg    MCHC 31.8 (L) 32.0 - 36.0 g/dL    RDW 17.6 (H) 11.5 - 14.5 %    Platelets 445 150 - 450 x10*3/uL    Neutrophils % 87.3 40.0 - 80.0 %    Immature Granulocytes %, Automated 0.6 0.0 - 0.9 %    Lymphocytes % 6.4 13.0 - 44.0 %    Monocytes % 5.5 2.0 - 10.0 %    Eosinophils % 0.0 0.0 - 6.0 %    Basophils % 0.2 0.0 - 2.0 %    Neutrophils Absolute 12.92 (H) 1.20 - 7.70 x10*3/uL    Immature Granulocytes Absolute, Automated 0.09 0.00 - 0.70 x10*3/uL    Lymphocytes Absolute 0.95 (L) 1.20 - 4.80 x10*3/uL    Monocytes Absolute 0.82 0.10 - 1.00 x10*3/uL    Eosinophils Absolute 0.00 0.00 - 0.70 x10*3/uL    Basophils Absolute 0.03 0.00 - 0.10 x10*3/uL   Basic metabolic panel   Result Value Ref Range    " Glucose 114 (H) 74 - 99 mg/dL    Sodium 137 136 - 145 mmol/L    Potassium 3.4 (L) 3.5 - 5.3 mmol/L    Chloride 101 98 - 107 mmol/L    Bicarbonate 24 21 - 32 mmol/L    Anion Gap 15 10 - 20 mmol/L    Urea Nitrogen 5 (L) 6 - 23 mg/dL    Creatinine 0.73 0.50 - 1.30 mg/dL    eGFR >90 >60 mL/min/1.73m*2    Calcium 7.9 (L) 8.6 - 10.3 mg/dL   Lipase   Result Value Ref Range    Lipase 7 (L) 9 - 82 U/L   Hepatic function panel   Result Value Ref Range    Albumin 3.9 3.4 - 5.0 g/dL    Bilirubin, Total 0.6 0.0 - 1.2 mg/dL    Bilirubin, Direct 0.1 0.0 - 0.3 mg/dL    Alkaline Phosphatase 56 33 - 120 U/L    ALT 14 10 - 52 U/L    AST 15 9 - 39 U/L    Total Protein 6.8 6.4 - 8.2 g/dL   Lactate   Result Value Ref Range    Lactate 1.8 0.4 - 2.0 mmol/L   Troponin I, High Sensitivity   Result Value Ref Range    Troponin I, High Sensitivity 46 (H) 0 - 20 ng/L   Blood Gas Venous Full Panel   Result Value Ref Range    POCT pH, Venous 7.46 (H) 7.33 - 7.43 pH    POCT pCO2, Venous 37 (L) 41 - 51 mm Hg    POCT pO2, Venous 43 35 - 45 mm Hg    POCT SO2, Venous 70 45 - 75 %    POCT Oxy Hemoglobin, Venous 68.1 45.0 - 75.0 %    POCT Hematocrit Calculated, Venous 36.0 (L) 41.0 - 52.0 %    POCT Sodium, Venous 133 (L) 136 - 145 mmol/L    POCT Potassium, Venous 3.2 (L) 3.5 - 5.3 mmol/L    POCT Chloride, Venous 99 98 - 107 mmol/L    POCT Ionized Calicum, Venous 1.07 (L) 1.10 - 1.33 mmol/L    POCT Glucose, Venous 128 (H) 74 - 99 mg/dL    POCT Lactate, Venous 1.9 0.4 - 2.0 mmol/L    POCT Base Excess, Venous 2.5 -2.0 - 3.0 mmol/L    POCT HCO3 Calculated, Venous 26.3 (H) 22.0 - 26.0 mmol/L    POCT Hemoglobin, Venous 12.0 (L) 13.5 - 17.5 g/dL    POCT Anion Gap, Venous 11.0 10.0 - 25.0 mmol/L    Patient Temperature 37.0 degrees Celsius    FiO2 21 %   ECG 12 lead   Result Value Ref Range    Ventricular Rate 79 BPM    Atrial Rate 79 BPM    AK Interval 160 ms    QRS Duration 82 ms    QT Interval 380 ms    QTC Calculation(Bazett) 435 ms    P Axis 79 degrees    R  Axis 27 degrees    T Axis 59 degrees    QRS Count 13 beats    Q Onset 224 ms    P Onset 144 ms    P Offset 200 ms    T Offset 414 ms    QTC Fredericia 416 ms   CBC   Result Value Ref Range    WBC 13.3 (H) 4.4 - 11.3 x10*3/uL    nRBC 0.0 0.0 - 0.0 /100 WBCs    RBC 5.20 4.50 - 5.90 x10*6/uL    Hemoglobin 12.0 (L) 13.5 - 17.5 g/dL    Hematocrit 38.0 (L) 41.0 - 52.0 %    MCV 73 (L) 80 - 100 fL    MCH 23.1 (L) 26.0 - 34.0 pg    MCHC 31.6 (L) 32.0 - 36.0 g/dL    RDW 17.5 (H) 11.5 - 14.5 %    Platelets 428 150 - 450 x10*3/uL   Blood Gas Venous Full Panel   Result Value Ref Range    POCT pH, Venous 7.42 7.33 - 7.43 pH    POCT pCO2, Venous 41 41 - 51 mm Hg    POCT pO2, Venous 32 (L) 35 - 45 mm Hg    POCT SO2, Venous 40 (L) 45 - 75 %    POCT Oxy Hemoglobin, Venous 39.4 (L) 45.0 - 75.0 %    POCT Hematocrit Calculated, Venous 39.0 (L) 41.0 - 52.0 %    POCT Sodium, Venous 133 (L) 136 - 145 mmol/L    POCT Potassium, Venous 3.3 (L) 3.5 - 5.3 mmol/L    POCT Chloride, Venous 97 (L) 98 - 107 mmol/L    POCT Ionized Calicum, Venous 1.11 1.10 - 1.33 mmol/L    POCT Glucose, Venous 128 (H) 74 - 99 mg/dL    POCT Lactate, Venous 2.7 (H) 0.4 - 2.0 mmol/L    POCT Base Excess, Venous 1.9 -2.0 - 3.0 mmol/L    POCT HCO3 Calculated, Venous 26.6 (H) 22.0 - 26.0 mmol/L    POCT Hemoglobin, Venous 12.9 (L) 13.5 - 17.5 g/dL    POCT Anion Gap, Venous 13.0 10.0 - 25.0 mmol/L    Patient Temperature 37.0 degrees Celsius    FiO2 21 %   CBC   Result Value Ref Range    WBC 12.6 (H) 4.4 - 11.3 x10*3/uL    nRBC 0.0 0.0 - 0.0 /100 WBCs    RBC 5.26 4.50 - 5.90 x10*6/uL    Hemoglobin 12.3 (L) 13.5 - 17.5 g/dL    Hematocrit 37.3 (L) 41.0 - 52.0 %    MCV 71 (L) 80 - 100 fL    MCH 23.4 (L) 26.0 - 34.0 pg    MCHC 33.0 32.0 - 36.0 g/dL    RDW 17.4 (H) 11.5 - 14.5 %    Platelets 396 150 - 450 x10*3/uL      ECG 12 lead    Result Date: 3/14/2024  Normal sinus rhythm Minimal voltage criteria for LVH, may be normal variant ( Sokolow-Miranda ) Borderline ECG When compared with  ECG of 26-FEB-2024 20:15, T wave amplitude has increased in Inferior leads Nonspecific T wave abnormality no longer evident in Anterior leads See ED provider note for full interpretation and clinical correlation Confirmed by Mireille Chambers ([id number]) on 3/14/2024 4:24:19 PM    CT angio chest abdomen pelvis    Result Date: 3/14/2024  Interpreted By:  Mario Frazier, STUDY: CT ANGIO CHEST ABDOMEN PELVIS; 3/14/2024 3:34 pm   INDICATION: Signs/Symptoms:abd pain sob hemetemesis recent splenic artery embolization, PE and appendectomy 2 days ago.   COMPARISON: 03/12/2024   ACCESSION NUMBER(S): [id number]   ORDERING CLINICIAN: KELLEY JUSTICE   TECHNIQUE: INTRAVENOUS CONTRAST: 75ml of non-ionic iodinated contrast was injected intravenously. Study was performed both with and without intravenous contrast. 3D MIP images of the chest, abdomen, and pelvis were obtained.   ORAL CONTRAST (Abdomen): Exam was performed without the use of oral contrast.   One or more of the following dose reduction techniques were used: Automated exposure control Adjustment of the mA and/or kV according to patient size, and/or use of iterative reconstruction technique.   FINDINGS: CHEST:   There is no hilar or mediastinal lymphadenopathy present.  Ascending thoracic aorta has a maximum diameter of 3.3 cm. There is right middle lobe and right lower lobe subsegmental atelectasis. No alveolar consolidation is noted. Small right pleural effusion is present.   Chest Wall: No chest wall masses are identified.   Skeletal System: No fractures or destructive lesions are identified.   _________________________________________________________   CT SCAN OF THE ABDOMEN AND PELVIS FINDINGS: ABDOMEN CT: SOLID ORGAN ASSESSMENT: The liver, pancreas, kidneys and adrenal glands are normal in appearance. There is small hypervascular focus within the caudal aspect of the spleen, unchanged compared to the prior study, likely representing a small hemangioma. The biliary  tree is unremarkable in appearance.   RETROPERITONEUM:   CT ANGIOGRAPHIC FINDINGS: *AORTA:  There is no evidence for abdominal aortic aneurysm. There is no calcific plaque within the abdominal aorta. The celiac axis, SMA, and MARIA ALEJANDRA are all widely patent. There are solitary renal arteries bilaterally which are widely patent. The hepatic artery and its branches are widely patent including the gastroduodenal artery. The splenic artery is occluded in association with embolic coils within left upper quadrant. Collateral vessels from the gastroduodenal artery traverse the upper abdomen to the splenic hilum. There is also a prominent retropancreatic arterial branch posterior to the pancreatic tail extending to the splenic hilum. *ILIAC SYSTEM: Calcific plaque is present within the common iliac arteries bilaterally. Iliac vessels are tortuous but otherwise widely patent. Minimal calcific plaque is present within the right common femoral artery.     LYMPH NODES: There is no evidence for retroperitoneal lymphadenopathy.   BOWEL ASSESSMENT: There is a small amount of intraperitoneal free air in this patient is status post appendectomy 2 days ago. There is a 4 cm hiatal hernia. There is a somewhat lobulated fluid collection anteriorly within the left side of the abdomen with the upper margin posterior to the greater curvature of the stomach in the inferior margin identified at approximately the level of the umbilicus, corresponding with what is thought to have been a area of hemorrhage along the anterior aspect of the mesentery within left upper quadrant, likely evolving into a seroma at this point measuring approximately 4.4 cm in transverse diameter, 3 cm in AP diameter, with a craniocaudal extent of 11 cm. The small bowel is unremarkable in appearance. Surgical clips are identified adjacent to the cecal tip consistent with a previous appendectomy. Residual haziness of the adjacent pericecal fat is present consistent with  inflammatory change without evidence for abscess. The colon is otherwise unremarkable in appearance.   There is no contrast extravasation into bowel loops.   ABDOMINAL AND PELVIC WALLS: There is no evidence for a hernia. No abdominal wall masses are identified.   OSSEOUS STRUCTURES: No lytic or blastic lesions are identified.   PELVIC CT: No pathologic masses or fluid collections are identified.       1. Small amount of intraperitoneal free air beneath the right hemidiaphragm in this patient is status post appendectomy 2 days ago. 2. Right middle lobe and right lower lobe subsegmental atelectasis with small right pleural effusion. 3. Evolving hematoma/seroma along the left side of the abdomen within the mesentery as described above. 4. Status post splenic artery embolization with collateral vasculature to the spleen as above. 5. No evidence for contrast extravasation into the bowel loops to suggest active bleeding at this time. 6. Status post appendectomy with postsurgical inflammatory changes right lower quadrant.     MACRO: none   Signed by: Mario Frazier 3/14/2024 4:23 PM Dictation workstation:   [id number]    CT angio chest abdomen pelvis    Result Date: 3/12/2024  Interpreted By:  Pillo Patel, STUDY: CT ANGIO CHEST ABDOMEN PELVIS;  3/12/2024 2:15 pm   INDICATION: Signs/Symptoms:rlq pain recent splenic artery aneuyrsym rupture emobolization, PE and hemoperitoneum.   COMPARISON: February 22, 2024   ACCESSION NUMBER(S): [id number]   ORDERING CLINICIAN: KELLEY JUSTICE   TECHNIQUE: Axial non-contrast images of the chest, abdomen, and pelvis with coronal and sagittal reformatted images. Axial CT images of the chest, abdomen and pelvis after the intravenous administration of 90 mL of Omnipaque 350 using angiographic technique with coronal and sagittal reformatted images. MIP images were provided and reviewed. 3D reconstructions were created on a separate independent workstation and reviewed.   FINDINGS:  VASCULATURE:   PULMONARY ARTERIES: No acute pulmonary embolism.   THORACIC AORTA: Non-contrast images show no evidence of acute intramural hematoma. No thoracic aortic aneurysm or dissection. No significant thoracic aortic atherosclerosis.   ABDOMINAL AORTA: No abdominal aortic aneurysm or dissection. No significant abdominal aortic atherosclerosis.   ABDOMINAL AND PELVIC ARTERIES: The mesenteric vessels are patent. There are multiple embolization coils across the proximal splenic artery, with distal reconstitution. No hemodynamically significant stenosis or occlusion.     CT CHEST:   MEDIASTINUM AND LYMPH NODES:  No enlarged intrathoracic or axillary lymph nodes. No pneumomediastinum.   HEART: Normal size. No coronary artery calcifications. No significant pericardial effusion.   LUNG, PLEURA, LARGE AIRWAYS: There is bilateral atelectasis. No consolidation, pulmonary edema, pleural effusion, or pneumothorax.   OSSEOUS STRUCTURES/CHEST WALL: No acute osseous abnormality.     CT ABDOMEN/PELVIS:   LIVER: No significant parenchymal abnormality.   BILE DUCTS: No significant intrahepatic or extrahepatic dilatation.   GALLBLADDER: No significant abnormality.   PANCREAS: No significant abnormality.   SPLEEN: No significant abnormality. Subcentimeter ovoid hyperdensity along the inferior aspect of the spleen may reflect a small aneurysm or hemangioma.   ADRENALS: No significant abnormality.   KIDNEYS, URETERS, BLADDER: No significant abnormality.   REPRODUCTIVE ORGANS: No significant abnormality.   VESSELS: (See above). No additional significant abnormality.   RETROPERITONEUM/LYMPH NODES: No enlarged lymph nodes.   BOWEL/MESENTERY/PERITONEUM: In the right lower quadrant, there is pericecal and periappendiceal fat stranding with mild thickening of the appendix, measuring up to 0.8 cm in axial dimension. There is colonic diverticulosis. There is no evidence of obstruction.   Again seen is mixed density fluid collection  adjacent to the greater curvature of the stomach and extending in the caudal direction insinuating between bowel loops, behind the left rectus abdominus muscle. This is consistent with subacute hematoma.     ABDOMINAL WALL: No significant abnormality.   OSSEOUS STRUCTURES: Degenerative changes of the spine. No acute osseous abnormality.       1. Findings consistent with acute, uncomplicated appendicitis.   2. Status post splenic artery embolization. Subcentimeter ovoid hyperdensity in the caudal aspect of the spleen suggestive of a small aneurysm or hemangioma. This can be confirmed with an ultrasound.   3. Subacute hematoma abutting the greater curvature of the stomach and extending in the caudal direction throughout the mesentery just behind the left rectus abdominus muscle.   Signed by: Pillo Patel 3/12/2024 3:15 PM Dictation workstation:   [id number]    Electrocardiogram, 12-lead PRN ACS symptoms    Result Date: 2/27/2024  Sinus bradycardia Possible Left atrial enlargement Left ventricular hypertrophy Abnormal ECG Confirmed by Jeremy Spear ([id number]) on 2/27/2024 5:02:13 PM    Transthoracic Echo (TTE) Complete    Result Date: 2/27/2024   [facility], [address] [place] [address]              Tel [phone] and Fax [phone] TRANSTHORACIC ECHOCARDIOGRAM REPORT  Patient Name:      DONALD Haney Physician: [id number] Helio Barfield DO Study Date:        2/27/2024           Ordering Provider: [id number] FINESSE HALE MRN/PID:           [id number]            Fellow: Accession#:        [id number]        Nurse: Date of Birth/Age: 1970 / 53      Sonographer:       Doni Panchal RDCS                    years Gender:            M                   Additional Staff: Height:            177.80 cm           Admit Date:        2/25/2024 Weight:            79.38 kg            Admission Status:  Inpatient - Routine BSA / BMI:          1.97 m2 / 25.11     Encounter#:        [id number]                    kg/m2 Blood Pressure: 127 /77 mmHg Study Type:    TRANSTHORACIC ECHO (TTE) LIMITED Diagnosis/ICD: Elevated Troponin-R79.89 Indication:    Elevated Troponin CPT Code:      Echo Limited-53101; Color Doppler-31205; Doppler Limited-80675 Patient History: Pertinent History: HTN and Hyperlipidemia. Study Detail: The following Echo studies were performed: 2D, M-Mode, Doppler and               color flow. Technically challenging study due to prominent lung               artifact.  PHYSICIAN INTERPRETATION: Left Ventricle: The left ventricular systolic function is normal, with an estimated ejection fraction of 60-65%. The calculated ejection fraction is normal at 63 % using the Villagomez's Bi-plane MOD calculation. There are no regional wall motion abnormalities. The left ventricular cavity size is normal. Left ventricular diastolic filling was not assessed. The LVEF is no longer hyperdynamic on todays study. Left Atrium: The left atrium is normal in size. Right Ventricle: The right ventricle was not assessed. There is normal right ventricular global systolic function. Right Atrium: The right atrium was not assessed. Aortic Valve: The aortic valve appears structurally normal. There is no evidence of aortic valve regurgitation. Mitral Valve: The mitral valve is normal in structure. There is trace mitral valve regurgitation. Tricuspid Valve: The tricuspid valve is structurally normal. There is trace tricuspid regurgitation. The Doppler estimated RVSP is within normal limits at 21.3 mmHg. Pulmonic Valve: The pulmonic valve is not well visualized. Pulmonic valve regurgitation was not assessed. Pericardium: There is no pericardial effusion noted. Aorta: The aortic root was not assessed. Systemic Veins: The inferior vena cava size appears small. There is IVC inspiratory collapse greater than 50%. In comparison to the previous echocardiogram(s): Compared with  study from 2/22/2024,. The LVEF is no longer hyperdynamic. No focal wall motion abnormalities.  CONCLUSIONS:  1. Left ventricular systolic function is normal with a 60-65% estimated ejection fraction.  2. RVSP within normal limits.  3. The LVEF is no longer hyperdynamic. No focal wall motion abnormalities. QUANTITATIVE DATA SUMMARY: LV SYSTOLIC FUNCTION BY 2D PLANIMETRY (MOD):                     Normal Ranges: EF-A4C View: 56.2 % (>=55%) EF-A2C View: 67.9 % EF-Biplane:  62.7 % TRICUSPID VALVE/RVSP:                             Normal Ranges: Peak TR Velocity: 2.14 m/s Est. RA Pressure: 3 mmHg RV Syst Pressure: 21.3 mmHg (< 30mmHg) IVC Diam:         1.16 cm  [id number] Helio Barfield DO Electronically signed on 2/27/2024 at 4:52:15 PM  ** Final **     ECG 12 lead    Result Date: 2/27/2024  Sinus bradycardia Minimal voltage criteria for LVH, may be normal variant ( Sokolow-Miranda ) Borderline ECG When compared with ECG of 22-FEB-2024 09:39, Vent. rate has decreased BY  38 BPM ST no longer depressed in Inferior leads ST no longer depressed in Lateral leads Nonspecific T wave abnormality, improved in Lateral leads See ED provider note for full interpretation and clinical correlation Confirmed by Hui Carrillo ([id number]) on 2/27/2024 4:21:29 PM    Vascular US lower extremity venous duplex bilateral    Result Date: 2/26/2024  Interpreted By:  Lilia Bean, STUDY: [facility] US LOWER EXTREMITY VENOUS DUPLEX BILATERAL;  2/26/2024 2:57 am   INDICATION: Signs/Symptoms:edema. Right lower extremity pain/edema. Recent procedure.   COMPARISON: None.   ACCESSION NUMBER(S): [id number]   ORDERING CLINICIAN: JOE GONZALEZ   TECHNIQUE: Vascular ultrasound of the bilateral lower extremities was performed. Real-time compression views as well as Gray scale, color Doppler and spectral Doppler waveform analysis was performed.   FINDINGS: Evaluation of the visualized portions of the bilateral common femoral vein, proximal, mid, and distal femoral  vein, and popliteal vein were performed.  Evaluation of the visualized portions of the  posterior tibial and peroneal veins were also performed.   The evaluated veins demonstrate normal compressibility. There is intact venous flow demonstrating normal respiratory variability and normal augmentation of flow with calf compression. Therefore, there is no ultrasonographic evidence for deep vein thrombosis within the evaluated veins.       1.  No sonographic evidence for deep vein thrombosis within the evaluated veins of the bilateral lower extremities.     MACRO: None   Signed by: Lilia Bean 2/26/2024 3:16 AM Dictation workstation:   [id number]    CT angio abdomen pelvis w and or wo IV IV contrast    Result Date: 2/26/2024  Interpreted By:  Shemar Caicedo, STUDY: CT ANGIO ABDOMEN PELVIS W AND/OR WO IV IV CONTRAST;  2/25/2024 11:52 pm   INDICATION: Signs/Symptoms:recent coiling splenic aneusym.   COMPARISON: CT angiography chest/abdomen/pelvis 02/22/2024   ACCESSION NUMBER(S): [id number]   ORDERING CLINICIAN: JOE GONZALEZ   TECHNIQUE: Contiguous non-contrast axial images of the abdomen and pelvis were initially obtained.   Thin-section axial images of the abdomen and pelvis were obtained in the arterial and portal venous phase after intravenous administration of 90 mL Omnipaque 350 contrast. Sagittal and coronal reformatted images were provided. MIP images and 3D reconstructions were created on an independent workstation and reviewed.   FINDINGS: VASCULATURE:   ABDOMINAL AORTA: No abdominal aortic aneurysm or dissection. Minimal distal abdominal aortic atherosclerosis.   ABDOMINAL and PELVIC ARTERIES: There are new postprocedural changes of a splenic artery coil embolization. Approximately 2 cm distal to the splenic artery origin, there is occlusion of the splenic artery followed by numerous embolization coils within the mid splenic artery. However, distal to the last coil, the splenic artery is  persistently opacified (axial image 134-136, series 306) and remains opacified to the splenic hilum. 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 No evidence of arterial bleeding along the splenic embolization coils, limited by beam hardening artifact in the setting of metallic hardware.     CT ABDOMEN/PELVIS:   LOWER CHEST: There are scattered segmental and subsegmental filling defects within the right middle and bilateral lower lobe pulmonary artery branches concerning for pulmonary emboli that are new from prior study. The heart is normal size. There is mild bibasilar atelectasis.   ABDOMINAL WALL: Small acute hematoma adjacent to the right common femoral artery measuring approximately 2.9 cm x 1.9 cm. No active bleeding.   LIVER: Normal homogeneous enhancement. Stable 0.6 cm simple cyst adjacent to the gallbladder fossa. No suspicious lesions. The liver is mildly enlarged.   BILE DUCTS: No significant intrahepatic or extrahepatic dilatation.   GALLBLADDER: No significant abnormality.   SPLEEN: The spleen is normal in size. There is homogeneous enhancement on portal phase images. There is persistent subtle arterial enhancement at the site of known pseudo aneurysm, again measuring approximately 0.7 cm (axial image 58/254, series 606; axial image 107/493, series 609). There is no evidence of active extravasation from the pseudoaneurysm. There is a small amount of acute hemorrhage around the spleen that is significantly decreased in quantity from 02/22/2024, most pronounced between the left hemidiaphragm in the superior pole of the spleen and gastric fundus. (Please see above for arterial vascular findings.)   PANCREAS: No significant abnormality.   ADRENALS: No significant abnormality.   KIDNEYS, URETERS, BLADDER: No significant abnormality.   REPRODUCTIVE ORGANS: No significant abnormality.   VESSELS: (See above for arterial). No additional significant abnormality;  "specifically, the portal and hepatic veins, splenic vein, and superior mesenteric vein is principal branches are patent. The IVC is normal in caliber.   LYMPH NODES/RETROPERITONEUM: No enlarged lymph nodes. No acute retroperitoneal abnormality.   BOWEL/MESENTERY/PERITONEUM: There is diffuse marked thickening of the gastric rugal folds similar to prior study. The small bowel is nondilated without evidence of inflammatory change. There is a moderate amount of stool in the ascending and proximal transverse colon and mild amount of stool throughout the remainder of the colon without evidence of acute colonic inflammation. Normal appendix.   There is moderate volume hemoperitoneum. This is decreased around the liver and spleen. Hemorrhage in the left upper quadrant mesentery tracks into the pelvis with a similar colonic compared to prior study. Compared to 02/22/2024 there is a slightly increased amount of hemorrhage in the rectovesical recess which could be explained by shifting distribution of hemoperitoneum given the small amount of hemoperitoneum in the upper abdomen.   No free intraperitoneal air.   OSSEOUS STRUCTURES: No acute osseous abnormality.       1. Acute segmental and subsegmental pulmonary emboli within the right lower lobe, right middle lobe, and left lower lobe. No evidence of right heart strain.   2. Status post splenic artery embolization with coils involving the mid splenic artery; however, distal to the last embolization \", the splenic artery remains opacified and the known splenic artery pseudoaneurysm remains opacified, again measuring ~ 0.7 cm. No active extravasation from the spleen/splenic pseudoaneurysm or along the course of the splenic artery within limitations of obscured visualization by beam hardening artifact. This supports relatively stable hemoglobin level since initial study.   3. Moderate volume hemoperitoneum, overall similar in quantity to 02/22/2024. There is an increased amount of " blood products in the rectovesical recess and decreased amount of blood products in the upper abdomen around the liver, spleen. Decreased amount of perisplenic hemorrhage favors absence of active bleeding.   4. Small hematoma surrounding the right common femoral artery measuring 2.9 cm x 1.9 cm without active bleeding.   5. Diffuse gastric fold thickening similar to prior study relating to a chronic gastritis of indeterminate etiology.   MACRO: Shemar Caicedo discussed the significance and urgency of this critical finding by telephone with  JOE GONZALEZ on 2/26/2024 at 1:01 am.  (**-RCF-**) Findings:  See findings.   Signed by: Shemar Caicedo 2/26/2024 1:13 AM Dictation workstation:   [id number]    ECG 12 lead    Result Date: 2/24/2024  Normal sinus rhythm Nonspecific ST and T wave abnormality Abnormal ECG When compared with ECG of 22-FEB-2024 08:52, (unconfirmed) Nonspecific T wave abnormality has replaced inverted T waves in Lateral leads QT has lengthened See ED provider note for full interpretation and clinical correlation Confirmed by Hui Carrillo ([id number]) on 2/24/2024 10:56:17 AM    ECG 12 lead    Result Date: 2/24/2024  Normal sinus rhythm Right atrial enlargement Minimal voltage criteria for LVH, may be normal variant ( Sokolow-Miranda ) ST & T wave abnormality, consider lateral ischemia Abnormal ECG When compared with ECG of 07-JUL-2022 00:38, Previous ECG has undetermined rhythm, needs review ST now depressed in Inferior leads ST depression has replaced ST elevation in Lateral leads T wave inversion now evident in Lateral leads See ED provider note for full interpretation and clinical correlation Confirmed by Hui Carrillo ([id number]) on 2/24/2024 10:42:06 AM    IR angiogram aorta abdomen    Result Date: 2/23/2024  Interpreted By:  Pillo Patel, STUDY: IR ANGIOGRAM AORTA ABDOMEN;  2/22/2024 10:34 pm   INDICATION: Signs/Symptoms:splenic aneurysm.   COMPARISON: None.   ACCESSION NUMBER(S): [id number]    ORDERING CLINICIAN: PILLO CHAMBERS   TECHNIQUE: INTERVENTIONALIST(S): Pillo Chambers MD   CONSENT: The patient/patient's POA/next of kin was informed of the nature of the proposed procedure. The purposes, alternatives, risks, and benefits were explained and discussed. All questions were answered and consent was obtained.   RADIATION EXPOSURE: Fluoroscopy time: 16.7 min. Dose: 379 mGy. Dose Area Product (DAP): 41137 mGy cm 2.   SEDATION: Moderate conscious IV sedation services (supervision of administration, induction, and maintenance) were provided by the physician performing the procedure with intravenous fentanyl 150 mcg and versed 1.5 mg from 2137 hours to 2227 hours. The physician was assisted by an independent trained observer, an interventional radiology nurse, in the continuous monitoring of patient level of consciousness and physiologic status.   MEDICATION/CONTRAST: No additional   TIME OUT: A time out was performed immediately prior to procedure start with the interventional team, correctly identifying the patient name, date of birth, MRN, procedure, anatomy (including marking of site and side), patient position, procedure consent form, relevant laboratory and imaging test results, antibiotic administration, safety precautions, and procedure-specific equipment needs.   COMPLICATIONS: No immediate adverse events identified.   FINDINGS: The patient was placed supine on the angiographic table in the right groin was prepped and draped in usual sterile fashion. Local anesthesia was achieved 1% lidocaine. Using direct ultrasound guidance, a 21 gauge micropuncture needle was used to access the right common femoral artery and a microwire was placed. The needle was exchanged for a 4 Chinese micropuncture sheath with its match dilator. The inner dilator and wire were removed and a 0.035 inch wire was advanced and in turn the micropuncture sheath was exchanged for a 5 Chinese hemostatic sheath, which was placed over  the wire.   Contrast injection was performed through the hemostatic sheath in the CASTAÑEDA position while imaging over the right groin.   A C2 cobra catheter was prepared and advanced over a Glidewire. The combination were used to cannulate the celiac artery origin. The wire was removed and contrast injections were performed with both fluoroscopic and DSA imaging over the abdomen. The Glidewire was replaced and the C2 Cobra catheter was advanced into the proximal 3rd of the splenic artery. The wire was removed and contrast injections were performed with DSA imaging over the left upper quadrant.   A microcatheter microwire were prepared and advanced through the C2 Cobra catheter. Multiple 2nd and 3rd order branches off of the splenic artery were cannulated with contrast injections performed while DSA imaging over the spleen.   The microcatheter was retracted to the proximal 3rd of the splenic artery and subsequently coil embolization was carried out. Stasis within the splenic artery was confirmed with a contrast injection via the microcatheter.   The catheters and wires were removed. After removal of the 5 Lao hemostatic sheath, hemostasis was achieved with an Angio-Seal closure device. The patient tolerated the procedure well without immediate complications.     Contrast injection through the 5 Lao hemostatic sheath with imaging over the right groin demonstrates the right common femoral artery to be widely patent without contraindication to the use of a closure device.   Contrast injection with the C2 Cobra catheter at the origin of the celiac artery demonstrates conventional branching pattern and widely patent celiac artery and its major branches. Splenic artery anatomy is defined with marked tortuosity of the proximal 3rd of the splenic artery.   Contrast injection with the C2 Cobra catheter in the proximal splenic artery demonstrates areas of vasospasm in the middle 3rd of the splenic artery. Its distal branches  are patent. There is gradual opacification of an ovoid structure in the caudal aspect of the spleen, coinciding with the recent findings on the CTA. Contrast injection in multiple 2nd and 3rd order branches of the splenic artery did not opacify the ovoid structure directly.   Sequential fluoroscopic images demonstrate interval coil embolization of the proximal 3rd of the splenic artery.   Final contrast injection with the microcatheter in the proximal splenic artery demonstrates stasis of the splenic artery.       1. Technically successful splenic artery embolization, as detailed above. 2. Ovoid enhancing structure in the caudal aspect of the spleen has features suggestive of a small hemangioma. There is no active contrast extravasation.   I was present for and/or performed the critical portions of the procedure and immediately available throughout the entire procedure.   I personally reviewed the image(s)/study and interpretation. I agree with the findings as stated.   Performed and dictated at [facility].   MACRO: None   Signed by: Pillo Patel 2/23/2024 2:31 PM Dictation workstation:   [id number]    CT angio chest abdomen pelvis    Result Date: 2/22/2024  Interpreted By:  Pillo Patel, STUDY: CT ANGIO CHEST ABDOMEN PELVIS;  2/22/2024 4:03 pm   INDICATION: Signs/Symptoms:abdominal pain, possible hemmorhaging into abdominal cavity.   COMPARISON: CT of the chest, abdomen, and pelvis without contrast from the same day taken at 1345 hours.   ACCESSION NUMBER(S): [id number]   ORDERING CLINICIAN: FRANCA WAY   TECHNIQUE: Axial non-contrast images of the chest, abdomen, and pelvis with coronal and sagittal reformatted images. Axial CT images of the chest, abdomen and pelvis after the intravenous administration of 90 mL of Omnipaque 350 using angiographic technique with coronal and sagittal reformatted images. MIP images were provided and reviewed. 3D reconstructions were  created on a separate independent workstation and reviewed.   FINDINGS: VASCULATURE:   PULMONARY ARTERIES: No acute pulmonary embolism.   THORACIC AORTA: No thoracic aortic aneurysm or dissection. No significant thoracic aortic atherosclerosis.   ABDOMINAL AORTA: No abdominal aortic aneurysm or dissection. No significant abdominal aortic atherosclerosis.   ABDOMINAL AND PELVIC ARTERIES: There is minor calcific disease at the origin of the bilateral common iliac arteries without significant stenosis. There is marked tortuosity of the pelvic vascularity.     CT CHEST:   MEDIASTINUM AND LYMPH NODES: No enlarged intrathoracic or axillary lymph nodes. No pneumomediastinum.   HEART: Normal size. No coronary artery calcifications. No significant pericardial effusion.   LUNG, PLEURA, LARGE AIRWAYS: There is minor bibasilar atelectasis, right worse than left. There is opacification and mild wall thickening of the small airways leading to the posterior right lower lobe. The central airways are otherwise patent. No consolidation, pulmonary edema, pleural effusion, or pneumothorax.   OSSEOUS STRUCTURES/CHEST WALL: No acute osseous abnormality.     CT ABDOMEN/PELVIS:   LIVER: The liver is mildly enlarged, but without focal parenchymal abnormality. There is a small amount of high-density fluid surrounding the liver, consistent with hemorrhage.   BILE DUCTS: No significant intrahepatic or extrahepatic dilatation.   GALLBLADDER: No significant abnormality.   PANCREAS: No significant abnormality.   SPLEEN: There is striated appearance of the spleen. There is a 6 mm ovoid hyperdensity, compatible with a small aneurysm. The spleen is surrounded by a small amount of hyperdense fluid consistent with hemorrhage. Delayed imaging does not demonstrate active contrast extravasation to suggest active hemorrhage.   ADRENALS: No significant abnormality.   KIDNEYS, URETERS, BLADDER: No significant abnormality. The distended urinary bladder is  within normal limits.   REPRODUCTIVE ORGANS: No significant abnormality.   VESSELS: (See above). No additional significant abnormality.   RETROPERITONEUM/LYMPH NODES: No enlarged lymph nodes.   BOWEL/MESENTERY/PERITONEUM: No inflammatory bowel wall thickening or dilatation. Normal appendix. There is a small hiatal hernia.   There is a small to moderate amount of high-density fluid within the abdomen, along the paracolic gutters, and deep in the pelvis, consistent with hemorrhage.     ABDOMINAL WALL: No significant abnormality.   OSSEOUS STRUCTURES: No acute osseous abnormality.       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 The spleen has a striated appearance which is likely related to phase of contrast, if there is recent history of trauma, small lacerations can have a similar appearance.   Small to moderate amount of hemoperitoneum surrounding the spleen, liver, and extending into the pelvis.   Bibasilar atelectasis with bronchial wall thickening and opacification of the small airways leading to the right lower lobe which can be seen in the setting of aspiration/mucoid secretions.   Signed by: Pillo Patel 2/22/2024 4:57 PM Dictation workstation:   [id number]    Transthoracic Echo (TTE) Complete    Result Date: 2/22/2024   [facility], [address] [place] [address]              Tel [phone] and Fax [phone] TRANSTHORACIC ECHOCARDIOGRAM REPORT  Patient Name:      DONALD Haney Physician:    [id number] Tremayne Sepulveda DO Study Date:        2/22/2024            Ordering Provider:    [id number] DYKES MRN/PID:           [id number]             Fellow: Accession#:        [id number]         Nurse: Date of Birth/Age: 1970 / 53 years Sonographer:          Nirmala Cee GWYN Gender:            M                    Additional Staff:  Height:            177.80 cm            Admit Date:           2/22/2024 Weight:            79.38 kg             Admission Status:     Inpatient -                                                               Routine BSA / BMI:         1.97 m2 / 25.11      Encounter#:           [id number]                    kg/m2                                         Department Location:  [facility] Non                                                               Invasive Blood Pressure: 132 /67 mmHg Study Type:    TRANSTHORACIC ECHO (TTE) COMPLETE Diagnosis/ICD: Elevated Troponin-R79.89 Indication:    Elevated Troponin CPT Code:      Echo Complete w Full Doppler-[id number] Patient History: Pertinent History: HTN and Hyperlipidemia. Opioid Use Disorder, GERD. Study Detail: The following Echo studies were performed: 2D, M-Mode, Doppler and               color flow. Technically challenging study due to prominent lung               artifact.  PHYSICIAN INTERPRETATION: Left Ventricle: The left ventricular systolic function is hyperdynamic, with an estimated ejection fraction of 70-75%. There are no regional wall motion abnormalities. The left ventricular cavity size is normal. Spectral Doppler shows a normal pattern of left ventricular diastolic filling. Left Atrium: The left atrium is normal in size. Right Ventricle: The right ventricle is normal in size. There is normal right ventricular global systolic function. Right Atrium: The right atrium is normal in size. Aortic Valve: The aortic valve appears structurally normal. There is no evidence of aortic valve regurgitation. The peak instantaneous gradient of the aortic valve is 13.7 mmHg. The mean gradient of the aortic valve is 8.0 mmHg. Mitral Valve: The mitral valve is normal in structure. There is no evidence of mitral valve regurgitation. Tricuspid Valve: The tricuspid valve is structurally normal. No evidence of tricuspid regurgitation. Pulmonic Valve: The pulmonic valve is  structurally normal. There is no indication of pulmonic valve regurgitation. Pericardium: There is no pericardial effusion noted. Aorta: The aortic root is normal. In comparison to the previous echocardiogram(s): Compared with study from 1/5/2022,.  CONCLUSIONS:  1. Left ventricular systolic function is hyperdynamic with a 70-75% estimated ejection fraction.  2. No valvular abnormalities. QUANTITATIVE DATA SUMMARY: 2D MEASUREMENTS:                          Normal Ranges: Ao Root d:     3.20 cm   (2.0-3.7cm) LAs:           3.20 cm   (2.7-4.0cm) IVSd:          1.10 cm   (0.6-1.1cm) LVPWd:         1.00 cm   (0.6-1.1cm) LVIDd:         4.40 cm   (3.9-5.9cm) LVIDs:         2.70 cm LV Mass Index: 80.2 g/m2 LV % FS        38.6 % LA VOLUME:                               Normal Ranges: LA Vol A4C:        46.6 ml    (22+/-6mL/m2) LA Vol A2C:        44.3 ml LA Vol BP:         45.5 ml LA Vol Index A4C:  23.6ml/m2 LA Vol Index A2C:  22.5 ml/m2 LA Vol Index BP:   23.1 ml/m2 LA Area A4C:       15.5 cm2 LA Area A2C:       15.1 cm2 LA Major Axis A4C: 4.4 cm LA Major Axis A2C: 4.4 cm LA Volume Index:   23.1 ml/m2 RA VOLUME BY A/L METHOD:                               Normal Ranges: RA Vol A4C:        41.9 ml    (8.3-19.5ml) RA Vol Index A4C:  21.2 ml/m2 RA Area A4C:       15.2 cm2 RA Major Axis A4C: 4.7 cm M-MODE MEASUREMENTS:                  Normal Ranges: Ao Root: 3.20 cm (2.0-3.7cm) LAs:     3.70 cm (2.7-4.0cm) AORTA MEASUREMENTS:                      Normal Ranges: Ao Sinus, d: 3.20 cm (2.1-3.5cm) Ao STJ, d:   2.75 cm (1.7-3.4cm) Asc Ao, d:   3.20 cm (2.1-3.4cm) LV SYSTOLIC FUNCTION BY 2D PLANIMETRY (MOD):                     Normal Ranges: EF-A4C View: 70.0 % (>=55%) EF-A2C View: 68.1 % EF-Biplane:  68.1 % LV DIASTOLIC FUNCTION:                              Normal Ranges: MV Peak E:        0.62 m/s   (0.7-1.2 m/s) MV Peak A:        0.82 m/s   (0.42-0.7 m/s) E/A Ratio:        0.75       (1.0-2.2) MV e'             0.11 m/s    (>8.0) MV lateral e'     0.11 m/s MV medial e'      0.05 m/s MV A Dur:         92.00 msec E/e' Ratio:       5.65       (<8.0) a'                0.11 m/s PulmV Sys Griffin:    80.00 cm/s PulmV [place] Griffin:   46.90 cm/s PulmV S/D Griffin:    1.70 PulmV A Revs Griffin: 35.00 cm/s PulmV A Revs Dur: 95.00 msec MITRAL VALVE:                 Normal Ranges: MV DT: 288 msec (150-240msec) AORTIC VALVE:                                    Normal Ranges: AoV Vmax:                1.85 m/s  (<=1.7m/s) AoV Peak P.7 mmHg (<20mmHg) AoV Mean P.0 mmHg  (1.7-11.5mmHg) LVOT Max Griffin:            1.66 m/s  (<=1.1m/s) AoV VTI:                 25.30 cm  (18-25cm) LVOT VTI:                24.10 cm LVOT Diameter:           2.20 cm   (1.8-2.4cm) AoV Area, VTI:           3.62 cm2  (2.5-5.5cm2) AoV Area,Vmax:           3.41 cm2  (2.5-4.5cm2) AoV Dimensionless Index: 0.95  RIGHT VENTRICLE: RV Basal 3.84 cm RV Mid   2.61 cm RV Major 7.2 cm TAPSE:   18.5 mm RV s'    0.15 m/s TRICUSPID VALVE/RVSP:                           Normal Ranges: Est. RA Pressure: 3 mmHg IVC Diam:         1.33 cm PULMONIC VALVE:                         Normal Ranges: PV Accel Time: 50 msec  (>120ms) PV Max Griffin:    1.1 m/s  (0.6-0.9m/s) PV Max P.8 mmHg Pulmonary Veins: PulmV A Revs Dur: 95.00 msec PulmV A Revs Griffin: 35.00 cm/s PulmV [place] Griffin:   46.90 cm/s PulmV S/D Griffin:    1.70 PulmV Sys Griffin:    80.00 cm/s  [id number] Tremayne Sepulveda DO Electronically signed on 2024 at 4:23:21 PM  ** Final **     CT chest abdomen pelvis wo IV contrast    Result Date: 2024  Interpreted By:  Elijah Gong, STUDY: CT CHEST ABDOMEN PELVIS WO CONTRAST; 2024 1:51 pm   INDICATION: Signs/Symptoms:abdominal pain, vomiting, elevated troponin, leukocytosis.   COMPARISON: CT of the abdomen and pelvis dated 08/15/2022   ACCESSION NUMBER(S): [id number]   ORDERING CLINICIAN: OSCAR SILVEIRA   TECHNIQUE: Contiguous axial CT images were obtained at 3mm slice thickness through  the chest, abdomen and pelvis without intravenous contrast administration. Coronal and sagittal reconstructions at 3 mm slice thickness were then performed. Intravenous contrast was not given per the referring physician's request.   FINDINGS: The study is severely limited by the lack of intravenous contrast.   CHEST:   CHEST WALL AND LOWER NECK: Evaluation of the visualized neck base demonstrates no gross mass or lymphadenopathy.   MEDIASTINUM AND DINAH: There is no gross axillary or mediastinal lymphadenopathy identified. Evaluation of the dinah is limited by the lack of intravenous contrast.   HEART: The heart is within normal limits for size. No pericardial effusion is identified.   VESSELS: The thoracic aorta is within normal limits for course and caliber, without evidence of aneurysm.   LUNG/PLEURA/LARGE AIRWAYS: Mild dependent atelectasis is seen in the lungs bilaterally. There is no focal infiltrate, pleural effusion or pneumothorax identified. No discrete pulmonary nodules or masses are seen.   ABDOMEN:   LIVER: The liver is within normal limits for appearance, without evidence of focal masses.   BILE DUCTS: No definite intra or extrahepatic biliary dilatation is identified.   GALLBLADDER: The gallbladder is nondilated. No definite calcified gallstones are seen.   PANCREAS: The pancreas is within normal limits for appearance, without evidence of focal masses.   SPLEEN: The spleen is within normal limits for size. No focal splenic mass is seen.   ADRENAL GLANDS: No definite adrenal nodules or masses are seen bilaterally.   KIDNEYS AND URETERS: There is no hydronephrosis, hydroureter or renal/ ureteral calculus identified bilaterally. No definite focal renal mass is seen, though evaluation is severely limited by the lack of intravenous contrast..   PELVIS:   BLADDER: The urinary bladder is grossly unremarkable for CT appearance.   REPRODUCTIVE ORGANS: The prostate is within normal limits for appearance.   BOWEL:  The colon and small bowel are within normal limits for course, caliber and appearance, without evidence of wall thickening or obstruction. The appendix is decompressed. No CT evidence of acute diverticulitis or appendicitis is seen.   VESSELS: Scattered atherosclerotic calcifications are seen throughout the infrarenal abdominal aorta and iliac arteries. The abdominal aorta is within normal limits for course, caliber and appearance, without evidence of aneurysm.   PERITONEUM/RETROPERITONEUM/LYMPH NODES: There is no free intraperitoneal air identified. Hyperdense free fluid is seen within the abdomen, greatest in the left upper quadrant, right lower quadrant and lower pelvis, concerning for intraperitoneal hemorrhage. No gross mesenteric or retroperitoneal lymphadenopathy is identified.   BONE AND SOFT TISSUE: There is no evidence of acute fracture identified. No evidence of abdominal wall mass or hernia is identified.       CHEST: 1. No focal infiltrate, pulmonary nodule or lymphadenopathy identified.   ABDOMEN-PELVIS: 1. Hyperdense free fluid within the abdomen, concerning for intraperitoneal hemorrhage. The source of this hemorrhage cannot be elucidated on CT. 2. No evidence of bowel obstruction or free intraperitoneal air.   MACRO: None   Signed by: Elijah Gong 2/22/2024 2:09 PM Dictation workstation:   [id number]    XR chest 2 views    Result Date: 2/22/2024  Interpreted By:  Bry Keller, STUDY: XR CHEST 2 VIEWS;  2/22/2024 9:09 am   INDICATION: Signs/Symptoms:abd pain vomiting leukocytosis.   COMPARISON: 07/06/2022   ACCESSION NUMBER(S): [id number]   ORDERING CLINICIAN: OSCAR SILVEIRA   FINDINGS: CARDIOMEDIASTINAL SILHOUETTE AND VASCULATURE:   Cardiac size:  Within normal limits.   Aortic shadow:  Within normal limits.   Mediastinal contours: Within normal limits.   Pulmonary vasculature:  The central vasculature is unremarkable   LUNGS: Lungs are clear.   ABDOMEN AND OTHER FINDINGS: No remarkable upper  abdominal findings.   BONES: No acute osseous changes.       1.  No active cardiopulmonary disease.  There has not been significant interval change from the prior exam.   Signed by: Bry Keller 2/22/2024 9:23 AM Dictation workstation:   [id number]       Assessment/Plan  Post-op seroma  - Abdomen non distended, soft, mildly tender to palpation to RLQ and LLQ. Lap sites C/D/I without drainage. Non-peritonitic.   - Pain control  - PRN antiemetic  - Agree with GI consult  - Recommend cardiology consult regarding anticoagulation due to PE  - Due to patient's pain, will order repeat labs including a CBC, CMP and lactate  - No acute general surgical interventions at this time  - AM labs  - NPO    Dispo: Will follow up with PM labs. No acute general surgical intervention for the post-op seroma. Discussed with Dr. Al.    I spent 45 minutes in the professional and overall care of this patient.      Rianna Tim, APRN-CNP

## 2024-03-15 NOTE — ANESTHESIA POSTPROCEDURE EVALUATION
Patient: Douglas Wilson    Procedure Summary       Date: 03/15/24 Room / Location: [facility]    Anesthesia Start: 1502 Anesthesia Stop: 1527    Procedure: EGD Diagnosis: Hematemesis, unspecified whether nausea present    Scheduled Providers: Husam Puckett MD; Willis Manning MD; Forest Duenas RN; Mahogany Nagy MA; Jose Angel Wade RN Responsible Provider: Khushbu Morataya MD    Anesthesia Type: MAC ASA Status: 4            Anesthesia Type: MAC    Vitals Value Taken Time   /73 03/15/24 1552   Temp 37.3 °C (99.1 °F) 03/15/24 1522   Pulse 76 03/15/24 1552   Resp 18 03/15/24 1552   SpO2 98 % 03/15/24 1552       Anesthesia Post Evaluation    Patient location during evaluation: PACU  Patient participation: complete - patient participated  Level of consciousness: awake and alert  Pain score: 0  Pain management: adequate  Airway patency: patent  Cardiovascular status: stable  Respiratory status: spontaneous ventilation  Hydration status: acceptable  Postoperative Nausea and Vomiting: none        No notable events documented.

## 2024-03-15 NOTE — ED PROVIDER NOTES
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  Family said this morning he had severe abdominal pain and vomited twice it was bloody.  No diarrhea.  Pain is diffuse 10/10.  No chest pain pressure heaviness fever chills cough congestion incontinence seizures syncope or near syncope, no melena.    Past history: Hypertension, recent splenic artery aneurysm rupture requiring embolization, pulm embolism, appendectomy  Social: History of tobacco use no alcohol drug abuse.  REVIEW OF SYSTEMS:    GENERAL.: No weight loss, fatigue, anorexia, insomnia, fever.    EYES: No vision loss, double vision, drainage, eye pain.    ENT: No pharyngitis, dry mouth.    CARDIOPULMONARY: No chest pain, palpitations, syncope, near syncope. No shortness of breath, cough, hemoptysis.    GI: Positive for abdominal pain, change in bowel habits, melena, positive for hematemesis, hematochezia, nausea, positive for vomiting, diarrhea.    : No discharge, dysuria, frequency, urgency, hematuria.    MS: No limb pain, joint pain, joint swelling.    SKIN: No rashes.    PSYCH: No depression, anxiety, suicidality, homicidality.    Review of systems is otherwise negative unless stated above or in history of present illness.  Social history, family history, allergies reviewed.  PHYSICAL EXAM:    GENERAL: Vitals noted, moderate distress. Alert and oriented  x 3. Non-toxic.  Diaphoretic    EENT: TMs clear. Posterior oropharynx unremarkable. No meningismus. No LAD.     NECK: Supple. Nontender. No midline tenderness.     CARDIAC:  Tachycardic regular, rate, rhythm. No murmurs rubs or gallops. No JVD    PULMONARY: Lungs clear bilaterally with good aeration. No wheezes rales or rhonchi. No respiratory distress.  No tachypnea stridor or retractions able to speak in full sentences    ABDOMEN: Soft, nonsurgical.  Diffuse tenderness no rebound or guarding. No peritoneal signs. Normoactive bowel sounds. No pulsatile masses.  Negative CVA tenderness    EXTREMITIES: No peripheral edema. Negative Homans bilaterally, no cords.  2+ bounding pulses well-perfused    SKIN: No rash. Intact.     NEURO: No focal neurologic deficits, NIH score of 0. Cranial nerves normal as tested from II through XII.     MEDICAL DECISION MAKING:  EKG on my interpretation shows a normal sinus rhythm normal axis rate about 88 with no ischemic changes.    CBC shows leukocytosis 14,000, hematocrit is stable, chemistries unremarkable, lactate is normal, CT angio chest abdomen pelvis shows no recurrent PEs no dissection stable postop changes and a 4 cm seroma versus hematoma evolving.    Treatment in ED: Upon arrival IV established given IV fluids multiple doses of morphine and Dilaudid and Zofran and IV hydralazine.  IV Protonix.    Consults: GI consulted surgery consulted.    ED course: Patient's pain remains poorly controlled.  Repeat CBC shows downtrending to leukocytosis stable hemoglobin repeat lactate is still normal.  Which is reassuring.  Blood pressure did improve as the pain improves but when the pain comes back blood pressure goes up again so blood pressure is being driven by pain.    Impression: #1 acute abdominal pain, #2 hematemesis/GI bleed, #3 hypertension poorly controlled    Plan/MDM: 53-year-old  male with multiple medical history recent and multiple hospitalizations for splenic artery aneurysm rupture requiring embolization, pulm emboli, appendectomy returns with acute abrupt abdominal pain and hematemesis x 2 remains stable hemodynamic.  Blood pressure  remains elevated due to pain.  Once the pain gets under better control blood pressure downtrends.  His CBC is reassuring repeat CBC is reassuring no drop in the hemoglobin lactate x 2 is normal which is reassuring.  CT angio of the chest and pelvis is again reassuring.  Surgery has been consulted GI has been consulted, patient will be hospitalized to the stepdown unit for further care.                          Lamar Coma Scale Score: 15                     Patient History   Past Medical History:   Diagnosis Date    Acute appendicitis without peritonitis 03/12/2024    NSTEMI (non-ST elevated myocardial infarction) (CMS/Formerly [facility])     Pulmonary embolism (CMS/[facility])     Splenic artery aneurysm (CMS/[facility]ly [facility]) 02/2024    rupture s/p coil embolization     Past Surgical History:   Procedure Laterality Date    APPENDECTOMY  03/12/2024    CT ANGIO CORONARY ART WITH HEARTFLOW IF SCORE >30%  04/19/2022    OTHER SURGICAL HISTORY  01/24/2022    Inguinal hernia repair laparoscopic    SPLENIC ARTERY EMBOLIZATION  02/2024     No family history on file.  Social History     Tobacco Use    Smoking status: Every Day     Packs/day: 1     Types: Cigarettes    Smokeless tobacco: Never   Vaping Use    Vaping Use: Not on file   Substance Use Topics    Alcohol use: Never    Drug use: Yes     Types: Heroin       Physical Exam   ED Triage Vitals [03/14/24 1225]   Temp Heart Rate Resp BP   36.2 °C (97.1 °F) 71 (!) 22 (!) 194/92      SpO2 Temp Source Heart Rate Source Patient Position   100 % Temporal Monitor Sitting      BP Location FiO2 (%)     Left arm --       Physical Exam    ED Course & MDM   Diagnoses as of 03/14/24 2220   Generalized abdominal pain   Gastrointestinal hemorrhage, unspecified gastrointestinal hemorrhage type       Medical Decision Making      Procedure  Critical Care    Performed by: Paola Ortiz MD  Authorized by: Paola Ortiz MD    Critical care provider statement:     Critical care time (minutes):  45    Critical care  time was exclusive of:  Separately billable procedures and treating other patients    Critical care was necessary to treat or prevent imminent or life-threatening deterioration of the following conditions: gi bleed,    Critical care was time spent personally by me on the following activities:  Blood draw for specimens, development of treatment plan with patient or surrogate, discussions with consultants, discussions with primary provider, evaluation of patient's response to treatment, examination of patient, review of old charts, re-evaluation of patient's condition, pulse oximetry, ordering and review of radiographic studies, ordering and review of laboratory studies and ordering and performing treatments and interventions    Care discussed with: admitting provider         Paola Ortiz MD  03/14/24 0885

## 2024-03-15 NOTE — H&P (VIEW-ONLY)
Reason For Consult  GI bleed    History Of Present Illness  Douglas Wilson is a 53 y.o. male with h/o splenic artery embolization due to a splenic aneurysm on 2/22 with IR complicated by an acute PE, diagnosed on 2/26, laparoscopic appendectomy 3/12/24, presented with hematemesis.  Patient was discharged from [facility] on 3/13/24.  At home he developed diffuse abdominal pain, nausea, vomiting, had several episodes of blood in his emesis.  At first it was brown and then red with clots.  Labs on admission WBC 12.6, H&H 12.3 and 37.3.  Surgery consulted, no further surgical procedure indicated.  GI consulted for hematemesis.  Hemoglobin has been stable.  Patient complains of persistent abdominal pain and constantly moaning.  He never had EGD or colonoscopy.  No weight loss     Past Medical History  He has a past medical history of Acute appendicitis without peritonitis (03/12/2024), NSTEMI (non-ST elevated myocardial infarction) (CMS/HCC), Pulmonary embolism (CMS/HCC), and Splenic artery aneurysm (CMS/HCC) (02/2024).    Surgical History  He has a past surgical history that includes Other surgical history (01/24/2022); CT angio coronary art with heartflow if score >30% (04/19/2022); Appendectomy (03/12/2024); and Splenic artery embolization (02/2024).     Social History  He reports that he has been smoking cigarettes. He has been smoking an average of 1 pack per day. He has never used smokeless tobacco. He reports current drug use. Drug: Heroin. He reports that he does not drink alcohol.    Family History  Denies any known GI malignancies    Allergies  Patient has no known allergies.    Review of Systems  10 systems reviewed and negative other than HPI     Physical Exam  Physical Exam  Vitals reviewed.   Constitutional:       General: He is in acute distress.      Appearance: He is ill-appearing. He is not toxic-appearing or diaphoretic.   HENT:      Head: Normocephalic and atraumatic.      Nose: Nose normal.       "Mouth/Throat:      Mouth: Mucous membranes are moist.      Pharynx: Oropharynx is clear.   Eyes:      Conjunctiva/sclera: Conjunctivae normal.   Cardiovascular:      Rate and Rhythm: Normal rate and regular rhythm.      Pulses: Normal pulses.      Heart sounds: Normal heart sounds.   Pulmonary:      Effort: Pulmonary effort is normal.      Breath sounds: Normal breath sounds.   Abdominal:      General: There is no distension.      Palpations: There is no mass.      Tenderness: There is generalized abdominal tenderness. There is guarding. There is no rebound.      Hernia: No hernia is present.   Skin:     General: Skin is warm and dry.   Neurological:      General: No focal deficit present.      Mental Status: He is alert and oriented to person, place, and time.   Psychiatric:         Mood and Affect: Mood normal.         Behavior: Behavior normal.            Last Recorded Vitals  Blood pressure (!) 162/96, pulse 93, temperature 37.6 °C (99.7 °F), temperature source Temporal, resp. rate 21, height 1.778 m (5' 10\"), weight 77.8 kg (171 lb 8.3 oz), SpO2 99 %.    Relevant Results      Scheduled medications  pantoprazole, 80 mg, intravenous, BID  polyethylene glycol, 17 g, oral, Daily  prochlorperazine, 10 mg, intravenous, Once      Continuous medications  sodium chloride 0.9%, 50 mL/hr, Last Rate: 50 mL/hr (03/15/24 0013)      PRN medications  PRN medications: acetaminophen **OR** acetaminophen **OR** acetaminophen, hydrALAZINE, HYDROmorphone, ondansetron  ECG 12 lead    Result Date: 3/14/2024  Normal sinus rhythm Minimal voltage criteria for LVH, may be normal variant ( Sokolow-Miranda ) Borderline ECG When compared with ECG of 26-FEB-2024 20:15, T wave amplitude has increased in Inferior leads Nonspecific T wave abnormality no longer evident in Anterior leads See ED provider note for full interpretation and clinical correlation Confirmed by Mireille Chambers ([id number]) on 3/14/2024 4:24:19 PM    CT angio chest abdomen " pelvis    Result Date: 3/14/2024    1. Small amount of intraperitoneal free air beneath the right hemidiaphragm in this patient is status post appendectomy 2 days ago. 2. Right middle lobe and right lower lobe subsegmental atelectasis with small right pleural effusion. 3. Evolving hematoma/seroma along the left side of the abdomen within the mesentery as described above. 4. Status post splenic artery embolization with collateral vasculature to the spleen as above. 5. No evidence for contrast extravasation into the bowel loops to suggest active bleeding at this time. 6. Status post appendectomy with postsurgical inflammatory changes right lower quadrant.     MACRO: none   Signed by: Mario Frazier 3/14/2024 4:23 PM Dictation workstation:   [id number]    CT angio chest abdomen pelvis    Result Date: 3/12/2024    1. Findings consistent with acute, uncomplicated appendicitis.   2. Status post splenic artery embolization. Subcentimeter ovoid hyperdensity in the caudal aspect of the spleen suggestive of a small aneurysm or hemangioma. This can be confirmed with an ultrasound.   3. Subacute hematoma abutting the greater curvature of the stomach and extending in the caudal direction throughout the mesentery just behind the left rectus abdominus muscle.   Signed by: Pillo Patel 3/12/2024 3:15 PM Dictation workstation:   [id number]    Assessment/Plan      53 y.o. male with h/o splenic artery embolization due to a splenic aneurysm on 2/22 with IR complicated by an acute PE, diagnosed on 2/26, laparoscopic appendectomy 3/12/24, presented with hematemesis, stable hemoglobin.  Possible etiologies include gastritis, esophagitis, Nataly-Tariq tear secondary to retching, PUD.    -Supportive care with antiemetics and analgesia as needed  -Twice daily PPI 40 mg IV  -Plan for upper endoscopy today  -N.p.o.  Further recommendations pending EGD results    I spent 35 minutes in the professional and overall care of this  patient.      Vijaya Lemon, APRN-CNP

## 2024-03-15 NOTE — H&P
INTERNAL MEDICINE     HISTORY AND PHYSICAL      Chief Complaint:  Abdominal pain    History Of Present Illness  Douglas Wilson is a 53 y.o. male presenting with severe abdominal pain.  Patient has had recent complicated medical history.  He was admitted 3 weeks ago with abdominal pain and was diagnosed with splenic aneurysm which required splenic artery embolization.  Postprocedure he developed pulmonary embolism.  He was treated with anticoagulation.  He was readmitted and underwent laparoscopic appendectomy on 3/12 and was discharged home.  Patient states that he was initially doing well but then he began to have abdominal pain again yesterday and had several bouts of emesis which he believes comprised blood.  He complains of severe pain.  Patient has required significant analgesics overnight.  He has had several bouts of emesis.  He has been maintained NPO.  CT scan showed no new pathology.  He is scheduled for endoscopy today.  He is receiving intravenous Protonix.     Past Medical History  He has a past medical history of Acute appendicitis without peritonitis (03/12/2024), NSTEMI (non-ST elevated myocardial infarction) (CMS/[facility]), Pulmonary embolism (CMS/[facility]), and Splenic artery aneurysm (CMS/[facility]) (02/2024).    Surgical History  He has a past surgical history that includes Other surgical history (01/24/2022); CT angio coronary art with heartflow if score >30% (04/19/2022); Appendectomy (03/12/2024); and Splenic artery embolization (02/2024).     Social History  He reports that he has been smoking cigarettes. He has been smoking an average of 1 pack per day. He has never used smokeless tobacco. He reports current drug use. Drug: Heroin. He reports that he does not drink alcohol.    Family History  Hypertension     Allergies  Patient has no known allergies.    Home Medications:  Prior to Admission medications    Medication Sig Start Date End Date Taking? Authorizing Provider   apixaban (Eliquis) 5 mg tablet  "Take 1 tablet (5 mg) by mouth 2 times a day. Do not start before March 6, 2024. 3/6/24 4/5/24  Shemar Banda MD   omeprazole (PriLOSEC) 40 mg DR capsule Take 1 capsule (40 mg) by mouth once daily as needed. Do not crush or chew.    Historical Provider, MD   tamsulosin (Flomax) 0.4 mg 24 hr capsule Take 1 capsule (0.4 mg) by mouth once daily. Med has not been filled since April. Caregiver confirmed that patient actually takes at home    Historical Provider, MD   traMADol (Ultram) 50 mg tablet Take 1 tablet (50 mg) by mouth 2 times a day as needed for severe pain (7 - 10) for up to 4 days. 3/13/24 3/17/24  Rangel Moore MD   apixaban (Eliquis) 5 mg tablet Take 2 tablets (10 mg) by mouth 2 times a day for 12 doses. 2/29/24 3/13/24  Shemar Banda MD        Review of Systems   Constitutional: Negative.    HENT: Negative.     Eyes: Negative.    Respiratory: Negative.     Cardiovascular: Negative.    Gastrointestinal:  Positive for abdominal pain, nausea and vomiting.   Musculoskeletal: Negative.    Skin: Negative.    Neurological: Negative.         Last Recorded Vitals  Blood pressure (!) 162/96, pulse 93, temperature 37.6 °C (99.7 °F), temperature source Temporal, resp. rate 21, height 1.778 m (5' 10\"), weight 77.8 kg (171 lb 8.3 oz), SpO2 99 %.    Physical Exam      Constitutional:       Appearance: Middle-aged, well-built, in moderate distress secondary to abdominal pain  HENT:      Head: Normocephalic and atraumatic.   Eyes:      Extraocular Movements: Extraocular movements intact.      Pupils: Pupils are equal, round, and reactive to light.   Cardiovascular:      Rate and Rhythm: Normal rate and regular rhythm.      Pulses: Normal pulses.      Heart sounds: Normal heart sounds.   Pulmonary:      Effort: Pulmonary effort is normal.      Breath sounds: Normal breath sounds.   Abdominal:      General: Abdomen is flat. Bowel sounds are normal.      Palpations: Abdomen is nondistended but there is significant " tenderness in the epigastric region with no rebound or guarding.  Musculoskeletal:         General: Normal range of motion.      Cervical back: Normal range of motion and neck supple.   Skin:     General: Skin is warm and dry.   Neurological:      General: No focal deficit present.      Mental Status: He is alert and oriented to person, place, and time. Mental status is at baseline.       Relevant Results    Results for orders placed or performed during the hospital encounter of 03/14/24 (from the past 24 hour(s))   CBC and Auto Differential   Result Value Ref Range    WBC 14.8 (H) 4.4 - 11.3 x10*3/uL    nRBC 0.0 0.0 - 0.0 /100 WBCs    RBC 5.09 4.50 - 5.90 x10*6/uL    Hemoglobin 11.7 (L) 13.5 - 17.5 g/dL    Hematocrit 36.8 (L) 41.0 - 52.0 %    MCV 72 (L) 80 - 100 fL    MCH 23.0 (L) 26.0 - 34.0 pg    MCHC 31.8 (L) 32.0 - 36.0 g/dL    RDW 17.6 (H) 11.5 - 14.5 %    Platelets 445 150 - 450 x10*3/uL    Neutrophils % 87.3 40.0 - 80.0 %    Immature Granulocytes %, Automated 0.6 0.0 - 0.9 %    Lymphocytes % 6.4 13.0 - 44.0 %    Monocytes % 5.5 2.0 - 10.0 %    Eosinophils % 0.0 0.0 - 6.0 %    Basophils % 0.2 0.0 - 2.0 %    Neutrophils Absolute 12.92 (H) 1.20 - 7.70 x10*3/uL    Immature Granulocytes Absolute, Automated 0.09 0.00 - 0.70 x10*3/uL    Lymphocytes Absolute 0.95 (L) 1.20 - 4.80 x10*3/uL    Monocytes Absolute 0.82 0.10 - 1.00 x10*3/uL    Eosinophils Absolute 0.00 0.00 - 0.70 x10*3/uL    Basophils Absolute 0.03 0.00 - 0.10 x10*3/uL   Basic metabolic panel   Result Value Ref Range    Glucose 114 (H) 74 - 99 mg/dL    Sodium 137 136 - 145 mmol/L    Potassium 3.4 (L) 3.5 - 5.3 mmol/L    Chloride 101 98 - 107 mmol/L    Bicarbonate 24 21 - 32 mmol/L    Anion Gap 15 10 - 20 mmol/L    Urea Nitrogen 5 (L) 6 - 23 mg/dL    Creatinine 0.73 0.50 - 1.30 mg/dL    eGFR >90 >60 mL/min/1.73m*2    Calcium 7.9 (L) 8.6 - 10.3 mg/dL   Lipase   Result Value Ref Range    Lipase 7 (L) 9 - 82 U/L   Hepatic function panel   Result Value Ref  Range    Albumin 3.9 3.4 - 5.0 g/dL    Bilirubin, Total 0.6 0.0 - 1.2 mg/dL    Bilirubin, Direct 0.1 0.0 - 0.3 mg/dL    Alkaline Phosphatase 56 33 - 120 U/L    ALT 14 10 - 52 U/L    AST 15 9 - 39 U/L    Total Protein 6.8 6.4 - 8.2 g/dL   Lactate   Result Value Ref Range    Lactate 1.8 0.4 - 2.0 mmol/L   Troponin I, High Sensitivity   Result Value Ref Range    Troponin I, High Sensitivity 46 (H) 0 - 20 ng/L   Blood Gas Venous Full Panel   Result Value Ref Range    POCT pH, Venous 7.46 (H) 7.33 - 7.43 pH    POCT pCO2, Venous 37 (L) 41 - 51 mm Hg    POCT pO2, Venous 43 35 - 45 mm Hg    POCT SO2, Venous 70 45 - 75 %    POCT Oxy Hemoglobin, Venous 68.1 45.0 - 75.0 %    POCT Hematocrit Calculated, Venous 36.0 (L) 41.0 - 52.0 %    POCT Sodium, Venous 133 (L) 136 - 145 mmol/L    POCT Potassium, Venous 3.2 (L) 3.5 - 5.3 mmol/L    POCT Chloride, Venous 99 98 - 107 mmol/L    POCT Ionized Calicum, Venous 1.07 (L) 1.10 - 1.33 mmol/L    POCT Glucose, Venous 128 (H) 74 - 99 mg/dL    POCT Lactate, Venous 1.9 0.4 - 2.0 mmol/L    POCT Base Excess, Venous 2.5 -2.0 - 3.0 mmol/L    POCT HCO3 Calculated, Venous 26.3 (H) 22.0 - 26.0 mmol/L    POCT Hemoglobin, Venous 12.0 (L) 13.5 - 17.5 g/dL    POCT Anion Gap, Venous 11.0 10.0 - 25.0 mmol/L    Patient Temperature 37.0 degrees Celsius    FiO2 21 %   ECG 12 lead   Result Value Ref Range    Ventricular Rate 79 BPM    Atrial Rate 79 BPM    VT Interval 160 ms    QRS Duration 82 ms    QT Interval 380 ms    QTC Calculation(Bazett) 435 ms    P Axis 79 degrees    R Axis 27 degrees    T Axis 59 degrees    QRS Count 13 beats    Q Onset 224 ms    P Onset 144 ms    P Offset 200 ms    T Offset 414 ms    QTC Fredericia 416 ms   CBC   Result Value Ref Range    WBC 13.3 (H) 4.4 - 11.3 x10*3/uL    nRBC 0.0 0.0 - 0.0 /100 WBCs    RBC 5.20 4.50 - 5.90 x10*6/uL    Hemoglobin 12.0 (L) 13.5 - 17.5 g/dL    Hematocrit 38.0 (L) 41.0 - 52.0 %    MCV 73 (L) 80 - 100 fL    MCH 23.1 (L) 26.0 - 34.0 pg    MCHC 31.6 (L)  32.0 - 36.0 g/dL    RDW 17.5 (H) 11.5 - 14.5 %    Platelets 428 150 - 450 x10*3/uL   Blood Gas Venous Full Panel   Result Value Ref Range    POCT pH, Venous 7.42 7.33 - 7.43 pH    POCT pCO2, Venous 41 41 - 51 mm Hg    POCT pO2, Venous 32 (L) 35 - 45 mm Hg    POCT SO2, Venous 40 (L) 45 - 75 %    POCT Oxy Hemoglobin, Venous 39.4 (L) 45.0 - 75.0 %    POCT Hematocrit Calculated, Venous 39.0 (L) 41.0 - 52.0 %    POCT Sodium, Venous 133 (L) 136 - 145 mmol/L    POCT Potassium, Venous 3.3 (L) 3.5 - 5.3 mmol/L    POCT Chloride, Venous 97 (L) 98 - 107 mmol/L    POCT Ionized Calicum, Venous 1.11 1.10 - 1.33 mmol/L    POCT Glucose, Venous 128 (H) 74 - 99 mg/dL    POCT Lactate, Venous 2.7 (H) 0.4 - 2.0 mmol/L    POCT Base Excess, Venous 1.9 -2.0 - 3.0 mmol/L    POCT HCO3 Calculated, Venous 26.6 (H) 22.0 - 26.0 mmol/L    POCT Hemoglobin, Venous 12.9 (L) 13.5 - 17.5 g/dL    POCT Anion Gap, Venous 13.0 10.0 - 25.0 mmol/L    Patient Temperature 37.0 degrees Celsius    FiO2 21 %   CBC   Result Value Ref Range    WBC 12.6 (H) 4.4 - 11.3 x10*3/uL    nRBC 0.0 0.0 - 0.0 /100 WBCs    RBC 5.26 4.50 - 5.90 x10*6/uL    Hemoglobin 12.3 (L) 13.5 - 17.5 g/dL    Hematocrit 37.3 (L) 41.0 - 52.0 %    MCV 71 (L) 80 - 100 fL    MCH 23.4 (L) 26.0 - 34.0 pg    MCHC 33.0 32.0 - 36.0 g/dL    RDW 17.4 (H) 11.5 - 14.5 %    Platelets 396 150 - 450 x10*3/uL   Comprehensive Metabolic Panel   Result Value Ref Range    Glucose 137 (H) 74 - 99 mg/dL    Sodium 132 (L) 136 - 145 mmol/L    Potassium 3.1 (L) 3.5 - 5.3 mmol/L    Chloride 96 (L) 98 - 107 mmol/L    Bicarbonate 24 21 - 32 mmol/L    Anion Gap 15 10 - 20 mmol/L    Urea Nitrogen 7 6 - 23 mg/dL    Creatinine 0.73 0.50 - 1.30 mg/dL    eGFR >90 >60 mL/min/1.73m*2    Calcium 9.3 8.6 - 10.3 mg/dL    Albumin 4.2 3.4 - 5.0 g/dL    Alkaline Phosphatase 61 33 - 120 U/L    Total Protein 8.2 6.4 - 8.2 g/dL    AST 13 9 - 39 U/L    Bilirubin, Total 0.6 0.0 - 1.2 mg/dL    ALT 13 10 - 52 U/L   Lactate   Result Value Ref  Range    Lactate 1.1 0.4 - 2.0 mmol/L   CBC and Auto Differential   Result Value Ref Range    WBC 11.7 (H) 4.4 - 11.3 x10*3/uL    nRBC 0.0 0.0 - 0.0 /100 WBCs    RBC 5.29 4.50 - 5.90 x10*6/uL    Hemoglobin 11.9 (L) 13.5 - 17.5 g/dL    Hematocrit 38.1 (L) 41.0 - 52.0 %    MCV 72 (L) 80 - 100 fL    MCH 22.5 (L) 26.0 - 34.0 pg    MCHC 31.2 (L) 32.0 - 36.0 g/dL    RDW 18.0 (H) 11.5 - 14.5 %    Platelets 414 150 - 450 x10*3/uL    Neutrophils % 73.0 40.0 - 80.0 %    Immature Granulocytes %, Automated 0.4 0.0 - 0.9 %    Lymphocytes % 14.6 13.0 - 44.0 %    Monocytes % 11.7 2.0 - 10.0 %    Eosinophils % 0.1 0.0 - 6.0 %    Basophils % 0.2 0.0 - 2.0 %    Neutrophils Absolute 8.52 (H) 1.20 - 7.70 x10*3/uL    Immature Granulocytes Absolute, Automated 0.05 0.00 - 0.70 x10*3/uL    Lymphocytes Absolute 1.71 1.20 - 4.80 x10*3/uL    Monocytes Absolute 1.37 (H) 0.10 - 1.00 x10*3/uL    Eosinophils Absolute 0.01 0.00 - 0.70 x10*3/uL    Basophils Absolute 0.02 0.00 - 0.10 x10*3/uL   Basic Metabolic Panel   Result Value Ref Range    Glucose 113 (H) 74 - 99 mg/dL    Sodium 133 (L) 136 - 145 mmol/L    Potassium 4.3 3.5 - 5.3 mmol/L    Chloride 98 98 - 107 mmol/L    Bicarbonate 25 21 - 32 mmol/L    Anion Gap 14 10 - 20 mmol/L    Urea Nitrogen 10 6 - 23 mg/dL    Creatinine 0.88 0.50 - 1.30 mg/dL    eGFR >90 >60 mL/min/1.73m*2    Calcium 9.3 8.6 - 10.3 mg/dL           Principal Problem:    Generalized abdominal pain      Problem list:  Principal Problem:    Generalized abdominal pain    ASSESSMENT AND PLAN:    Abdominal pain -suspect peptic ulcer disease.  Continue Protonix, endoscopy today.  Recurrent emesis -continue with antiemetics.  Recent appendectomy -General surgery following, no intervention planned.  Substance abuse -cessation of use strongly encouraged.  Elevated blood pressure -secondary to uncontrolled pain, continue with intravenous hydralazine as needed.      Shemar Banda MD  03/15/2411:26 AM

## 2024-03-15 NOTE — PROGRESS NOTES
"Douglas Wilson is a 53 y.o. male on day 0 of admission presenting with Generalized abdominal pain.    Assessment/Plan   CT scan unremarkable following recent laparoscopic appendectomy.  Hemoglobin has been stable.  Would add MiraLAX twice a day to stimulate his bowels.  No plans for surgical intervention    Subjective   Status post laparoscopic appendectomy 3/ came back in with worsening 12.  Diffuse abdominal pain and nausea.  He had some hematemesis.  Hemoglobin has been stable.  Vital signs stable.  He says he has not had a bowel movement in 5 days.       Objective     Physical Exam  NAD  A&Ox3  Non icteric  CTA  RR  Abdomen soft min tender. Wounds clean, intact  Extremities warm, well perfused     Last Recorded Vitals  Blood pressure (!) 158/93, pulse 72, temperature 37.4 °C (99.3 °F), temperature source Temporal, resp. rate 21, height 1.778 m (5' 10\"), weight 77.8 kg (171 lb 8.3 oz), SpO2 99 %.  Intake/Output last 3 Shifts:  I/O last 3 completed shifts:  In: 2000 (25.7 mL/kg) [IV Piggyback:2000]  Out: - (0 mL/kg)   Weight: 77.8 kg     Relevant Results    Scheduled medications  pantoprazole, 80 mg, intravenous, BID  polyethylene glycol, 17 g, oral, Daily  prochlorperazine, 10 mg, intravenous, Once      Continuous medications  sodium chloride 0.9%, 50 mL/hr, Last Rate: 50 mL/hr (03/15/24 0013)      PRN medications  PRN medications: acetaminophen **OR** acetaminophen **OR** acetaminophen, hydrALAZINE, HYDROmorphone, ondansetron    Results for orders placed or performed during the hospital encounter of 03/14/24 (from the past 24 hour(s))   CBC and Auto Differential   Result Value Ref Range    WBC 14.8 (H) 4.4 - 11.3 x10*3/uL    nRBC 0.0 0.0 - 0.0 /100 WBCs    RBC 5.09 4.50 - 5.90 x10*6/uL    Hemoglobin 11.7 (L) 13.5 - 17.5 g/dL    Hematocrit 36.8 (L) 41.0 - 52.0 %    MCV 72 (L) 80 - 100 fL    MCH 23.0 (L) 26.0 - 34.0 pg    MCHC 31.8 (L) 32.0 - 36.0 g/dL    RDW 17.6 (H) 11.5 - 14.5 %    Platelets 445 150 - 450 " x10*3/uL    Neutrophils % 87.3 40.0 - 80.0 %    Immature Granulocytes %, Automated 0.6 0.0 - 0.9 %    Lymphocytes % 6.4 13.0 - 44.0 %    Monocytes % 5.5 2.0 - 10.0 %    Eosinophils % 0.0 0.0 - 6.0 %    Basophils % 0.2 0.0 - 2.0 %    Neutrophils Absolute 12.92 (H) 1.20 - 7.70 x10*3/uL    Immature Granulocytes Absolute, Automated 0.09 0.00 - 0.70 x10*3/uL    Lymphocytes Absolute 0.95 (L) 1.20 - 4.80 x10*3/uL    Monocytes Absolute 0.82 0.10 - 1.00 x10*3/uL    Eosinophils Absolute 0.00 0.00 - 0.70 x10*3/uL    Basophils Absolute 0.03 0.00 - 0.10 x10*3/uL   Basic metabolic panel   Result Value Ref Range    Glucose 114 (H) 74 - 99 mg/dL    Sodium 137 136 - 145 mmol/L    Potassium 3.4 (L) 3.5 - 5.3 mmol/L    Chloride 101 98 - 107 mmol/L    Bicarbonate 24 21 - 32 mmol/L    Anion Gap 15 10 - 20 mmol/L    Urea Nitrogen 5 (L) 6 - 23 mg/dL    Creatinine 0.73 0.50 - 1.30 mg/dL    eGFR >90 >60 mL/min/1.73m*2    Calcium 7.9 (L) 8.6 - 10.3 mg/dL   Lipase   Result Value Ref Range    Lipase 7 (L) 9 - 82 U/L   Hepatic function panel   Result Value Ref Range    Albumin 3.9 3.4 - 5.0 g/dL    Bilirubin, Total 0.6 0.0 - 1.2 mg/dL    Bilirubin, Direct 0.1 0.0 - 0.3 mg/dL    Alkaline Phosphatase 56 33 - 120 U/L    ALT 14 10 - 52 U/L    AST 15 9 - 39 U/L    Total Protein 6.8 6.4 - 8.2 g/dL   Lactate   Result Value Ref Range    Lactate 1.8 0.4 - 2.0 mmol/L   Troponin I, High Sensitivity   Result Value Ref Range    Troponin I, High Sensitivity 46 (H) 0 - 20 ng/L   Blood Gas Venous Full Panel   Result Value Ref Range    POCT pH, Venous 7.46 (H) 7.33 - 7.43 pH    POCT pCO2, Venous 37 (L) 41 - 51 mm Hg    POCT pO2, Venous 43 35 - 45 mm Hg    POCT SO2, Venous 70 45 - 75 %    POCT Oxy Hemoglobin, Venous 68.1 45.0 - 75.0 %    POCT Hematocrit Calculated, Venous 36.0 (L) 41.0 - 52.0 %    POCT Sodium, Venous 133 (L) 136 - 145 mmol/L    POCT Potassium, Venous 3.2 (L) 3.5 - 5.3 mmol/L    POCT Chloride, Venous 99 98 - 107 mmol/L    POCT Ionized Calicum,  Venous 1.07 (L) 1.10 - 1.33 mmol/L    POCT Glucose, Venous 128 (H) 74 - 99 mg/dL    POCT Lactate, Venous 1.9 0.4 - 2.0 mmol/L    POCT Base Excess, Venous 2.5 -2.0 - 3.0 mmol/L    POCT HCO3 Calculated, Venous 26.3 (H) 22.0 - 26.0 mmol/L    POCT Hemoglobin, Venous 12.0 (L) 13.5 - 17.5 g/dL    POCT Anion Gap, Venous 11.0 10.0 - 25.0 mmol/L    Patient Temperature 37.0 degrees Celsius    FiO2 21 %   ECG 12 lead   Result Value Ref Range    Ventricular Rate 79 BPM    Atrial Rate 79 BPM    NJ Interval 160 ms    QRS Duration 82 ms    QT Interval 380 ms    QTC Calculation(Bazett) 435 ms    P Axis 79 degrees    R Axis 27 degrees    T Axis 59 degrees    QRS Count 13 beats    Q Onset 224 ms    P Onset 144 ms    P Offset 200 ms    T Offset 414 ms    QTC Fredericia 416 ms   CBC   Result Value Ref Range    WBC 13.3 (H) 4.4 - 11.3 x10*3/uL    nRBC 0.0 0.0 - 0.0 /100 WBCs    RBC 5.20 4.50 - 5.90 x10*6/uL    Hemoglobin 12.0 (L) 13.5 - 17.5 g/dL    Hematocrit 38.0 (L) 41.0 - 52.0 %    MCV 73 (L) 80 - 100 fL    MCH 23.1 (L) 26.0 - 34.0 pg    MCHC 31.6 (L) 32.0 - 36.0 g/dL    RDW 17.5 (H) 11.5 - 14.5 %    Platelets 428 150 - 450 x10*3/uL   Blood Gas Venous Full Panel   Result Value Ref Range    POCT pH, Venous 7.42 7.33 - 7.43 pH    POCT pCO2, Venous 41 41 - 51 mm Hg    POCT pO2, Venous 32 (L) 35 - 45 mm Hg    POCT SO2, Venous 40 (L) 45 - 75 %    POCT Oxy Hemoglobin, Venous 39.4 (L) 45.0 - 75.0 %    POCT Hematocrit Calculated, Venous 39.0 (L) 41.0 - 52.0 %    POCT Sodium, Venous 133 (L) 136 - 145 mmol/L    POCT Potassium, Venous 3.3 (L) 3.5 - 5.3 mmol/L    POCT Chloride, Venous 97 (L) 98 - 107 mmol/L    POCT Ionized Calicum, Venous 1.11 1.10 - 1.33 mmol/L    POCT Glucose, Venous 128 (H) 74 - 99 mg/dL    POCT Lactate, Venous 2.7 (H) 0.4 - 2.0 mmol/L    POCT Base Excess, Venous 1.9 -2.0 - 3.0 mmol/L    POCT HCO3 Calculated, Venous 26.6 (H) 22.0 - 26.0 mmol/L    POCT Hemoglobin, Venous 12.9 (L) 13.5 - 17.5 g/dL    POCT Anion Gap, Venous  13.0 10.0 - 25.0 mmol/L    Patient Temperature 37.0 degrees Celsius    FiO2 21 %   CBC   Result Value Ref Range    WBC 12.6 (H) 4.4 - 11.3 x10*3/uL    nRBC 0.0 0.0 - 0.0 /100 WBCs    RBC 5.26 4.50 - 5.90 x10*6/uL    Hemoglobin 12.3 (L) 13.5 - 17.5 g/dL    Hematocrit 37.3 (L) 41.0 - 52.0 %    MCV 71 (L) 80 - 100 fL    MCH 23.4 (L) 26.0 - 34.0 pg    MCHC 33.0 32.0 - 36.0 g/dL    RDW 17.4 (H) 11.5 - 14.5 %    Platelets 396 150 - 450 x10*3/uL   Comprehensive Metabolic Panel   Result Value Ref Range    Glucose 137 (H) 74 - 99 mg/dL    Sodium 132 (L) 136 - 145 mmol/L    Potassium 3.1 (L) 3.5 - 5.3 mmol/L    Chloride 96 (L) 98 - 107 mmol/L    Bicarbonate 24 21 - 32 mmol/L    Anion Gap 15 10 - 20 mmol/L    Urea Nitrogen 7 6 - 23 mg/dL    Creatinine 0.73 0.50 - 1.30 mg/dL    eGFR >90 >60 mL/min/1.73m*2    Calcium 9.3 8.6 - 10.3 mg/dL    Albumin 4.2 3.4 - 5.0 g/dL    Alkaline Phosphatase 61 33 - 120 U/L    Total Protein 8.2 6.4 - 8.2 g/dL    AST 13 9 - 39 U/L    Bilirubin, Total 0.6 0.0 - 1.2 mg/dL    ALT 13 10 - 52 U/L   Lactate   Result Value Ref Range    Lactate 1.1 0.4 - 2.0 mmol/L   CBC and Auto Differential   Result Value Ref Range    WBC 11.7 (H) 4.4 - 11.3 x10*3/uL    nRBC 0.0 0.0 - 0.0 /100 WBCs    RBC 5.29 4.50 - 5.90 x10*6/uL    Hemoglobin 11.9 (L) 13.5 - 17.5 g/dL    Hematocrit 38.1 (L) 41.0 - 52.0 %    MCV 72 (L) 80 - 100 fL    MCH 22.5 (L) 26.0 - 34.0 pg    MCHC 31.2 (L) 32.0 - 36.0 g/dL    RDW 18.0 (H) 11.5 - 14.5 %    Platelets 414 150 - 450 x10*3/uL    Neutrophils % 73.0 40.0 - 80.0 %    Immature Granulocytes %, Automated 0.4 0.0 - 0.9 %    Lymphocytes % 14.6 13.0 - 44.0 %    Monocytes % 11.7 2.0 - 10.0 %    Eosinophils % 0.1 0.0 - 6.0 %    Basophils % 0.2 0.0 - 2.0 %    Neutrophils Absolute 8.52 (H) 1.20 - 7.70 x10*3/uL    Immature Granulocytes Absolute, Automated 0.05 0.00 - 0.70 x10*3/uL    Lymphocytes Absolute 1.71 1.20 - 4.80 x10*3/uL    Monocytes Absolute 1.37 (H) 0.10 - 1.00 x10*3/uL    Eosinophils  Absolute 0.01 0.00 - 0.70 x10*3/uL    Basophils Absolute 0.02 0.00 - 0.10 x10*3/uL   Basic Metabolic Panel   Result Value Ref Range    Glucose 113 (H) 74 - 99 mg/dL    Sodium 133 (L) 136 - 145 mmol/L    Potassium 4.3 3.5 - 5.3 mmol/L    Chloride 98 98 - 107 mmol/L    Bicarbonate 25 21 - 32 mmol/L    Anion Gap 14 10 - 20 mmol/L    Urea Nitrogen 10 6 - 23 mg/dL    Creatinine 0.88 0.50 - 1.30 mg/dL    eGFR >90 >60 mL/min/1.73m*2    Calcium 9.3 8.6 - 10.3 mg/dL           I spent 25 minutes in the professional and overall care of this patient.      Lauro Carranza MD

## 2024-03-15 NOTE — POST-PROCEDURE NOTE
1522 Received patient from the procedure accompanied by RN and AA. Patient sleeping, easily arousable. Abdomen soft    1536 DR. Puckett at bedside and talked to patient and mother. He said that per GI he can eat.    1541 Sleeping most of the time, easily arousable, oriented x 4, states that he still has pain but is okay.    1546 Report given to BIA Reardon of 4th floor    1557 Transferred back to room 404 in satisfactory condition via stretcher accompanied by family.

## 2024-03-15 NOTE — SIGNIFICANT EVENT
EGD performed.  Patient tolerated well.  Grade C esophagitis found without active bleeding.  Approximately 4 cm hiatal hernia.  Gastritis with erythema patchy in the stomach.  Biopsies were performed for H. pylori.  EBL was minimal.  Patient tolerated procedure well.  Recommendation is to pursue PPI therapy twice daily for 6 weeks.  I will follow-up on pathology results.  Will sign off please call with questions.  Okay with advancing diet.

## 2024-03-15 NOTE — CONSULTS
Reason For Consult  GI bleed    History Of Present Illness  Douglas Wilson is a 53 y.o. male with h/o splenic artery embolization due to a splenic aneurysm on 2/22 with IR complicated by an acute PE, diagnosed on 2/26, laparoscopic appendectomy 3/12/24, presented with hematemesis.  Patient was discharged from [facility] on 3/13/24.  At home he developed diffuse abdominal pain, nausea, vomiting, had several episodes of blood in his emesis.  At first it was brown and then red with clots.  Labs on admission WBC 12.6, H&H 12.3 and 37.3.  Surgery consulted, no further surgical procedure indicated.  GI consulted for hematemesis.  Hemoglobin has been stable.  Patient complains of persistent abdominal pain and constantly moaning.  He never had EGD or colonoscopy.  No weight loss     Past Medical History  He has a past medical history of Acute appendicitis without peritonitis (03/12/2024), NSTEMI (non-ST elevated myocardial infarction) (CMS/HCC), Pulmonary embolism (CMS/HCC), and Splenic artery aneurysm (CMS/HCC) (02/2024).    Surgical History  He has a past surgical history that includes Other surgical history (01/24/2022); CT angio coronary art with heartflow if score >30% (04/19/2022); Appendectomy (03/12/2024); and Splenic artery embolization (02/2024).     Social History  He reports that he has been smoking cigarettes. He has been smoking an average of 1 pack per day. He has never used smokeless tobacco. He reports current drug use. Drug: Heroin. He reports that he does not drink alcohol.    Family History  Denies any known GI malignancies    Allergies  Patient has no known allergies.    Review of Systems  10 systems reviewed and negative other than HPI     Physical Exam  Physical Exam  Vitals reviewed.   Constitutional:       General: He is in acute distress.      Appearance: He is ill-appearing. He is not toxic-appearing or diaphoretic.   HENT:      Head: Normocephalic and atraumatic.      Nose: Nose normal.       "Mouth/Throat:      Mouth: Mucous membranes are moist.      Pharynx: Oropharynx is clear.   Eyes:      Conjunctiva/sclera: Conjunctivae normal.   Cardiovascular:      Rate and Rhythm: Normal rate and regular rhythm.      Pulses: Normal pulses.      Heart sounds: Normal heart sounds.   Pulmonary:      Effort: Pulmonary effort is normal.      Breath sounds: Normal breath sounds.   Abdominal:      General: There is no distension.      Palpations: There is no mass.      Tenderness: There is generalized abdominal tenderness. There is guarding. There is no rebound.      Hernia: No hernia is present.   Skin:     General: Skin is warm and dry.   Neurological:      General: No focal deficit present.      Mental Status: He is alert and oriented to person, place, and time.   Psychiatric:         Mood and Affect: Mood normal.         Behavior: Behavior normal.            Last Recorded Vitals  Blood pressure (!) 162/96, pulse 93, temperature 37.6 °C (99.7 °F), temperature source Temporal, resp. rate 21, height 1.778 m (5' 10\"), weight 77.8 kg (171 lb 8.3 oz), SpO2 99 %.    Relevant Results      Scheduled medications  pantoprazole, 80 mg, intravenous, BID  polyethylene glycol, 17 g, oral, Daily  prochlorperazine, 10 mg, intravenous, Once      Continuous medications  sodium chloride 0.9%, 50 mL/hr, Last Rate: 50 mL/hr (03/15/24 0013)      PRN medications  PRN medications: acetaminophen **OR** acetaminophen **OR** acetaminophen, hydrALAZINE, HYDROmorphone, ondansetron  ECG 12 lead    Result Date: 3/14/2024  Normal sinus rhythm Minimal voltage criteria for LVH, may be normal variant ( Sokolow-Miranda ) Borderline ECG When compared with ECG of 26-FEB-2024 20:15, T wave amplitude has increased in Inferior leads Nonspecific T wave abnormality no longer evident in Anterior leads See ED provider note for full interpretation and clinical correlation Confirmed by Mireille Chambers ([id number]) on 3/14/2024 4:24:19 PM    CT angio chest abdomen " pelvis    Result Date: 3/14/2024    1. Small amount of intraperitoneal free air beneath the right hemidiaphragm in this patient is status post appendectomy 2 days ago. 2. Right middle lobe and right lower lobe subsegmental atelectasis with small right pleural effusion. 3. Evolving hematoma/seroma along the left side of the abdomen within the mesentery as described above. 4. Status post splenic artery embolization with collateral vasculature to the spleen as above. 5. No evidence for contrast extravasation into the bowel loops to suggest active bleeding at this time. 6. Status post appendectomy with postsurgical inflammatory changes right lower quadrant.     MACRO: none   Signed by: Mario Frazier 3/14/2024 4:23 PM Dictation workstation:   [id number]    CT angio chest abdomen pelvis    Result Date: 3/12/2024    1. Findings consistent with acute, uncomplicated appendicitis.   2. Status post splenic artery embolization. Subcentimeter ovoid hyperdensity in the caudal aspect of the spleen suggestive of a small aneurysm or hemangioma. This can be confirmed with an ultrasound.   3. Subacute hematoma abutting the greater curvature of the stomach and extending in the caudal direction throughout the mesentery just behind the left rectus abdominus muscle.   Signed by: Pillo Patel 3/12/2024 3:15 PM Dictation workstation:   [id number]    Assessment/Plan      53 y.o. male with h/o splenic artery embolization due to a splenic aneurysm on 2/22 with IR complicated by an acute PE, diagnosed on 2/26, laparoscopic appendectomy 3/12/24, presented with hematemesis, stable hemoglobin.  Possible etiologies include gastritis, esophagitis, Nataly-Tariq tear secondary to retching, PUD.    -Supportive care with antiemetics and analgesia as needed  -Twice daily PPI 40 mg IV  -Plan for upper endoscopy today  -N.p.o.  Further recommendations pending EGD results    I spent 35 minutes in the professional and overall care of this  patient.      Vijaya Lemon, APRN-CNP

## 2024-03-15 NOTE — CARE PLAN
The patient's goals for the shift include      The clinical goals for the shift include pt will remain hemodynamically stable    Over the shift, the patient did not make progress toward the following goals. Barriers to progression include . Recommendations to address these barriers include .

## 2024-03-16 LAB
ANION GAP SERPL CALC-SCNC: 10 MMOL/L (ref 10–20)
BASOPHILS # BLD AUTO: 0.04 X10*3/UL (ref 0–0.1)
BASOPHILS NFR BLD AUTO: 0.5 %
BUN SERPL-MCNC: 18 MG/DL (ref 6–23)
CALCIUM SERPL-MCNC: 8.4 MG/DL (ref 8.6–10.3)
CHLORIDE SERPL-SCNC: 102 MMOL/L (ref 98–107)
CO2 SERPL-SCNC: 28 MMOL/L (ref 21–32)
CREAT SERPL-MCNC: 1.19 MG/DL (ref 0.5–1.3)
EGFRCR SERPLBLD CKD-EPI 2021: 73 ML/MIN/1.73M*2
EOSINOPHIL # BLD AUTO: 0.19 X10*3/UL (ref 0–0.7)
EOSINOPHIL NFR BLD AUTO: 2.1 %
ERYTHROCYTE [DISTWIDTH] IN BLOOD BY AUTOMATED COUNT: 17.9 % (ref 11.5–14.5)
GLUCOSE SERPL-MCNC: 99 MG/DL (ref 74–99)
HCT VFR BLD AUTO: 32.6 % (ref 41–52)
HGB BLD-MCNC: 10.2 G/DL (ref 13.5–17.5)
IMM GRANULOCYTES # BLD AUTO: 0.05 X10*3/UL (ref 0–0.7)
IMM GRANULOCYTES NFR BLD AUTO: 0.6 % (ref 0–0.9)
LYMPHOCYTES # BLD AUTO: 2.3 X10*3/UL (ref 1.2–4.8)
LYMPHOCYTES NFR BLD AUTO: 26 %
MCH RBC QN AUTO: 23.1 PG (ref 26–34)
MCHC RBC AUTO-ENTMCNC: 31.3 G/DL (ref 32–36)
MCV RBC AUTO: 74 FL (ref 80–100)
MONOCYTES # BLD AUTO: 1.13 X10*3/UL (ref 0.1–1)
MONOCYTES NFR BLD AUTO: 12.8 %
NEUTROPHILS # BLD AUTO: 5.14 X10*3/UL (ref 1.2–7.7)
NEUTROPHILS NFR BLD AUTO: 58 %
NRBC BLD-RTO: 0 /100 WBCS (ref 0–0)
PLATELET # BLD AUTO: 336 X10*3/UL (ref 150–450)
POTASSIUM SERPL-SCNC: 4.2 MMOL/L (ref 3.5–5.3)
RBC # BLD AUTO: 4.41 X10*6/UL (ref 4.5–5.9)
SODIUM SERPL-SCNC: 136 MMOL/L (ref 136–145)
WBC # BLD AUTO: 8.9 X10*3/UL (ref 4.4–11.3)

## 2024-03-16 PROCEDURE — G0378 HOSPITAL OBSERVATION PER HR: HCPCS

## 2024-03-16 PROCEDURE — 2500000004 HC RX 250 GENERAL PHARMACY W/ HCPCS (ALT 636 FOR OP/ED)

## 2024-03-16 PROCEDURE — 80048 BASIC METABOLIC PNL TOTAL CA: CPT | Performed by: INTERNAL MEDICINE

## 2024-03-16 PROCEDURE — 2500000002 HC RX 250 W HCPCS SELF ADMINISTERED DRUGS (ALT 637 FOR MEDICARE OP, ALT 636 FOR OP/ED): Performed by: INTERNAL MEDICINE

## 2024-03-16 PROCEDURE — 96376 TX/PRO/DX INJ SAME DRUG ADON: CPT

## 2024-03-16 PROCEDURE — 36415 COLL VENOUS BLD VENIPUNCTURE: CPT | Performed by: INTERNAL MEDICINE

## 2024-03-16 PROCEDURE — 2500000001 HC RX 250 WO HCPCS SELF ADMINISTERED DRUGS (ALT 637 FOR MEDICARE OP)

## 2024-03-16 PROCEDURE — C9113 INJ PANTOPRAZOLE SODIUM, VIA: HCPCS | Performed by: INTERNAL MEDICINE

## 2024-03-16 PROCEDURE — 85025 COMPLETE CBC W/AUTO DIFF WBC: CPT | Performed by: INTERNAL MEDICINE

## 2024-03-16 PROCEDURE — 2500000004 HC RX 250 GENERAL PHARMACY W/ HCPCS (ALT 636 FOR OP/ED): Performed by: INTERNAL MEDICINE

## 2024-03-16 RX ORDER — MAGNESIUM HYDROXIDE 2400 MG/10ML
10 SUSPENSION ORAL ONCE
Status: COMPLETED | OUTPATIENT
Start: 2024-03-16 | End: 2024-03-16

## 2024-03-16 RX ADMIN — PANTOPRAZOLE SODIUM 80 MG: 40 INJECTION, POWDER, FOR SOLUTION INTRAVENOUS at 10:51

## 2024-03-16 RX ADMIN — TAMSULOSIN HYDROCHLORIDE 0.4 MG: 0.4 CAPSULE ORAL at 10:51

## 2024-03-16 RX ADMIN — PANTOPRAZOLE SODIUM 80 MG: 40 INJECTION, POWDER, FOR SOLUTION INTRAVENOUS at 21:36

## 2024-03-16 RX ADMIN — POLYETHYLENE GLYCOL 3350 17 G: 17 POWDER, FOR SOLUTION ORAL at 11:03

## 2024-03-16 RX ADMIN — SODIUM CHLORIDE 50 ML/HR: 9 INJECTION, SOLUTION INTRAVENOUS at 15:04

## 2024-03-16 RX ADMIN — MAGNESIUM HYDROXIDE 10 ML: 2400 SUSPENSION ORAL at 10:51

## 2024-03-16 ASSESSMENT — COGNITIVE AND FUNCTIONAL STATUS - GENERAL
MOBILITY SCORE: 24
DAILY ACTIVITIY SCORE: 24
MOBILITY SCORE: 24
DAILY ACTIVITIY SCORE: 24
MOBILITY SCORE: 24
DAILY ACTIVITIY SCORE: 24
DAILY ACTIVITIY SCORE: 24
MOBILITY SCORE: 24

## 2024-03-16 ASSESSMENT — PAIN - FUNCTIONAL ASSESSMENT
PAIN_FUNCTIONAL_ASSESSMENT: 0-10

## 2024-03-16 ASSESSMENT — PAIN SCALES - GENERAL
PAINLEVEL_OUTOF10: 0 - NO PAIN

## 2024-03-16 NOTE — PROGRESS NOTES
Douglas Wilson is a 53 y.o. male on day 0 of admission presenting with Generalized abdominal pain.      Subjective   Reports resolution of abdominal pain, asking for a diet.        Objective     Last Recorded Vitals  /70 (BP Location: Right arm, Patient Position: Lying)   Pulse 59   Temp 36.9 °C (98.5 °F) (Temporal)   Resp 17   Wt 76.1 kg (167 lb 12.3 oz)   SpO2 98%   Intake/Output last 3 Shifts:    Intake/Output Summary (Last 24 hours) at 3/16/2024 1357  Last data filed at 3/16/2024 0700  Gross per 24 hour   Intake 2109.16 ml   Output 0 ml   Net 2109.16 ml       Admission Weight  Weight: 76.2 kg (168 lb) (03/14/24 1225)    Daily Weight  03/16/24 : 76.1 kg (167 lb 12.3 oz)    Image Results  EGD  Table formatting from the original result was not included.  Impression  Grade C esophagitis in the lower third of the esophagus  Moderate erythematous mucosa in the body of the stomach and antrum;   performed cold forceps biopsy to rule out H. pylori  Type I hiatal hernia  The duodenal bulb and 2nd part of the duodenum appeared normal.    Findings  Moderate grade C esophagitis with multiple mucosal breaks measuring 5 mm   or more, continuous between folds, covering less than 75% of the   circumference, showing ulcerated mucosa in the lower third of the   esophagus  Moderate, patchy erythematous mucosa in the body of the stomach and   antrum; performed cold forceps biopsy to rule out H. pylori;  Sliding hiatal hernia (type I hiatal hernia) - GE junction 4 cm from the   incisors  The duodenal bulb and 2nd part of the duodenum appeared normal.    Recommendation   Await pathology results    PPI therapy twice daily for 6 weeks.       Indication  Hematemesis, unspecified whether nausea present    Staff  Staff Role   Husam Puckett MD Proceduralist     Medications  See Anesthesia Record.     Preprocedure  A history and physical has been performed, and patient medication   allergies have been reviewed. The patient's  tolerance of previous   anesthesia has been reviewed. The risks and benefits of the procedure and   the sedation options and risks were discussed with the patient. All   questions were answered and informed consent obtained.    Details of the Procedure  The patient underwent monitored anesthesia care, which was administered by   an anesthesia professional. The patient's blood pressure, ECG, ETCO2,   heart rate, oxygen, level of consciousness and respirations were monitored   throughout the procedure. The scope was introduced through the mouth and   advanced to the second part of the duodenum. Retroflexion was performed in   the cardia and fundus. Prior to the procedure, the patient's H. Pylori   status was unknown. The patient's estimated blood loss was minimal (<5   mL). The procedure was not difficult. The patient tolerated the procedure   well. There were no apparent adverse events.     Events  Procedure Events   Event Event Time   ENDO SCOPE IN TIME 3/15/2024  3:13 PM   ENDO SCOPE OUT TIME 3/15/2024  3:18 PM     Specimens  ID Type Source Tests Collected by Time   1 : antrum and body of stomach bx r/o hp Tissue STOMACH ANTRUM BIOPSY   SURGICAL PATHOLOGY EXAM Husam Puckett MD 3/15/2024 1516     Procedure Location  [facility]  [address] [address]  [phone]    Referring Provider  Vijaya Lemon V, APRN-CNP  [address] [facility], [place] [id number]    Procedure Provider  Husam Puckett MD    Physical Exam        Constitutional:       Appearance: Middle-aged, well-built, in moderate distress secondary to abdominal pain  HENT:      Head: Normocephalic and atraumatic.   Eyes:      Extraocular Movements: Extraocular movements intact.      Pupils: Pupils are equal, round, and reactive to light.   Cardiovascular:      Rate and Rhythm: Normal rate and regular rhythm.      Pulses: Normal pulses.      Heart sounds: Normal heart  sounds.   Pulmonary:      Effort: Pulmonary effort is normal.      Breath sounds: Normal breath sounds.   Abdominal:      General: Abdomen is flat. Bowel sounds are normal.      Palpations: Abdomen is nondistended but there is significant tenderness in the epigastric region with no rebound or guarding.  Musculoskeletal:         General: Normal range of motion.      Cervical back: Normal range of motion and neck supple.   Skin:     General: Skin is warm and dry.   Neurological:      General: No focal deficit present.      Mental Status: He is alert and oriented to person, place, and time. Mental status is at baseline.           Assessment/Plan     Abdominal pain -S/p EGD, advance diet per GI recs. Reports s/s have resolve.   Recurrent emesis -continue with antiemetics. No further complaints.   Recent appendectomy -General surgery following, no intervention planned.  Substance abuse -continue to encourage cessation.   Elevated blood pressure -secondary to uncontrolled pain, monitor for improvement.               Shemar Banda MD

## 2024-03-16 NOTE — NURSING NOTE
Rapid Response Nurse Note:     Douglas Wilson is a 53 y.o. male on day 0 of admission presenting with Generalized abdominal pain.    Rounded on patient due to recent rapid response 3/14 for acute pain, reviewed patient chart and checked with bedside nurse for any questions or concerns none voiced at this time.    Patient current RADAR score is 0    /59 (BP Location: Right arm, Patient Position: Lying)   Pulse 57   Temp 35.9 °C (96.6 °F) (Temporal)   Resp 18   Wt 77.8 kg (171 lb 8.3 oz)   SpO2 96%     No signs/symptoms of distress at this time. Bedside nurse notified to escalate concerns if patient condition changes      Dena Ruiz RN

## 2024-03-16 NOTE — PROGRESS NOTES
"Douglas Wilson is a 53 y.o. male on day 0 of admission presenting with Generalized abdominal pain.    Assessment/Plan   Status post appendectomy earlier in the week.  Clinically improved.  We are going to advance his diet and put him on MiraLAX twice a day.  Might be ready for discharge today or tomorrow    Subjective   Mr. Wilson feels a lot better he just has not moved his bowels yet.  EGD report reviewed.       Objective     Physical Exam  NAD  A&Ox3  Non icteric  CTA  RR  Abdomen soft min tender. Wounds clean, intact  Extremities warm, well perfused     Last Recorded Vitals  Blood pressure 119/69, pulse 56, temperature 36.9 °C (98.4 °F), temperature source Temporal, resp. rate 18, height 1.778 m (5' 10\"), weight 76.1 kg (167 lb 12.3 oz), SpO2 97 %.  Intake/Output last 3 Shifts:  I/O last 3 completed shifts:  In: 1958.3 (25.7 mL/kg) [P.O.:270; I.V.:1688.3 (22.2 mL/kg)]  Out: 0 (0 mL/kg)   Weight: 76.1 kg     Relevant Results    Scheduled medications  pantoprazole, 80 mg, intravenous, BID  polyethylene glycol, 17 g, oral, BID  prochlorperazine, 10 mg, intravenous, Once  tamsulosin, 0.4 mg, oral, Daily      Continuous medications  sodium chloride 0.9%, 50 mL/hr, Last Rate: 50 mL/hr (03/16/24 0001)      PRN medications  PRN medications: acetaminophen **OR** acetaminophen **OR** acetaminophen, hydrALAZINE, HYDROmorphone, ondansetron    Results for orders placed or performed during the hospital encounter of 03/14/24 (from the past 24 hour(s))   CBC and Auto Differential   Result Value Ref Range    WBC 8.9 4.4 - 11.3 x10*3/uL    nRBC 0.0 0.0 - 0.0 /100 WBCs    RBC 4.41 (L) 4.50 - 5.90 x10*6/uL    Hemoglobin 10.2 (L) 13.5 - 17.5 g/dL    Hematocrit 32.6 (L) 41.0 - 52.0 %    MCV 74 (L) 80 - 100 fL    MCH 23.1 (L) 26.0 - 34.0 pg    MCHC 31.3 (L) 32.0 - 36.0 g/dL    RDW 17.9 (H) 11.5 - 14.5 %    Platelets 336 150 - 450 x10*3/uL    Neutrophils % 58.0 40.0 - 80.0 %    Immature Granulocytes %, Automated 0.6 0.0 - 0.9 %    " Lymphocytes % 26.0 13.0 - 44.0 %    Monocytes % 12.8 2.0 - 10.0 %    Eosinophils % 2.1 0.0 - 6.0 %    Basophils % 0.5 0.0 - 2.0 %    Neutrophils Absolute 5.14 1.20 - 7.70 x10*3/uL    Immature Granulocytes Absolute, Automated 0.05 0.00 - 0.70 x10*3/uL    Lymphocytes Absolute 2.30 1.20 - 4.80 x10*3/uL    Monocytes Absolute 1.13 (H) 0.10 - 1.00 x10*3/uL    Eosinophils Absolute 0.19 0.00 - 0.70 x10*3/uL    Basophils Absolute 0.04 0.00 - 0.10 x10*3/uL   Basic Metabolic Panel   Result Value Ref Range    Glucose 99 74 - 99 mg/dL    Sodium 136 136 - 145 mmol/L    Potassium 4.2 3.5 - 5.3 mmol/L    Chloride 102 98 - 107 mmol/L    Bicarbonate 28 21 - 32 mmol/L    Anion Gap 10 10 - 20 mmol/L    Urea Nitrogen 18 6 - 23 mg/dL    Creatinine 1.19 0.50 - 1.30 mg/dL    eGFR 73 >60 mL/min/1.73m*2    Calcium 8.4 (L) 8.6 - 10.3 mg/dL           I spent 25 minutes in the professional and overall care of this patient.      Lauro Carranza MD

## 2024-03-17 VITALS
HEIGHT: 70 IN | OXYGEN SATURATION: 99 % | SYSTOLIC BLOOD PRESSURE: 143 MMHG | BODY MASS INDEX: 24.02 KG/M2 | HEART RATE: 53 BPM | DIASTOLIC BLOOD PRESSURE: 89 MMHG | RESPIRATION RATE: 16 BRPM | TEMPERATURE: 98.2 F | WEIGHT: 167.77 LBS

## 2024-03-17 LAB
ANION GAP SERPL CALC-SCNC: 13 MMOL/L (ref 10–20)
BASOPHILS # BLD AUTO: 0.05 X10*3/UL (ref 0–0.1)
BASOPHILS NFR BLD AUTO: 0.6 %
BUN SERPL-MCNC: 13 MG/DL (ref 6–23)
CALCIUM SERPL-MCNC: 8.1 MG/DL (ref 8.6–10.3)
CHLORIDE SERPL-SCNC: 103 MMOL/L (ref 98–107)
CO2 SERPL-SCNC: 24 MMOL/L (ref 21–32)
CREAT SERPL-MCNC: 0.84 MG/DL (ref 0.5–1.3)
EGFRCR SERPLBLD CKD-EPI 2021: >90 ML/MIN/1.73M*2
EOSINOPHIL # BLD AUTO: 0.42 X10*3/UL (ref 0–0.7)
EOSINOPHIL NFR BLD AUTO: 5.2 %
ERYTHROCYTE [DISTWIDTH] IN BLOOD BY AUTOMATED COUNT: 16.6 % (ref 11.5–14.5)
GLUCOSE SERPL-MCNC: 102 MG/DL (ref 74–99)
HCT VFR BLD AUTO: 31.1 % (ref 41–52)
HGB BLD-MCNC: 11.1 G/DL (ref 13.5–17.5)
IMM GRANULOCYTES # BLD AUTO: 0.03 X10*3/UL (ref 0–0.7)
IMM GRANULOCYTES NFR BLD AUTO: 0.4 % (ref 0–0.9)
LYMPHOCYTES # BLD AUTO: 2.59 X10*3/UL (ref 1.2–4.8)
LYMPHOCYTES NFR BLD AUTO: 32.1 %
MCH RBC QN AUTO: 25.2 PG (ref 26–34)
MCHC RBC AUTO-ENTMCNC: 35.7 G/DL (ref 32–36)
MCV RBC AUTO: 71 FL (ref 80–100)
MONOCYTES # BLD AUTO: 0.78 X10*3/UL (ref 0.1–1)
MONOCYTES NFR BLD AUTO: 9.7 %
NEUTROPHILS # BLD AUTO: 4.21 X10*3/UL (ref 1.2–7.7)
NEUTROPHILS NFR BLD AUTO: 52 %
NRBC BLD-RTO: 0 /100 WBCS (ref 0–0)
PLATELET # BLD AUTO: 361 X10*3/UL (ref 150–450)
POTASSIUM SERPL-SCNC: 3.8 MMOL/L (ref 3.5–5.3)
RBC # BLD AUTO: 4.41 X10*6/UL (ref 4.5–5.9)
SODIUM SERPL-SCNC: 136 MMOL/L (ref 136–145)
WBC # BLD AUTO: 8.1 X10*3/UL (ref 4.4–11.3)

## 2024-03-17 PROCEDURE — 82374 ASSAY BLOOD CARBON DIOXIDE: CPT | Performed by: INTERNAL MEDICINE

## 2024-03-17 PROCEDURE — 2500000004 HC RX 250 GENERAL PHARMACY W/ HCPCS (ALT 636 FOR OP/ED): Performed by: INTERNAL MEDICINE

## 2024-03-17 PROCEDURE — C9113 INJ PANTOPRAZOLE SODIUM, VIA: HCPCS | Performed by: INTERNAL MEDICINE

## 2024-03-17 PROCEDURE — G0378 HOSPITAL OBSERVATION PER HR: HCPCS

## 2024-03-17 PROCEDURE — 85025 COMPLETE CBC W/AUTO DIFF WBC: CPT | Performed by: INTERNAL MEDICINE

## 2024-03-17 PROCEDURE — 2500000002 HC RX 250 W HCPCS SELF ADMINISTERED DRUGS (ALT 637 FOR MEDICARE OP, ALT 636 FOR OP/ED): Performed by: INTERNAL MEDICINE

## 2024-03-17 PROCEDURE — 36415 COLL VENOUS BLD VENIPUNCTURE: CPT | Performed by: INTERNAL MEDICINE

## 2024-03-17 PROCEDURE — 96376 TX/PRO/DX INJ SAME DRUG ADON: CPT

## 2024-03-17 RX ORDER — PANTOPRAZOLE SODIUM 40 MG/1
40 TABLET, DELAYED RELEASE ORAL
Status: DISCONTINUED | OUTPATIENT
Start: 2024-03-17 | End: 2024-03-17 | Stop reason: HOSPADM

## 2024-03-17 RX ORDER — PANTOPRAZOLE SODIUM 40 MG/1
40 TABLET, DELAYED RELEASE ORAL
Qty: 60 TABLET | Refills: 0 | Status: SHIPPED | OUTPATIENT
Start: 2024-03-17 | End: 2024-04-04 | Stop reason: SDUPTHER

## 2024-03-17 RX ADMIN — TAMSULOSIN HYDROCHLORIDE 0.4 MG: 0.4 CAPSULE ORAL at 09:59

## 2024-03-17 RX ADMIN — PANTOPRAZOLE SODIUM 80 MG: 40 INJECTION, POWDER, FOR SOLUTION INTRAVENOUS at 09:59

## 2024-03-17 ASSESSMENT — COGNITIVE AND FUNCTIONAL STATUS - GENERAL
DAILY ACTIVITIY SCORE: 24
MOBILITY SCORE: 24
MOBILITY SCORE: 24
DAILY ACTIVITIY SCORE: 24

## 2024-03-17 ASSESSMENT — PAIN SCALES - GENERAL
PAINLEVEL_OUTOF10: 0 - NO PAIN
PAINLEVEL_OUTOF10: 0 - NO PAIN

## 2024-03-17 ASSESSMENT — PAIN - FUNCTIONAL ASSESSMENT
PAIN_FUNCTIONAL_ASSESSMENT: 0-10
PAIN_FUNCTIONAL_ASSESSMENT: 0-10

## 2024-03-17 NOTE — DISCHARGE SUMMARY
Discharge Diagnosis  Generalized abdominal pain    Issues Requiring Follow-Up      Discharge Meds     Your medication list        START taking these medications        Instructions Last Dose Given Next Dose Due   pantoprazole 40 mg EC tablet  Commonly known as: ProtoNix      Take 1 tablet (40 mg) by mouth 2 times a day before meals. Do not crush, chew, or split.              CONTINUE taking these medications        Instructions Last Dose Given Next Dose Due   apixaban 5 mg tablet  Commonly known as: Eliquis      Take 1 tablet (5 mg) by mouth 2 times a day. Do not start before March 6, 2024.       tamsulosin 0.4 mg 24 hr capsule  Commonly known as: Flomax                  STOP taking these medications      omeprazole 40 mg DR capsule  Commonly known as: PriLOSEC        traMADol 50 mg tablet  Commonly known as: Ultram                  Where to Get Your Medications        These medications were sent to [id number] - [facility] [address]  AT [facility]  [address] , [facility] [address]      Hours: 24-hours Phone: [phone]   pantoprazole 40 mg EC tablet         Test Results Pending At Discharge  Pending Labs       Order Current Status    Surgical Pathology Exam In process            Hospital Course   Douglas Wilson is a 53 y.o. male presenting with severe abdominal pain.  Patient has had recent complicated medical history.  He was admitted 3 weeks ago with abdominal pain and was diagnosed with splenic aneurysm which required splenic artery embolization.  Postprocedure he developed pulmonary embolism.  He was treated with anticoagulation.  He was readmitted and underwent laparoscopic appendectomy on 3/12 and was discharged home.  Patient states that he was initially doing well but then he began to have abdominal pain again yesterday and had several bouts of emesis which he believes comprised blood.  He complains of severe pain.  Patient has required significant analgesics  overnight.  He has had several bouts of emesis.  He has been maintained NPO.  CT scan showed no new pathology.  He is scheduled for endoscopy today.  He is receiving intravenous Protonix.       The patient was seen by GI, EGD completed, s/s improved, diet was advanced. He was encouraged substance abuse cessation, stable for dc home with outpatient follow up.     Pertinent Physical Exam At Time of Discharge  Constitutional:       Appearance: Middle-aged, well-built, in moderate distress secondary to abdominal pain  HENT:      Head: Normocephalic and atraumatic.   Eyes:      Extraocular Movements: Extraocular movements intact.      Pupils: Pupils are equal, round, and reactive to light.   Cardiovascular:      Rate and Rhythm: Normal rate and regular rhythm.      Pulses: Normal pulses.      Heart sounds: Normal heart sounds.   Pulmonary:      Effort: Pulmonary effort is normal.      Breath sounds: Normal breath sounds.   Abdominal:      General: Abdomen is flat. Bowel sounds are normal.      Palpations: Abdomen is nondistended but there is significant tenderness in the epigastric region with no rebound or guarding.  Musculoskeletal:         General: Normal range of motion.      Cervical back: Normal range of motion and neck supple.   Skin:     General: Skin is warm and dry.   Neurological:      General: No focal deficit present.      Mental Status: He is alert and oriented to person, place, and time. Mental status is at baseline.         Outpatient Follow-Up  Future Appointments   Date Time Provider Department Center   4/4/2024  4:00 PM Chucho De Leon MD [id number]       Time and care for discharge management < 30 minutes.     Shemar Banda MD

## 2024-03-17 NOTE — NURSING NOTE
Rapid Response Nurse Note:     Douglas Wilson is a 53 y.o. male on day 0 of admission presenting with Generalized abdominal pain.    Rounded on patient due to recent rapid response for acute pain, reviewed patient chart and checked with bedside nurse for any questions or concerns none voiced at this time.    Patient current RADAR score is 0    /70 (BP Location: Left arm, Patient Position: Lying)   Pulse 55   Temp 36.2 °C (97.2 °F) (Temporal)   Resp 18   Wt 76.1 kg (167 lb 12.3 oz)   SpO2 99%     No signs/symptoms of distress at this time. Bedside nurse notified to escalate concerns if patient condition changes      Dena Ruiz RN

## 2024-03-17 NOTE — CARE PLAN
Problem: Pain  Goal: My pain/discomfort is manageable  3/17/2024 0121 by Shelli Borjas RN  Outcome: Progressing  Flowsheets (Taken 3/17/2024 0121)  Resident's pain/discomfort is manageable:   Include resident/family/caregiver in decisions related to pain management   Offer non-pharmacological pain management interventions   Administer pain medication prior to activities that may trigger pain   Identify and avoid pain triggers  3/17/2024 0121 by Shelli Borjas RN  Outcome: Progressing  Flowsheets (Taken 3/17/2024 0121)  Resident's pain/discomfort is manageable:   Include resident/family/caregiver in decisions related to pain management   Offer non-pharmacological pain management interventions   Administer pain medication prior to activities that may trigger pain   Identify and avoid pain triggers     Problem: Safety  Goal: Patient will be injury free during hospitalization  3/17/2024 0121 by Shelli Borjas RN  Outcome: Progressing  Note: .  3/17/2024 0121 by Shelli Borjas RN  Outcome: Progressing  Goal: I will remain free of falls  3/17/2024 0121 by Shelli Borjas RN  Outcome: Progressing  Flowsheets (Taken 3/17/2024 0121)  Resident will remain free of falls:   Utilize fall response kit   Apply bed/chair alarms as appropriate   Assist with toileting as orderd   Maintain bed at position as ordered (chair height, low bed)   Consider camera monitoring   Assess and monitor medications that may increase fall risk   Use gait belt for all transfers   Accompany resident as ordered (ex. 1:1, stand-by assist, dayroom monitoring, 15 minute checks, line of sight)   Visual checks per facility policy   Consider transfer to room close to nurses' station   Consult with physical therapy as needed  Note: .  3/17/2024 0121 by Shelli Borjas RN  Outcome: Progressing  Flowsheets (Taken 3/17/2024 0121)  Resident will remain free of falls:   Utilize fall response kit   Apply bed/chair alarms as  appropriate   Assist with toileting as orderd   Maintain bed at position as ordered (chair height, low bed)   Consider camera monitoring   Assess and monitor medications that may increase fall risk   Use gait belt for all transfers   Accompany resident as ordered (ex. 1:1, stand-by assist, dayroom monitoring, 15 minute checks, line of sight)   Visual checks per facility policy   Consider transfer to room close to nurses' station   Consult with physical therapy as needed     Problem: Daily Care  Goal: Daily care needs are met  3/17/2024 0121 by Shelli Borjas RN  Outcome: Progressing  Flowsheets (Taken 3/17/2024 0121)  Daily care needs are met:   Assist patient with activities of daily living as needed   Assess and monitor ability to perform self care and identify potential discharge needs   Provide mouth care   Assess skin integrity/risk for skin breakdown   Encourage independent activity per ability   Include patient/family/caregiver in decisions related to daily care  Note: .  3/17/2024 0121 by Shelli Borjas RN  Outcome: Progressing  Flowsheets (Taken 3/17/2024 0121)  Daily care needs are met:   Assist patient with activities of daily living as needed   Assess and monitor ability to perform self care and identify potential discharge needs   Provide mouth care   Assess skin integrity/risk for skin breakdown   Encourage independent activity per ability   Include patient/family/caregiver in decisions related to daily care     Problem: Psychosocial Needs  Goal: Demonstrates ability to cope with hospitalization/illness  3/17/2024 0121 by Shelli Borjas RN  Outcome: Progressing  Flowsheets (Taken 3/17/2024 0121)  Demonstrates ability to cope with hospitalization/illness:   Encourage verbalization of feelings/concerns/expectations   Provide low-stimulation environment as needed   Assist resident to identify and practice own strengths and abilities   Encourage resident to set and complete small goals  for self   Encourage participation in diversional activities   Reinforce positive adaptation of new coping behaviors   Include resident/family/caregiver in decisions related to psychosocial needs  Note: .  3/17/2024 0121 by Shelli Borjas RN  Outcome: Progressing  Flowsheets (Taken 3/17/2024 0121)  Demonstrates ability to cope with hospitalization/illness:   Encourage verbalization of feelings/concerns/expectations   Provide low-stimulation environment as needed   Assist resident to identify and practice own strengths and abilities   Encourage resident to set and complete small goals for self   Encourage participation in diversional activities   Reinforce positive adaptation of new coping behaviors   Include resident/family/caregiver in decisions related to psychosocial needs  Goal: Collaborate with me, my family, and caregiver to identify my specific goals  3/17/2024 0121 by Shelli Borjas RN  Outcome: Progressing  Flowsheets (Taken 3/17/2024 0121)  Cultural Requests During Hospitalization: .  Spiritual Requests During Hospitalization: .  Is there anything else we need to know to provide the best care possible?: .  Note: .  3/17/2024 0121 by Shelli Borjsa RN  Outcome: Progressing  Flowsheets (Taken 3/17/2024 0121)  Cultural Requests During Hospitalization: .  Spiritual Requests During Hospitalization: .  Is there anything else we need to know to provide the best care possible?: .     Problem: Discharge Barriers  Goal: My discharge needs are met  3/17/2024 0121 by Shelli Borjas RN  Outcome: Progressing  Flowsheets (Taken 3/17/2024 0121)  Resident's discharge needs are met:   Identify potential discharge barriers on admission and throughout stay   Involve resident/family/caregiver in discharge planning process  Note: .  3/17/2024 0121 by Shelli Borjas RN  Outcome: Progressing  Flowsheets (Taken 3/17/2024 0121)  Resident's discharge needs are met:   Identify potential discharge  barriers on admission and throughout stay   Involve resident/family/caregiver in discharge planning process   The patient's goals for the shift include      The clinical goals for the shift include Pt will remain free from pain entire shift.    .

## 2024-03-18 ASSESSMENT — PAIN SCALES - GENERAL: PAINLEVEL_OUTOF10: 0 - NO PAIN

## 2024-03-20 LAB
LABORATORY COMMENT REPORT: NORMAL
LABORATORY COMMENT REPORT: NORMAL
PATH REPORT.FINAL DX SPEC: NORMAL
PATH REPORT.FINAL DX SPEC: NORMAL
PATH REPORT.GROSS SPEC: NORMAL
PATH REPORT.GROSS SPEC: NORMAL
PATH REPORT.RELEVANT HX SPEC: NORMAL
PATH REPORT.TOTAL CANCER: NORMAL
PATH REPORT.TOTAL CANCER: NORMAL

## 2024-03-21 LAB
ATRIAL RATE: 66 BPM
P AXIS: 64 DEGREES
P OFFSET: 201 MS
P ONSET: 147 MS
PR INTERVAL: 150 MS
Q ONSET: 222 MS
QRS COUNT: 11 BEATS
QRS DURATION: 76 MS
QT INTERVAL: 450 MS
QTC CALCULATION(BAZETT): 471 MS
QTC FREDERICIA: 465 MS
R AXIS: 11 DEGREES
T AXIS: 50 DEGREES
T OFFSET: 447 MS
VENTRICULAR RATE: 66 BPM

## 2024-03-25 ENCOUNTER — OFFICE VISIT (OUTPATIENT)
Dept: SURGERY | Facility: CLINIC | Age: 54
End: 2024-03-25
Payer: COMMERCIAL

## 2024-03-25 VITALS
WEIGHT: 180 LBS | BODY MASS INDEX: 25.83 KG/M2 | OXYGEN SATURATION: 99 % | SYSTOLIC BLOOD PRESSURE: 118 MMHG | TEMPERATURE: 96.3 F | HEART RATE: 63 BPM | DIASTOLIC BLOOD PRESSURE: 70 MMHG

## 2024-03-25 DIAGNOSIS — Z09 FOLLOW-UP EXAM: Primary | ICD-10-CM

## 2024-03-25 PROCEDURE — 4004F PT TOBACCO SCREEN RCVD TLK: CPT | Performed by: SURGERY

## 2024-03-25 PROCEDURE — 99024 POSTOP FOLLOW-UP VISIT: CPT | Performed by: SURGERY

## 2024-03-25 ASSESSMENT — ENCOUNTER SYMPTOMS: DEPRESSION: 0

## 2024-03-25 ASSESSMENT — PAIN SCALES - GENERAL: PAINLEVEL: 2

## 2024-03-25 NOTE — LETTER
March 25, 2024     Chucho De Leon MD  [address] [place] [address] Dr Tammie [place]don [place], [facility] [address]  [place] [id number]    Patient: Douglas Wilson   YOB: 1970   Date of Visit: 3/25/2024       Dear Dr. Chucho De Leon MD:    Thank you for referring Douglas Wilson to me for evaluation. Below are my notes for this consultation.  If you have questions, please do not hesitate to call me. I look forward to following your patient along with you.       Sincerely,     Doni Maldonado MD      CC: No Recipients  ______________________________________________________________________________________      Mr Wilson is a 53-year-old gentleman who had several recent   hospitalizations.  He was admitted to the hospital on 2/22/2024 with abdominal pain.   CT scan showed hemoperitoneum.  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  He underwent splenic artery embolization that was successful at stopping the bleeding.      He was discharged to home but came back in the hospital with abdominal pain where he was found to have PE.  He was discharged to home on Eliquis.     Readmitted to the hospital on 3/12/2024 with severe lower abdominal pain.  Found to have acute appendicitis.  I took him to surgery for lap scopic appendectomy.    He did fairly well after the operation but then came back to the hospital with hematemesis.  Underwent upper endoscopy and was found to have some peptic ulcers.  He is currently doing well he is at home.  Tolerating a diet, bowel movements are normal        On exam patient looks well    Incisions are healing very nicely    Surgical Pathology Exam: [id number]  Order: [id number]  Collected 3/12/2024 23:01       Status: Final result       Visible to patient: Yes (not seen)       Dx: Acute appendicitis without peritonitis    0 Result Notes      Component    FINAL DIAGNOSIS   A. APPENDIX:    -- HEMORRHAGE, HEMOSIDERIN LADEN MACROPHAGES AND ACUTE INFLAMMATORY CELL INFILTRATE ON APPENDICEAL SEROSA.       Electronically signed by Cheryl Joya MD on 3/20/2024 at 1559                     Impression: Doing well following prolonged hospitalization for hemoperitoneum related to splenic artery aneurysm rupture, history of PE.    Taken to surgery for laparoscopic appendectomy 3/12/2024    Return to the hospital with hematemesis found to have a peptic ulcer.  He is on a PPI    I did strongly suggest that he stop smoking.    Wrote a note returning him to his job  Discussed increasing activity level    Follow-up with me as needed

## 2024-03-25 NOTE — PROGRESS NOTES
Mr Wilson is a 53-year-old gentleman who had several recent   hospitalizations.  He was admitted to the hospital on 2/22/2024 with abdominal pain.   CT scan showed hemoperitoneum.  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  He underwent splenic artery embolization that was successful at stopping the bleeding.      He was discharged to home but came back in the hospital with abdominal pain where he was found to have PE.  He was discharged to home on Eliquis.     Readmitted to the hospital on 3/12/2024 with severe lower abdominal pain.  Found to have acute appendicitis.  I took him to surgery for lap scopic appendectomy.    He did fairly well after the operation but then came back to the hospital with hematemesis.  Underwent upper endoscopy and was found to have some peptic ulcers.  He is currently doing well he is at home.  Tolerating a diet, bowel movements are normal        On exam patient looks well    Incisions are healing very nicely    Surgical Pathology Exam: [id number]  Order: [id number]  Collected 3/12/2024 23:01       Status: Final result       Visible to patient: Yes (not seen)       Dx: Acute appendicitis without peritonitis    0 Result Notes      Component    FINAL DIAGNOSIS   A. APPENDIX:    -- HEMORRHAGE, HEMOSIDERIN LADEN MACROPHAGES AND ACUTE INFLAMMATORY CELL INFILTRATE ON APPENDICEAL SEROSA.      Electronically signed by Cheryl Joya MD on 3/20/2024 at 1553                     Impression: Doing well following prolonged hospitalization for hemoperitoneum related to splenic artery aneurysm rupture, history of PE.    Taken to surgery for laparoscopic appendectomy 3/12/2024    Return to the hospital with hematemesis found to have a peptic ulcer.  He is on a PPI    I did strongly suggest that he stop smoking.    Wrote a note returning him to his job  Discussed increasing activity level    Follow-up with me as needed

## 2024-03-25 NOTE — LETTER
March 25, 2024     Patient: Douglas Wilson   YOB: 1970   Date of Visit: 3/25/2024       To Whom It May Concern:    I am seeing Mr Douglas Wilson in my office today after undergoing recent surgery.  He is progressing very well and from my perspective he may return to work tomorrow Tuesday, March 26, 2024      If you have any questions or concerns, please don't hesitate to call.         Sincerely,        Doni Maldonado MD  [phone]     CC: No Recipients

## 2024-03-28 PROBLEM — I21.9 MYOCARDIAL INFARCTION (MULTI): Chronic | Status: ACTIVE | Noted: 2022-01-05
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
## 2024-03-28 PROBLEM — F19.10 POLYSUBSTANCE ABUSE (MULTI): Status: ACTIVE | Noted: 2024-03-13

## 2024-03-28 PROBLEM — K21.9 GASTRO-ESOPHAGEAL REFLUX DISEASE WITHOUT ESOPHAGITIS: Status: ACTIVE | Noted: 2022-04-19

## 2024-03-28 PROBLEM — R07.0 PAIN IN THROAT: Status: ACTIVE | Noted: 2024-03-28

## 2024-03-28 PROBLEM — G47.33 OBSTRUCTIVE SLEEP APNEA SYNDROME: Status: ACTIVE | Noted: 2020-10-22

## 2024-03-28 PROBLEM — K44.9 HIATAL HERNIA: Status: ACTIVE | Noted: 2021-10-08

## 2024-03-28 PROBLEM — F11.93 OPIOID WITHDRAWAL (MULTI): Status: ACTIVE | Noted: 2024-03-28

## 2024-03-28 PROBLEM — E78.5 HYPERLIPIDEMIA: Status: ACTIVE | Noted: 2022-04-28

## 2024-03-28 PROBLEM — N40.0 BPH (BENIGN PROSTATIC HYPERPLASIA): Status: ACTIVE | Noted: 2022-04-19

## 2024-03-28 PROBLEM — R07.89 ATYPICAL CHEST PAIN: Status: ACTIVE | Noted: 2024-03-28

## 2024-03-28 PROBLEM — F14.21 COCAINE DEPENDENCE, IN REMISSION (MULTI): Chronic | Status: ACTIVE | Noted: 2021-05-28

## 2024-03-28 RX ORDER — LANOLIN ALCOHOL/MO/W.PET/CERES
1000 CREAM (GRAM) TOPICAL
COMMUNITY
Start: 2020-05-18 | End: 2024-04-04 | Stop reason: WASHOUT

## 2024-03-28 RX ORDER — MELOXICAM 7.5 MG/1
TABLET ORAL
COMMUNITY
Start: 2021-10-08 | End: 2024-04-04 | Stop reason: WASHOUT

## 2024-03-28 RX ORDER — FOLIC ACID 1 MG/1
1 TABLET ORAL
COMMUNITY
Start: 2024-02-24

## 2024-03-28 RX ORDER — OMEPRAZOLE 40 MG/1
40 CAPSULE, DELAYED RELEASE ORAL 2 TIMES DAILY
COMMUNITY
Start: 2021-02-01 | End: 2024-04-04 | Stop reason: WASHOUT

## 2024-03-28 RX ORDER — NICOTINE 11MG/24HR
PATCH, TRANSDERMAL 24 HOURS TRANSDERMAL
COMMUNITY
Start: 2023-04-20 | End: 2024-05-08 | Stop reason: DRUGHIGH

## 2024-03-28 RX ORDER — DIAZEPAM 2 MG/1
10 TABLET ORAL
COMMUNITY
Start: 2024-02-23 | End: 2024-04-04 | Stop reason: WASHOUT

## 2024-03-28 RX ORDER — ASPIRIN 81 MG/1
81 TABLET ORAL DAILY
COMMUNITY
Start: 2022-01-24 | End: 2024-04-04 | Stop reason: WASHOUT

## 2024-03-28 RX ORDER — NALTREXONE HYDROCHLORIDE 50 MG/1
1 TABLET, FILM COATED ORAL ONCE
COMMUNITY
Start: 2021-02-01 | End: 2024-04-04 | Stop reason: ALTCHOICE

## 2024-03-28 RX ORDER — TALC
3 POWDER (GRAM) TOPICAL
COMMUNITY
Start: 2024-02-26 | End: 2024-04-04 | Stop reason: WASHOUT

## 2024-03-28 RX ORDER — ACETAMINOPHEN 325 MG/1
TABLET ORAL EVERY 6 HOURS PRN
COMMUNITY
Start: 2021-11-29 | End: 2024-04-04 | Stop reason: WASHOUT

## 2024-03-28 RX ORDER — ROPINIROLE 1 MG/1
1 TABLET, FILM COATED ORAL
COMMUNITY
Start: 2022-06-13 | End: 2024-04-04 | Stop reason: WASHOUT

## 2024-03-28 RX ORDER — LOPERAMIDE HYDROCHLORIDE 2 MG/1
2 CAPSULE ORAL 4 TIMES DAILY PRN
COMMUNITY
Start: 2024-02-23 | End: 2024-04-04 | Stop reason: WASHOUT

## 2024-03-28 RX ORDER — SILDENAFIL 100 MG/1
100 TABLET, FILM COATED ORAL AS NEEDED
COMMUNITY
Start: 2021-02-24 | End: 2024-06-05 | Stop reason: HOSPADM

## 2024-03-28 RX ORDER — DIPHENHYDRAMINE HCL 25 MG
50 TABLET ORAL
COMMUNITY
Start: 2021-02-01 | End: 2024-04-04 | Stop reason: WASHOUT

## 2024-04-04 ENCOUNTER — OFFICE VISIT (OUTPATIENT)
Dept: PRIMARY CARE | Facility: HOSPITAL | Age: 54
End: 2024-04-04
Payer: COMMERCIAL

## 2024-04-04 VITALS
WEIGHT: 182.6 LBS | SYSTOLIC BLOOD PRESSURE: 129 MMHG | HEART RATE: 74 BPM | HEIGHT: 69 IN | DIASTOLIC BLOOD PRESSURE: 78 MMHG | OXYGEN SATURATION: 93 % | BODY MASS INDEX: 27.05 KG/M2 | TEMPERATURE: 98.7 F

## 2024-04-04 DIAGNOSIS — I72.8 SPLENIC ARTERY ANEURYSM (CMS-HCC): ICD-10-CM

## 2024-04-04 DIAGNOSIS — R39.11 BENIGN PROSTATIC HYPERPLASIA WITH URINARY HESITANCY: ICD-10-CM

## 2024-04-04 DIAGNOSIS — D62 ACUTE BLOOD LOSS ANEMIA: ICD-10-CM

## 2024-04-04 DIAGNOSIS — N52.9 ERECTILE DYSFUNCTION, UNSPECIFIED ERECTILE DYSFUNCTION TYPE: ICD-10-CM

## 2024-04-04 DIAGNOSIS — E83.51 HYPOCALCEMIA: ICD-10-CM

## 2024-04-04 DIAGNOSIS — I26.99 ACUTE PULMONARY EMBOLISM WITHOUT ACUTE COR PULMONALE, UNSPECIFIED PULMONARY EMBOLISM TYPE (MULTI): ICD-10-CM

## 2024-04-04 DIAGNOSIS — R79.89 ELEVATED TROPONIN: Primary | ICD-10-CM

## 2024-04-04 DIAGNOSIS — D64.9 ANEMIA, UNSPECIFIED TYPE: ICD-10-CM

## 2024-04-04 DIAGNOSIS — N40.1 BENIGN PROSTATIC HYPERPLASIA WITH URINARY HESITANCY: ICD-10-CM

## 2024-04-04 PROCEDURE — 4004F PT TOBACCO SCREEN RCVD TLK: CPT | Performed by: INTERNAL MEDICINE

## 2024-04-04 PROCEDURE — 99215 OFFICE O/P EST HI 40 MIN: CPT | Performed by: INTERNAL MEDICINE

## 2024-04-04 RX ORDER — PANTOPRAZOLE SODIUM 40 MG/1
40 TABLET, DELAYED RELEASE ORAL
Qty: 60 TABLET | Refills: 3 | Status: SHIPPED | OUTPATIENT
Start: 2024-04-04

## 2024-04-04 RX ORDER — TADALAFIL 5 MG/1
5 TABLET ORAL DAILY
Qty: 90 TABLET | Refills: 3 | Status: SHIPPED | OUTPATIENT
Start: 2024-04-04 | End: 2024-06-05 | Stop reason: HOSPADM

## 2024-04-04 ASSESSMENT — ENCOUNTER SYMPTOMS
DEPRESSION: 0
OCCASIONAL FEELINGS OF UNSTEADINESS: 0
LOSS OF SENSATION IN FEET: 0

## 2024-04-04 NOTE — ASSESSMENT & PLAN NOTE
Comprehensive metabolic panel, magnesium level and 25-hydroxy vitamin D level will be obtained now.

## 2024-04-04 NOTE — ASSESSMENT & PLAN NOTE
Pulmonary emboli occurred in the setting of acute illness with splenic artery aneurysm and hemoperitoneum.  He will complete 3 months of anticoagulation.  We discussed the importance of tobacco cessation to lower risk of recurrent thrombosis.

## 2024-04-04 NOTE — ASSESSMENT & PLAN NOTE
No significant CAD on CCTA in 2022.  Normal echo on 2/27/24.  Suspect demand ischemia. Referred to cardiology for opinion on need for stress testing.

## 2024-04-04 NOTE — ASSESSMENT & PLAN NOTE
PSA due now.  Delmer has had evidence of bladder wall thickening on CT scan from his recent hospitalization, and urinalysis will be obtained.

## 2024-04-04 NOTE — PROGRESS NOTES
Chief Complaint   Patient presents with    [facility]     ED stomach pain           History Of Present Illness:    Douglas Wilson is a 53 y.o. male presenting for post hospitalization follow up.   Douglas has hx of smoking, BPH, former polysubstance use who has been sick since  February.    2/22/24 - 3 d of abdominal pain and diagnosed with splenic artery aneurysm and underwent coiling procedure.  Had troponin leak to 1,252.  Echocardiogram normal.  CCTA in 2022 showed minimal CAD.     2/25/24 - presented with chest pain and SOB.  Diagnosed with multiple bilateral pulmonary emboli.  Started Eliquis and DC home    3/12/24 - presented with abdominal pain and diagnosed with appendicitis.  Underwent urgent appendectomy    3/14/24 - presented with vomiting and poss hematemesis.   EGD showed hiatal hernia and esophagitis.  Started BID protonix.    Significant findings from labs in hospital - anemia, hypocalcemia, elevated troponin.     Is slowly improving since his hospital discharge.  He endorses fatigue but denies chest pain or shortness of breath.  Appetite is normal.  He reports no further nausea or vomiting.  He has been constipated.  No diarrhea and no blood in his stool.  No black appearing stools.  Obstructive type lower urinary tract symptoms are managed with tamsulosin.  He has ED and would like to go back on a phosphodiesterase inhibitor.  He did a lot of lifting at work earlier today, he is now experiencing pain in his left lower quadrant.  He has some tenderness to palpation around his incision.     Echocardiogram 2/27/24  CONCLUSIONS:   1. Left ventricular systolic function is normal with a 60-65% estimated ejection fraction.   2. RVSP within normal limits.   3. The LVEF is no longer hyperdynamic. No focal wall motion abnormalities.    4/19/22 Coronary Artery CT Angio  IMPRESSION:  1. Minor coronary artery disease.  2. The proximal LAD has a small/focal region of calcified plaque with  minimal luminal  stenosis (<25%).  3. The LM, LCx and RCA have no significant atherosclerotic change or  stenotic disease.  4. Right-dominant coronary artery system.    8/15/22 CT abdomen pelvis   Bladder wall thickening     2/22/24 - splenic artery aneurysm and bleed - 3 d of pain - peak troponin 1252    Component      Latest Ref Rng 2/22/2024 2/23/2024 2/25/2024 2/26/2024   Troponin I      0.00 - 0.03 ng/mL       Troponin I, High Sensitivity      0 - 20 ng/L 1,252 (HH)  595 (HH)  64 (HH)  53 (HH)    Troponin I, High Sensitivity       1,103 (HH)       Troponin I, High Sensitivity       1,074 (HH)       Troponin I, High Sensitivity       1,300 (HH)         Component      Latest Ref Rng 3/14/2024   Troponin I      0.00 - 0.03 ng/mL    Troponin I, High Sensitivity      0 - 20 ng/L 46 (H)         2/25/24 - bilateral PE  There are scattered segmental and subsegmental filling  defects within the right middle and bilateral lower lobe pulmonary  artery branches concerning for pulmonary emboli that are new from  prior study. The heart is normal size. There is mild bibasilar  atelectasis.      EGD 3/15/24  Result Text   Impression  Grade C esophagitis in the lower third of the esophagus  Moderate erythematous mucosa in the body of the stomach and antrum; performed cold forceps biopsy to rule out H. pylori  Type I hiatal hernia  The duodenal bulb and 2nd part of the duodenum appeared normal.        Findings  Moderate grade C esophagitis with multiple mucosal breaks measuring 5 mm or more, continuous between folds, covering less than 75% of the circumference, showing ulcerated mucosa in the lower third of the esophagus  Moderate, patchy erythematous mucosa in the body of the stomach and antrum; performed cold forceps biopsy to rule out H. pylori;  Sliding hiatal hernia (type I hiatal hernia) - GE junction 4 cm from the incisors  The duodenal bulb and 2nd part of the duodenum appeared normal.         Active Medical Problems:  Patient Active  Problem List    Diagnosis Date Noted    Hypocalcemia 04/04/2024    Erectile dysfunction 04/04/2024    Atypical chest pain 03/28/2024    Fall 03/28/2024    Opioid withdrawal (CMS/[facility]) 03/28/2024    Pain in throat 03/28/2024    Nicotine use disorder 03/13/2024    Heroin abuse (CMS/[facility]) 03/13/2024    Polysubstance abuse (CMS/[facility]) 03/13/2024    Acute pulmonary embolism without acute cor pulmonale (CMS/[facility]) 02/27/2024    Acute blood loss anemia 02/27/2024    Elevated troponin 02/26/2024    Generalized abdominal pain 02/22/2024    Hyperlipidemia 04/28/2022    BPH (benign prostatic hyperplasia) 04/19/2022    Gastro-esophageal reflux disease without esophagitis 04/19/2022    Myocardial infarction (CMS/[facility]) 01/05/2022    Hiatal hernia 10/08/2021    Cocaine dependence, in remission (CMS/HCC) 05/28/2021    Obstructive sleep apnea syndrome 10/22/2020    Acute appendicitis without peritonitis 03/12/2024       Past Medical History:  Past Medical History:   Diagnosis Date    Acute appendicitis without peritonitis 03/12/2024    BPH (benign prostatic hyperplasia)     Heroin abuse (CMS/[facility])     NSTEMI (non-ST elevated myocardial infarction) (CMS/HCC)     Pulmonary embolism (CMS/HCC)     Splenic artery aneurysm (CMS/[facility]) 02/2024    rupture s/p coil embolization    Upper GI bleed 03/15/2024    EGD with esophagitis, no active bleeding       Past Surgical History:  Past Surgical History:   Procedure Laterality Date    APPENDECTOMY  03/12/2024    CT ANGIO CORONARY ART WITH HEARTFLOW IF SCORE >30%  04/19/2022    OTHER SURGICAL HISTORY Bilateral 01/24/2022    Inguinal hernia repair laparoscopic    SPLENIC ARTERY EMBOLIZATION  02/2024         Social History:  Social History     Tobacco Use    Smoking status: Every Day     Packs/day: 1     Types: Cigarettes    Smokeless tobacco: Never   Substance Use Topics    Alcohol use: Never    Drug use: Yes     Types: Heroin     Comment: in past         Family History:  Family History   Problem Relation  "Name Age of Onset    Diabetes Father      Hypertension Father      No Known Problems Son          raymond    Deafness Son      Other (orthopedic) Son          wearing braces to help with walking        Allergies:  Patient has no known allergies.    Outpatient Medications:    Current Outpatient Medications:     sildenafil (Viagra) 100 mg tablet, Take by mouth., Disp: , Rfl:     tamsulosin (Flomax) 0.4 mg 24 hr capsule, Take 1 capsule (0.4 mg) by mouth once daily. Med has not been filled since April. Caregiver confirmed that patient actually takes at home, Disp: , Rfl:     apixaban (Eliquis) 5 mg tablet, Take 1 tablet (5 mg) by mouth 2 times a day., Disp: 60 tablet, Rfl: 3    folic acid (Folvite) 1 mg tablet, Take 1 tablet (1 mg) by mouth once daily., Disp: , Rfl:     multivitamin with minerals tablet, Take 1 tablet by mouth once daily., Disp: , Rfl:     pantoprazole (ProtoNix) 40 mg EC tablet, Take 1 tablet (40 mg) by mouth 2 times a day before meals. Do not crush, chew, or split., Disp: 60 tablet, Rfl: 3    tadalafil (Cialis) 5 mg tablet, Take 1 tablet (5 mg) by mouth once daily., Disp: 90 tablet, Rfl: 3    Vitamin D3 50 mcg (2,000 unit) capsule, , Disp: , Rfl:     Review of Systems:  Pertinent positives in review of systems outlined above.  Complete ROS otherwise negative.           Last Recorded Vitals:  Vitals:    04/04/24 1613   BP: 129/78   BP Location: Left arm   Patient Position: Sitting   BP Cuff Size: Large adult   Pulse: 74   Temp: 37.1 °C (98.7 °F)   SpO2: 93%   Weight: 82.8 kg (182 lb 9.6 oz)   Height: 1.753 m (5' 9\")   Body mass index is 26.97 kg/m².        Physical Exam  HENT:      Mouth/Throat:      Pharynx: Oropharynx is clear.   Eyes:      Extraocular Movements: Extraocular movements intact.      Conjunctiva/sclera: Conjunctivae normal.      Pupils: Pupils are equal, round, and reactive to light.   Cardiovascular:      Rate and Rhythm: Normal rate and regular rhythm.      Pulses: Normal pulses.      " Heart sounds: Normal heart sounds.   Pulmonary:      Effort: Pulmonary effort is normal.      Breath sounds: Normal breath sounds.   Abdominal:      General: Abdomen is flat. Bowel sounds are normal.      Palpations: Abdomen is soft.      Comments: Tenderness to palpation of left lower quadrant at surgical incision.  No hernia, no discharge, not red.  Positive bowel sounds.     Neurological:      Mental Status: He is alert.          Assessment/Plan   Problem List Items Addressed This Visit       Elevated troponin - Primary     No significant CAD on CCTA in 2022.  Normal echo on 2/27/24.  Suspect demand ischemia. Referred to cardiology for opinion on need for stress testing.           Relevant Orders    Referral to Cardiology    Comprehensive Metabolic Panel    Acute pulmonary embolism without acute cor pulmonale (CMS/HCC)     Pulmonary emboli occurred in the setting of acute illness with splenic artery aneurysm and hemoperitoneum.  He will complete 3 months of anticoagulation.  We discussed the importance of tobacco cessation to lower risk of recurrent thrombosis.         Relevant Medications    apixaban (Eliquis) 5 mg tablet    Acute blood loss anemia     Complete blood count, iron studies, B12 level will be obtained now.  To continue twice daily proton pump inhibitor.  Recent screening for H. pylori was negative.  No active bleeding seen on recent endoscopy.         Relevant Medications    pantoprazole (ProtoNix) 40 mg EC tablet    BPH (benign prostatic hyperplasia)     PSA due now.  Delmer has had evidence of bladder wall thickening on CT scan from his recent hospitalization, and urinalysis will be obtained.         Relevant Orders    Prostate Specific Antigen    Urinalysis with Reflex Microscopic    Hypocalcemia     Comprehensive metabolic panel, magnesium level and 25-hydroxy vitamin D level will be obtained now.         Relevant Orders    Comprehensive Metabolic Panel    Vitamin D 25-Hydroxy,Total (for eval of  Vitamin D levels)    Magnesium    Erectile dysfunction     Hiram will start daily tadalafil.  He was given instructions on proper use and potential side effects.  He will be screened for low testosterone to complete his workup for ED.  Testosterone levels will be assessed after he recovers from his bilateral pulm emboli and completes a full 3 months of anticoagulation.  Testosterone therapy is relatively contraindicated in the setting of a recent PE.         Relevant Medications    tadalafil (Cialis) 5 mg tablet     Other Visit Diagnoses       Splenic artery aneurysm (CMS/HCC)        Relevant Orders    Comprehensive Metabolic Panel    CBC and Auto Differential    Anemia, unspecified type        Relevant Orders    Comprehensive Metabolic Panel    CBC and Auto Differential    Ferritin    Iron and TIBC    Transferrin    Vitamin B12              A total of 60 minutes was spent reviewing the chart and recent testing and discussing plan of care.     Chucho De Leon MD

## 2024-04-04 NOTE — PATIENT INSTRUCTIONS
Blood work and urinalysis now     Begin daily tadalafil     Schedule follow up with cardiology

## 2024-04-04 NOTE — ASSESSMENT & PLAN NOTE
Complete blood count, iron studies, B12 level will be obtained now.  To continue twice daily proton pump inhibitor.  Recent screening for H. pylori was negative.  No active bleeding seen on recent endoscopy.

## 2024-04-23 ENCOUNTER — OFFICE VISIT (OUTPATIENT)
Dept: PULMONOLOGY | Facility: CLINIC | Age: 54
End: 2024-04-23
Payer: COMMERCIAL

## 2024-04-23 VITALS
OXYGEN SATURATION: 96 % | RESPIRATION RATE: 18 BRPM | SYSTOLIC BLOOD PRESSURE: 121 MMHG | BODY MASS INDEX: 26.81 KG/M2 | WEIGHT: 181 LBS | HEART RATE: 66 BPM | TEMPERATURE: 97.7 F | HEIGHT: 69 IN | DIASTOLIC BLOOD PRESSURE: 71 MMHG

## 2024-04-23 DIAGNOSIS — F17.200 NICOTINE USE DISORDER: ICD-10-CM

## 2024-04-23 DIAGNOSIS — R60.0 BILATERAL LOWER EXTREMITY EDEMA: ICD-10-CM

## 2024-04-23 DIAGNOSIS — I26.99 ACUTE PULMONARY EMBOLISM WITHOUT ACUTE COR PULMONALE, UNSPECIFIED PULMONARY EMBOLISM TYPE (MULTI): Primary | ICD-10-CM

## 2024-04-23 PROCEDURE — 99406 BEHAV CHNG SMOKING 3-10 MIN: CPT | Performed by: INTERNAL MEDICINE

## 2024-04-23 PROCEDURE — 99215 OFFICE O/P EST HI 40 MIN: CPT | Performed by: INTERNAL MEDICINE

## 2024-04-23 PROCEDURE — 4004F PT TOBACCO SCREEN RCVD TLK: CPT | Performed by: INTERNAL MEDICINE

## 2024-04-23 PROCEDURE — 99205 OFFICE O/P NEW HI 60 MIN: CPT | Performed by: INTERNAL MEDICINE

## 2024-04-23 ASSESSMENT — ENCOUNTER SYMPTOMS
FATIGUE: 1
BRUISES/BLEEDS EASILY: 0
COUGH: 0
SHORTNESS OF BREATH: 0

## 2024-04-23 ASSESSMENT — PAIN SCALES - GENERAL: PAINLEVEL: 0-NO PAIN

## 2024-04-23 NOTE — PROGRESS NOTES
Department of Medicine  Division of Pulmonary, Critical Care, and Sleep Medicine  Location  [facility], [address]    I was asked by No ref. provider found, to evaluate Douglas Wilson for pulmonary embolism. I have independently interviewed and examined the patient in the office and reviewed available records.     Physician HPI (4/23/2024):  53 y.o. year-old male here for hospital follow up after being diagnosed with pulmonary embolism. Currently, patient states he does not have any shortness of breath.  He denies any hemoptysis or other bleeding.  He did state that on April 13 he woke up with significant bilateral lower extremity edema.  That has since resolved with the aid of compression stockings. He still complains of calf tenderness bilaterally. He is an active 1 pack/day smoker from the age of 12.  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    He initially presented to [facility] in February 2024 with abdominal pain, and was found to have a splenic artery aneurysmal rupture with hemoperitoneum for which she underwent splenic artery embolization on 2/22/2024.  He was discharged on February 24 and returned a day later with recurrent abdominal pain.  He underwent CT angiography of his chest which demonstrated subsegmental pulmonary emboli which were not noticed on previous CTA from 3 days ago.  He had bilateral lower extremity duplex evaluation which was negative for DVT.  PERT team was contacted for PE, and he was eventually transition to oral apixaban.  He did not have any RV strain on echocardiogram. He returned again to [facility] on March 12, this time with acute appendicitis requiring appendectomy. He had another CTA of his chest performed on March 12, however, this was negative for any pulmonary embolism. He was discharged on March 13 and then came back to the emergency room on March 14 with upper GI bleed.  EGD was notable for grade C esophagitis.  Given all these recent admissions to the hospital, he finally  was able to stay on any maintenance dose of apixaban from mid March.    He currently has no desire to quit smoking.        PMH:  Past Medical History:   Diagnosis Date    Acute appendicitis without peritonitis 03/12/2024    BPH (benign prostatic hyperplasia)     Heroin abuse (Multi)     NSTEMI (non-ST elevated myocardial infarction) (Multi)     Pulmonary embolism (Multi)     Splenic artery aneurysm (CMS-HCC) 02/2024    rupture s/p coil embolization    Upper GI bleed 03/15/2024    EGD with esophagitis, no active bleeding       PSH:  Past Surgical History:   Procedure Laterality Date    APPENDECTOMY  03/12/2024    CT ANGIO CORONARY ART WITH HEARTFLOW IF SCORE >30%  04/19/2022    OTHER SURGICAL HISTORY Bilateral 01/24/2022    Inguinal hernia repair laparoscopic    SPLENIC ARTERY EMBOLIZATION  02/2024       FHx:  Family History   Problem Relation Name Age of Onset    Diabetes Father      Hypertension Father      No Known Problems Son          raymond    Deafness Son      Other (orthopedic) Son          wearing braces to help with walking       Social Hx:  Social History     Socioeconomic History    Marital status: Single     Spouse name: None    Number of children: None    Years of education: None    Highest education level: None   Occupational History    Occupation: class [employer]    Tobacco Use    Smoking status: Every Day     Current packs/day: 1.00     Types: Cigarettes    Smokeless tobacco: Never   Vaping Use    Vaping status: None   Substance and Sexual Activity    Alcohol use: Never    Drug use: Yes     Types: Heroin     Comment: in past    Sexual activity: Defer   Other Topics Concern    None   Social History Narrative    Calisthenics at home, basketball     Social Determinants of Health     Financial Resource Strain: Low Risk  (2/26/2024)    Overall Financial Resource Strain (CARDIA)     Difficulty of Paying Living Expenses: Not hard at all   Food Insecurity: No Food Insecurity (2/26/2024)    Hunger Vital  Sign     Worried About Running Out of Food in the Last Year: Never true     Ran Out of Food in the Last Year: Never true   Transportation Needs: No Transportation Needs (3/13/2024)    PRAPARE - Transportation     Lack of Transportation (Medical): No     Lack of Transportation (Non-Medical): No   Physical Activity: Insufficiently Active (2/26/2024)    Exercise Vital Sign     Days of Exercise per Week: 2 days     Minutes of Exercise per Session: 60 min   Stress: No Stress Concern Present (2/26/2024)    [place] [place] [employer] - Occupational Stress Questionnaire     Feeling of Stress : Not at all   Social Connections: Moderately Isolated (3/13/2024)    Social Connection and Isolation Panel [NHANES]     Frequency of Communication with Friends and Family: More than three times a week     Frequency of Social Gatherings with Friends and Family: More than three times a week     Attends Confucianism Services: Never     Active Member of Clubs or Organizations: No     Attends Club or Organization Meetings: Never     Marital Status: Living with partner   Intimate Partner Violence: Not At Risk (3/13/2024)    Humiliation, Afraid, Rape, and Kick questionnaire     Fear of Current or Ex-Partner: No     Emotionally Abused: No     Physically Abused: No     Sexually Abused: No   Housing Stability: Low Risk  (3/13/2024)    Housing Stability Vital Sign     Unable to Pay for Housing in the Last Year: No     Number of Places Lived in the Last Year: 1     Unstable Housing in the Last Year: No       Immunization History:    There is no immunization history on file for this patient.    Current Medications:  Current Outpatient Medications   Medication Instructions    apixaban (ELIQUIS) 5 mg, oral, 2 times daily    folic acid (FOLVITE) 1 mg, oral, Daily RT    multivitamin with minerals tablet 1 tablet, oral, Daily RT    pantoprazole (PROTONIX) 40 mg, oral, 2 times daily before meals, Do not crush, chew, or split.    sildenafil  "(Viagra) 100 mg tablet oral    tadalafil (CIALIS) 5 mg, oral, Daily    tamsulosin (FLOMAX) 0.4 mg, oral, Daily, Med has not been filled since April. Caregiver confirmed that patient actually takes at home     Vitamin D3 50 mcg (2,000 unit) capsule         Drug Allergies/Intolerances:  No Known Allergies     Review of Systems:  Review of Systems   Constitutional:  Positive for fatigue.   Respiratory:  Negative for cough and shortness of breath.    Hematological:  Does not bruise/bleed easily.        All other review of systems are negative and/or non-contributory.    Physical Examination:  Vitals:    04/23/24 1357   BP: 121/71   BP Location: Right arm   Patient Position: Sitting   Pulse: 66   Resp: 18   Temp: 36.5 °C (97.7 °F)   TempSrc: Temporal   SpO2: 96%   Weight: 82.1 kg (181 lb)   Height: 1.753 m (5' 9\")        GEN: appears tired  CV: RRR, no m/g/r  LUNGS: good effort, clear bilaterally, no w/r/r  ABD: Soft, nontender  EXT: mild lower extremity edema  NEURO: strength equal bilaterally, sensation grossly intact        Pulmonary Function Test Results     None    Exacerbation History     None    Chest Radiograph     XR chest 2 views 02/22/2024      Impression  1.  No active cardiopulmonary disease.  There has not been  significant interval change from the prior exam.    Signed by: Bry Keller 2/22/2024 9:23 AM  Dictation workstation:   [id number]      Chest CT Scan     CTA 3/12/2024:  PULMONARY ARTERIES: No acute pulmonary embolism.   LUNG, PLEURA, LARGE AIRWAYS: There is bilateral atelectasis. No  consolidation, pulmonary edema, pleural effusion, or pneumothorax.    CTA 2/25/2024:  1. Acute segmental and subsegmental pulmonary emboli within the right  lower lobe, right middle lobe, and left lower lobe. No evidence of  right heart strain.      2. Status post splenic artery embolization with coils involving the  mid splenic artery; however, distal to the last embolization \", the  splenic artery remains opacified " and the known splenic artery  pseudoaneurysm remains opacified, again measuring ~ 0.7 cm. No  active extravasation from the spleen/splenic pseudoaneurysm or along  the course of the splenic artery within limitations of obscured  visualization by beam hardening artifact. This supports relatively  stable hemoglobin level since initial study.      3. Moderate volume hemoperitoneum, overall similar in quantity to  02/22/2024. There is an increased amount of blood products in the  rectovesical recess and decreased amount of blood products in the  upper abdomen around the liver, spleen. Decreased amount of  perisplenic hemorrhage favors absence of active bleeding.    CTA 2/22/2024:  PULMONARY ARTERIES: No acute pulmonary embolism.     Bronchoscopy     None    Labs     Lab Results   Component Value Date    WBC 8.1 03/17/2024    HGB 11.1 (L) 03/17/2024    HCT 31.1 (L) 03/17/2024    MCV 71 (L) 03/17/2024     03/17/2024     Lab Results   Component Value Date    BNP 69 02/26/2024     Lab Results   Component Value Date    EOSABS 0.42 03/17/2024         Echocardiogram     2/26/2024:   1. Left ventricular systolic function is normal with a 60-65% estimated ejection fraction.   2. RVSP within normal limits.   3. The LVEF is no longer hyperdynamic. No focal wall motion abnormalities.    ASSESSMENT & PLAN     Problem List Items Addressed This Visit       Acute pulmonary embolism without acute cor pulmonale (Multi) - Primary    Relevant Orders    Vascular US Lower Extremity Venous Duplex Bilateral    Complete Pulmonary Function Test Pre/Post Bronchodialator (Spirometry Pre/Post/DLCO/Lung Volumes)    Bilateral lower extremity edema    Nicotine use disorder        Summary:  53 y.o. year-old male for hospital follow-up after recently being diagnosed with pulmonary embolism.  Unclear at this time with the provoking factor was.  However, on repeat CTA from a month ago, he no longer has any signs of pulmonary embolism.  He has  started anticoagulation mid-March without any complications.  He is still complaining of significant calf pain and did have unprovoked bilateral lower extremity edema.  For this reason we will pursue repeat lower extremity duplex studies.  Given significant smoking history, will perform PFT to screen for COPD.    Plan:  -Lower extremity duplex studies  -PFT  -Consult smoking cessation for 3 minutes  -Continue full dose anticoagulation for total 3 months unless he has DVT, then he will need to be anticoagulated indefinitely    Follow-up: 6/4/2024        Sue Sims DO  Staff Physician - Pulmonary & Critical Care  04/23/24 2:02 PM  Office number: [phone]   Fax number:  [phone]

## 2024-04-30 ENCOUNTER — HOSPITAL ENCOUNTER (OUTPATIENT)
Dept: RESPIRATORY THERAPY | Facility: CLINIC | Age: 54
Discharge: HOME | End: 2024-04-30
Payer: COMMERCIAL

## 2024-04-30 DIAGNOSIS — I26.99 ACUTE PULMONARY EMBOLISM WITHOUT ACUTE COR PULMONALE, UNSPECIFIED PULMONARY EMBOLISM TYPE (MULTI): ICD-10-CM

## 2024-04-30 PROCEDURE — 94727 GAS DIL/WSHOT DETER LNG VOL: CPT | Performed by: INTERNAL MEDICINE

## 2024-04-30 PROCEDURE — 94729 DIFFUSING CAPACITY: CPT | Performed by: INTERNAL MEDICINE

## 2024-04-30 PROCEDURE — 94010 BREATHING CAPACITY TEST: CPT

## 2024-04-30 PROCEDURE — 94729 DIFFUSING CAPACITY: CPT

## 2024-04-30 PROCEDURE — 94010 BREATHING CAPACITY TEST: CPT | Performed by: INTERNAL MEDICINE

## 2024-04-30 PROCEDURE — 94727 GAS DIL/WSHOT DETER LNG VOL: CPT

## 2024-05-01 ENCOUNTER — APPOINTMENT (OUTPATIENT)
Dept: CARDIOLOGY | Facility: CLINIC | Age: 54
End: 2024-05-01
Payer: COMMERCIAL

## 2024-05-03 ENCOUNTER — PATIENT MESSAGE (OUTPATIENT)
Dept: PRIMARY CARE | Facility: HOSPITAL | Age: 54
End: 2024-05-03

## 2024-05-03 ENCOUNTER — LAB (OUTPATIENT)
Dept: LAB | Facility: LAB | Age: 54
End: 2024-05-03
Payer: COMMERCIAL

## 2024-05-03 DIAGNOSIS — I72.8 SPLENIC ARTERY ANEURYSM (CMS-HCC): ICD-10-CM

## 2024-05-03 DIAGNOSIS — N40.1 BENIGN PROSTATIC HYPERPLASIA WITH URINARY HESITANCY: ICD-10-CM

## 2024-05-03 DIAGNOSIS — E83.51 HYPOCALCEMIA: ICD-10-CM

## 2024-05-03 DIAGNOSIS — R39.11 BENIGN PROSTATIC HYPERPLASIA WITH URINARY HESITANCY: ICD-10-CM

## 2024-05-03 DIAGNOSIS — D64.9 ANEMIA, UNSPECIFIED TYPE: ICD-10-CM

## 2024-05-03 DIAGNOSIS — R79.89 ELEVATED TROPONIN: ICD-10-CM

## 2024-05-03 LAB
25(OH)D3 SERPL-MCNC: 18 NG/ML (ref 30–100)
ALBUMIN SERPL BCP-MCNC: 3.9 G/DL (ref 3.4–5)
ALP SERPL-CCNC: 51 U/L (ref 33–120)
ALT SERPL W P-5'-P-CCNC: 16 U/L (ref 10–52)
ANION GAP SERPL CALC-SCNC: 11 MMOL/L (ref 10–20)
APPEARANCE UR: ABNORMAL
AST SERPL W P-5'-P-CCNC: 16 U/L (ref 9–39)
BASOPHILS # BLD AUTO: 0.04 X10*3/UL (ref 0–0.1)
BASOPHILS NFR BLD AUTO: 0.5 %
BILIRUB SERPL-MCNC: 0.3 MG/DL (ref 0–1.2)
BILIRUB UR STRIP.AUTO-MCNC: NEGATIVE MG/DL
BUN SERPL-MCNC: 9 MG/DL (ref 6–23)
CALCIUM SERPL-MCNC: 8.6 MG/DL (ref 8.6–10.3)
CHLORIDE SERPL-SCNC: 102 MMOL/L (ref 98–107)
CO2 SERPL-SCNC: 31 MMOL/L (ref 21–32)
COLOR UR: ABNORMAL
CREAT SERPL-MCNC: 0.88 MG/DL (ref 0.5–1.3)
EGFRCR SERPLBLD CKD-EPI 2021: >90 ML/MIN/1.73M*2
EOSINOPHIL # BLD AUTO: 0.36 X10*3/UL (ref 0–0.7)
EOSINOPHIL NFR BLD AUTO: 4.6 %
ERYTHROCYTE [DISTWIDTH] IN BLOOD BY AUTOMATED COUNT: 16.1 % (ref 11.5–14.5)
FERRITIN SERPL-MCNC: 62 NG/ML (ref 20–300)
GLUCOSE SERPL-MCNC: 96 MG/DL (ref 74–99)
GLUCOSE UR STRIP.AUTO-MCNC: NORMAL MG/DL
HCT VFR BLD AUTO: 35.5 % (ref 41–52)
HGB BLD-MCNC: 11.2 G/DL (ref 13.5–17.5)
IMM GRANULOCYTES # BLD AUTO: 0.03 X10*3/UL (ref 0–0.7)
IMM GRANULOCYTES NFR BLD AUTO: 0.4 % (ref 0–0.9)
IRON SATN MFR SERPL: 17 % (ref 25–45)
IRON SERPL-MCNC: 48 UG/DL (ref 35–150)
KETONES UR STRIP.AUTO-MCNC: NEGATIVE MG/DL
LEUKOCYTE ESTERASE UR QL STRIP.AUTO: NEGATIVE
LYMPHOCYTES # BLD AUTO: 1.85 X10*3/UL (ref 1.2–4.8)
LYMPHOCYTES NFR BLD AUTO: 23.5 %
MAGNESIUM SERPL-MCNC: 1.9 MG/DL (ref 1.6–2.4)
MCH RBC QN AUTO: 22.6 PG (ref 26–34)
MCHC RBC AUTO-ENTMCNC: 31.5 G/DL (ref 32–36)
MCV RBC AUTO: 72 FL (ref 80–100)
MONOCYTES # BLD AUTO: 0.66 X10*3/UL (ref 0.1–1)
MONOCYTES NFR BLD AUTO: 8.4 %
NEUTROPHILS # BLD AUTO: 4.94 X10*3/UL (ref 1.2–7.7)
NEUTROPHILS NFR BLD AUTO: 62.6 %
NITRITE UR QL STRIP.AUTO: NEGATIVE
NRBC BLD-RTO: 0 /100 WBCS (ref 0–0)
PH UR STRIP.AUTO: 7 [PH]
PLATELET # BLD AUTO: 269 X10*3/UL (ref 150–450)
POTASSIUM SERPL-SCNC: 4.5 MMOL/L (ref 3.5–5.3)
PROT SERPL-MCNC: 6.2 G/DL (ref 6.4–8.2)
PROT UR STRIP.AUTO-MCNC: NEGATIVE MG/DL
PSA SERPL-MCNC: 0.34 NG/ML
RBC # BLD AUTO: 4.95 X10*6/UL (ref 4.5–5.9)
RBC # UR STRIP.AUTO: NEGATIVE /UL
SODIUM SERPL-SCNC: 139 MMOL/L (ref 136–145)
SP GR UR STRIP.AUTO: 1.02
TIBC SERPL-MCNC: 288 UG/DL (ref 240–445)
TRANSFERRIN SERPL-MCNC: 220 MG/DL (ref 200–360)
UIBC SERPL-MCNC: 240 UG/DL (ref 110–370)
UROBILINOGEN UR STRIP.AUTO-MCNC: NORMAL MG/DL
VIT B12 SERPL-MCNC: 308 PG/ML (ref 211–911)
WBC # BLD AUTO: 7.9 X10*3/UL (ref 4.4–11.3)

## 2024-05-03 PROCEDURE — 82607 VITAMIN B-12: CPT

## 2024-05-03 PROCEDURE — 83735 ASSAY OF MAGNESIUM: CPT

## 2024-05-03 PROCEDURE — 83540 ASSAY OF IRON: CPT

## 2024-05-03 PROCEDURE — 85025 COMPLETE CBC W/AUTO DIFF WBC: CPT

## 2024-05-03 PROCEDURE — 80053 COMPREHEN METABOLIC PANEL: CPT

## 2024-05-03 PROCEDURE — 82306 VITAMIN D 25 HYDROXY: CPT

## 2024-05-03 PROCEDURE — 84153 ASSAY OF PSA TOTAL: CPT

## 2024-05-03 PROCEDURE — 84466 ASSAY OF TRANSFERRIN: CPT

## 2024-05-03 PROCEDURE — 81003 URINALYSIS AUTO W/O SCOPE: CPT

## 2024-05-03 PROCEDURE — 82728 ASSAY OF FERRITIN: CPT

## 2024-05-03 PROCEDURE — 36415 COLL VENOUS BLD VENIPUNCTURE: CPT

## 2024-05-03 PROCEDURE — 83550 IRON BINDING TEST: CPT

## 2024-05-06 ENCOUNTER — APPOINTMENT (OUTPATIENT)
Dept: PRIMARY CARE | Facility: HOSPITAL | Age: 54
End: 2024-05-06
Payer: COMMERCIAL

## 2024-05-07 PROBLEM — I72.8 ANEURYSM OF SPLENIC ARTERY (CMS-HCC): Status: ACTIVE | Noted: 2024-05-07

## 2024-05-08 ENCOUNTER — OFFICE VISIT (OUTPATIENT)
Dept: CARDIOLOGY | Facility: CLINIC | Age: 54
End: 2024-05-08
Payer: COMMERCIAL

## 2024-05-08 ENCOUNTER — OFFICE VISIT (OUTPATIENT)
Dept: PRIMARY CARE | Facility: CLINIC | Age: 54
End: 2024-05-08
Payer: COMMERCIAL

## 2024-05-08 VITALS
DIASTOLIC BLOOD PRESSURE: 73 MMHG | HEART RATE: 71 BPM | WEIGHT: 190.4 LBS | OXYGEN SATURATION: 98 % | TEMPERATURE: 98.4 F | BODY MASS INDEX: 28.12 KG/M2 | SYSTOLIC BLOOD PRESSURE: 125 MMHG

## 2024-05-08 VITALS
SYSTOLIC BLOOD PRESSURE: 139 MMHG | HEIGHT: 69 IN | BODY MASS INDEX: 27.7 KG/M2 | DIASTOLIC BLOOD PRESSURE: 82 MMHG | WEIGHT: 187 LBS | OXYGEN SATURATION: 93 % | HEART RATE: 58 BPM

## 2024-05-08 DIAGNOSIS — Z72.0 TOBACCO USE: ICD-10-CM

## 2024-05-08 DIAGNOSIS — I26.94 MULTIPLE SUBSEGMENTAL PULMONARY EMBOLI WITHOUT ACUTE COR PULMONALE (MULTI): Primary | ICD-10-CM

## 2024-05-08 DIAGNOSIS — K59.00 CONSTIPATION, UNSPECIFIED CONSTIPATION TYPE: ICD-10-CM

## 2024-05-08 DIAGNOSIS — D50.9 IRON DEFICIENCY ANEMIA, UNSPECIFIED IRON DEFICIENCY ANEMIA TYPE: ICD-10-CM

## 2024-05-08 DIAGNOSIS — R60.0 LOWER EXTREMITY EDEMA: ICD-10-CM

## 2024-05-08 DIAGNOSIS — E55.9 VITAMIN D DEFICIENCY: ICD-10-CM

## 2024-05-08 DIAGNOSIS — R79.89 ELEVATED TROPONIN: ICD-10-CM

## 2024-05-08 DIAGNOSIS — R60.9 PERIPHERAL EDEMA: Primary | ICD-10-CM

## 2024-05-08 PROBLEM — F11.93 OPIOID WITHDRAWAL (MULTI): Status: RESOLVED | Noted: 2024-03-28 | Resolved: 2024-05-08

## 2024-05-08 PROBLEM — R10.84 GENERALIZED ABDOMINAL PAIN: Status: RESOLVED | Noted: 2024-02-22 | Resolved: 2024-05-08

## 2024-05-08 PROBLEM — R07.0 PAIN IN THROAT: Status: RESOLVED | Noted: 2024-03-28 | Resolved: 2024-05-08

## 2024-05-08 PROBLEM — I21.9 MYOCARDIAL INFARCTION (MULTI): Chronic | Status: RESOLVED | Noted: 2022-01-05 | Resolved: 2024-05-08
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
## 2024-05-08 PROBLEM — F14.21 COCAINE DEPENDENCE, IN REMISSION (MULTI): Chronic | Status: RESOLVED | Noted: 2021-05-28 | Resolved: 2024-05-08

## 2024-05-08 PROBLEM — F19.10 POLYSUBSTANCE ABUSE (MULTI): Status: RESOLVED | Noted: 2024-03-13 | Resolved: 2024-05-08

## 2024-05-08 PROBLEM — F11.10 HEROIN ABUSE (MULTI): Status: RESOLVED | Noted: 2024-03-13 | Resolved: 2024-05-08

## 2024-05-08 PROBLEM — K35.80 ACUTE APPENDICITIS WITHOUT PERITONITIS: Status: RESOLVED | Noted: 2024-03-12 | Resolved: 2024-05-08

## 2024-05-08 PROBLEM — R07.89 ATYPICAL CHEST PAIN: Status: RESOLVED | Noted: 2024-03-28 | Resolved: 2024-05-08

## 2024-05-08 PROCEDURE — 99214 OFFICE O/P EST MOD 30 MIN: CPT | Performed by: INTERNAL MEDICINE

## 2024-05-08 PROCEDURE — 4004F PT TOBACCO SCREEN RCVD TLK: CPT | Performed by: INTERNAL MEDICINE

## 2024-05-08 RX ORDER — FERROUS GLUCONATE 324(38)MG
38 TABLET ORAL EVERY OTHER DAY
Qty: 45 TABLET | Refills: 1 | Status: SHIPPED | OUTPATIENT
Start: 2024-05-08

## 2024-05-08 RX ORDER — AMOXICILLIN 250 MG
2 CAPSULE ORAL NIGHTLY
Qty: 60 TABLET | Refills: 1 | Status: SHIPPED | OUTPATIENT
Start: 2024-05-08 | End: 2024-06-05 | Stop reason: HOSPADM

## 2024-05-08 RX ORDER — FUROSEMIDE 20 MG/1
20 TABLET ORAL DAILY
Qty: 3 TABLET | Refills: 1 | Status: SHIPPED | OUTPATIENT
Start: 2024-05-08 | End: 2024-06-05 | Stop reason: HOSPADM

## 2024-05-08 RX ORDER — CHOLECALCIFEROL (VITAMIN D3) 1250 MCG
50000 TABLET ORAL
Qty: 12 TABLET | Refills: 0 | Status: SHIPPED | OUTPATIENT
Start: 2024-05-08 | End: 2024-06-05 | Stop reason: HOSPADM

## 2024-05-08 RX ORDER — POTASSIUM CHLORIDE 750 MG/1
10 TABLET, FILM COATED, EXTENDED RELEASE ORAL DAILY
Qty: 3 TABLET | Refills: 1 | Status: SHIPPED | OUTPATIENT
Start: 2024-05-08 | End: 2024-06-05 | Stop reason: HOSPADM

## 2024-05-08 ASSESSMENT — ENCOUNTER SYMPTOMS
OCCASIONAL FEELINGS OF UNSTEADINESS: 0
LOSS OF SENSATION IN FEET: 0
DEPRESSION: 0

## 2024-05-08 ASSESSMENT — PAIN SCALES - GENERAL: PAINLEVEL: 7

## 2024-05-08 NOTE — PATIENT INSTRUCTIONS
Begin iron supplement - every other day    Take senokot s - 2 tabs every night    Drink at least 8 glasses of water per day     Take vitamin d once weekly for 12 weeks    Take furosemide and potassium daily for 3d    Wear compression stockings every day and keep legs elevated

## 2024-05-08 NOTE — LETTER
May 8, 2024     Patient: Douglas Wilson   YOB: 1970   Date of Visit: 5/8/2024       To Whom It May Concern:    Douglas Wilson is a patient under my care. His medical record has been corrected and updated to reflect that he does not have ongoing substance use or substance dependence.  Please update his work records to reflect this change.     If you have any questions or concerns, please don't hesitate to call.         Sincerely,         Chucho De Leon MD        CC: No Recipients

## 2024-05-08 NOTE — PROGRESS NOTES
Chief Complaint:   Follow-up     History Of Present Illness:    Douglas Wilson is a 53 y.o. male with active medical problems outlined below who presents for follow up of fatigue and to review recent lab testing.      INITIAL VISIT 4/4/24  Douglas has hx of smoking, BPH, former polysubstance use who has been sick since  February.  2/22/24 - 3 d of abdominal pain and diagnosed with splenic artery aneurysm and underwent coiling procedure.  Had troponin leak to 1,252.  Echocardiogram normal.  CCTA in 2022 showed minimal CAD.   2/25/24 - presented with chest pain and SOB.  Diagnosed with multiple bilateral pulmonary emboli.  Started Eliquis and DC home  3/12/24 - presented with abdominal pain and diagnosed with appendicitis.  Underwent urgent appendectomy  3/14/24 - presented with vomiting and poss hematemesis.   EGD showed hiatal hernia and esophagitis.  Started BID protonix.  Significant findings from labs in hospital - anemia, hypocalcemia, elevated troponin.   Is slowly improving since his hospital discharge.  He endorses fatigue but denies chest pain or shortness of breath.  Appetite is normal.  He reports no further nausea or vomiting.  He has been constipated.  No diarrhea and no blood in his stool.  No black appearing stools.  Obstructive type lower urinary tract symptoms are managed with tamsulosin.  He has ED and would like to go back on a phosphodiesterase inhibitor.  He did a lot of lifting at work earlier today, he is now experiencing pain in his left lower quadrant.  He has some tenderness to palpation around his incision.     Echocardiogram 2/27/24  CONCLUSIONS:   1. Left ventricular systolic function is normal with a 60-65% estimated ejection fraction.   2. RVSP within normal limits.   3. The LVEF is no longer hyperdynamic. No focal wall motion abnormalities.    4/19/22 Coronary Artery CT Angio  IMPRESSION:  1. Minor coronary artery disease.  2. The proximal LAD has a small/focal region of calcified plaque  with  minimal luminal stenosis (<25%).  3. The LM, LCx and RCA have no significant atherosclerotic change or  stenotic disease.  4. Right-dominant coronary artery system.    8/15/22 CT abdomen pelvis   Bladder wall thickening     2/22/24 - splenic artery aneurysm and bleed - 3 d of pain - peak troponin 1252      INTERVAL HISTORY 5/8/24  Recovering from recent surgery and reports more energy over time.   Normal appetite.  Eating normally, no nausea/vomiting/pain.  Has been constipated.  Sitting and straining for 30min.  Legs are still swelling and wonders if sitting/straining is contributing.  Not out of breath, no chest pain or palpitations.   Knees hurt and also having pain in neck, shoulders , arms       Component      Latest Ref Rng 2/22/2024 2/23/2024 2/25/2024 2/26/2024   Troponin I      0.00 - 0.03 ng/mL       Troponin I, High Sensitivity      0 - 20 ng/L 1,252 (HH)  595 (HH)  64 (HH)  53 (HH)    Troponin I, High Sensitivity       1,103 (HH)       Troponin I, High Sensitivity       1,074 (HH)       Troponin I, High Sensitivity       1,300 (HH)         Component      Latest Ref Rng 3/14/2024   Troponin I      0.00 - 0.03 ng/mL    Troponin I, High Sensitivity      0 - 20 ng/L 46 (H)         2/25/24 - bilateral PE  There are scattered segmental and subsegmental filling  defects within the right middle and bilateral lower lobe pulmonary  artery branches concerning for pulmonary emboli that are new from  prior study. The heart is normal size. There is mild bibasilar  atelectasis.      EGD 3/15/24  Result Text   Impression  Grade C esophagitis in the lower third of the esophagus  Moderate erythematous mucosa in the body of the stomach and antrum; performed cold forceps biopsy to rule out H. pylori  Type I hiatal hernia  The duodenal bulb and 2nd part of the duodenum appeared normal.        Findings  Moderate grade C esophagitis with multiple mucosal breaks measuring 5 mm or more, continuous between folds, covering  less than 75% of the circumference, showing ulcerated mucosa in the lower third of the esophagus  Moderate, patchy erythematous mucosa in the body of the stomach and antrum; performed cold forceps biopsy to rule out H. pylori;  Sliding hiatal hernia (type I hiatal hernia) - GE junction 4 cm from the incisors  The duodenal bulb and 2nd part of the duodenum appeared normal.        Patient Active Problem List   Diagnosis    Acute pulmonary embolism without acute cor pulmonale (Multi)    Acute blood loss anemia    Nicotine use disorder    BPH (benign prostatic hyperplasia)    Gastro-esophageal reflux disease without esophagitis    Hiatal hernia    Hyperlipidemia    Obstructive sleep apnea syndrome    Hypocalcemia    Erectile dysfunction    Aneurysm of splenic artery (CMS-HCC)    Peripheral edema    Iron deficiency anemia    Vitamin D deficiency    Constipation       Current Outpatient Medications:     apixaban (Eliquis) 5 mg tablet, Take 1 tablet (5 mg) by mouth 2 times a day., Disp: 60 tablet, Rfl: 3    multivitamin with minerals tablet, Take 1 tablet by mouth once daily., Disp: , Rfl:     pantoprazole (ProtoNix) 40 mg EC tablet, Take 1 tablet (40 mg) by mouth 2 times a day before meals. Do not crush, chew, or split., Disp: 60 tablet, Rfl: 3    tadalafil (Cialis) 5 mg tablet, Take 1 tablet (5 mg) by mouth once daily., Disp: 90 tablet, Rfl: 3    cholecalciferol (Vitamin D3) 1,250 mcg (50,000 unit) tablet, Take 1 tablet (50,000 Units) by mouth every 7 days., Disp: 12 tablet, Rfl: 0    ferrous gluconate 324 (38 Fe) mg tablet, Take 1 tablet (38 mg of iron) by mouth every other day., Disp: 45 tablet, Rfl: 1    folic acid (Folvite) 1 mg tablet, Take 1 tablet (1 mg) by mouth once daily., Disp: , Rfl:     furosemide (Lasix) 20 mg tablet, Take 1 tablet (20 mg) by mouth once daily., Disp: 3 tablet, Rfl: 1    potassium chloride CR (Klor-Con) 10 mEq ER tablet, Take 1 tablet (10 mEq) by mouth once daily. Do not crush, chew, or  split., Disp: 3 tablet, Rfl: 1    sennosides-docusate sodium (Senokot-S) 8.6-50 mg tablet, Take 2 tablets by mouth once daily at bedtime., Disp: 60 tablet, Rfl: 1    sildenafil (Viagra) 100 mg tablet, Take by mouth., Disp: , Rfl:     tamsulosin (Flomax) 0.4 mg 24 hr capsule, Take 1 capsule (0.4 mg) by mouth once daily. Med has not been filled since April. Caregiver confirmed that patient actually takes at home, Disp: , Rfl:   Patient has no known allergies.  Social History     Tobacco Use    Smoking status: Every Day     Current packs/day: 1.00     Types: Cigarettes    Smokeless tobacco: Never   Substance Use Topics    Alcohol use: Never    Drug use: Yes     Types: Heroin     Comment: in past         All pertinent aspects of medical and surgical history were reviewed and updated in chart    Review of Systems   Pertinent positives in review of systems outlined above.  Complete ROS otherwise negative.          Physical Exam  HENT:      Mouth/Throat:      Pharynx: Oropharynx is clear.   Eyes:      Extraocular Movements: Extraocular movements intact.      Conjunctiva/sclera: Conjunctivae normal.      Pupils: Pupils are equal, round, and reactive to light.   Cardiovascular:      Rate and Rhythm: Normal rate and regular rhythm.      Pulses: Normal pulses.      Heart sounds: Normal heart sounds.   Pulmonary:      Effort: Pulmonary effort is normal.      Breath sounds: Normal breath sounds.   Musculoskeletal:      Comments: 1+ edema to mid leg bilateral.  Varicosities present bilateral.  No erythema, no palpable cord.             Assessment/Plan   Problem List Items Addressed This Visit       Peripheral edema - Primary     Recent echo with Nl EF.  Liver and kidney function normal. Will check TSH.   Swelling is symmetric and no signs of DVT on exam. Will begin 3d of furosemide.  To keep legs elevated and to use compression stockings.  To call if not improving         Relevant Medications    furosemide (Lasix) 20 mg tablet     potassium chloride CR (Klor-Con) 10 mEq ER tablet    Other Relevant Orders    TSH with reflex to Free T4 if abnormal    Iron deficiency anemia     Secondary to recent bleeding episode and surgeries. To start iron supplement daily.  Will check CBC and iron levels in 6 weeks         Relevant Medications    ferrous gluconate 324 (38 Fe) mg tablet    Other Relevant Orders    CBC and Auto Differential    Ferritin    Iron and TIBC    Transferrin    Vitamin D deficiency     25OHD level now.           Relevant Medications    cholecalciferol (Vitamin D3) 1,250 mcg (50,000 unit) tablet    Constipation     Will check TSH.  To push fluids, increase fiber intake and take senokot 2 tabs at night.  Will arrange colonoscopy          Relevant Medications    sennosides-docusate sodium (Senokot-S) 8.6-50 mg tablet     A total of 30 minutes was spent reviewing the chart and recent testing and discussing plan of care.         Chucho De Leon MD
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
## 2024-05-11 PROBLEM — K59.00 CONSTIPATION: Status: ACTIVE | Noted: 2024-05-11

## 2024-05-11 PROBLEM — R60.0 PERIPHERAL EDEMA: Status: ACTIVE | Noted: 2024-05-11

## 2024-05-11 PROBLEM — D50.9 IRON DEFICIENCY ANEMIA: Status: ACTIVE | Noted: 2024-05-11

## 2024-05-11 PROBLEM — R60.9 PERIPHERAL EDEMA: Status: ACTIVE | Noted: 2024-05-11

## 2024-05-11 PROBLEM — E55.9 VITAMIN D DEFICIENCY: Status: ACTIVE | Noted: 2024-05-11

## 2024-05-11 NOTE — ASSESSMENT & PLAN NOTE
Recent echo with Nl EF.  Liver and kidney function normal. Will check TSH.   Swelling is symmetric and no signs of DVT on exam. Will begin 3d of furosemide.  To keep legs elevated and to use compression stockings.  To call if not improving

## 2024-05-11 NOTE — ASSESSMENT & PLAN NOTE
Will check TSH.  To push fluids, increase fiber intake and take senokot 2 tabs at night.  Will arrange colonoscopy

## 2024-05-11 NOTE — ASSESSMENT & PLAN NOTE
Secondary to recent bleeding episode and surgeries. To start iron supplement daily.  Will check CBC and iron levels in 6 weeks

## 2024-05-14 ENCOUNTER — ANCILLARY PROCEDURE (OUTPATIENT)
Dept: CARDIOLOGY | Facility: CLINIC | Age: 54
End: 2024-05-14
Payer: COMMERCIAL

## 2024-05-14 DIAGNOSIS — R22.1 LOCALIZED SWELLING, MASS AND LUMP, NECK: ICD-10-CM

## 2024-05-14 DIAGNOSIS — I26.99 ACUTE PULMONARY EMBOLISM WITHOUT ACUTE COR PULMONALE, UNSPECIFIED PULMONARY EMBOLISM TYPE (MULTI): ICD-10-CM

## 2024-05-14 PROCEDURE — 93970 EXTREMITY STUDY: CPT | Performed by: SURGERY

## 2024-05-14 PROCEDURE — 93970 EXTREMITY STUDY: CPT

## 2024-05-30 ENCOUNTER — APPOINTMENT (OUTPATIENT)
Dept: RADIOLOGY | Facility: HOSPITAL | Age: 54
End: 2024-05-30
Payer: COMMERCIAL

## 2024-05-30 ENCOUNTER — APPOINTMENT (OUTPATIENT)
Dept: CARDIOLOGY | Facility: HOSPITAL | Age: 54
End: 2024-05-30
Payer: COMMERCIAL

## 2024-05-30 ENCOUNTER — HOSPITAL ENCOUNTER (INPATIENT)
Facility: HOSPITAL | Age: 54
LOS: 6 days | Discharge: HOME | End: 2024-06-05
Payer: COMMERCIAL

## 2024-05-30 DIAGNOSIS — E78.49 OTHER HYPERLIPIDEMIA: ICD-10-CM

## 2024-05-30 DIAGNOSIS — R94.31 ABNORMAL EKG: ICD-10-CM

## 2024-05-30 DIAGNOSIS — G93.40 ENCEPHALOPATHY: Primary | ICD-10-CM

## 2024-05-30 DIAGNOSIS — I10 PRIMARY HYPERTENSION: ICD-10-CM

## 2024-05-30 LAB
ALBUMIN SERPL BCP-MCNC: 3.8 G/DL (ref 3.4–5)
ALBUMIN SERPL BCP-MCNC: 4.2 G/DL (ref 3.4–5)
ALP SERPL-CCNC: 68 U/L (ref 33–120)
ALT SERPL W P-5'-P-CCNC: 16 U/L (ref 10–52)
ANION GAP SERPL CALC-SCNC: 18 MMOL/L (ref 10–20)
ANION GAP SERPL CALC-SCNC: 19 MMOL/L (ref 10–20)
AST SERPL W P-5'-P-CCNC: 30 U/L (ref 9–39)
BILIRUB SERPL-MCNC: 0.7 MG/DL (ref 0–1.2)
BUN SERPL-MCNC: 16 MG/DL (ref 6–23)
BUN SERPL-MCNC: 17 MG/DL (ref 6–23)
CALCIUM SERPL-MCNC: 8.4 MG/DL (ref 8.6–10.3)
CALCIUM SERPL-MCNC: 8.9 MG/DL (ref 8.6–10.3)
CARDIAC TROPONIN I PNL SERPL HS: 1863 NG/L (ref 0–20)
CHLORIDE SERPL-SCNC: 100 MMOL/L (ref 98–107)
CHLORIDE SERPL-SCNC: 101 MMOL/L (ref 98–107)
CK SERPL-CCNC: 477 U/L (ref 0–325)
CO2 SERPL-SCNC: 18 MMOL/L (ref 21–32)
CO2 SERPL-SCNC: 18 MMOL/L (ref 21–32)
CREAT SERPL-MCNC: 0.93 MG/DL (ref 0.5–1.3)
CREAT SERPL-MCNC: 0.97 MG/DL (ref 0.5–1.3)
EGFRCR SERPLBLD CKD-EPI 2021: >90 ML/MIN/1.73M*2
EGFRCR SERPLBLD CKD-EPI 2021: >90 ML/MIN/1.73M*2
ERYTHROCYTE [DISTWIDTH] IN BLOOD BY AUTOMATED COUNT: 17.7 % (ref 11.5–14.5)
GLUCOSE BLD MANUAL STRIP-MCNC: 117 MG/DL (ref 74–99)
GLUCOSE SERPL-MCNC: 121 MG/DL (ref 74–99)
GLUCOSE SERPL-MCNC: 126 MG/DL (ref 74–99)
HCT VFR BLD AUTO: 47.7 % (ref 41–52)
HGB BLD-MCNC: 16 G/DL (ref 13.5–17.5)
MCH RBC QN AUTO: 22.9 PG (ref 26–34)
MCHC RBC AUTO-ENTMCNC: 33.5 G/DL (ref 32–36)
MCV RBC AUTO: 68 FL (ref 80–100)
NRBC BLD-RTO: 0 /100 WBCS (ref 0–0)
PHOSPHATE SERPL-MCNC: 2.3 MG/DL (ref 2.5–4.9)
PLATELET # BLD AUTO: 259 X10*3/UL (ref 150–450)
POTASSIUM SERPL-SCNC: 3.2 MMOL/L (ref 3.5–5.3)
POTASSIUM SERPL-SCNC: 3.4 MMOL/L (ref 3.5–5.3)
PROT SERPL-MCNC: 8 G/DL (ref 6.4–8.2)
RBC # BLD AUTO: 6.99 X10*6/UL (ref 4.5–5.9)
SODIUM SERPL-SCNC: 134 MMOL/L (ref 136–145)
SODIUM SERPL-SCNC: 134 MMOL/L (ref 136–145)
WBC # BLD AUTO: 22.5 X10*3/UL (ref 4.4–11.3)

## 2024-05-30 PROCEDURE — 2020000001 HC ICU ROOM DAILY

## 2024-05-30 PROCEDURE — 83605 ASSAY OF LACTIC ACID: CPT | Performed by: NURSE PRACTITIONER

## 2024-05-30 PROCEDURE — 93010 ELECTROCARDIOGRAM REPORT: CPT | Performed by: INTERNAL MEDICINE

## 2024-05-30 PROCEDURE — 2500000004 HC RX 250 GENERAL PHARMACY W/ HCPCS (ALT 636 FOR OP/ED)

## 2024-05-30 PROCEDURE — 82947 ASSAY GLUCOSE BLOOD QUANT: CPT

## 2024-05-30 PROCEDURE — C9113 INJ PANTOPRAZOLE SODIUM, VIA: HCPCS | Performed by: STUDENT IN AN ORGANIZED HEALTH CARE EDUCATION/TRAINING PROGRAM

## 2024-05-30 PROCEDURE — 85027 COMPLETE CBC AUTOMATED: CPT | Performed by: NURSE PRACTITIONER

## 2024-05-30 PROCEDURE — 82550 ASSAY OF CK (CPK): CPT | Performed by: STUDENT IN AN ORGANIZED HEALTH CARE EDUCATION/TRAINING PROGRAM

## 2024-05-30 PROCEDURE — 93005 ELECTROCARDIOGRAM TRACING: CPT

## 2024-05-30 PROCEDURE — 84484 ASSAY OF TROPONIN QUANT: CPT | Performed by: STUDENT IN AN ORGANIZED HEALTH CARE EDUCATION/TRAINING PROGRAM

## 2024-05-30 PROCEDURE — 2500000004 HC RX 250 GENERAL PHARMACY W/ HCPCS (ALT 636 FOR OP/ED): Performed by: NURSE PRACTITIONER

## 2024-05-30 PROCEDURE — 80053 COMPREHEN METABOLIC PANEL: CPT | Performed by: NURSE PRACTITIONER

## 2024-05-30 PROCEDURE — 71045 X-RAY EXAM CHEST 1 VIEW: CPT | Performed by: RADIOLOGY

## 2024-05-30 PROCEDURE — 2500000004 HC RX 250 GENERAL PHARMACY W/ HCPCS (ALT 636 FOR OP/ED): Performed by: STUDENT IN AN ORGANIZED HEALTH CARE EDUCATION/TRAINING PROGRAM

## 2024-05-30 PROCEDURE — 80069 RENAL FUNCTION PANEL: CPT | Mod: CCI | Performed by: NURSE PRACTITIONER

## 2024-05-30 PROCEDURE — 71045 X-RAY EXAM CHEST 1 VIEW: CPT

## 2024-05-30 PROCEDURE — 99291 CRITICAL CARE FIRST HOUR: CPT

## 2024-05-30 PROCEDURE — 82947 ASSAY GLUCOSE BLOOD QUANT: CPT | Performed by: NURSE PRACTITIONER

## 2024-05-30 PROCEDURE — 36415 COLL VENOUS BLD VENIPUNCTURE: CPT | Performed by: STUDENT IN AN ORGANIZED HEALTH CARE EDUCATION/TRAINING PROGRAM

## 2024-05-30 RX ORDER — POTASSIUM CHLORIDE 14.9 MG/ML
20 INJECTION INTRAVENOUS ONCE
Status: COMPLETED | OUTPATIENT
Start: 2024-05-30 | End: 2024-05-30

## 2024-05-30 RX ORDER — ENOXAPARIN SODIUM 100 MG/ML
40 INJECTION SUBCUTANEOUS EVERY 24 HOURS
Status: DISCONTINUED | OUTPATIENT
Start: 2024-05-30 | End: 2024-05-31

## 2024-05-30 RX ORDER — PANTOPRAZOLE SODIUM 40 MG/10ML
40 INJECTION, POWDER, LYOPHILIZED, FOR SOLUTION INTRAVENOUS DAILY
Status: DISCONTINUED | OUTPATIENT
Start: 2024-05-30 | End: 2024-06-05 | Stop reason: HOSPADM

## 2024-05-30 RX ORDER — HYDRALAZINE HYDROCHLORIDE 20 MG/ML
INJECTION INTRAMUSCULAR; INTRAVENOUS
Status: COMPLETED
Start: 2024-05-30 | End: 2024-05-30

## 2024-05-30 RX ORDER — HYDRALAZINE HYDROCHLORIDE 20 MG/ML
10 INJECTION INTRAMUSCULAR; INTRAVENOUS ONCE
Status: COMPLETED | OUTPATIENT
Start: 2024-05-30 | End: 2024-05-30

## 2024-05-30 RX ORDER — NALOXONE HYDROCHLORIDE 0.4 MG/ML
0.4 INJECTION, SOLUTION INTRAMUSCULAR; INTRAVENOUS; SUBCUTANEOUS ONCE
Status: COMPLETED | OUTPATIENT
Start: 2024-05-30 | End: 2024-05-30

## 2024-05-30 RX ORDER — SODIUM CHLORIDE AND POTASSIUM CHLORIDE 150; 900 MG/100ML; MG/100ML
125 INJECTION, SOLUTION INTRAVENOUS CONTINUOUS
Status: DISCONTINUED | OUTPATIENT
Start: 2024-05-30 | End: 2024-06-04

## 2024-05-30 RX ORDER — SODIUM CHLORIDE, SODIUM LACTATE, POTASSIUM CHLORIDE, CALCIUM CHLORIDE 600; 310; 30; 20 MG/100ML; MG/100ML; MG/100ML; MG/100ML
100 INJECTION, SOLUTION INTRAVENOUS CONTINUOUS
Status: DISCONTINUED | OUTPATIENT
Start: 2024-05-30 | End: 2024-06-02

## 2024-05-30 RX ADMIN — NALOXONE HYDROCHLORIDE 0.4 MG: 0.4 INJECTION, SOLUTION INTRAMUSCULAR; INTRAVENOUS; SUBCUTANEOUS at 19:03

## 2024-05-30 RX ADMIN — HYDRALAZINE HYDROCHLORIDE 10 MG: 20 INJECTION INTRAMUSCULAR; INTRAVENOUS at 20:13

## 2024-05-30 RX ADMIN — ENOXAPARIN SODIUM 40 MG: 40 INJECTION SUBCUTANEOUS at 20:53

## 2024-05-30 RX ADMIN — POTASSIUM CHLORIDE AND SODIUM CHLORIDE 125 ML/HR: 900; 150 INJECTION, SOLUTION INTRAVENOUS at 20:58

## 2024-05-30 RX ADMIN — POTASSIUM CHLORIDE 20 MEQ: 200 INJECTION, SOLUTION INTRAVENOUS at 20:49

## 2024-05-30 RX ADMIN — SODIUM CHLORIDE, POTASSIUM CHLORIDE, SODIUM LACTATE AND CALCIUM CHLORIDE 1000 ML: 600; 310; 30; 20 INJECTION, SOLUTION INTRAVENOUS at 19:03

## 2024-05-30 RX ADMIN — PANTOPRAZOLE SODIUM 40 MG: 40 INJECTION, POWDER, FOR SOLUTION INTRAVENOUS at 19:14

## 2024-05-30 SDOH — SOCIAL STABILITY: SOCIAL INSECURITY: WITHIN THE LAST YEAR, HAVE YOU BEEN AFRAID OF YOUR PARTNER OR EX-PARTNER?: NO

## 2024-05-30 SDOH — ECONOMIC STABILITY: INCOME INSECURITY: HOW HARD IS IT FOR YOU TO PAY FOR THE VERY BASICS LIKE FOOD, HOUSING, MEDICAL CARE, AND HEATING?: NOT HARD AT ALL

## 2024-05-30 SDOH — HEALTH STABILITY: MENTAL HEALTH
HOW OFTEN DO YOU NEED TO HAVE SOMEONE HELP YOU WHEN YOU READ INSTRUCTIONS, PAMPHLETS, OR OTHER WRITTEN MATERIAL FROM YOUR DOCTOR OR PHARMACY?: NEVER

## 2024-05-30 SDOH — SOCIAL STABILITY: SOCIAL INSECURITY: HAVE YOU HAD ANY THOUGHTS OF HARMING ANYONE ELSE?: UNABLE TO ASSESS

## 2024-05-30 SDOH — ECONOMIC STABILITY: INCOME INSECURITY: IN THE PAST 12 MONTHS, HAS THE ELECTRIC, GAS, OIL, OR WATER COMPANY THREATENED TO SHUT OFF SERVICE IN YOUR HOME?: NO

## 2024-05-30 SDOH — ECONOMIC STABILITY: INCOME INSECURITY: IN THE LAST 12 MONTHS, WAS THERE A TIME WHEN YOU WERE NOT ABLE TO PAY THE MORTGAGE OR RENT ON TIME?: NO

## 2024-05-30 SDOH — HEALTH STABILITY: MENTAL HEALTH: HOW OFTEN DO YOU HAVE A DRINK CONTAINING ALCOHOL?: NEVER

## 2024-05-30 SDOH — SOCIAL STABILITY: SOCIAL INSECURITY: WERE YOU ABLE TO COMPLETE ALL THE BEHAVIORAL HEALTH SCREENINGS?: NO

## 2024-05-30 SDOH — ECONOMIC STABILITY: FOOD INSECURITY: WITHIN THE PAST 12 MONTHS, YOU WORRIED THAT YOUR FOOD WOULD RUN OUT BEFORE YOU GOT MONEY TO BUY MORE.: NEVER TRUE

## 2024-05-30 SDOH — SOCIAL STABILITY: SOCIAL NETWORK: HOW OFTEN DO YOU ATTEND CHURCH OR RELIGIOUS SERVICES?: NEVER

## 2024-05-30 SDOH — SOCIAL STABILITY: SOCIAL INSECURITY: DOES ANYONE TRY TO KEEP YOU FROM HAVING/CONTACTING OTHER FRIENDS OR DOING THINGS OUTSIDE YOUR HOME?: UNABLE TO ASSESS

## 2024-05-30 SDOH — ECONOMIC STABILITY: FOOD INSECURITY: WITHIN THE PAST 12 MONTHS, THE FOOD YOU BOUGHT JUST DIDN'T LAST AND YOU DIDN'T HAVE MONEY TO GET MORE.: NEVER TRUE

## 2024-05-30 SDOH — SOCIAL STABILITY: SOCIAL INSECURITY: WITHIN THE LAST YEAR, HAVE YOU BEEN HUMILIATED OR EMOTIONALLY ABUSED IN OTHER WAYS BY YOUR PARTNER OR EX-PARTNER?: NO

## 2024-05-30 SDOH — SOCIAL STABILITY: SOCIAL NETWORK: ARE YOU MARRIED, WIDOWED, DIVORCED, SEPARATED, NEVER MARRIED, OR LIVING WITH A PARTNER?: LIVING WITH PARTNER

## 2024-05-30 SDOH — SOCIAL STABILITY: SOCIAL INSECURITY: DO YOU FEEL ANYONE HAS EXPLOITED OR TAKEN ADVANTAGE OF YOU FINANCIALLY OR OF YOUR PERSONAL PROPERTY?: UNABLE TO ASSESS

## 2024-05-30 SDOH — HEALTH STABILITY: MENTAL HEALTH: HOW OFTEN DO YOU HAVE 6 OR MORE DRINKS ON ONE OCCASION?: NEVER

## 2024-05-30 SDOH — HEALTH STABILITY: PHYSICAL HEALTH: ON AVERAGE, HOW MANY MINUTES DO YOU ENGAGE IN EXERCISE AT THIS LEVEL?: 30 MIN

## 2024-05-30 SDOH — SOCIAL STABILITY: SOCIAL NETWORK: HOW OFTEN DO YOU GET TOGETHER WITH FRIENDS OR RELATIVES?: MORE THAN THREE TIMES A WEEK

## 2024-05-30 SDOH — HEALTH STABILITY: MENTAL HEALTH: HOW MANY STANDARD DRINKS CONTAINING ALCOHOL DO YOU HAVE ON A TYPICAL DAY?: PATIENT DOES NOT DRINK

## 2024-05-30 SDOH — SOCIAL STABILITY: SOCIAL INSECURITY: HAVE YOU HAD THOUGHTS OF HARMING ANYONE ELSE?: UNABLE TO ASSESS

## 2024-05-30 SDOH — SOCIAL STABILITY: SOCIAL INSECURITY: DO YOU FEEL UNSAFE GOING BACK TO THE PLACE WHERE YOU ARE LIVING?: UNABLE TO ASSESS

## 2024-05-30 SDOH — HEALTH STABILITY: PHYSICAL HEALTH: ON AVERAGE, HOW MANY DAYS PER WEEK DO YOU ENGAGE IN MODERATE TO STRENUOUS EXERCISE (LIKE A BRISK WALK)?: 2 DAYS

## 2024-05-30 SDOH — SOCIAL STABILITY: SOCIAL INSECURITY: ABUSE: ADULT

## 2024-05-30 SDOH — SOCIAL STABILITY: SOCIAL NETWORK: HOW OFTEN DO YOU ATTENT MEETINGS OF THE CLUB OR ORGANIZATION YOU BELONG TO?: NEVER

## 2024-05-30 SDOH — SOCIAL STABILITY: SOCIAL INSECURITY: ARE YOU OR HAVE YOU BEEN THREATENED OR ABUSED PHYSICALLY, EMOTIONALLY, OR SEXUALLY BY ANYONE?: UNABLE TO ASSESS

## 2024-05-30 SDOH — SOCIAL STABILITY: SOCIAL INSECURITY: ARE THERE ANY APPARENT SIGNS OF INJURIES/BEHAVIORS THAT COULD BE RELATED TO ABUSE/NEGLECT?: UNABLE TO ASSESS

## 2024-05-30 SDOH — ECONOMIC STABILITY: HOUSING INSECURITY: IN THE LAST 12 MONTHS, HOW MANY PLACES HAVE YOU LIVED?: 1

## 2024-05-30 SDOH — SOCIAL STABILITY: SOCIAL INSECURITY: HAS ANYONE EVER THREATENED TO HURT YOUR FAMILY OR YOUR PETS?: UNABLE TO ASSESS

## 2024-05-30 ASSESSMENT — COGNITIVE AND FUNCTIONAL STATUS - GENERAL
MOBILITY SCORE: 15
WALKING IN HOSPITAL ROOM: A LOT
DRESSING REGULAR LOWER BODY CLOTHING: A LITTLE
PERSONAL GROOMING: A LITTLE
WALKING IN HOSPITAL ROOM: A LOT
CLIMB 3 TO 5 STEPS WITH RAILING: A LOT
PERSONAL GROOMING: A LITTLE
PATIENT BASELINE BEDBOUND: NO
DAILY ACTIVITIY SCORE: 18
HELP NEEDED FOR BATHING: A LITTLE
MOVING FROM LYING ON BACK TO SITTING ON SIDE OF FLAT BED WITH BEDRAILS: A LITTLE
MOVING TO AND FROM BED TO CHAIR: A LITTLE
DRESSING REGULAR UPPER BODY CLOTHING: A LITTLE
CLIMB 3 TO 5 STEPS WITH RAILING: A LOT
TURNING FROM BACK TO SIDE WHILE IN FLAT BAD: A LITTLE
MOVING TO AND FROM BED TO CHAIR: A LOT
STANDING UP FROM CHAIR USING ARMS: A LOT
TURNING FROM BACK TO SIDE WHILE IN FLAT BAD: A LITTLE
MOBILITY SCORE: 15
EATING MEALS: A LITTLE
STANDING UP FROM CHAIR USING ARMS: A LOT
TOILETING: A LITTLE
DRESSING REGULAR LOWER BODY CLOTHING: A LITTLE
HELP NEEDED FOR BATHING: A LITTLE
DRESSING REGULAR UPPER BODY CLOTHING: A LITTLE
DAILY ACTIVITIY SCORE: 18
TOILETING: A LITTLE
EATING MEALS: A LITTLE

## 2024-05-30 ASSESSMENT — LIFESTYLE VARIABLES
AUDIT-C TOTAL SCORE: -1
HOW MANY STANDARD DRINKS CONTAINING ALCOHOL DO YOU HAVE ON A TYPICAL DAY: PATIENT UNABLE TO ANSWER
AUDIT-C TOTAL SCORE: 0
PRESCIPTION_ABUSE_PAST_12_MONTHS: NO
HOW OFTEN DO YOU HAVE A DRINK CONTAINING ALCOHOL: PATIENT UNABLE TO ANSWER
HOW OFTEN DO YOU HAVE 6 OR MORE DRINKS ON ONE OCCASION: PATIENT UNABLE TO ANSWER
SUBSTANCE_ABUSE_PAST_12_MONTHS: YES
AUDIT-C TOTAL SCORE: -1
SKIP TO QUESTIONS 9-10: 0
SKIP TO QUESTIONS 9-10: 1

## 2024-05-30 ASSESSMENT — ACTIVITIES OF DAILY LIVING (ADL)
DRESSING YOURSELF: UNABLE TO ASSESS
TOILETING: UNABLE TO ASSESS
HEARING - LEFT EAR: FUNCTIONAL
HEARING - RIGHT EAR: FUNCTIONAL
WALKS IN HOME: UNABLE TO ASSESS
JUDGMENT_ADEQUATE_SAFELY_COMPLETE_DAILY_ACTIVITIES: UNABLE TO ASSESS
ADEQUATE_TO_COMPLETE_ADL: UNABLE TO ASSESS
FEEDING YOURSELF: UNABLE TO ASSESS
BATHING: UNABLE TO ASSESS
PATIENT'S MEMORY ADEQUATE TO SAFELY COMPLETE DAILY ACTIVITIES?: UNABLE TO ASSESS
LACK_OF_TRANSPORTATION: PATIENT UNABLE TO ANSWER
GROOMING: UNABLE TO ASSESS

## 2024-05-30 ASSESSMENT — PAIN - FUNCTIONAL ASSESSMENT
PAIN_FUNCTIONAL_ASSESSMENT: 0-10
PAIN_FUNCTIONAL_ASSESSMENT: CPOT (CRITICAL CARE PAIN OBSERVATION TOOL)

## 2024-05-30 ASSESSMENT — PAIN SCALES - GENERAL
PAINLEVEL_OUTOF10: 0 - NO PAIN
PAINLEVEL_OUTOF10: 2
PAINLEVEL_OUTOF10: 0 - NO PAIN

## 2024-05-30 NOTE — NURSING NOTE
Pt arrived to the floor via EMS. Pt transferred to bed 429. Monitor applied and call light in place. Dr. Ramirez at bedside.

## 2024-05-30 NOTE — H&P
History Of Present Illness  Douglas Wilson is a 53 y.o. male presenting with Altered level of consciousness/encephalopathy and abdominal pain.    Date of Admission: May 30/2024    HPI: Patient is non-communicative at present. As history taken from handover from transferring MD and from the chart.    Patient presented to [facility] ED for evaluation of an overdose. Received naloxone prior to arrival to the ED without improvement.  Patient was opening eyes, intermittent yawning, but was non-communicative nor able to follow instructions.  Noted with tachycardiac (HR > 130) and hypotensive. ABG demonstrated a metabolic acidosis with a pH ~7.2, lactate of 10, anion gap > 30 and blood glucose > 280 (down trending on second assessment to 235). No ketones in urine. Troponin also elevated at 268. It was unclear if the patient had experienced a seizure prior to arrival, but bite marks noted to the tongue. Limited collateral history from family (father). No history of falls or trauma.     Investigations at [facility]:  CT Brain: No evidence of an acute infarct or other acute parenchymal process.   CT Cervical Spine: No evidence of an acute fracture.   CTA Chest: no evidence of thoracic aortic intramural  hematoma. No lung consolidation. No pleural effusion  CTA Abdo/Pelvis: Mild gallbladder wall thickening and pericholecystic fluid. No cholelithiasis or biliary duct dilation is identified. No mass, ascites or fluid collection.   US gallbladder: No cholelithiasis.  No sonographic findings for acute cholecystitis.     Patient resuscitated with 30cc/kg of crystalloid with improved hemodynamics. Metabolic status improved with a normalization of the pH to 7.38 and lactate down trending to 6.3 on a venous blood gas at 1549 hrs. F    Urine tox screen:   Phencyclidine - Negative   Benzodiazepines -  Negative   Cocaine - Negative   Amphetamines - Negative   Cannabinoids - Negative   Opiates -  Negative, however positive for fentanyl.    Barbiturates - Negative   Ethanol, Urine <11   Oxycodone - Negative     Family requesting transfer to [facility] due to previous care at this institution.    Recent  admissions for:  Feb 2024 - Splenic aneurysm rupture requiring emergent coiling; also had a NSTEMI (Troponin ~1200)  Feb 2024 - Return to  with bilateral PE - started on a DOAC  March 2024 - Return to  with abdominal secondary to appendicitis - underwent lap. Appendectomy  March 2024 - Hematemesis - EGD undertaken demonstrating Grade C esophagitis in the lower third of the esophagus, Moderate, patchy erythematous mucosa in the body of the stomach and antrum and Type I hiatal hernia    TTE  2/27/24  CONCLUSIONS:   1. Left ventricular systolic function is normal with a 60-65% estimated ejection fraction.   2. RVSP within normal limits.     Coronary Artery CT Angio (4/19/22):  1. Minor coronary artery disease.  2. The proximal LAD has a small/focal region of calcified plaque with minimal luminal stenosis (<25%).  3. The LM, LCx and RCA have no significant atherosclerotic change or stenotic disease.  4. Right-dominant coronary artery system.    Past Medical History  CAD - recent NSTEMI  Previous PE - On Eliquis  Splenic Artery Aneurysm rupture - required coiling  Appendectomy  Esophagitis/GI Bleed  BPH    Social History  He reports that he has been smoking cigarettes. He has never used smokeless tobacco. He reports current drug use. Drug: Heroin. He reports that he does not drink alcohol.    Family History  Family History   Problem Relation Name Age of Onset    Diabetes Father      Hypertension Father      No Known Problems Son          raymond    Deafness Son      Other (orthopedic) Son          wearing braces to help with walking        Allergies  Patient has no known allergies.    Review of Systems  Unable to obtain as the patient is non-communicative    On arrival to [facility] ICU:  Physical Exam  Constitutional:       General: He is not in  acute distress.     Appearance: He is not toxic-appearing.      Comments: Patient initially lying on his right side. Patient rolled supine to examine chest and abdomen and then patient rolled back on right side. Not cooperative to ongoing physical exam   HENT:      Head:      Comments: Bite injury to his tongue  Eyes:      Pupils: Pupils are equal, round, and reactive to light.   Cardiovascular:      Comments:     Pulmonary:      Effort: Pulmonary effort is normal. No respiratory distress.      Breath sounds: Decreased breath sounds present.   Abdominal:      General: Abdomen is flat. Bowel sounds are normal.      Palpations: Abdomen is soft.      Tenderness: There is abdominal tenderness. There is no guarding or rebound.   Skin:     Capillary Refill: Capillary refill takes less than 2 seconds.      Comments: No obvious rashes or lesions noted   Neurological:      Mental Status: He is lethargic.      Comments: RASS -1  CAM-ICU: +   Psychiatric:         Attention and Perception: He is inattentive.         Speech: He is noncommunicative.         Behavior: Behavior is uncooperative.       Last Recorded Vitals  Pulse 85, temperature 36.3 °C (97.3 °F), temperature source Temporal, resp. rate (!) 32.    Assessment/Plan     Altered LOC with positive urine screen for fentanyl. No clear infectious source and imaging of brain, chest, abdo/pelvis negative an obvious etiology.     Plan:  Resend VBG  Attempt further dose of naloxone  Follow-up with cultures  Continue to trend troponin level     Will hand over to the night time arriving shortly for ongoing initial management    I spent 48 minutes in the professional and overall care of this patient.    Roger Ramirez MD

## 2024-05-31 ENCOUNTER — APPOINTMENT (OUTPATIENT)
Dept: RADIOLOGY | Facility: HOSPITAL | Age: 54
End: 2024-05-31
Payer: COMMERCIAL

## 2024-05-31 ENCOUNTER — APPOINTMENT (OUTPATIENT)
Dept: CARDIOLOGY | Facility: HOSPITAL | Age: 54
End: 2024-05-31
Payer: COMMERCIAL

## 2024-05-31 ENCOUNTER — APPOINTMENT (OUTPATIENT)
Dept: NEUROLOGY | Facility: HOSPITAL | Age: 54
End: 2024-05-31
Payer: COMMERCIAL

## 2024-05-31 ENCOUNTER — DOCUMENTATION (OUTPATIENT)
Dept: RESEARCH | Age: 54
End: 2024-05-31

## 2024-05-31 LAB
ALBUMIN SERPL BCP-MCNC: 3.8 G/DL (ref 3.4–5)
ANION GAP BLDV CALCULATED.4IONS-SCNC: 16 MMOL/L (ref 10–25)
ANION GAP SERPL CALC-SCNC: 13 MMOL/L (ref 10–20)
AORTIC VALVE MEAN GRADIENT: 4 MMHG
AORTIC VALVE PEAK VELOCITY: 1.54 M/S
APAP SERPL-MCNC: <10 UG/ML
ATRIAL RATE: 115 BPM
ATRIAL RATE: 94 BPM
AV PEAK GRADIENT: 9.5 MMHG
AVA (PEAK VEL): 2.04 CM2
AVA (VTI): 2.51 CM2
BASE EXCESS BLDV CALC-SCNC: -0.5 MMOL/L (ref -2–3)
BODY TEMPERATURE: ABNORMAL
BUN SERPL-MCNC: 15 MG/DL (ref 6–23)
CA-I BLDV-SCNC: 1.08 MMOL/L (ref 1.1–1.33)
CALCIUM SERPL-MCNC: 8.6 MG/DL (ref 8.6–10.3)
CARDIAC TROPONIN I PNL SERPL HS: 1391 NG/L (ref 0–20)
CARDIAC TROPONIN I PNL SERPL HS: 2315 NG/L (ref 0–20)
CHLORIDE BLDV-SCNC: 101 MMOL/L (ref 98–107)
CHLORIDE SERPL-SCNC: 102 MMOL/L (ref 98–107)
CHOLEST SERPL-MCNC: 200 MG/DL (ref 0–199)
CHOLESTEROL/HDL RATIO: 3.8
CK SERPL-CCNC: 710 U/L (ref 0–325)
CO2 SERPL-SCNC: 23 MMOL/L (ref 21–32)
CREAT SERPL-MCNC: 0.8 MG/DL (ref 0.5–1.3)
EGFRCR SERPLBLD CKD-EPI 2021: >90 ML/MIN/1.73M*2
EJECTION FRACTION APICAL 4 CHAMBER: 55.1
ERYTHROCYTE [DISTWIDTH] IN BLOOD BY AUTOMATED COUNT: 18.7 % (ref 11.5–14.5)
GLUCOSE BLD MANUAL STRIP-MCNC: 119 MG/DL (ref 74–99)
GLUCOSE BLD MANUAL STRIP-MCNC: 121 MG/DL (ref 74–99)
GLUCOSE BLDV-MCNC: 144 MG/DL (ref 74–99)
GLUCOSE SERPL-MCNC: 110 MG/DL (ref 74–99)
HCO3 BLDV-SCNC: 19.7 MMOL/L (ref 22–26)
HCT VFR BLD AUTO: 47.4 % (ref 41–52)
HCT VFR BLD EST: 51 % (ref 41–52)
HDLC SERPL-MCNC: 53.3 MG/DL
HGB BLD-MCNC: 15.9 G/DL (ref 13.5–17.5)
HGB BLDV-MCNC: 17.1 G/DL (ref 13.5–17.5)
HOLD SPECIMEN: NORMAL
INHALED O2 CONCENTRATION: 21 %
LACTATE BLDV-SCNC: 2.3 MMOL/L (ref 0.4–2)
LACTATE BLDV-SCNC: 3 MMOL/L (ref 0.4–2)
LDLC SERPL CALC-MCNC: 115 MG/DL
LEFT ATRIUM VOLUME AREA LENGTH INDEX BSA: 16 ML/M2
LEFT VENTRICLE INTERNAL DIMENSION DIASTOLE: 5 CM (ref 3.5–6)
LEFT VENTRICULAR OUTFLOW TRACT DIAMETER: 2 CM
LV EJECTION FRACTION BIPLANE: 57 %
MAGNESIUM SERPL-MCNC: 2.1 MG/DL (ref 1.6–2.4)
MCH RBC QN AUTO: 22.8 PG (ref 26–34)
MCHC RBC AUTO-ENTMCNC: 33.5 G/DL (ref 32–36)
MCV RBC AUTO: 68 FL (ref 80–100)
MITRAL VALVE E/A RATIO: 0.62
MITRAL VALVE E/E' RATIO: 5.58
NON HDL CHOLESTEROL: 147 MG/DL (ref 0–149)
NRBC BLD-RTO: 0 /100 WBCS (ref 0–0)
OXYHGB MFR BLDV: 92.6 % (ref 45–75)
P AXIS: 53 DEGREES
P AXIS: 75 DEGREES
P OFFSET: 197 MS
P OFFSET: 202 MS
P ONSET: 141 MS
P ONSET: 146 MS
PCO2 BLDV: 23 MM HG (ref 41–51)
PH BLDV: 7.54 PH (ref 7.33–7.43)
PHOSPHATE SERPL-MCNC: 2.1 MG/DL (ref 2.5–4.9)
PLATELET # BLD AUTO: 317 X10*3/UL (ref 150–450)
PO2 BLDV: 69 MM HG (ref 35–45)
POTASSIUM BLDV-SCNC: 3.2 MMOL/L (ref 3.5–5.3)
POTASSIUM SERPL-SCNC: 3.8 MMOL/L (ref 3.5–5.3)
PR INTERVAL: 144 MS
PR INTERVAL: 154 MS
Q ONSET: 213 MS
Q ONSET: 223 MS
QRS COUNT: 16 BEATS
QRS COUNT: 19 BEATS
QRS DURATION: 74 MS
QRS DURATION: 94 MS
QT INTERVAL: 366 MS
QT INTERVAL: 374 MS
QTC CALCULATION(BAZETT): 467 MS
QTC CALCULATION(BAZETT): 506 MS
QTC FREDERICIA: 434 MS
QTC FREDERICIA: 454 MS
R AXIS: -38 DEGREES
R AXIS: 38 DEGREES
RBC # BLD AUTO: 6.96 X10*6/UL (ref 4.5–5.9)
RIGHT VENTRICLE FREE WALL PEAK S': 32.6 CM/S
RIGHT VENTRICLE PEAK SYSTOLIC PRESSURE: 23.8 MMHG
SAO2 % BLDV: 95 % (ref 45–75)
SODIUM BLDV-SCNC: 133 MMOL/L (ref 136–145)
SODIUM SERPL-SCNC: 134 MMOL/L (ref 136–145)
T AXIS: 43 DEGREES
T AXIS: 64 DEGREES
T OFFSET: 396 MS
T OFFSET: 410 MS
TRICUSPID ANNULAR PLANE SYSTOLIC EXCURSION: 2 CM
TRIGL SERPL-MCNC: 157 MG/DL (ref 0–149)
VENTRICULAR RATE: 115 BPM
VENTRICULAR RATE: 94 BPM
VLDL: 31 MG/DL (ref 0–40)
WBC # BLD AUTO: 22.2 X10*3/UL (ref 4.4–11.3)

## 2024-05-31 PROCEDURE — 2500000004 HC RX 250 GENERAL PHARMACY W/ HCPCS (ALT 636 FOR OP/ED)

## 2024-05-31 PROCEDURE — 87040 BLOOD CULTURE FOR BACTERIA: CPT | Mod: AHULAB | Performed by: NURSE PRACTITIONER

## 2024-05-31 PROCEDURE — A9575 INJ GADOTERATE MEGLUMI 0.1ML: HCPCS

## 2024-05-31 PROCEDURE — 80069 RENAL FUNCTION PANEL: CPT | Performed by: STUDENT IN AN ORGANIZED HEALTH CARE EDUCATION/TRAINING PROGRAM

## 2024-05-31 PROCEDURE — 80061 LIPID PANEL: CPT | Performed by: INTERNAL MEDICINE

## 2024-05-31 PROCEDURE — 36415 COLL VENOUS BLD VENIPUNCTURE: CPT | Performed by: STUDENT IN AN ORGANIZED HEALTH CARE EDUCATION/TRAINING PROGRAM

## 2024-05-31 PROCEDURE — 2500000004 HC RX 250 GENERAL PHARMACY W/ HCPCS (ALT 636 FOR OP/ED): Performed by: PHARMACIST

## 2024-05-31 PROCEDURE — 2500000004 HC RX 250 GENERAL PHARMACY W/ HCPCS (ALT 636 FOR OP/ED): Performed by: STUDENT IN AN ORGANIZED HEALTH CARE EDUCATION/TRAINING PROGRAM

## 2024-05-31 PROCEDURE — 93005 ELECTROCARDIOGRAM TRACING: CPT

## 2024-05-31 PROCEDURE — 95816 EEG AWAKE AND DROWSY: CPT | Performed by: PSYCHIATRY & NEUROLOGY

## 2024-05-31 PROCEDURE — 87075 CULTR BACTERIA EXCEPT BLOOD: CPT | Mod: AHULAB | Performed by: NURSE PRACTITIONER

## 2024-05-31 PROCEDURE — 93306 TTE W/DOPPLER COMPLETE: CPT

## 2024-05-31 PROCEDURE — 80371 STIMULANTS SYNTHETIC: CPT | Performed by: NURSE PRACTITIONER

## 2024-05-31 PROCEDURE — 70553 MRI BRAIN STEM W/O & W/DYE: CPT | Performed by: RADIOLOGY

## 2024-05-31 PROCEDURE — 99222 1ST HOSP IP/OBS MODERATE 55: CPT | Performed by: INTERNAL MEDICINE

## 2024-05-31 PROCEDURE — 36415 COLL VENOUS BLD VENIPUNCTURE: CPT | Performed by: NURSE PRACTITIONER

## 2024-05-31 PROCEDURE — 70553 MRI BRAIN STEM W/O & W/DYE: CPT

## 2024-05-31 PROCEDURE — 80143 DRUG ASSAY ACETAMINOPHEN: CPT | Performed by: STUDENT IN AN ORGANIZED HEALTH CARE EDUCATION/TRAINING PROGRAM

## 2024-05-31 PROCEDURE — 2550000001 HC RX 255 CONTRASTS

## 2024-05-31 PROCEDURE — 95816 EEG AWAKE AND DROWSY: CPT

## 2024-05-31 PROCEDURE — 97165 OT EVAL LOW COMPLEX 30 MIN: CPT | Mod: GO

## 2024-05-31 PROCEDURE — 2020000001 HC ICU ROOM DAILY

## 2024-05-31 PROCEDURE — 83605 ASSAY OF LACTIC ACID: CPT | Performed by: NURSE PRACTITIONER

## 2024-05-31 PROCEDURE — C9113 INJ PANTOPRAZOLE SODIUM, VIA: HCPCS | Performed by: STUDENT IN AN ORGANIZED HEALTH CARE EDUCATION/TRAINING PROGRAM

## 2024-05-31 PROCEDURE — 84484 ASSAY OF TROPONIN QUANT: CPT | Performed by: STUDENT IN AN ORGANIZED HEALTH CARE EDUCATION/TRAINING PROGRAM

## 2024-05-31 PROCEDURE — 83735 ASSAY OF MAGNESIUM: CPT | Performed by: STUDENT IN AN ORGANIZED HEALTH CARE EDUCATION/TRAINING PROGRAM

## 2024-05-31 PROCEDURE — 2500000001 HC RX 250 WO HCPCS SELF ADMINISTERED DRUGS (ALT 637 FOR MEDICARE OP): Performed by: NURSE PRACTITIONER

## 2024-05-31 PROCEDURE — 85027 COMPLETE CBC AUTOMATED: CPT | Performed by: STUDENT IN AN ORGANIZED HEALTH CARE EDUCATION/TRAINING PROGRAM

## 2024-05-31 PROCEDURE — 82947 ASSAY GLUCOSE BLOOD QUANT: CPT

## 2024-05-31 PROCEDURE — 2500000004 HC RX 250 GENERAL PHARMACY W/ HCPCS (ALT 636 FOR OP/ED): Performed by: NURSE PRACTITIONER

## 2024-05-31 PROCEDURE — 82550 ASSAY OF CK (CPK): CPT | Performed by: STUDENT IN AN ORGANIZED HEALTH CARE EDUCATION/TRAINING PROGRAM

## 2024-05-31 PROCEDURE — 2500000005 HC RX 250 GENERAL PHARMACY W/O HCPCS: Performed by: NURSE PRACTITIONER

## 2024-05-31 PROCEDURE — 93306 TTE W/DOPPLER COMPLETE: CPT | Performed by: INTERNAL MEDICINE

## 2024-05-31 PROCEDURE — A4217 STERILE WATER/SALINE, 500 ML: HCPCS | Performed by: PHARMACIST

## 2024-05-31 RX ORDER — AMLODIPINE BESYLATE 5 MG/1
5 TABLET ORAL DAILY
Status: DISCONTINUED | OUTPATIENT
Start: 2024-05-31 | End: 2024-06-03

## 2024-05-31 RX ORDER — ENOXAPARIN SODIUM 100 MG/ML
1 INJECTION SUBCUTANEOUS DAILY
Status: DISCONTINUED | OUTPATIENT
Start: 2024-05-31 | End: 2024-05-31

## 2024-05-31 RX ORDER — NICARDIPINE HYDROCHLORIDE 0.2 MG/ML
2.5-15 INJECTION INTRAVENOUS CONTINUOUS
Status: DISCONTINUED | OUTPATIENT
Start: 2024-05-31 | End: 2024-05-31

## 2024-05-31 RX ORDER — VANCOMYCIN HYDROCHLORIDE 1 G/20ML
INJECTION, POWDER, LYOPHILIZED, FOR SOLUTION INTRAVENOUS DAILY PRN
Status: DISCONTINUED | OUTPATIENT
Start: 2024-05-31 | End: 2024-06-05

## 2024-05-31 RX ORDER — VANCOMYCIN HYDROCHLORIDE 1 G/200ML
1000 INJECTION, SOLUTION INTRAVENOUS EVERY 12 HOURS
Status: DISCONTINUED | OUTPATIENT
Start: 2024-05-31 | End: 2024-06-01

## 2024-05-31 RX ORDER — VANCOMYCIN HYDROCHLORIDE 1 G/20ML
INJECTION, POWDER, LYOPHILIZED, FOR SOLUTION INTRAVENOUS DAILY PRN
Status: DISCONTINUED | OUTPATIENT
Start: 2024-05-31 | End: 2024-05-31 | Stop reason: DRUGHIGH

## 2024-05-31 RX ORDER — METOPROLOL TARTRATE 1 MG/ML
5 INJECTION, SOLUTION INTRAVENOUS EVERY 6 HOURS PRN
Status: DISCONTINUED | OUTPATIENT
Start: 2024-05-31 | End: 2024-06-05 | Stop reason: HOSPADM

## 2024-05-31 RX ORDER — ENOXAPARIN SODIUM 100 MG/ML
40 INJECTION SUBCUTANEOUS DAILY
Status: DISCONTINUED | OUTPATIENT
Start: 2024-06-01 | End: 2024-06-05 | Stop reason: HOSPADM

## 2024-05-31 RX ORDER — LABETALOL HYDROCHLORIDE 5 MG/ML
20 INJECTION, SOLUTION INTRAVENOUS ONCE
Status: COMPLETED | OUTPATIENT
Start: 2024-05-31 | End: 2024-05-31

## 2024-05-31 RX ORDER — PANTOPRAZOLE SODIUM 40 MG/1
40 TABLET, DELAYED RELEASE ORAL
Status: DISCONTINUED | OUTPATIENT
Start: 2024-05-31 | End: 2024-06-05 | Stop reason: HOSPADM

## 2024-05-31 RX ORDER — THIAMINE HYDROCHLORIDE 100 MG/ML
100 INJECTION, SOLUTION INTRAMUSCULAR; INTRAVENOUS DAILY
Status: DISCONTINUED | OUTPATIENT
Start: 2024-05-31 | End: 2024-06-05 | Stop reason: HOSPADM

## 2024-05-31 RX ORDER — NICARDIPINE HYDROCHLORIDE 0.2 MG/ML
2.5-15 INJECTION INTRAVENOUS CONTINUOUS
Status: DISCONTINUED | OUTPATIENT
Start: 2024-05-31 | End: 2024-06-02

## 2024-05-31 RX ORDER — ACETAMINOPHEN 325 MG/1
650 TABLET ORAL EVERY 6 HOURS PRN
Status: DISCONTINUED | OUTPATIENT
Start: 2024-05-31 | End: 2024-06-01

## 2024-05-31 RX ORDER — GADOTERATE MEGLUMINE 376.9 MG/ML
16 INJECTION INTRAVENOUS
Status: COMPLETED | OUTPATIENT
Start: 2024-05-31 | End: 2024-05-31

## 2024-05-31 RX ORDER — DEXMEDETOMIDINE HYDROCHLORIDE 4 UG/ML
.1-1.5 INJECTION, SOLUTION INTRAVENOUS CONTINUOUS
Status: DISCONTINUED | OUTPATIENT
Start: 2024-05-31 | End: 2024-06-02

## 2024-05-31 RX ADMIN — ACYCLOVIR SODIUM 680 MG: 50 INJECTION, SOLUTION INTRAVENOUS at 20:52

## 2024-05-31 RX ADMIN — ACETAMINOPHEN 650 MG: 325 TABLET ORAL at 17:00

## 2024-05-31 RX ADMIN — VANCOMYCIN HYDROCHLORIDE 2000 MG: 10 INJECTION, POWDER, LYOPHILIZED, FOR SOLUTION INTRAVENOUS at 04:15

## 2024-05-31 RX ADMIN — CEFTRIAXONE 2 G: 2 INJECTION, POWDER, FOR SOLUTION INTRAMUSCULAR; INTRAVENOUS at 15:55

## 2024-05-31 RX ADMIN — NICARDIPINE HYDROCHLORIDE 2.5 MG/HR: 0.2 INJECTION INTRAVENOUS at 04:59

## 2024-05-31 RX ADMIN — ACETAMINOPHEN 650 MG: 325 TABLET ORAL at 00:40

## 2024-05-31 RX ADMIN — CEFTRIAXONE 2 G: 2 INJECTION, POWDER, FOR SOLUTION INTRAMUSCULAR; INTRAVENOUS at 03:37

## 2024-05-31 RX ADMIN — PANTOPRAZOLE SODIUM 40 MG: 40 INJECTION, POWDER, FOR SOLUTION INTRAVENOUS at 09:25

## 2024-05-31 RX ADMIN — THIAMINE HYDROCHLORIDE 100 MG: 100 INJECTION, SOLUTION INTRAMUSCULAR; INTRAVENOUS at 09:25

## 2024-05-31 RX ADMIN — ENOXAPARIN SODIUM 80 MG: 80 INJECTION SUBCUTANEOUS at 09:25

## 2024-05-31 RX ADMIN — DEXMEDETOMIDINE HYDROCHLORIDE 0.2 MCG/KG/HR: 4 INJECTION, SOLUTION INTRAVENOUS at 21:03

## 2024-05-31 RX ADMIN — ACYCLOVIR SODIUM 680 MG: 50 INJECTION, SOLUTION INTRAVENOUS at 09:36

## 2024-05-31 RX ADMIN — METOPROLOL TARTRATE 5 MG: 5 INJECTION INTRAVENOUS at 03:36

## 2024-05-31 RX ADMIN — AMLODIPINE BESYLATE 5 MG: 5 TABLET ORAL at 20:54

## 2024-05-31 RX ADMIN — VANCOMYCIN HYDROCHLORIDE 1000 MG: 1 INJECTION, SOLUTION INTRAVENOUS at 23:14

## 2024-05-31 RX ADMIN — GADOTERATE MEGLUMINE 16 ML: 376.9 INJECTION INTRAVENOUS at 22:28

## 2024-05-31 RX ADMIN — LABETALOL HYDROCHLORIDE 20 MG: 5 INJECTION, SOLUTION INTRAVENOUS at 16:33

## 2024-05-31 ASSESSMENT — COGNITIVE AND FUNCTIONAL STATUS - GENERAL
HELP NEEDED FOR BATHING: A LITTLE
WALKING IN HOSPITAL ROOM: A LOT
DAILY ACTIVITIY SCORE: 19
DAILY ACTIVITIY SCORE: 19
MOBILITY SCORE: 15
CLIMB 3 TO 5 STEPS WITH RAILING: A LOT
HELP NEEDED FOR BATHING: A LITTLE
MOVING TO AND FROM BED TO CHAIR: A LITTLE
TURNING FROM BACK TO SIDE WHILE IN FLAT BAD: A LITTLE
STANDING UP FROM CHAIR USING ARMS: A LOT
DRESSING REGULAR LOWER BODY CLOTHING: A LITTLE
DRESSING REGULAR UPPER BODY CLOTHING: A LITTLE
MOVING FROM LYING ON BACK TO SITTING ON SIDE OF FLAT BED WITH BEDRAILS: A LITTLE
TOILETING: A LITTLE
DRESSING REGULAR UPPER BODY CLOTHING: A LITTLE
TOILETING: A LITTLE
PERSONAL GROOMING: A LITTLE
DRESSING REGULAR LOWER BODY CLOTHING: A LITTLE
PERSONAL GROOMING: A LITTLE

## 2024-05-31 ASSESSMENT — ACTIVITIES OF DAILY LIVING (ADL)
BATHING_ASSISTANCE: MINIMAL
ADL_ASSISTANCE: INDEPENDENT
LACK_OF_TRANSPORTATION: NO

## 2024-05-31 ASSESSMENT — PAIN - FUNCTIONAL ASSESSMENT: PAIN_FUNCTIONAL_ASSESSMENT: 0-10

## 2024-05-31 ASSESSMENT — PAIN SCALES - GENERAL: PAINLEVEL_OUTOF10: 0 - NO PAIN

## 2024-05-31 NOTE — PROGRESS NOTES
"Douglas Wilson is a 53 y.o. male on day 1 of admission presenting with Encephalopathy.    Subjective   No complaints this morning.  A+O x 2.       Objective     Physical Exam  Eyes:      Pupils: Pupils are equal, round, and reactive to light.   Cardiovascular:      Rate and Rhythm: Regular rhythm. Tachycardia present.      Heart sounds: Normal heart sounds.   Pulmonary:      Breath sounds: Normal breath sounds.   Abdominal:      General: Bowel sounds are normal.      Palpations: Abdomen is soft.   Musculoskeletal:      Cervical back: No rigidity.   Skin:     General: Skin is warm and dry.      Capillary Refill: Capillary refill takes less than 2 seconds.   Neurological:      Mental Status: He is alert. He is disoriented.      Comments: CAM -   Psychiatric:         Mood and Affect: Mood normal.       Last Recorded Vitals  Blood pressure (!) 183/113, pulse 107, temperature (!) 38.4 °C (101.1 °F), temperature source Temporal, resp. rate (!) 27, height 1.727 m (5' 7.99\"), weight 81.9 kg (180 lb 8.9 oz), SpO2 99%.  Intake/Output last 3 Shifts:  I/O last 3 completed shifts:  In: 1904.2 (23.3 mL/kg) [I.V.:754.2 (9.2 mL/kg); IV Piggyback:1150]  Out: 422 (5.2 mL/kg) [Urine:422 (0.1 mL/kg/hr)]  Weight: 81.9 kg     Relevant Results  Scheduled medications  cefTRIAXone, 2 g, intravenous, q12h  enoxaparin, 1 mg/kg, subcutaneous, Daily  pantoprazole, 40 mg, oral, Daily before breakfast  pantoprazole, 40 mg, intravenous, Daily  thiamine, 100 mg, intravenous, Daily      Continuous medications  lactated Ringer's, 100 mL/hr  niCARdipine, 2.5-15 mg/hr, Last Rate: 5 mg/hr (05/31/24 0517)  potassium chloride in 0.9%NaCl, 125 mL/hr, Last Rate: 125 mL/hr (05/31/24 0300)      PRN medications  PRN medications: acetaminophen, metoprolol  Results for orders placed or performed during the hospital encounter of 05/30/24 (from the past 24 hour(s))   Creatine Kinase   Result Value Ref Range    Creatine Kinase 477 (H) 0 - 325 U/L   Troponin I, High " Sensitivity   Result Value Ref Range    Troponin I, High Sensitivity 1,863 (HH) 0 - 20 ng/L   Comprehensive metabolic panel   Result Value Ref Range    Glucose 126 (H) 74 - 99 mg/dL    Sodium 134 (L) 136 - 145 mmol/L    Potassium 3.2 (L) 3.5 - 5.3 mmol/L    Chloride 100 98 - 107 mmol/L    Bicarbonate 18 (L) 21 - 32 mmol/L    Anion Gap 19 10 - 20 mmol/L    Urea Nitrogen 17 6 - 23 mg/dL    Creatinine 0.97 0.50 - 1.30 mg/dL    eGFR >90 >60 mL/min/1.73m*2    Calcium 8.9 8.6 - 10.3 mg/dL    Albumin 4.2 3.4 - 5.0 g/dL    Alkaline Phosphatase 68 33 - 120 U/L    Total Protein 8.0 6.4 - 8.2 g/dL    AST 30 9 - 39 U/L    Bilirubin, Total 0.7 0.0 - 1.2 mg/dL    ALT 16 10 - 52 U/L   Blood Gas Venous Full Panel   Result Value Ref Range    POCT pH, Venous 7.54 (H) 7.33 - 7.43 pH    POCT pCO2, Venous 23 (L) 41 - 51 mm Hg    POCT pO2, Venous 69 (H) 35 - 45 mm Hg    POCT SO2, Venous 95 (H) 45 - 75 %    POCT Oxy Hemoglobin, Venous 92.6 (H) 45.0 - 75.0 %    POCT Hematocrit Calculated, Venous 51.0 41.0 - 52.0 %    POCT Sodium, Venous 133 (L) 136 - 145 mmol/L    POCT Potassium, Venous 3.2 (L) 3.5 - 5.3 mmol/L    POCT Chloride, Venous 101 98 - 107 mmol/L    POCT Ionized Calicum, Venous 1.08 (L) 1.10 - 1.33 mmol/L    POCT Glucose, Venous 144 (H) 74 - 99 mg/dL    POCT Lactate, Venous 3.0 (H) 0.4 - 2.0 mmol/L    POCT Base Excess, Venous -0.5 -2.0 - 3.0 mmol/L    POCT HCO3 Calculated, Venous 19.7 (L) 22.0 - 26.0 mmol/L    POCT Hemoglobin, Venous 17.1 13.5 - 17.5 g/dL    POCT Anion Gap, Venous 16.0 10.0 - 25.0 mmol/L    Patient Temperature      FiO2 21 %   POCT GLUCOSE   Result Value Ref Range    POCT Glucose 117 (H) 74 - 99 mg/dL   CBC   Result Value Ref Range    WBC 22.5 (H) 4.4 - 11.3 x10*3/uL    nRBC 0.0 0.0 - 0.0 /100 WBCs    RBC 6.99 (H) 4.50 - 5.90 x10*6/uL    Hemoglobin 16.0 13.5 - 17.5 g/dL    Hematocrit 47.7 41.0 - 52.0 %    MCV 68 (L) 80 - 100 fL    MCH 22.9 (L) 26.0 - 34.0 pg    MCHC 33.5 32.0 - 36.0 g/dL    RDW 17.7 (H) 11.5 -  14.5 %    Platelets 259 150 - 450 x10*3/uL   Renal Function Panel   Result Value Ref Range    Glucose 121 (H) 74 - 99 mg/dL    Sodium 134 (L) 136 - 145 mmol/L    Potassium 3.4 (L) 3.5 - 5.3 mmol/L    Chloride 101 98 - 107 mmol/L    Bicarbonate 18 (L) 21 - 32 mmol/L    Anion Gap 18 10 - 20 mmol/L    Urea Nitrogen 16 6 - 23 mg/dL    Creatinine 0.93 0.50 - 1.30 mg/dL    eGFR >90 >60 mL/min/1.73m*2    Calcium 8.4 (L) 8.6 - 10.3 mg/dL    Phosphorus 2.3 (L) 2.5 - 4.9 mg/dL    Albumin 3.8 3.4 - 5.0 g/dL   ECG 12 lead   Result Value Ref Range    Ventricular Rate 94 BPM    Atrial Rate 94 BPM    ME Interval 154 ms    QRS Duration 94 ms    QT Interval 374 ms    QTC Calculation(Bazett) 467 ms    P Axis 53 degrees    R Axis 38 degrees    T Axis 43 degrees    QRS Count 16 beats    Q Onset 223 ms    P Onset 146 ms    P Offset 202 ms    T Offset 410 ms    QTC Fredericia 434 ms   Troponin I, High Sensitivity   Result Value Ref Range    Troponin I, High Sensitivity 2,315 (HH) 0 - 20 ng/L   CBC   Result Value Ref Range    WBC 22.2 (H) 4.4 - 11.3 x10*3/uL    nRBC 0.0 0.0 - 0.0 /100 WBCs    RBC 6.96 (H) 4.50 - 5.90 x10*6/uL    Hemoglobin 15.9 13.5 - 17.5 g/dL    Hematocrit 47.4 41.0 - 52.0 %    MCV 68 (L) 80 - 100 fL    MCH 22.8 (L) 26.0 - 34.0 pg    MCHC 33.5 32.0 - 36.0 g/dL    RDW 18.7 (H) 11.5 - 14.5 %    Platelets 317 150 - 450 x10*3/uL   Troponin I, High Sensitivity   Result Value Ref Range    Troponin I, High Sensitivity 1,391 (HH) 0 - 20 ng/L   Blood Gas Lactic Acid, Venous   Result Value Ref Range    POCT Lactate, Venous 2.3 (H) 0.4 - 2.0 mmol/L     ECG 12 lead    Result Date: 5/31/2024  Normal sinus rhythm Voltage criteria for left ventricular hypertrophy Nonspecific ST abnormality Abnormal ECG When compared with ECG of 15-MAR-2024 13:46, ST now depressed in Lateral leads T wave amplitude has decreased in Inferior leads Nonspecific T wave abnormality now evident in Anterior leads    US gallbladder    Result Date: 5/30/2024  *  * *Final Report* * * DATE OF EXAM: May 30 2024  1:54PM   U   1032  -  US ABD RIGHT UPPER QUADRANT  / ACCESSION #  [id number] PROCEDURE REASON: Cholelithiasis      * * * * Physician Interpretation * * * *  EXAMINATION:   RIGHT UPPER QUADRANT ULTRASOUND CLINICAL HISTORY: Cholelithiasis; possible overdose; abnormal finding on recent CTA A/P TECHNIQUE:  Sonography of the right upper quadrant  was performed.   Images were obtained and stored in a permanent archive. MQ:  URUQ_2 COMPARISON: 05/30/2024 CTA C/A/P RESULT: Pancreas: Normal sonographic appearance.    Portions obscured: tail Liver:      Echotexture:  Normal, homogeneous.      Echogenicity:  Normal      Surface contour:  Smooth      Lesions:  None. Biliary: No intrahepatic biliary duct dilation.      CBD: 0.6 cm at the hilum.      Gallbladder: Normal caliber           -Contents:  No cholelithiasis           -Wall:  Concentric gallbladder wall thickening present           -Other:  No pericholecystic fluid.  No sonographic Ramirez's sign. Right Kidney: No hydronephrosis. Ascites: None.    IMPRESSION: Concentric gallbladder wall thickening is of uncertain etiology and significance.  No cholelithiasis.  No sonographic findings for acute cholecystitis. : PSCB   Transcribe Date/Time: May 30 2024  2:03P Dictated by : CASSANDRA LIM MD This examination was interpreted and the report reviewed and electronically signed by: CASSANDRA LIM MD on May 30 2024  2:09PM  EST    CT angio abdomen pelvis w and or wo IV IV contrast    Result Date: 5/30/2024  * * *Final Report* * * DATE OF EXAM: May 30 2024 11:56AM   [facility]   0311  -  CTA ABD/PELV W IVCON  / ACCESSION #  [id number] PROCEDURE REASON: Aortic dissection suspected      * * * * Physician Interpretation * * * *  EXAMINATION:  CTA CHEST (GATED) WO/W IVCON, CTA ABD/PELV W IVCON CLINICAL HISTORY:   Aortic dissection suspected. Tachycardia and tachypnea. Diminished pulses. Technique: Multidetector CTA of the chest,  abdomen, and pelvis was performed following the administration of intervenous contrast.  2-D and 3-D reconstructed images were provided.  Images before contrast of the chest were also performed. Contrast:  IV administration of 100 ml of Omnipaque 350 Comparison: None. CT Radiation dose: Integrated Dose-length product (DLP) for this visit =   2190 mGy*cm. CT Dose Reduction Employed: Automated exposure control(AEC) and iterative recon RESULT: Limitations: Respiratory and patient motion.  Arm positioning. Images before contrast show no evidence of thoracic aortic intramural hematoma. Vasculature: Thoracic aorta and branch vessels: Normal caliber thoracic aorta with no evidence of dissection or intramural hematoma. The proximal branch vessels are patent without evidence of dissection.  Pulmonary arteries: Normal caliber main pulmonary artery with no evidence of proximal pulmonary embolism. Abdominal aorta and branch vessels: Normal caliber abdominal aorta with no evidence of dissection. The proximal branch vessels are patent with no dissection or high-grade stenosis. Iliac and imaged femoral vessels: Normal caliber with minimal calcified atherosclerotic changes. No dissection or focal high-grade stenosis. Nonvascular findings: Lines, tubes, and devices:  None. Lung parenchyma and pleura: No consolidation.  3 mm anterior RIGHT upper lobe nodule image 9:39.   No pleural effusion. Central airways are patent. Thoracic inlet, heart, and mediastinum:  No lymphadenopathy in the axillary, mediastinal, or hilar regions. The cardiac chambers are normal in size. No coronary artery atherosclerotic calcifications are noted, although the study is not optimized for coronary assessment. No pericardial effusion or thickening. Liver: No mass. Biliary: No bile duct dilation.  Mild gallbladder wall thickening and pericholecystic fluid. No radiopaque cholelithiasis. Spleen: No mass. No splenomegaly. Pancreas: No mass or duct dilation.  Adrenals: No mass. Kidneys: No mass, calculus or hydronephrosis. GI tract: Left upper quadrant embolization coils near the gastric fundus. No dilation or wall thickening. Lymph nodes: No abdominal or pelvic lymphadenopathy. Mesentery/Peritoneum: No ascites or mass. Retroperitoneum: No mass. Pelvis: No mass, ascites or fluid collection. Bones/Soft Tissues: No significant finding. Localizer images: No additional findings.    IMPRESSION: No acute aortic pathology. Mild gallbladder wall thickening with pericholecystic fluid, which can be seen in the setting of acute cholecystitis although no cholelithiasis or biliary duct dilation is identified. Consider further evaluation with right upper quadrant ultrasound if there is a clinical concern for acute cholecystitis. No acute findings in the chest. : PSCB   Transcribe Date/Time: May 30 2024 12:16P Dictated by : KATIE GOLDEN MD This examination was interpreted and the report reviewed and electronically signed by: SAIRA KATE MD on May 30 2024 12:52PM  EST    CT angio chest w and wo IV contrast    Result Date: 5/30/2024  * * *Final Report* * * DATE OF EXAM: May 30 2024 11:57AM   [facility]   0126  -  CTA CHEST (GATED) WO/W IVCON  / ACCESSION #  [id number] PROCEDURE REASON: Aortic dissection suspected      * * * * Physician Interpretation * * * *  EXAMINATION:  CTA CHEST (GATED) WO/W IVCON, CTA ABD/PELV W IVCON CLINICAL HISTORY:   Aortic dissection suspected. Tachycardia and tachypnea. Diminished pulses. Technique: Multidetector CTA of the chest, abdomen, and pelvis was performed following the administration of intervenous contrast.  2-D and 3-D reconstructed images were provided.  Images before contrast of the chest were also performed. Contrast:  IV administration of 100 ml of Omnipaque 350 Comparison: None. CT Radiation dose: Integrated Dose-length product (DLP) for this visit =   2190 mGy*cm. CT Dose Reduction Employed: Automated exposure control(AEC) and  iterative recon RESULT: Limitations: Respiratory and patient motion.  Arm positioning. Images before contrast show no evidence of thoracic aortic intramural hematoma. Vasculature: Thoracic aorta and branch vessels: Normal caliber thoracic aorta with no evidence of dissection or intramural hematoma. The proximal branch vessels are patent without evidence of dissection. Pulmonary arteries: Normal caliber main pulmonary artery with no evidence of proximal pulmonary embolism. Abdominal aorta and branch vessels: Normal caliber abdominal aorta with no evidence of dissection. The proximal branch vessels are patent with no dissection or high-grade stenosis. Iliac and imaged femoral vessels: Normal caliber with minimal calcified atherosclerotic changes. No dissection or focal high-grade stenosis. Nonvascular findings: Lines, tubes, and devices:  None. Lung parenchyma and pleura: No consolidation.  3 mm anterior RIGHT upper lobe nodule image 9:39.   No pleural effusion. Central airways are patent. Thoracic inlet, heart, and mediastinum:  No lymphadenopathy in the axillary, mediastinal, or hilar regions. The cardiac chambers are normal in size. No coronary artery atherosclerotic calcifications are noted, although the study is not optimized for coronary assessment. No pericardial effusion or thickening. Liver: No mass. Biliary: No bile duct dilation.  Mild gallbladder wall thickening and pericholecystic fluid. No radiopaque cholelithiasis. Spleen: No mass. No splenomegaly. Pancreas: No mass or duct dilation. Adrenals: No mass. Kidneys: No mass, calculus or hydronephrosis. GI tract: Left upper quadrant embolization coils near the gastric fundus. No dilation or wall thickening. Lymph nodes: No abdominal or pelvic lymphadenopathy. Mesentery/Peritoneum: No ascites or mass. Retroperitoneum: No mass.   Pelvis: No mass, ascites or fluid collection. Bones/Soft Tissues: No significant finding. Localizer images: No additional  findings.    IMPRESSION: No acute aortic pathology. Mild gallbladder wall thickening with pericholecystic fluid, which can be seen in the setting of acute cholecystitis although no cholelithiasis or biliary duct dilation is identified. Consider further evaluation with right upper quadrant ultrasound if there is a clinical concern for acute cholecystitis. No acute findings in the chest. : ZHANG   Transcribe Date/Time: May 30 2024 12:16P Dictated by : KATIE GOLDEN MD This examination was interpreted and the report reviewed and electronically signed by: SAIRA KATE MD on May 30 2024 12:52PM  EST    CT cervical spine wo IV contrast    Result Date: 5/30/2024  * * *Final Report* * * DATE OF EXAM: May 30 2024 11:56AM   [facility]   0505  -  CT CERVICAL SPINE WO IVCON  / ACCESSION #  [id number] PROCEDURE REASON: Spine fracture, cervical, traumatic      * * * * Physician Interpretation * * * *  EXAMINATION:  CT BRAIN WO IVCON, CT CERVICAL SPINE WO IVCON CLINICAL HISTORY:  Cerebral hemorrhage suspected .  Head pain and cervical spine pain after injury.  Acute Dissection TECHNIQUE:  Serial axial unenhanced images were obtained from the  vertex to the foramen magnum.  Spiral, high resolution axial unenhanced images were obtained from the skull base to the cervicothoracic junction with sagittal and coronal planar reconstructions. MQ:  CTBCSWO_3 Dose-Length Product (DLP): 2190 mGy*cm. CT Dose Reduction Employed: Automated exposure control(AEC) and iterative recon COMPARISON:  None. RESULT: BRAIN: Some detail especially at skull base limited by motion artifact. Post-operative change:  None. Acute change:   No evidence of an acute infarct or other acute parenchymal process. Hemorrhage:    No evidence of acute intracranial hemorrhage. Mass effect / Mass lesion:   There is no evidence of an intracranial mass or extraaxial fluid collection.  No significant mass effect. Chronic change:   None apparent. Parenchyma:  There  is no significant volume loss. Ventricles:   The ventricles are within normal limits of size and configuration for age. Paranasal sinuses and skull base:  The visualized paranasal sinuses are grossly clear. CERVICAL: Counting reference:  Craniocervical junction.   Anatomic Variants:  None. Alignment:    Alignment is anatomic. Craniocervical junction:    Craniocervical junction is normal. Osseous structures/fracture:    No evidence of a lytic or blastic process in the visualized spine.  No evidence of acute or chronic fracture. Cervical soft tissues:   The imaged thyroid gland is normal.    The paraspinal soft tissues are within normal limits. Degenerative changes: No significant degenerative changes.  (topogram) images: Radiodensity suggesting embolization coils in LEFT upper quadrant. -    IMPRESSION: CT brain: No evidence of acute intracranial process. CT cervical spine: No evidence of an acute fracture. Anatomic Variant:  None.  Assume 7 cervical vertebrae with counting from the craniocervical junction. : ZHANG   Transcribe Date/Time: May 30 2024 11:58A Dictated by : SAIRA KATE MD This examination was interpreted and the report reviewed and electronically signed by: SAIRA KATE MD on May 30 2024 12:03PM  EST    CT head wo IV contrast    Result Date: 5/30/2024  * * *Final Report* * * DATE OF EXAM: May 30 2024 11:56AM   [facility]   0504  -  CT BRAIN WO IVCON  / ACCESSION #  [id number] PROCEDURE REASON: Cerebral hemorrhage suspected      * * * * Physician Interpretation * * * *  EXAMINATION:  CT BRAIN WO IVCON, CT CERVICAL SPINE WO IVCON CLINICAL HISTORY:  Cerebral hemorrhage suspected .  Head pain and cervical spine pain after injury.  Acute Dissection TECHNIQUE:  Serial axial unenhanced images were obtained from the  vertex to the foramen magnum.  Spiral, high resolution axial unenhanced images were obtained from the skull base to the cervicothoracic junction with sagittal and coronal planar  reconstructions. MQ:  CTBCSWO_3 Dose-Length Product (DLP): 2190 mGy*cm. CT Dose Reduction Employed: Automated exposure control(AEC) and iterative recon COMPARISON:  None. RESULT: BRAIN: Some detail especially at skull base limited by motion artifact. Post-operative change:  None. Acute change:   No evidence of an acute infarct or other acute parenchymal process. Hemorrhage:    No evidence of acute intracranial hemorrhage. Mass effect / Mass lesion:   There is no evidence of an intracranial mass or extraaxial fluid collection.  No significant mass effect. Chronic change:   None apparent. Parenchyma:  There is no significant volume loss. Ventricles:   The ventricles are within normal limits of size and configuration for age. Paranasal sinuses and skull base:  The visualized paranasal sinuses are   grossly clear. CERVICAL: Counting reference:  Craniocervical junction.   Anatomic Variants:  None. Alignment:    Alignment is anatomic. Craniocervical junction:    Craniocervical junction is normal. Osseous structures/fracture:    No evidence of a lytic or blastic process in the visualized spine.  No evidence of acute or chronic fracture. Cervical soft tissues:   The imaged thyroid gland is normal.    The paraspinal soft tissues are within normal limits. Degenerative changes: No significant degenerative changes.  (topogram) images: Radiodensity suggesting embolization coils in LEFT upper quadrant. -    IMPRESSION: CT brain: No evidence of acute intracranial process. CT cervical spine: No evidence of an acute fracture. Anatomic Variant:  None.  Assume 7 cervical vertebrae with counting from the craniocervical junction. : ZHANG   Transcribe Date/Time: May 30 2024 11:58A Dictated by : SAIRA KATE MD This examination was interpreted and the report reviewed and electronically signed by: SAIRA KATE MD on May 30 2024 12:03PM  EST    XR chest 1 view    Result Date: 5/30/2024  * * *Final Report* * * DATE  OF EXAM: May 30 2024 11:18AM   [facility]   [id number]  -  XR CHEST 1V FRONTAL PORT  / ACCESSION #  [id number] PROCEDURE REASON: Fatigue and malaise      * * * * Physician Interpretation * * * *  EXAMINATION:  CHEST RADIOGRAPH (PORTABLE SINGLE VIEW AP) Exam Date/Time:  5/30/2024 11:18 AM CLINICAL HISTORY: Fatigue and malaise, Fatigue and malaise MQ:  XCPR_5 Comparison:  No relevant prior RESULT: Lines, tubes, and devices:  None. Lungs and pleura:  Medial apices are obscured by the patient's overlapping soft tissues.  Visualized lungs demonstrate no alveolar airspace opacity.  Costophrenic angles are clear. Cardiomediastinal silhouette:  Normal cardiomediastinal silhouette. Other:  .    IMPRESSION: No radiographic evidence for acute cardiopulmonary disease. : ZHANG   Transcribe Date/Time: May 30 2024 11:23A Dictated by : CASSANDRA LIM MD This examination was interpreted and the report reviewed and electronically signed by: CASSANDRA LIM MD on May 30 2024 11:23AM  EST                  Assessment/Plan   Principal Problem:    Encephalopathy    Douglas Wilson is a 53 year old male with PMH of CAD, NSTEMI, PE (Eliquis), splenic artery aneurysm rupture (s/p coiling), appendicitis (s/p appendectomy), esophagitis/GI bleed and BPH who presents to the [facility] ICU from [facility] where he presented for evaluation of an overdose.  Altered LOC with positive urine screen for fentanyl. No clear infectious source and imaging of brain, chest, abdo/pelvis negative an obvious etiology.     Neurologic  Metabolic encephalopathy 2/2 fentanyl vs. Post ictal state vs. Meningitis vs. Other- improving  - Start Acyclovir IV  - Tylenol PRN  - Tylenol level  - CAM BID  - EEG  - MRI - Precedex gtt for MRI  - Neurology consulted- continue to hold Eliquis for possible LP (6/3 soonest based on last dose of 5/28)    Cardiovascular  H/O CAD, NSTEMI  Hypertensive emergency  - Nicardipine gtt; Titrate to goal SBP <180   - Echo today (r/o  endocarditis)    Pulmonary  No active issues  - Keep SpO2 > 92%     Renal  Baseline Cr ~0.80   - Strict I & Os     GI/  No active issues  - Continue Protonix     Endocrine  Pre-diabetes- Hgb A1C 6.3 (1/22)   - Q 4 hour BG while NPO    Hematology/Oncology  H/O PE  - Home Eliquis on hold    ID  Leukocytosis, Febrile- negative urine, CXR, CT chest/abd  C/F meningitis vs. Endocarditis vs. other  - Continue Ceftriaxone and Vancomycin IV  - Follow blood cultures (NTD)    ICU Checklist  Analgesia: Tylenol PRN  Delirium/CAM: RASS 0 CAM - (waxing and waning)  PT/OT: Ordered; unable to participate  Update Family: Cousin at bedside  Diet: NPO  Antibiotics: Ceftriaxone  Lines: PIV  DVT Prophylaxis: Lovenox SQ  GI Prophylaxis: Protonix  Code Status: Full Code  Disposition: Remain in ICU      I spent 60 minutes in the professional and overall care of this patient.      Zoe Marcano, APRN-CNP

## 2024-05-31 NOTE — NURSING NOTE
Patient consistently with high blood pressure with SBP in the 180's even after being given prn  metoprolol 5 mg IVP. Initially pt's BP responded and dropped down to 170's systolic transiently before staying in the 180's. CNP Cari Junior notified via secure chat regarding high blood pressures and no meds available to give.  CNP to bedside to see pt.  New order for cardene drip pending with verbal systolic blood pressure goal <160's. Cardene drip initiated and titrated to 5 mg/hr with pt tolerating it without adverse effects.

## 2024-05-31 NOTE — PROGRESS NOTES
"Douglas Wilson is a 53 y.o. male on day 1 of admission presenting with Encephalopathy.    Subjective   No complaints this morning.  A+O x 2 re-oriented to date/time.       Objective     Physical Exam  HENT:      Mouth/Throat:      Comments: R lateral tongue white lesion  Eyes:      Pupils: Pupils are equal, round, and reactive to light.   Cardiovascular:      Rate and Rhythm: Regular rhythm. Tachycardia present.      Heart sounds: Normal heart sounds.   Pulmonary:      Breath sounds: Normal breath sounds.   Abdominal:      General: Bowel sounds are normal.      Palpations: Abdomen is soft.   Musculoskeletal:      Cervical back: No rigidity.   Skin:     General: Skin is warm and dry.      Capillary Refill: Capillary refill takes less than 2 seconds.   Neurological:      Mental Status: He is alert. He is disoriented.      Comments: CAM -   Psychiatric:         Mood and Affect: Mood normal.       Last Recorded Vitals  Blood pressure (!) 183/113, pulse 107, temperature (!) 38.4 °C (101.1 °F), temperature source Temporal, resp. rate (!) 27, height 1.727 m (5' 7.99\"), weight 81.9 kg (180 lb 8.9 oz), SpO2 99%.  Intake/Output last 3 Shifts:  I/O last 3 completed shifts:  In: 1904.2 (23.3 mL/kg) [I.V.:754.2 (9.2 mL/kg); IV Piggyback:1150]  Out: 422 (5.2 mL/kg) [Urine:422 (0.1 mL/kg/hr)]  Weight: 81.9 kg     Relevant Results  Scheduled medications  cefTRIAXone, 2 g, intravenous, q12h  enoxaparin, 1 mg/kg, subcutaneous, Daily  pantoprazole, 40 mg, oral, Daily before breakfast  pantoprazole, 40 mg, intravenous, Daily  thiamine, 100 mg, intravenous, Daily      Continuous medications  lactated Ringer's, 100 mL/hr  niCARdipine, 2.5-15 mg/hr, Last Rate: 5 mg/hr (05/31/24 0517)  potassium chloride in 0.9%NaCl, 125 mL/hr, Last Rate: 125 mL/hr (05/31/24 0300)      PRN medications  PRN medications: acetaminophen, metoprolol  Results for orders placed or performed during the hospital encounter of 05/30/24 (from the past 24 hour(s)) "   Creatine Kinase   Result Value Ref Range    Creatine Kinase 477 (H) 0 - 325 U/L   Troponin I, High Sensitivity   Result Value Ref Range    Troponin I, High Sensitivity 1,863 (HH) 0 - 20 ng/L   Comprehensive metabolic panel   Result Value Ref Range    Glucose 126 (H) 74 - 99 mg/dL    Sodium 134 (L) 136 - 145 mmol/L    Potassium 3.2 (L) 3.5 - 5.3 mmol/L    Chloride 100 98 - 107 mmol/L    Bicarbonate 18 (L) 21 - 32 mmol/L    Anion Gap 19 10 - 20 mmol/L    Urea Nitrogen 17 6 - 23 mg/dL    Creatinine 0.97 0.50 - 1.30 mg/dL    eGFR >90 >60 mL/min/1.73m*2    Calcium 8.9 8.6 - 10.3 mg/dL    Albumin 4.2 3.4 - 5.0 g/dL    Alkaline Phosphatase 68 33 - 120 U/L    Total Protein 8.0 6.4 - 8.2 g/dL    AST 30 9 - 39 U/L    Bilirubin, Total 0.7 0.0 - 1.2 mg/dL    ALT 16 10 - 52 U/L   Blood Gas Venous Full Panel   Result Value Ref Range    POCT pH, Venous 7.54 (H) 7.33 - 7.43 pH    POCT pCO2, Venous 23 (L) 41 - 51 mm Hg    POCT pO2, Venous 69 (H) 35 - 45 mm Hg    POCT SO2, Venous 95 (H) 45 - 75 %    POCT Oxy Hemoglobin, Venous 92.6 (H) 45.0 - 75.0 %    POCT Hematocrit Calculated, Venous 51.0 41.0 - 52.0 %    POCT Sodium, Venous 133 (L) 136 - 145 mmol/L    POCT Potassium, Venous 3.2 (L) 3.5 - 5.3 mmol/L    POCT Chloride, Venous 101 98 - 107 mmol/L    POCT Ionized Calicum, Venous 1.08 (L) 1.10 - 1.33 mmol/L    POCT Glucose, Venous 144 (H) 74 - 99 mg/dL    POCT Lactate, Venous 3.0 (H) 0.4 - 2.0 mmol/L    POCT Base Excess, Venous -0.5 -2.0 - 3.0 mmol/L    POCT HCO3 Calculated, Venous 19.7 (L) 22.0 - 26.0 mmol/L    POCT Hemoglobin, Venous 17.1 13.5 - 17.5 g/dL    POCT Anion Gap, Venous 16.0 10.0 - 25.0 mmol/L    Patient Temperature      FiO2 21 %   POCT GLUCOSE   Result Value Ref Range    POCT Glucose 117 (H) 74 - 99 mg/dL   CBC   Result Value Ref Range    WBC 22.5 (H) 4.4 - 11.3 x10*3/uL    nRBC 0.0 0.0 - 0.0 /100 WBCs    RBC 6.99 (H) 4.50 - 5.90 x10*6/uL    Hemoglobin 16.0 13.5 - 17.5 g/dL    Hematocrit 47.7 41.0 - 52.0 %    MCV 68  (L) 80 - 100 fL    MCH 22.9 (L) 26.0 - 34.0 pg    MCHC 33.5 32.0 - 36.0 g/dL    RDW 17.7 (H) 11.5 - 14.5 %    Platelets 259 150 - 450 x10*3/uL   Renal Function Panel   Result Value Ref Range    Glucose 121 (H) 74 - 99 mg/dL    Sodium 134 (L) 136 - 145 mmol/L    Potassium 3.4 (L) 3.5 - 5.3 mmol/L    Chloride 101 98 - 107 mmol/L    Bicarbonate 18 (L) 21 - 32 mmol/L    Anion Gap 18 10 - 20 mmol/L    Urea Nitrogen 16 6 - 23 mg/dL    Creatinine 0.93 0.50 - 1.30 mg/dL    eGFR >90 >60 mL/min/1.73m*2    Calcium 8.4 (L) 8.6 - 10.3 mg/dL    Phosphorus 2.3 (L) 2.5 - 4.9 mg/dL    Albumin 3.8 3.4 - 5.0 g/dL   ECG 12 lead   Result Value Ref Range    Ventricular Rate 94 BPM    Atrial Rate 94 BPM    WA Interval 154 ms    QRS Duration 94 ms    QT Interval 374 ms    QTC Calculation(Bazett) 467 ms    P Axis 53 degrees    R Axis 38 degrees    T Axis 43 degrees    QRS Count 16 beats    Q Onset 223 ms    P Onset 146 ms    P Offset 202 ms    T Offset 410 ms    QTC Fredericia 434 ms   Troponin I, High Sensitivity   Result Value Ref Range    Troponin I, High Sensitivity 2,315 (HH) 0 - 20 ng/L   CBC   Result Value Ref Range    WBC 22.2 (H) 4.4 - 11.3 x10*3/uL    nRBC 0.0 0.0 - 0.0 /100 WBCs    RBC 6.96 (H) 4.50 - 5.90 x10*6/uL    Hemoglobin 15.9 13.5 - 17.5 g/dL    Hematocrit 47.4 41.0 - 52.0 %    MCV 68 (L) 80 - 100 fL    MCH 22.8 (L) 26.0 - 34.0 pg    MCHC 33.5 32.0 - 36.0 g/dL    RDW 18.7 (H) 11.5 - 14.5 %    Platelets 317 150 - 450 x10*3/uL   Troponin I, High Sensitivity   Result Value Ref Range    Troponin I, High Sensitivity 1,391 (HH) 0 - 20 ng/L   Blood Gas Lactic Acid, Venous   Result Value Ref Range    POCT Lactate, Venous 2.3 (H) 0.4 - 2.0 mmol/L     ECG 12 lead    Result Date: 5/31/2024  Normal sinus rhythm Voltage criteria for left ventricular hypertrophy Nonspecific ST abnormality Abnormal ECG When compared with ECG of 15-MAR-2024 13:46, ST now depressed in Lateral leads T wave amplitude has decreased in Inferior leads  Nonspecific T wave abnormality now evident in Anterior leads    US gallbladder    Result Date: 5/30/2024  * * *Final Report* * * DATE OF EXAM: May 30 2024  1:54PM   [facility]   1032  -  US ABD RIGHT UPPER QUADRANT  / ACCESSION #  [id number] PROCEDURE REASON: Cholelithiasis      * * * * Physician Interpretation * * * *  EXAMINATION:   RIGHT UPPER QUADRANT ULTRASOUND CLINICAL HISTORY: Cholelithiasis; possible overdose; abnormal finding on recent CTA A/P TECHNIQUE:  Sonography of the right upper quadrant  was performed.   Images were obtained and stored in a permanent archive. MQ:  URUQ_2 COMPARISON: 05/30/2024 CTA C/A/P RESULT: Pancreas: Normal sonographic appearance.    Portions obscured: tail Liver:      Echotexture:  Normal, homogeneous.      Echogenicity:  Normal      Surface contour:  Smooth      Lesions:  None. Biliary: No intrahepatic biliary duct dilation.      CBD: 0.6 cm at the hilum.      Gallbladder: Normal caliber           -Contents:  No cholelithiasis           -Wall:  Concentric gallbladder wall thickening present           -Other:  No pericholecystic fluid.  No sonographic Ramirez's sign. Right Kidney: No hydronephrosis. Ascites: None.    IMPRESSION: Concentric gallbladder wall thickening is of uncertain etiology and significance.  No cholelithiasis.  No sonographic findings for acute cholecystitis. : PSCB   Transcribe Date/Time: May 30 2024  2:03P Dictated by : CASSANDRA LIM MD This examination was interpreted and the report reviewed and electronically signed by: CASSANDRA LIM MD on May 30 2024  2:09PM  EST    CT angio abdomen pelvis w and or wo IV IV contrast    Result Date: 5/30/2024  * * *Final Report* * * DATE OF EXAM: May 30 2024 11:56AM   [facility]   0311  -  CTA ABD/PELV W IVCON  / ACCESSION #  [id number] PROCEDURE REASON: Aortic dissection suspected      * * * * Physician Interpretation * * * *  EXAMINATION:  CTA CHEST (GATED) WO/W IVCON, CTA ABD/PELV W IVCON CLINICAL HISTORY:   Aortic  dissection suspected. Tachycardia and tachypnea. Diminished pulses. Technique: Multidetector CTA of the chest, abdomen, and pelvis was performed following the administration of intervenous contrast.  2-D and 3-D reconstructed images were provided.  Images before contrast of the chest were also performed. Contrast:  IV administration of 100 ml of Omnipaque 350 Comparison: None. CT Radiation dose: Integrated Dose-length product (DLP) for this visit =   2190 mGy*cm. CT Dose Reduction Employed: Automated exposure control(AEC) and iterative recon RESULT: Limitations: Respiratory and patient motion.  Arm positioning. Images before contrast show no evidence of thoracic aortic intramural hematoma. Vasculature: Thoracic aorta and branch vessels: Normal caliber thoracic aorta with no evidence of dissection or intramural hematoma. The proximal branch vessels are patent without evidence of dissection.  Pulmonary arteries: Normal caliber main pulmonary artery with no evidence of proximal pulmonary embolism. Abdominal aorta and branch vessels: Normal caliber abdominal aorta with no evidence of dissection. The proximal branch vessels are patent with no dissection or high-grade stenosis. Iliac and imaged femoral vessels: Normal caliber with minimal calcified atherosclerotic changes. No dissection or focal high-grade stenosis. Nonvascular findings: Lines, tubes, and devices:  None. Lung parenchyma and pleura: No consolidation.  3 mm anterior RIGHT upper lobe nodule image 9:39.   No pleural effusion. Central airways are patent. Thoracic inlet, heart, and mediastinum:  No lymphadenopathy in the axillary, mediastinal, or hilar regions. The cardiac chambers are normal in size. No coronary artery atherosclerotic calcifications are noted, although the study is not optimized for coronary assessment. No pericardial effusion or thickening. Liver: No mass. Biliary: No bile duct dilation.  Mild gallbladder wall thickening and pericholecystic  fluid. No radiopaque cholelithiasis. Spleen: No mass. No splenomegaly. Pancreas: No mass or duct dilation. Adrenals: No mass. Kidneys: No mass, calculus or hydronephrosis. GI tract: Left upper quadrant embolization coils near the gastric fundus. No dilation or wall thickening. Lymph nodes: No abdominal or pelvic lymphadenopathy. Mesentery/Peritoneum: No ascites or mass. Retroperitoneum: No mass. Pelvis: No mass, ascites or fluid collection. Bones/Soft Tissues: No significant finding. Localizer images: No additional findings.    IMPRESSION: No acute aortic pathology. Mild gallbladder wall thickening with pericholecystic fluid, which can be seen in the setting of acute cholecystitis although no cholelithiasis or biliary duct dilation is identified. Consider further evaluation with right upper quadrant ultrasound if there is a clinical concern for acute cholecystitis. No acute findings in the chest. : ZHANG   Transcribe Date/Time: May 30 2024 12:16P Dictated by : KATIE GOLDEN MD This examination was interpreted and the report reviewed and electronically signed by: SAIRA KATE MD on May 30 2024 12:52PM  EST    CT angio chest w and wo IV contrast    Result Date: 5/30/2024  * * *Final Report* * * DATE OF EXAM: May 30 2024 11:57AM   [facility]   0126  -  CTA CHEST (GATED) WO/W IVCON  / ACCESSION #  [id number] PROCEDURE REASON: Aortic dissection suspected      * * * * Physician Interpretation * * * *  EXAMINATION:  CTA CHEST (GATED) WO/W IVCON, CTA ABD/PELV W IVCON CLINICAL HISTORY:   Aortic dissection suspected. Tachycardia and tachypnea. Diminished pulses. Technique: Multidetector CTA of the chest, abdomen, and pelvis was performed following the administration of intervenous contrast.  2-D and 3-D reconstructed images were provided.  Images before contrast of the chest were also performed. Contrast:  IV administration of 100 ml of Omnipaque 350 Comparison: None. CT Radiation dose: Integrated Dose-length  product (DLP) for this visit =   2190 mGy*cm. CT Dose Reduction Employed: Automated exposure control(AEC) and iterative recon RESULT: Limitations: Respiratory and patient motion.  Arm positioning. Images before contrast show no evidence of thoracic aortic intramural hematoma. Vasculature: Thoracic aorta and branch vessels: Normal caliber thoracic aorta with no evidence of dissection or intramural hematoma. The proximal branch vessels are patent without evidence of dissection. Pulmonary arteries: Normal caliber main pulmonary artery with no evidence of proximal pulmonary embolism. Abdominal aorta and branch vessels: Normal caliber abdominal aorta with no evidence of dissection. The proximal branch vessels are patent with no dissection or high-grade stenosis. Iliac and imaged femoral vessels: Normal caliber with minimal calcified atherosclerotic changes. No dissection or focal high-grade stenosis. Nonvascular findings: Lines, tubes, and devices:  None. Lung parenchyma and pleura: No consolidation.  3 mm anterior RIGHT upper lobe nodule image 9:39.   No pleural effusion. Central airways are patent. Thoracic inlet, heart, and mediastinum:  No lymphadenopathy in the axillary, mediastinal, or hilar regions. The cardiac chambers are normal in size. No coronary artery atherosclerotic calcifications are noted, although the study is not optimized for coronary assessment. No pericardial effusion or thickening. Liver: No mass. Biliary: No bile duct dilation.  Mild gallbladder wall thickening and pericholecystic fluid. No radiopaque cholelithiasis. Spleen: No mass. No splenomegaly. Pancreas: No mass or duct dilation. Adrenals: No mass. Kidneys: No mass, calculus or hydronephrosis. GI tract: Left upper quadrant embolization coils near the gastric fundus. No dilation or wall thickening. Lymph nodes: No abdominal or pelvic lymphadenopathy. Mesentery/Peritoneum: No ascites or mass. Retroperitoneum: No mass.   Pelvis: No mass,  ascites or fluid collection. Bones/Soft Tissues: No significant finding. Localizer images: No additional findings.    IMPRESSION: No acute aortic pathology. Mild gallbladder wall thickening with pericholecystic fluid, which can be seen in the setting of acute cholecystitis although no cholelithiasis or biliary duct dilation is identified. Consider further evaluation with right upper quadrant ultrasound if there is a clinical concern for acute cholecystitis. No acute findings in the chest. : PSCB   Transcribe Date/Time: May 30 2024 12:16P Dictated by : KATIE GOLDEN MD This examination was interpreted and the report reviewed and electronically signed by: SAIRA KATE MD on May 30 2024 12:52PM  EST    CT cervical spine wo IV contrast    Result Date: 5/30/2024  * * *Final Report* * * DATE OF EXAM: May 30 2024 11:56AM   [facility]   0505  -  CT CERVICAL SPINE WO IVCON  / ACCESSION #  [id number] PROCEDURE REASON: Spine fracture, cervical, traumatic      * * * * Physician Interpretation * * * *  EXAMINATION:  CT BRAIN WO IVCON, CT CERVICAL SPINE WO IVCON CLINICAL HISTORY:  Cerebral hemorrhage suspected .  Head pain and cervical spine pain after injury.  Acute Dissection TECHNIQUE:  Serial axial unenhanced images were obtained from the  vertex to the foramen magnum.  Spiral, high resolution axial unenhanced images were obtained from the skull base to the cervicothoracic junction with sagittal and coronal planar reconstructions. MQ:  CTBCSWO_3 Dose-Length Product (DLP): 2190 mGy*cm. CT Dose Reduction Employed: Automated exposure control(AEC) and iterative recon COMPARISON:  None. RESULT: BRAIN: Some detail especially at skull base limited by motion artifact. Post-operative change:  None. Acute change:   No evidence of an acute infarct or other acute parenchymal process. Hemorrhage:    No evidence of acute intracranial hemorrhage. Mass effect / Mass lesion:   There is no evidence of an intracranial mass or  extraaxial fluid collection.  No significant mass effect. Chronic change:   None apparent. Parenchyma:  There is no significant volume loss. Ventricles:   The ventricles are within normal limits of size and configuration for age. Paranasal sinuses and skull base:  The visualized paranasal sinuses are grossly clear. CERVICAL: Counting reference:  Craniocervical junction.   Anatomic Variants:  None. Alignment:    Alignment is anatomic. Craniocervical junction:    Craniocervical junction is normal. Osseous structures/fracture:    No evidence of a lytic or blastic process in the visualized spine.  No evidence of acute or chronic fracture. Cervical soft tissues:   The imaged thyroid gland is normal.    The paraspinal soft tissues are within normal limits. Degenerative changes: No significant degenerative changes.  (topogram) images: Radiodensity suggesting embolization coils in LEFT upper quadrant. -    IMPRESSION: CT brain: No evidence of acute intracranial process. CT cervical spine: No evidence of an acute fracture. Anatomic Variant:  None.  Assume 7 cervical vertebrae with counting from the craniocervical junction. : [facility]   Transcribe Date/Time: May 30 2024 11:58A Dictated by : SAIRA KATE MD This examination was interpreted and the report reviewed and electronically signed by: SAIRA KATE MD on May 30 2024 12:03PM  EST    CT head wo IV contrast    Result Date: 5/30/2024  * * *Final Report* * * DATE OF EXAM: May 30 2024 11:56AM   [facility]   0504  -  CT BRAIN WO IVCON  / ACCESSION #  [id number] PROCEDURE REASON: Cerebral hemorrhage suspected      * * * * Physician Interpretation * * * *  EXAMINATION:  CT BRAIN WO IVCON, CT CERVICAL SPINE WO IVCON CLINICAL HISTORY:  Cerebral hemorrhage suspected .  Head pain and cervical spine pain after injury.  Acute Dissection TECHNIQUE:  Serial axial unenhanced images were obtained from the  vertex to the foramen magnum.  Spiral, high resolution axial  unenhanced images were obtained from the skull base to the cervicothoracic junction with sagittal and coronal planar reconstructions. MQ:  CTBCSWO_3 Dose-Length Product (DLP): 2190 mGy*cm. CT Dose Reduction Employed: Automated exposure control(AEC) and iterative recon COMPARISON:  None. RESULT: BRAIN: Some detail especially at skull base limited by motion artifact. Post-operative change:  None. Acute change:   No evidence of an acute infarct or other acute parenchymal process. Hemorrhage:    No evidence of acute intracranial hemorrhage. Mass effect / Mass lesion:   There is no evidence of an intracranial mass or extraaxial fluid collection.  No significant mass effect. Chronic change:   None apparent. Parenchyma:  There is no significant volume loss. Ventricles:   The ventricles are within normal limits of size and configuration for age. Paranasal sinuses and skull base:  The visualized paranasal sinuses are   grossly clear. CERVICAL: Counting reference:  Craniocervical junction.   Anatomic Variants:  None. Alignment:    Alignment is anatomic. Craniocervical junction:    Craniocervical junction is normal. Osseous structures/fracture:    No evidence of a lytic or blastic process in the visualized spine.  No evidence of acute or chronic fracture. Cervical soft tissues:   The imaged thyroid gland is normal.    The paraspinal soft tissues are within normal limits. Degenerative changes: No significant degenerative changes.  (topogram) images: Radiodensity suggesting embolization coils in LEFT upper quadrant. -    IMPRESSION: CT brain: No evidence of acute intracranial process. CT cervical spine: No evidence of an acute fracture. Anatomic Variant:  None.  Assume 7 cervical vertebrae with counting from the craniocervical junction. : ZHANG   Transcribe Date/Time: May 30 2024 11:58A Dictated by : SAIRA KATE MD This examination was interpreted and the report reviewed and electronically signed by:  SAIRA KATE MD on May 30 2024 12:03PM  EST    XR chest 1 view    Result Date: 5/30/2024  * * *Final Report* * * DATE OF EXAM: May 30 2024 11:18AM   [facility]   [id number]  -  XR CHEST 1V FRONTAL PORT  / ACCESSION #  [id number] PROCEDURE REASON: Fatigue and malaise      * * * * Physician Interpretation * * * *  EXAMINATION:  CHEST RADIOGRAPH (PORTABLE SINGLE VIEW AP) Exam Date/Time:  5/30/2024 11:18 AM CLINICAL HISTORY: Fatigue and malaise, Fatigue and malaise MQ:  XCPR_5 Comparison:  No relevant prior RESULT: Lines, tubes, and devices:  None. Lungs and pleura:  Medial apices are obscured by the patient's overlapping soft tissues.  Visualized lungs demonstrate no alveolar airspace opacity.  Costophrenic angles are clear. Cardiomediastinal silhouette:  Normal cardiomediastinal silhouette. Other:  .    IMPRESSION: No radiographic evidence for acute cardiopulmonary disease. : PSCVALERIE   Transcribe Date/Time: May 30 2024 11:23A Dictated by : CASSANDRA LIM MD This examination was interpreted and the report reviewed and electronically signed by: CASSANDRA LIM MD on May 30 2024 11:23AM  EST                  Assessment/Plan   Principal Problem:    Encephalopathy    Douglas Wilson is a 53 year old male with PMH of CAD, NSTEMI, PE (Eliquis), splenic artery aneurysm rupture (s/p coiling), appendicitis (s/p appendectomy), esophagitis/GI bleed and BPH who presents to the [facility] ICU from [facility] where he presented for evaluation of an overdose.  Altered LOC with positive urine screen for fentanyl. No clear infectious source and imaging of brain, chest, abdo/pelvis negative an obvious etiology.     Neurologic  Metabolic encephalopathy 2/2 fentanyl vs. post ictal state (reported seizure per EMS prior to arrival at [facility]) vs. Meningitis vs. Other- improving  - Start Acyclovir IV  - Tylenol PRN  - Tylenol level  - CAM BID  - EEG  - MRI - Precedex gtt for MRI  - Neurology consulted- continue to hold Eliquis for possible LP  (6/3 soonest based on last dose of 5/28)    Cardiovascular  H/O CAD, NSTEMI  Hypertensive emergency  - Nicardipine gtt; Titrate to goal SBP <180   - Echo today (r/o endocarditis)    Pulmonary  No active issues  - Keep SpO2 > 92%     Renal  Baseline Cr ~0.80   - Strict I & Os     GI/  No active issues  - Continue Protonix     Endocrine  Pre-diabetes- Hgb A1C 6.3 (1/22)   - Q 4 hour BG while NPO    Hematology/Oncology  H/O PE  - Home Eliquis on hold    ID  Leukocytosis, Febrile- negative urine, CXR, CT chest/abd  C/F meningitis vs. Endocarditis vs. other  - Continue Ceftriaxone and Vancomycin IV  - Follow blood cultures (NTD)    ICU Checklist  Analgesia: Tylenol PRN  Delirium/CAM: RASS 0 CAM - (waxing and waning)  PT/OT: Ordered; unable to participate  Update Family: Cousin at bedside  Diet: NPO  Antibiotics: Ceftriaxone  Lines: PIV  DVT Prophylaxis: Lovenox SQ  GI Prophylaxis: Protonix  Code Status: Full Code  Disposition: Remain in ICU      I spent 60 minutes in the professional and overall care of this patient.      Zoe Marcano, APRN-CNP

## 2024-05-31 NOTE — CONSULTS
Vancomycin Dosing by Pharmacy- INITIAL    Douglas Wilson is a 53 y.o. year old male who Pharmacy has been consulted for vancomycin dosing for CNS/meningoencephalitis. Based on the patient's indication and renal status this patient will be dosed based on a goal AUC of 500-600.     Renal function is currently stable.    Visit Vitals  BP (!) 161/115   Pulse 101   Temp 36.9 °C (98.5 °F) (Temporal)   Resp (!) 34        Lab Results   Component Value Date    CREATININE 0.93 2024    CREATININE 0.97 2024    CREATININE 0.88 2024    CREATININE 0.84 2024        Patient weight is as follows:   Vitals:    24 1826   Weight: 81.9 kg (180 lb 8.9 oz)       Cultures:  No results found for the encounter in last 14 days.        I/O last 3 completed shifts:  In: 1904.2 (23.3 mL/kg) [I.V.:754.2 (9.2 mL/kg); IV Piggyback:1150]  Out: 772 (9.4 mL/kg) [Urine:772 (0.3 mL/kg/hr)]  Weight: 81.9 kg   I/O during current shift:  I/O this shift:  In: 171.3 [I.V.:71.3; IV Piggyback:100]  Out: -     Temp (24hrs), Av.9 °C (98.5 °F), Min:36.3 °C (97.3 °F), Max:38.4 °C (101.1 °F)         Assessment/Plan     Patient has already been given a loading dose of 2000 mg.  Will initiate vancomycin maintenance,  1000 mg every 12 hours.    This dosing regimen is predicted by [employer] to result in the following pharmacokinetic parameters:  Loading dose: N/A  Regimen: 1000 mg IV every 12 hours.  Start time: 16:15 on 2024  Exposure target: AUC24 (range)400-600 mg/L.hr   AUC24,ss: 491 mg/L.hr  Probability of AUC24 > 400: 71 %  Ctrough,ss: 15.4 mg/L  Probability of Ctrough,ss > 20: 29 %  Probability of nephrotoxicity (Lodise BJ ): 11 %      Follow-up level will be ordered on  at 0500 unless clinically indicated sooner.  Will continue to monitor renal function daily while on vancomycin and order serum creatinine at least every 48 hours if not already ordered.  Follow for continued vancomycin needs, clinical response, and  signs/symptoms of toxicity.       José Manuel Barrera, PharmD

## 2024-05-31 NOTE — CONSULTS
Consults    History Of Present Illness  Douglas Wilson is a 53 y.o. male presenting with altered level of consciousness.  He came from outside facility where he was admitted for overdose/altered mental status.  He had increased level of fentanyl hands it was suspected to have fentanyl overdose however it did not respond to naloxone.  In addition to that he was also febrile and also had tongue bite which was suspicious for having seizure with or without meningitis.  He noted to have tachycardia and there is metabolic acidosis on ABG with blood glucose more than 280 and anion gap more than 30.  At outside facility CT brain was done without any evidence of acute neurological change CT C-spine was unremarkable for any acute fracture CT chest did not show any thoracic or aortic hematoma or any consolidation in the lung or effusion.  Abdomen and pelvis CT suggested mild gallbladder wall thickening.  Neurology was consulted to rule out any additional causes for encephalopathy..  Past Medical History  Past Medical History:   Diagnosis Date    Acute appendicitis without peritonitis 03/12/2024    BPH (benign prostatic hyperplasia)     Heroin abuse (Multi)     NSTEMI (non-ST elevated myocardial infarction) (Multi)     Pulmonary embolism (Multi)     Splenic artery aneurysm (CMS-HCC) 02/2024    rupture s/p coil embolization    Upper GI bleed 03/15/2024    EGD with esophagitis, no active bleeding     Surgical History  Past Surgical History:   Procedure Laterality Date    APPENDECTOMY  03/12/2024    CT ANGIO CORONARY ART WITH HEARTFLOW IF SCORE >30%  04/19/2022    OTHER SURGICAL HISTORY Bilateral 01/24/2022    Inguinal hernia repair laparoscopic    SPLENIC ARTERY EMBOLIZATION  02/2024     Social History  Social History     Tobacco Use    Smoking status: Every Day     Current packs/day: 1.00     Types: Cigarettes    Smokeless tobacco: Never   Substance Use Topics    Alcohol use: Never    Drug use: Yes     Types: Heroin     Comment:  in past     Allergies  Patient has no known allergies.  Medications Prior to Admission   Medication Sig Dispense Refill Last Dose    apixaban (Eliquis) 5 mg tablet Take 1 tablet (5 mg) by mouth 2 times a day. 60 tablet 3 5/29/2024 at 09    cholecalciferol (Vitamin D3) 1,250 mcg (50,000 unit) tablet Take 1 tablet (50,000 Units) by mouth every 7 days. (Patient taking differently: Take 1 tablet (50,000 Units) by mouth every 7 days. On Sunday) 12 tablet 0 5/26/2024    ferrous gluconate 324 (38 Fe) mg tablet Take 1 tablet (38 mg of iron) by mouth every other day. 45 tablet 1 5/28/2024    pantoprazole (ProtoNix) 40 mg EC tablet Take 1 tablet (40 mg) by mouth 2 times a day before meals. Do not crush, chew, or split. 60 tablet 3 5/29/2024    tamsulosin (Flomax) 0.4 mg 24 hr capsule Take 1 capsule (0.4 mg) by mouth once daily.   5/29/2024 at 09    folic acid (Folvite) 1 mg tablet Take 1 tablet (1 mg) by mouth once daily.       furosemide (Lasix) 20 mg tablet Take 1 tablet (20 mg) by mouth once daily. (Patient not taking: Reported on 5/31/2024) 3 tablet 1 Not Taking    potassium chloride CR (Klor-Con) 10 mEq ER tablet Take 1 tablet (10 mEq) by mouth once daily. Do not crush, chew, or split. (Patient not taking: Reported on 5/31/2024) 3 tablet 1 Not Taking    sennosides-docusate sodium (Senokot-S) 8.6-50 mg tablet Take 2 tablets by mouth once daily at bedtime. (Patient not taking: Reported on 5/31/2024) 60 tablet 1 Not Taking    sildenafil (Viagra) 100 mg tablet Take 1 tablet (100 mg) by mouth if needed for erectile dysfunction.       tadalafil (Cialis) 5 mg tablet Take 1 tablet (5 mg) by mouth once daily. (Patient taking differently: Take 1 tablet (5 mg) by mouth once daily as needed for erectile dysfunction.) 90 tablet 3        Review of Systems  Unable to obtain  Neurological Exam  Obtunded does not follow commands.  Extraocular movement full, VOR normal pupils reactive.  Moves all 4 extremities without any focal weakness.   "No abnormal involuntary movements.    Last Recorded Vitals  Blood pressure (!) 161/115, pulse 101, temperature 36.9 °C (98.5 °F), temperature source Temporal, resp. rate (!) 34, height 1.727 m (5' 7.99\"), weight 81.9 kg (180 lb 8.9 oz), SpO2 98%.    Relevant Results  I have personally reviewed the following imaging results ECG 12 lead    Result Date: 5/31/2024  Normal sinus rhythm Voltage criteria for left ventricular hypertrophy Nonspecific ST abnormality Abnormal ECG When compared with ECG of 15-MAR-2024 13:46, ST now depressed in Lateral leads T wave amplitude has decreased in Inferior leads Nonspecific T wave abnormality now evident in Anterior leads Confirmed by Tremayne Sepulveda ([id number]) on 5/31/2024 11:35:44 AM    XR chest 1 view    Result Date: 5/31/2024  Interpreted By:  Raman Valenzuela, STUDY: XR CHEST 1 VIEW;  5/30/2024 9:01 pm   INDICATION: Signs/Symptoms:icu admit with decreased level of consciousness.   COMPARISON: 02/22/2024   ACCESSION NUMBER(S): [id number]   ORDERING CLINICIAN: MARTINA CHARLTON   FINDINGS: AP portable view of the chest is obtained.  Limited exam due to portable nature. Magnified cardiac silhouette. No infiltrates, effusions or pneumothorax.       1.  No evidence of acute cardiopulmonary process.       MACRO: None   Signed by: Raman Valenzuela 5/31/2024 8:07 AM Dictation workstation:   [id number]    US gallbladder    Result Date: 5/30/2024  * * *Final Report* * * DATE OF EXAM: May 30 2024  1:54PM   [facility]   1032  -  US ABD RIGHT UPPER QUADRANT  / ACCESSION #  [id number] PROCEDURE REASON: Cholelithiasis      * * * * Physician Interpretation * * * *  EXAMINATION:   RIGHT UPPER QUADRANT ULTRASOUND CLINICAL HISTORY: Cholelithiasis; possible overdose; abnormal finding on recent CTA A/P TECHNIQUE:  Sonography of the right upper quadrant  was performed.   Images were obtained and stored in a permanent archive. MQ:  URUQ_2 COMPARISON: 05/30/2024 CTA C/A/P RESULT: Pancreas: Normal sonographic appearance.    " Portions obscured: tail Liver:      Echotexture:  Normal, homogeneous.      Echogenicity:  Normal      Surface contour:  Smooth      Lesions:  None. Biliary: No intrahepatic biliary duct dilation.      CBD: 0.6 cm at the hilum.      Gallbladder: Normal caliber           -Contents:  No cholelithiasis           -Wall:  Concentric gallbladder wall thickening present           -Other:  No pericholecystic fluid.  No sonographic Ramirez's sign. Right Kidney: No hydronephrosis. Ascites: None.    IMPRESSION: Concentric gallbladder wall thickening is of uncertain etiology and significance.  No cholelithiasis.  No sonographic findings for acute cholecystitis. : PSCVALERIE   Transcribe Date/Time: May 30 2024  2:03P Dictated by : CASSANDRA LIM MD This examination was interpreted and the report reviewed and electronically signed by: CASSANDRA LIM MD on May 30 2024  2:09PM  EST    CT angio abdomen pelvis w and or wo IV IV contrast    Result Date: 5/30/2024  * * *Final Report* * * DATE OF EXAM: May 30 2024 11:56AM   [facility]   0311  -  CTA ABD/PELV W IVCON  / ACCESSION #  [id number] PROCEDURE REASON: Aortic dissection suspected      * * * * Physician Interpretation * * * *  EXAMINATION:  CTA CHEST (GATED) WO/W IVCON, CTA ABD/PELV W IVCON CLINICAL HISTORY:   Aortic dissection suspected. Tachycardia and tachypnea. Diminished pulses. Technique: Multidetector CTA of the chest, abdomen, and pelvis was performed following the administration of intervenous contrast.  2-D and 3-D reconstructed images were provided.  Images before contrast of the chest were also performed. Contrast:  IV administration of 100 ml of Omnipaque 350 Comparison: None. CT Radiation dose: Integrated Dose-length product (DLP) for this visit =   2190 mGy*cm. CT Dose Reduction Employed: Automated exposure control(AEC) and iterative recon RESULT: Limitations: Respiratory and patient motion.  Arm positioning. Images before contrast show no evidence of thoracic  aortic intramural hematoma. Vasculature: Thoracic aorta and branch vessels: Normal caliber thoracic aorta with no evidence of dissection or intramural hematoma. The proximal branch vessels are patent without evidence of dissection.  Pulmonary arteries: Normal caliber main pulmonary artery with no evidence of proximal pulmonary embolism. Abdominal aorta and branch vessels: Normal caliber abdominal aorta with no evidence of dissection. The proximal branch vessels are patent with no dissection or high-grade stenosis. Iliac and imaged femoral vessels: Normal caliber with minimal calcified atherosclerotic changes. No dissection or focal high-grade stenosis. Nonvascular findings: Lines, tubes, and devices:  None. Lung parenchyma and pleura: No consolidation.  3 mm anterior RIGHT upper lobe nodule image 9:39.   No pleural effusion. Central airways are patent. Thoracic inlet, heart, and mediastinum:  No lymphadenopathy in the axillary, mediastinal, or hilar regions. The cardiac chambers are normal in size. No coronary artery atherosclerotic calcifications are noted, although the study is not optimized for coronary assessment. No pericardial effusion or thickening. Liver: No mass. Biliary: No bile duct dilation.  Mild gallbladder wall thickening and pericholecystic fluid. No radiopaque cholelithiasis. Spleen: No mass. No splenomegaly. Pancreas: No mass or duct dilation. Adrenals: No mass. Kidneys: No mass, calculus or hydronephrosis. GI tract: Left upper quadrant embolization coils near the gastric fundus. No dilation or wall thickening. Lymph nodes: No abdominal or pelvic lymphadenopathy. Mesentery/Peritoneum: No ascites or mass. Retroperitoneum: No mass. Pelvis: No mass, ascites or fluid collection. Bones/Soft Tissues: No significant finding. Localizer images: No additional findings.    IMPRESSION: No acute aortic pathology. Mild gallbladder wall thickening with pericholecystic fluid, which can be seen in the setting of  acute cholecystitis although no cholelithiasis or biliary duct dilation is identified. Consider further evaluation with right upper quadrant ultrasound if there is a clinical concern for acute cholecystitis. No acute findings in the chest. : PSCB   Transcribe Date/Time: May 30 2024 12:16P Dictated by : KATIE GOLDEN MD This examination was interpreted and the report reviewed and electronically signed by: SAIRA KATE MD on May 30 2024 12:52PM  EST    CT angio chest w and wo IV contrast    Result Date: 5/30/2024  * * *Final Report* * * DATE OF EXAM: May 30 2024 11:57AM   [facility]   0126  -  CTA CHEST (GATED) WO/W IVCON  / ACCESSION #  [id number] PROCEDURE REASON: Aortic dissection suspected      * * * * Physician Interpretation * * * *  EXAMINATION:  CTA CHEST (GATED) WO/W IVCON, CTA ABD/PELV W IVCON CLINICAL HISTORY:   Aortic dissection suspected. Tachycardia and tachypnea. Diminished pulses. Technique: Multidetector CTA of the chest, abdomen, and pelvis was performed following the administration of intervenous contrast.  2-D and 3-D reconstructed images were provided.  Images before contrast of the chest were also performed. Contrast:  IV administration of 100 ml of Omnipaque 350 Comparison: None. CT Radiation dose: Integrated Dose-length product (DLP) for this visit =   2190 mGy*cm. CT Dose Reduction Employed: Automated exposure control(AEC) and iterative recon RESULT: Limitations: Respiratory and patient motion.  Arm positioning. Images before contrast show no evidence of thoracic aortic intramural hematoma. Vasculature: Thoracic aorta and branch vessels: Normal caliber thoracic aorta with no evidence of dissection or intramural hematoma. The proximal branch vessels are patent without evidence of dissection. Pulmonary arteries: Normal caliber main pulmonary artery with no evidence of proximal pulmonary embolism. Abdominal aorta and branch vessels: Normal caliber abdominal aorta with no evidence of  dissection. The proximal branch vessels are patent with no dissection or high-grade stenosis. Iliac and imaged femoral vessels: Normal caliber with minimal calcified atherosclerotic changes. No dissection or focal high-grade stenosis. Nonvascular findings: Lines, tubes, and devices:  None. Lung parenchyma and pleura: No consolidation.  3 mm anterior RIGHT upper lobe nodule image 9:39.   No pleural effusion. Central airways are patent. Thoracic inlet, heart, and mediastinum:  No lymphadenopathy in the axillary, mediastinal, or hilar regions. The cardiac chambers are normal in size. No coronary artery atherosclerotic calcifications are noted, although the study is not optimized for coronary assessment. No pericardial effusion or thickening. Liver: No mass. Biliary: No bile duct dilation.  Mild gallbladder wall thickening and pericholecystic fluid. No radiopaque cholelithiasis. Spleen: No mass. No splenomegaly. Pancreas: No mass or duct dilation. Adrenals: No mass. Kidneys: No mass, calculus or hydronephrosis. GI tract: Left upper quadrant embolization coils near the gastric fundus. No dilation or wall thickening. Lymph nodes: No abdominal or pelvic lymphadenopathy. Mesentery/Peritoneum: No ascites or mass. Retroperitoneum: No mass.   Pelvis: No mass, ascites or fluid collection. Bones/Soft Tissues: No significant finding. Localizer images: No additional findings.    IMPRESSION: No acute aortic pathology. Mild gallbladder wall thickening with pericholecystic fluid, which can be seen in the setting of acute cholecystitis although no cholelithiasis or biliary duct dilation is identified. Consider further evaluation with right upper quadrant ultrasound if there is a clinical concern for acute cholecystitis. No acute findings in the chest. : ZHANG   Transcribe Date/Time: May 30 2024 12:16P Dictated by : KATIE GOLDEN MD This examination was interpreted and the report reviewed and electronically signed by:  SAIRA KATE MD on May 30 2024 12:52PM  EST    CT cervical spine wo IV contrast    Result Date: 5/30/2024  * * *Final Report* * * DATE OF EXAM: May 30 2024 11:56AM   [facility]   0505  -  CT CERVICAL SPINE WO IVCON  / ACCESSION #  [id number] PROCEDURE REASON: Spine fracture, cervical, traumatic      * * * * Physician Interpretation * * * *  EXAMINATION:  CT BRAIN WO IVCON, CT CERVICAL SPINE WO IVCON CLINICAL HISTORY:  Cerebral hemorrhage suspected .  Head pain and cervical spine pain after injury.  Acute Dissection TECHNIQUE:  Serial axial unenhanced images were obtained from the  vertex to the foramen magnum.  Spiral, high resolution axial unenhanced images were obtained from the skull base to the cervicothoracic junction with sagittal and coronal planar reconstructions. MQ:  CTBCSWO_3 Dose-Length Product (DLP): 2190 mGy*cm. CT Dose Reduction Employed: Automated exposure control(AEC) and iterative recon COMPARISON:  None. RESULT: BRAIN: Some detail especially at skull base limited by motion artifact. Post-operative change:  None. Acute change:   No evidence of an acute infarct or other acute parenchymal process. Hemorrhage:    No evidence of acute intracranial hemorrhage. Mass effect / Mass lesion:   There is no evidence of an intracranial mass or extraaxial fluid collection.  No significant mass effect. Chronic change:   None apparent. Parenchyma:  There is no significant volume loss. Ventricles:   The ventricles are within normal limits of size and configuration for age. Paranasal sinuses and skull base:  The visualized paranasal sinuses are grossly clear. CERVICAL: Counting reference:  Craniocervical junction.   Anatomic Variants:  None. Alignment:    Alignment is anatomic. Craniocervical junction:    Craniocervical junction is normal. Osseous structures/fracture:    No evidence of a lytic or blastic process in the visualized spine.  No evidence of acute or chronic fracture. Cervical soft tissues:   The imaged  thyroid gland is normal.    The paraspinal soft tissues are within normal limits. Degenerative changes: No significant degenerative changes.  (topogram) images: Radiodensity suggesting embolization coils in LEFT upper quadrant. -    IMPRESSION: CT brain: No evidence of acute intracranial process. CT cervical spine: No evidence of an acute fracture. Anatomic Variant:  None.  Assume 7 cervical vertebrae with counting from the craniocervical junction. : PSCVALERIE   Transcribe Date/Time: May 30 2024 11:58A Dictated by : SAIRA KATE MD This examination was interpreted and the report reviewed and electronically signed by: SAIRA KATE MD on May 30 2024 12:03PM  EST    CT head wo IV contrast    Result Date: 5/30/2024  * * *Final Report* * * DATE OF EXAM: May 30 2024 11:56AM   [facility]   0504  -  CT BRAIN WO IVCON  / ACCESSION #  [id number] PROCEDURE REASON: Cerebral hemorrhage suspected      * * * * Physician Interpretation * * * *  EXAMINATION:  CT BRAIN WO IVCON, CT CERVICAL SPINE WO IVCON CLINICAL HISTORY:  Cerebral hemorrhage suspected .  Head pain and cervical spine pain after injury.  Acute Dissection TECHNIQUE:  Serial axial unenhanced images were obtained from the  vertex to the foramen magnum.  Spiral, high resolution axial unenhanced images were obtained from the skull base to the cervicothoracic junction with sagittal and coronal planar reconstructions. MQ:  CTBCSWO_3 Dose-Length Product (DLP): 2190 mGy*cm. CT Dose Reduction Employed: Automated exposure control(AEC) and iterative recon COMPARISON:  None. RESULT: BRAIN: Some detail especially at skull base limited by motion artifact. Post-operative change:  None. Acute change:   No evidence of an acute infarct or other acute parenchymal process. Hemorrhage:    No evidence of acute intracranial hemorrhage. Mass effect / Mass lesion:   There is no evidence of an intracranial mass or extraaxial fluid collection.  No significant mass effect. Chronic  change:   None apparent. Parenchyma:  There is no significant volume loss. Ventricles:   The ventricles are within normal limits of size and configuration for age. Paranasal sinuses and skull base:  The visualized paranasal sinuses are   grossly clear. CERVICAL: Counting reference:  Craniocervical junction.   Anatomic Variants:  None. Alignment:    Alignment is anatomic. Craniocervical junction:    Craniocervical junction is normal. Osseous structures/fracture:    No evidence of a lytic or blastic process in the visualized spine.  No evidence of acute or chronic fracture. Cervical soft tissues:   The imaged thyroid gland is normal.    The paraspinal soft tissues are within normal limits. Degenerative changes: No significant degenerative changes.  (topogram) images: Radiodensity suggesting embolization coils in LEFT upper quadrant. -    IMPRESSION: CT brain: No evidence of acute intracranial process. CT cervical spine: No evidence of an acute fracture. Anatomic Variant:  None.  Assume 7 cervical vertebrae with counting from the craniocervical junction. : KanjoyaB   Transcribe Date/Time: May 30 2024 11:58A Dictated by : SAIRA KATE MD This examination was interpreted and the report reviewed and electronically signed by: SAIRA KATE MD on May 30 2024 12:03PM  EST    XR chest 1 view    Result Date: 5/30/2024  * * *Final Report* * * DATE OF EXAM: May 30 2024 11:18AM   [facility]   [id number]  -  XR CHEST 1V FRONTAL PORT  / ACCESSION #  [id number] PROCEDURE REASON: Fatigue and malaise      * * * * Physician Interpretation * * * *  EXAMINATION:  CHEST RADIOGRAPH (PORTABLE SINGLE VIEW AP) Exam Date/Time:  5/30/2024 11:18 AM CLINICAL HISTORY: Fatigue and malaise, Fatigue and malaise MQ:  XCPR_5 Comparison:  No relevant prior RESULT: Lines, tubes, and devices:  None. Lungs and pleura:  Medial apices are obscured by the patient's overlapping soft tissues.  Visualized lungs demonstrate no alveolar airspace opacity.   Costophrenic angles are clear. Cardiomediastinal silhouette:  Normal cardiomediastinal silhouette. Other:  .    IMPRESSION: No radiographic evidence for acute cardiopulmonary disease. : PSCB   Transcribe Date/Time: May 30 2024 11:23A Dictated by : CASSANDRA LIM MD This examination was interpreted and the report reviewed and electronically signed by: CASSANDRA LIM MD on May 30 2024 11:23AM  EST    Vascular US Lower Extremity Venous Duplex Bilateral    Result Date: 5/15/2024         [place] [address] Tel [phone] and Fax [phone]  Vascular Lab Report [facility] US LOWER EXTREMITY VENOUS DUPLEX BILATERAL  Patient Name:     DONALD STATON       Reading Physician: [id number] Neda Bob MD Study Date:       5/14/2024           Ordering           [id number] measuring                                       Physician:         ZENON MRN/PID:          [id number]            Technologist:      Fina Fine RUST Accession#:       [id number]        Technologist 2: Date of           1970 / 53      Encounter#:        [id number] Birth/Age:        years Gender:           M Admission Status: Outpatient          Location           [facility]                                       Performed:  Diagnosis/ICD: Localized swelling, mass and lump, neck-R22.1 CPT Codes:     39380 Peripheral venous duplex scan for DVT complete  CONCLUSIONS: Right Lower Venous: No evidence of acute deep vein thrombus visualized in the right lower extremity. Additional Findings; Lymph nodes is noted in the right groin measuring 1.2 cm x 0.47 cm x 1.6 cm. Left Lower Venous: No evidence of acute deep vein thrombus visualized in the left lower extremity. Additional Findings; Lymph nodes is noted in the left groin measuring 1.4 cm x 0.56 cm x 2.8 cm.  Imaging & Doppler Findings:  Right                 Compressible Thrombus        Flow  Distal External Iliac     Yes        None   Spontaneous/Phasic CFV                       Yes        None   Spontaneous/Phasic PFV                       Yes        None FV Proximal               Yes        None   Spontaneous/Phasic FV Mid                    Yes        None FV Distal                 Yes        None Popliteal                 Yes        None   Spontaneous/Phasic Peroneal                  Yes        None PTV                       Yes        None  Left                  Compress Thrombus        Flow Distal External Iliac   Yes      None   Spontaneous/Phasic CFV                     Yes      None   Spontaneous/Phasic PFV                     Yes      None FV Proximal             Yes      None   Spontaneous/Phasic FV Mid                  Yes      None FV Distal               Yes      None Popliteal               Yes      None   Spontaneous/Phasic Peroneal                Yes      None PTV                     Yes      None  [id number] Neda Bob MD Electronically signed by [id number] Neda Bob MD on 5/15/2024 at 11:31:57 PM  ** Final **        Assessment/Plan   Principal Problem:    Encephalopathy  This is a 53-year-old man with a history of fentanyl overdose presenting for altered mental status which did not respond to naloxone however per note he is improving.  He is being treated with broad-spectrum antibiotics and acyclovir Eliquis is on board but has been on hold.  Question is whether this is meningitis or prolonged effects of fentanyl over dose.  Will follow through EEG.        I spent 60 minutes in the professional and overall care of this patient.      Aasef G Shaikh, MD PhD

## 2024-05-31 NOTE — CARE PLAN
The patient's goals for the shift include  No falls, improved mental status     The clinical goals for the shift include Keep SBP < 160, mental status improvement, prevent falls and injury, frequent rounding      Problem: Pain  Goal: My pain/discomfort is manageable  5/30/2024 2119 by Delon Gonzalez RN  Outcome: Progressing  5/30/2024 2118 by Delon Gonzalez RN  Outcome: Progressing     Problem: Safety  Goal: Patient will be injury free during hospitalization  5/30/2024 2119 by Delon Gonzalez RN  Outcome: Progressing  5/30/2024 2118 by Delon Gonzalez RN  Outcome: Progressing  Goal: I will remain free of falls  5/30/2024 2119 by Delon Gonzalez RN  Outcome: Progressing  5/30/2024 2118 by Delon Gonzalez RN  Outcome: Progressing     Problem: Daily Care  Goal: Daily care needs are met  5/30/2024 2119 by Delon Gonzalez RN  Outcome: Progressing  5/30/2024 2118 by Delon Gonzalez RN  Outcome: Progressing     Problem: Psychosocial Needs  Goal: Demonstrates ability to cope with hospitalization/illness  5/30/2024 2119 by Delon Gonzalez RN  Outcome: Progressing  5/30/2024 2118 by Delon Gonzalez RN  Outcome: Progressing  Goal: Collaborate with me, my family, and caregiver to identify my specific goals  5/30/2024 2119 by Delon Gonzalez RN  Outcome: Progressing  5/30/2024 2118 by Delon Gonzalez RN  Outcome: Progressing     Problem: Discharge Barriers  Goal: My discharge needs are met  5/30/2024 2119 by Delon Gonzalez RN  Outcome: Progressing  5/30/2024 2118 by Delon Gonzalez RN  Outcome: Progressing     Problem: Fall/Injury  Goal: Not fall by end of shift  5/30/2024 2119 by Delon Gonzalez RN  Outcome: Progressing  5/30/2024 2118 by Delon Gonzalez RN  Outcome: Progressing  Goal: Be free from injury by end of the shift  5/30/2024 2119 by Delon Gonzalez RN  Outcome: Progressing  5/30/2024 2118 by Delon Gonzalez RN  Outcome: Progressing  Goal: Verbalize understanding of personal risk factors for fall in the hospital  5/30/2024 2119 by  Delon Lisa, RN  Outcome: Progressing  5/30/2024 2118 by Delon Gonzalez RN  Outcome: Progressing  Goal: Verbalize understanding of risk factor reduction measures to prevent injury from fall in the home  5/30/2024 2119 by Delon Gonzalez RN  Outcome: Progressing  5/30/2024 2118 by Delon Gonzalez RN  Outcome: Progressing  Goal: Use assistive devices by end of the shift  5/30/2024 2119 by Delon Gonzalez RN  Outcome: Progressing  5/30/2024 2118 by Delon Gonzalez RN  Outcome: Progressing  Goal: Pace activities to prevent fatigue by end of the shift  5/30/2024 2119 by Delon Gonzalez RN  Outcome: Progressing  5/30/2024 2118 by Delon Gonzalez RN  Outcome: Progressing

## 2024-05-31 NOTE — PROGRESS NOTES
Pharmacy Medication History Review   Spoke to the patients Sig. Other. Patient takes Eliquis daily.  No longer on Lasix and Pot. Chl. Only on briefly for 3 days     Douglas Wilson is a 53 y.o. male admitted for Encephalopathy. Pharmacy reviewed the patient's [id number] medications and allergies for accuracy.    The list below reflectives the updated PTA list. Please review each medication in order reconciliation for additional clarification and justification.  Prior to Admission Medications   Prescriptions Last Dose Informant   apixaban (Eliquis) 5 mg tablet 5/29/2024 at 09    Sig: Take 1 tablet (5 mg) by mouth 2 times a day.   cholecalciferol (Vitamin D3) 1,250 mcg (50,000 unit) tablet 5/26/2024    Sig: Take 1 tablet (50,000 Units) by mouth every 7 days.   Patient taking differently: Take 1 tablet (50,000 Units) by mouth every 7 days. On Sunday   ferrous gluconate 324 (38 Fe) mg tablet 5/28/2024    Sig: Take 1 tablet (38 mg of iron) by mouth every other day.   folic acid (Folvite) 1 mg tablet     Sig: Take 1 tablet (1 mg) by mouth once daily.              pantoprazole (ProtoNix) 40 mg EC tablet 5/29/2024    Sig: Take 1 tablet (40 mg) by mouth 2 times a day before meals. Do not crush, chew, or split.                         sildenafil (Viagra) 100 mg tablet     Sig: Take 1 tablet (100 mg) by mouth if needed for erectile dysfunction.   tadalafil (Cialis) 5 mg tablet     Sig: Take 1 tablet (5 mg) by mouth once daily.   Patient taking differently: Take 1 tablet (5 mg) by mouth once daily as needed for erectile dysfunction.   tamsulosin (Flomax) 0.4 mg 24 hr capsule 5/29/2024 at 09 Family Member   Sig: Take 1 capsule (0.4 mg) by mouth once daily. Med has not been filled since April. Caregiver confirmed that patient actually takes at home      Facility-Administered Medications: None       The list below reflectives the updated allergy list. Please review each documented allergy for additional clarification and  justification.  Allergies  Reviewed by Corina Bhatti RN on 5/30/2024   No Known Allergies         Below are additional concerns with the patient's PTA list.      Louise Goode

## 2024-05-31 NOTE — PROGRESS NOTES
Occupational Therapy    Evaluation    Patient Name: Douglas Wilson  MRN: [id number]  Today's Date: 5/31/2024  Time Calculation  Start Time: 1213  Stop Time: 1223  Time Calculation (min): 10 min        Assessment:  OT Assessment: patient presents with unsteadiness and impulsivity, contributing to a high fall risk. pt does not demonstrate ability to follow cues for slowing pace/ safety. pt was pleasant and cooperative. would benefit from low intensity OT to maximzie safety.  Prognosis: Fair  Barriers to Discharge: Decreased caregiver support  Evaluation/Treatment Tolerance: Patient tolerated treatment well  Medical Staff Made Aware: Yes  End of Session Communication: Bedside nurse  End of Session Patient Position: Bed, 4 rail up, Alarm on, Held/seated with caregiver  OT Assessment Results: Decreased ADL status, Decreased safe judgment during ADL, Decreased cognition, Decreased endurance, Decreased IADLs  Prognosis: Fair  Barriers to Discharge: Decreased caregiver support  Evaluation/Treatment Tolerance: Patient tolerated treatment well  Medical Staff Made Aware: Yes  Strengths: Capable of completing ADLs semi/independent  Barriers to Participation: Coping skills, Comorbidities, Insight into problems, Premorbid level of function  Plan:  Treatment Interventions: ADL retraining, Functional transfer training, Endurance training, Cognitive reorientation, Patient/family training, Equipment evaluation/education, Neuromuscular reeducation  OT Frequency: 2 times per week  OT Discharge Recommendations: Low intensity level of continued care  Equipment Recommended upon Discharge:  (TBD)  OT Recommended Transfer Status: Stand by assist, Assist of 1  OT - OK to Discharge: Yes  Treatment Interventions: ADL retraining, Functional transfer training, Endurance training, Cognitive reorientation, Patient/family training, Equipment evaluation/education, Neuromuscular reeducation    Subjective   Current Problem:  1. Encephalopathy   Transthoracic Echo (TTE) Complete    Transthoracic Echo (TTE) Complete      2. Abnormal EKG  Transthoracic Echo (TTE) Complete    Transthoracic Echo (TTE) Complete        General:  General  Reason for Referral: Pt admitted for hypertensive emergency following suspected OD on fentanyl. Pt admitted with elevated troponin and encephalopathy  Referred By: Dr. Ramirez  Past Medical History Relevant to Rehab:   Past Medical History:   Diagnosis Date    Acute appendicitis without peritonitis 03/12/2024    BPH (benign prostatic hyperplasia)     Heroin abuse (Multi)     NSTEMI (non-ST elevated myocardial infarction) (Multi)     Pulmonary embolism (Multi)     Splenic artery aneurysm (CMS-HCC) 02/2024    rupture s/p coil embolization    Upper GI bleed 03/15/2024    EGD with esophagitis, no active bleeding     Family/Caregiver Present: Yes  Caregiver Feedback: sitter present  Prior to Session Communication: Bedside nurse  Patient Position Received: Bed, 4 rail up, Alarm on, Held/seated with caregiver  General Comment: patient presents disoriented and lethargic but agreeable to OT  Precautions:  Medical Precautions: Fall precautions  Objective   Cognition:  Orientation Level: Disoriented to situation  Insight: Moderate  Impulsive: Moderately     Home Living:  Type of Home: House  Lives With: Significant other  Home Adaptive Equipment: None  Home Layout: One level, Laundry in basement, Able to live on main level with bedroom/bathroom  Home Access: Stairs to enter with rails  Entrance Stairs-Rails: Right  Entrance Stairs-Number of Steps: 2  Bathroom Shower/Tub: Tub/shower unit  Bathroom Toilet: Standard  Home Living Comments: patient confused during evaluation, unsure accuracy of information provided  Prior Function:  Level of Frontier: Independent with homemaking with ambulation, Independent with ADLs and functional transfers  ADL Assistance: Independent  Homemaking Assistance: Independent  Ambulatory Assistance:  Independent  Vocational: Full time employment ()  Hand Dominance: Right  IADL History:  Current License: Yes  ADL:  Eating Assistance: Stand by  Eating Deficit: Setup  Grooming Assistance: Stand by  Grooming Deficit: Setup  Bathing Assistance: Minimal  Bathing Deficit: Verbal cueing, Supervision/safety, Increased time to complete , Steadying  UE Dressing Assistance: Stand by  UE Dressing Deficit: Supervision/safety, Increased time to complete, Verbal cueing  LE Dressing Assistance: Minimal  LE Dressing Deficit: Steadying, Requires assistive device for steadying, Verbal cueing, Supervision/safety  Toileting Assistance with Device: Minimal  Toileting Deficit: Steadying, Verbal cueing, Supervison/safety, Increased time to complete, Use of bedpan/urinal setup  Activity Tolerance:  Endurance: Endurance does not limit participation in activity  Bed Mobility/Transfers: Bed Mobility  Bed Mobility: Yes  Bed Mobility 1  Bed Mobility 1: Supine to sitting  Level of Assistance 1: Modified independent  Bed Mobility Comments 1: impulsive  Bed Mobility 2  Bed Mobility  2: Sitting to supine  Level of Assistance 2: Modified independent  Bed Mobility Comments 2: impulsive    Transfers  Transfer: Yes  Transfer 1  Transfer From 1: Bed to  Transfer to 1: Stand  Technique 1: Stand to sit, Sit to stand  Transfer Device 1:  (no device)  Transfer Level of Assistance 1: Contact guard  Trials/Comments 1: cues to slow pace, impulsive and moving quickly  Sitting Balance:  Static Sitting Balance  Static Sitting-Balance Support: Bilateral upper extremity supported, Feet supported  Static Sitting-Level of Assistance: Close supervision  Dynamic Sitting Balance  Dynamic Sitting-Balance Support: Bilateral upper extremity supported, Feet supported  Dynamic Sitting-Balance: Forward lean  Dynamic Sitting-Comments: close SUP  Standing Balance:  Static Standing Balance  Static Standing-Balance Support: No upper extremity supported  Static  "Standing-Level of Assistance: Contact guard  Dynamic Standing Balance  Dynamic Standing-Balance Support: No upper extremity supported  Dynamic Standing-Balance: Turning  Dynamic Standing-Comments: CGA   Vision:Vision - Basic Assessment  Current Vision:  (reports no visual deficits but states R eyelid \"has no muscles\" and doesnt close)  Coordination:  Movements are Fluid and Coordinated: Yes   Hand Function:  Gross Grasp: Functional  Coordination: Functional  Extremities: RUE   RUE : Within Functional Limits and LUE   LUE: Within Functional Limits  Outcome Measures:[facility] Daily Activity  Putting on and taking off regular lower body clothing: A little  Bathing (including washing, rinsing, drying): A little  Putting on and taking off regular upper body clothing: A little  Toileting, which includes using toilet, bedpan or urinal: A little  Taking care of personal grooming such as brushing teeth: A little  Eating Meals: None  Daily Activity - Total Score: 19    Education Documentation  Body Mechanics, taught by Norah Lemus OT at 5/31/2024  1:08 PM.  Learner: Patient  Readiness: Nonacceptance  Method: Explanation, Demonstration  Response: Needs Reinforcement    Precautions, taught by Norah Lemus OT at 5/31/2024  1:08 PM.  Learner: Patient  Readiness: Nonacceptance  Method: Explanation, Demonstration  Response: Needs Reinforcement    ADL Training, taught by Norah Lemus OT at 5/31/2024  1:08 PM.  Learner: Patient  Readiness: Nonacceptance  Method: Explanation, Demonstration  Response: Needs Reinforcement    Education Comments  No comments found.    Goals:  Encounter Problems       Encounter Problems (Active)       ADLs       Patient with complete upper body dressing with set-up level of assistance donning and doffing all UE clothes with PRN adaptive equipment while edge of bed        Start:  05/31/24    Expected End:  06/14/24            Patient with complete lower body dressing with set-up level of assistance " donning and doffing all LE clothes  with PRN adaptive equipment while edge of bed        Start:  05/31/24    Expected End:  06/14/24            Patient will complete toileting including hygiene clothing management/hygiene with set-up level of assistance and grab bars.       Start:  05/31/24    Expected End:  06/14/24               BALANCE       Pt will maintain dynamic standing balance during ADL task with modified independent level of assistance in order to demonstrate decreased risk of falling and improved postural control.       Start:  05/31/24    Expected End:  06/14/24               MOBILITY       Patient will perform Functional mobility max Household distances/Community Distances with modified independent level of assistance and least restrictive device in order to improve safety and functional mobility.       Start:  05/31/24    Expected End:  06/14/24               TRANSFERS       Patient will complete functional transfer to all surfaces with least restrictive device with modified independent level of assistance.       Start:  05/31/24    Expected End:  06/14/24

## 2024-05-31 NOTE — CONSULTS
Inpatient consult to Cardiology  Consult performed by: DONTE Talavera-CNP  Consult ordered by: STEPHANIE Crockett  Reason for consult: Elevated trop          History Of Present Illness:    Douglas Wilson is a 53 y.o. male with a past medical history of Splenic artery aneurysm s/p embolization surgery (2/24) subsequently found to have a Pulmonary Embolism the same hospitalization and was started on Eliquis x 6 months> ending therapy expected 8/24 per outpt cards Dr. Alvarado, Hyperlipidemia, BPH, LUC, minimal CAD per coronary CTA 4/2022, Heroin use, nicotine dependence, Covid PNA(1/21), GERD. Last admit 2/22 trop elevated peaked at 1300> it was felt by Cards that elevation was d/t GI bleed and hemoperitoneum; ischemia less likely d/t lack of ischemic s/s, trivial CAD on CT angio in 2022 and TTE 2/22/24 revealed EF 70-75% with no WMA> stress test recommended outpt when recovers from illness. Patient presents this admit from [facility] to [facility] ICU for overdose evaluation>Altered LOC with positive urine screen for fentanyl, found to be in Hypertensive emergency. Cardiology consulted for further evaluation of Elevated trops 2315/1391.     Evaluated patient today here at [facility], patient reports that he is unclear why he is in the hospital.  He reports that he is unsure if he took any illicit drugs prior to admission. Currently, he reports that he feels comfortable at rest and has no complaints of chest pain, dyspnea, palpitations, dizziness or lightheadedness.  He states that he does not remember what medications that he takes at home other than he knows that he takes his Eliquis 5 mg p.o. twice daily.    Hospital course: Initial EKG: SR with LVH and ST abnormality HR 94  Arrival vital signs: Afebrile 97.3, HR 85, /102, 99% on room air  Current vital signs: , /120, 100% on room air  Pertinent imaging/Labs: HS trop 2315/1391, WBC 22.2, Hgb 15.9, , , K3.4, CR 0.93,CT angio of chest  reveals no acute; CTH reveals no acute intracranial hemorrhage, pathology or PE.    Home CV meds:  Eliquis 5 mg p.o. twice daily    All other systems reviewed and negative unless as mentioned in HPI.       Past Medical/ Surgical History:  Splenic artery aneurysm s/p embolization surgery (2/24)   Pulmonary Embolism  Hyperlipidemia  BPH  Obstructive sleep apnea  Heroin use  Nicotine dependence  Covid Pneumonia 1/22  GERD    Social History:  Currently Smokes 1/2 ppd x 30 years  Denies Alcohol use  States he snorts Heroin in the past, denies current illicit drug use      Family History:  Denies any family history of CAD or sudden cardiac death      Past Cardiology Tests (Last 3 Years):  Echocardiogram 2/27/24:  CONCLUSIONS:   1. Left ventricular systolic function is normal with a 60-65% estimated ejection fraction.   2. RVSP within normal limits.   3. The LVEF is no longer hyperdynamic. No focal wall motion abnormalities.       CTA coronary arteries with heart flow 4/19/2022  1. Minor coronary artery disease.  2. The proximal LAD has a small/focal region of calcified plaque with  minimal luminal stenosis (<25%).  3. The LM, LCx and RCA have no significant atherosclerotic change or  stenotic disease.  4. Right-dominant coronary artery system.     Exercise Stress Test:  Indication CP  8/2022 [facility]   This was a normal exercise stress test without evidence of stress induced   ischemia.   Prognosis is suggested to be good from this test ( functional capacity >/=   10 METs ).   Electronically signed by AGGIE POLK MD(Interpreting    physician) on 08/11/2022 02:33 PM     Allergies:  Patient has no known allergies.    ROS:  10 point review of systems including (Constitutional, Eyes, ENMT, Respiratory, Cardiac, Gastrointestinal, Neurological, Psychiatric, and Hematologic) was performed and is otherwise negative.    Objective Data:  Last Recorded Vitals:  Vitals:    05/31/24 0715 05/31/24 0730 05/31/24 0745 05/31/24  "08   BP: 116/90  133/82 (!) 164/120   BP Location:       Patient Position:       Pulse: 104 97 92    Resp: (!) 34 26 (!) 29    Temp:       TempSrc:       SpO2: 100%  100%    Weight:       Height:         Medical Gas Therapy: None (Room air)  Weight  Av.9 kg (180 lb 8.9 oz)  Min: 81.9 kg (180 lb 8.9 oz)  Max: 81.9 kg (180 lb 8.9 oz)      LABS:  CMP:  Results from last 7 days   Lab Units 24  1857   SODIUM mmol/L 134* 134*   POTASSIUM mmol/L 3.4* 3.2*   CHLORIDE mmol/L 101 100   CO2 mmol/L 18* 18*   ANION GAP mmol/L 18 19   BUN mg/dL 16 17   CREATININE mg/dL 0.93 0.97   EGFR mL/min/1.73m*2 >90 >90   ALBUMIN g/dL 3.8 4.2   ALT U/L  --  16   AST U/L  --  30   BILIRUBIN TOTAL mg/dL  --  0.7     CBC:  Results from last 7 days   Lab Units 24   WBC AUTO x10*3/uL 22.2* 22.5*   HEMOGLOBIN g/dL 15.9 16.0   HEMATOCRIT % 47.4 47.7   PLATELETS AUTO x10*3/uL 317 259   MCV fL 68* 68*     COAG:     ABO: No results found for: \"ABO\"  HEME/ENDO:     CARDIAC:   Results from last 7 days   Lab Units 24  0603 246 24  1857   TROPHS ng/L 1,391* 2,315* 1,863*             Last I/O:    Intake/Output Summary (Last 24 hours) at 2024  Last data filed at 2024 0759  Gross per 24 hour   Intake 1975.42 ml   Output 772 ml   Net 1203.42 ml     Net IO Since Admission: 1,203.42 mL [24]            Inpatient Medications:  Scheduled medications   Medication Dose Route Frequency    cefTRIAXone  2 g intravenous q12h    enoxaparin  1 mg/kg subcutaneous Daily    pantoprazole  40 mg oral Daily before breakfast    pantoprazole  40 mg intravenous Daily    thiamine  100 mg intravenous Daily     PRN medications   Medication    acetaminophen    metoprolol     Continuous Medications   Medication Dose Last Rate    lactated Ringer's  100 mL/hr      niCARdipine  2.5-15 mg/hr 5 mg/hr (24 0822)    potassium chloride in 0.9%NaCl  125 mL/hr 125 mL/hr (24 0300) "       Inpatient Medications:  Scheduled medications   Medication Dose Route Frequency    cefTRIAXone  2 g intravenous q12h    enoxaparin  1 mg/kg subcutaneous Daily    pantoprazole  40 mg oral Daily before breakfast    pantoprazole  40 mg intravenous Daily    thiamine  100 mg intravenous Daily     PRN medications   Medication    acetaminophen    metoprolol     Continuous Medications   Medication Dose Last Rate    lactated Ringer's  100 mL/hr      niCARdipine  2.5-15 mg/hr 5 mg/hr (05/31/24 0822)    potassium chloride in 0.9%NaCl  125 mL/hr 125 mL/hr (05/31/24 0300)     Outpatient Medications:  Current Outpatient Medications   Medication Instructions    apixaban (ELIQUIS) 5 mg, oral, 2 times daily    cholecalciferol (VITAMIN D3) 50,000 Units, oral, Every 7 days    ferrous gluconate 324 (38 Fe) mg tablet 38 mg of iron, oral, Every other day    folic acid (FOLVITE) 1 mg, oral, Daily RT    furosemide (LASIX) 20 mg, oral, Daily    multivitamin with minerals tablet 1 tablet, oral, Daily RT    pantoprazole (PROTONIX) 40 mg, oral, 2 times daily before meals, Do not crush, chew, or split.    potassium chloride CR (Klor-Con) 10 mEq ER tablet 10 mEq, oral, Daily, Do not crush, chew, or split.    sennosides-docusate sodium (Senokot-S) 8.6-50 mg tablet 2 tablets, oral, Nightly    sildenafil (Viagra) 100 mg tablet oral    tadalafil (CIALIS) 5 mg, oral, Daily    tamsulosin (FLOMAX) 0.4 mg, oral, Daily, Med has not been filled since April. Caregiver confirmed that patient actually takes at home        Physical Exam:  General:  Patient is awake, alert, and oriented.  Patient is in no acute distress.  HEENT:  Pupils equal and reactive.  Normocephalic.  Moist mucosa.    Neck:  No thyromegaly.  Normal Jugular Venous Pressure.  Cardiovascular:  Regular rate and rhythm. No cardiac murmurs noted.  Normal S1 and S2.  Pulmonary:  Clear to auscultation bilaterally.  Abdomen:  Soft. Non-tender.   Non-distended.  Positive bowel sounds.  Lower  Extremities:  2+ pedal pulses. No LE edema.  Neurologic:  Cranial nerves intact.  No focal deficit.   Skin: Skin warm and dry, normal skin turgor.   Psychiatric: Normal affect.     Assessment/Plan   Douglas Wilson is a 53 y.o. male with a past medical history of  Splenic artery aneurysm s/p embolization surgery (2/24) subsequently found to have a Pulmonary Embolism the same hospitalization and was started on Eliquis x 6 months> ending therapy expected 8/24 per outpt cards Dr. Alvarado, Hyperlipidemia, BPH, LUC, minimal CAD per coronary CTA 4/2022, Heroin use, nicotine dependence, Covid PNA(1/21), GERD. Last admit 2/22 trop elevated peaked at 1300> it was felt by Cards that elevation was d/t GI bleed and hemoperitoneum; ischemia less likely d/t lack of ischemic s/s, trivial CAD on CT angio in 2022 and TTE 2/22/24 revealed EF 70-75% with no WMA> stress test recommended outpt when recovers from illness. Patient presents this admit from [facility] to [facility] ICU for overdose evaluation>Altered LOC with positive urine screen for fentanyl, found to be in Hypertensive emergency. Cardiology consulted for further evaluation of Elevated trops 2315/1391.     I reviewed EKGs, labs and all imaging reports  Reviewed telemetry, SR/ST with occasional PVCs      Elevated troponins in the setting of concern for fentanyl overdose, metabolic encephalopathy versus meningitis, infection, hypertensive emergency.  No complaints of chest pain, EKG reveals LVH in the setting of uncontrolled HTN. Non-MI troponin elevation/acute non-traumatic  myocardial injury- core measures do not apply.     Hypertensive Emergency.        BP management /optimization of meds, currently on Nicardipine infusion    3.   Pulmonary embolism (2/24)> expected Eliquis therapy until 8/24      Recommendations after discussion with Cardiologist Dr. Newton  No ACS/ Chest pain s/s noted  Management of Metabolic Encephalopathy/ Infection/ Hypertensive Emergency per ICU  Monitor  over weekend for improvement in BP and HR and will plan for Coronary CTA prior to discharge  Repeat Echo and EKG pending  Pulmonary Embolism noted on CT 2/25/24> was on Eliquis 5 mg PO BID with plans to continue until 8/24> defer resuming to primary team      Code Status:  Full Code            Samaria Martinez, APRN-CNP

## 2024-05-31 NOTE — RESEARCH NOTES
Artificial Intelligence Monitoring in Nursing (AIMS Nursing) Study    Principle Investigator - Dr. Romulo Hoffman  Research Coordinator - Pradeep Wells RN     Patient Name - Douglas Wilson  Date - 5/31/2024 3:23 PM  Location - [facility] 4th floor    Douglas Wilson was approached by Pradeep Wells RN to talk about participating in the AIMS Nursing Study. The patient was not able to be approached, a research coordinator will come back at a later time. Study protocol was followed and patient was given study contact information.     Pradeep Wells RN

## 2024-05-31 NOTE — PROGRESS NOTES
"   05/31/24 1423   Discharge Planning   Living Arrangements Spouse/significant other   Support Systems Spouse/significant other;Parent;Family members   Assistance Needed drug resources   Type of Residence Private residence   Number of Stairs to Enter Residence 2   Number of Stairs Within Residence 13   Do you have animals or pets at home? No   Home or Post Acute Services None   Patient expects to be discharged to: home?   Does the patient need discharge transport arranged? Yes   RoundTrip coordination needed? Yes   Financial Resource Strain   How hard is it for you to pay for the very basics like food, housing, medical care, and heating? Not hard   Housing Stability   In the last 12 months, was there a time when you were not able to pay the mortgage or rent on time? N   In the last 12 months, how many places have you lived? 1   In the last 12 months, was there a time when you did not have a steady place to sleep or slept in a shelter (including now)? N   Transportation Needs   In the past 12 months, has lack of transportation kept you from medical appointments or from getting medications? no   In the past 12 months, has lack of transportation kept you from meetings, work, or from getting things needed for daily living? No     Met with patient's mom at bedside. Patient resting. Mom stated that patient has PCP, Dr. Chucho De Leon and sees him frequently. Per Mom, patient was attempting to self detox at home and patient's child called family members to get help, patient had passed out. Mom stated that patient has been using since 15 and has been in \"every\" treatment facility in the area. Mom shared that patient is a high functioning drug user- able to perform well at job, maintains relationships, etc. Patient lives with his fiance and recently obtained temporary custody of son. Patient was admitted frequently  in Feburary and March to [facility]. Patient had refused treatment or supports during previous admission, patient's " mom reported that patient will most likely refuse treatment or supports during this admission. SW will follow for social/discharge planning needs.

## 2024-05-31 NOTE — PROGRESS NOTES
05/31/24 1356   Discharge Planning   Patient expects to be discharged to: Home?        Patient is in ICU. EEG was done this morning, pending results. Neuro following for lethargy. MRI of brain pending. Sitter at bedside. Will follow for discharge needs. Requested from  to follow for substance resources.

## 2024-06-01 LAB
ALBUMIN SERPL BCP-MCNC: 3.7 G/DL (ref 3.4–5)
ANION GAP SERPL CALC-SCNC: 15 MMOL/L (ref 10–20)
BASOPHILS # BLD AUTO: 0.05 X10*3/UL (ref 0–0.1)
BASOPHILS NFR BLD AUTO: 0.3 %
BUN SERPL-MCNC: 13 MG/DL (ref 6–23)
CALCIUM SERPL-MCNC: 8.7 MG/DL (ref 8.6–10.3)
CHLORIDE SERPL-SCNC: 102 MMOL/L (ref 98–107)
CO2 SERPL-SCNC: 22 MMOL/L (ref 21–32)
CREAT SERPL-MCNC: 0.76 MG/DL (ref 0.5–1.3)
EGFRCR SERPLBLD CKD-EPI 2021: >90 ML/MIN/1.73M*2
EOSINOPHIL # BLD AUTO: 0 X10*3/UL (ref 0–0.7)
EOSINOPHIL NFR BLD AUTO: 0 %
ERYTHROCYTE [DISTWIDTH] IN BLOOD BY AUTOMATED COUNT: 17.6 % (ref 11.5–14.5)
ERYTHROCYTE [DISTWIDTH] IN BLOOD BY AUTOMATED COUNT: 17.6 % (ref 11.5–14.5)
GLUCOSE BLD MANUAL STRIP-MCNC: 126 MG/DL (ref 74–99)
GLUCOSE SERPL-MCNC: 107 MG/DL (ref 74–99)
HCT VFR BLD AUTO: 44.9 % (ref 41–52)
HCT VFR BLD AUTO: 44.9 % (ref 41–52)
HGB BLD-MCNC: 14.9 G/DL (ref 13.5–17.5)
HGB BLD-MCNC: 14.9 G/DL (ref 13.5–17.5)
IMM GRANULOCYTES # BLD AUTO: 0.07 X10*3/UL (ref 0–0.7)
IMM GRANULOCYTES NFR BLD AUTO: 0.4 % (ref 0–0.9)
LYMPHOCYTES # BLD AUTO: 2.18 X10*3/UL (ref 1.2–4.8)
LYMPHOCYTES NFR BLD AUTO: 11.8 %
MAGNESIUM SERPL-MCNC: 2.1 MG/DL (ref 1.6–2.4)
MCH RBC QN AUTO: 22.9 PG (ref 26–34)
MCH RBC QN AUTO: 22.9 PG (ref 26–34)
MCHC RBC AUTO-ENTMCNC: 33.2 G/DL (ref 32–36)
MCHC RBC AUTO-ENTMCNC: 33.2 G/DL (ref 32–36)
MCV RBC AUTO: 69 FL (ref 80–100)
MCV RBC AUTO: 69 FL (ref 80–100)
MONOCYTES # BLD AUTO: 2.23 X10*3/UL (ref 0.1–1)
MONOCYTES NFR BLD AUTO: 12.1 %
NEUTROPHILS # BLD AUTO: 13.93 X10*3/UL (ref 1.2–7.7)
NEUTROPHILS NFR BLD AUTO: 75.4 %
NRBC BLD-RTO: 0 /100 WBCS (ref 0–0)
NRBC BLD-RTO: 0 /100 WBCS (ref 0–0)
PHOSPHATE SERPL-MCNC: 2.8 MG/DL (ref 2.5–4.9)
PLATELET # BLD AUTO: 213 X10*3/UL (ref 150–450)
PLATELET # BLD AUTO: 213 X10*3/UL (ref 150–450)
POTASSIUM SERPL-SCNC: 3.6 MMOL/L (ref 3.5–5.3)
RBC # BLD AUTO: 6.5 X10*6/UL (ref 4.5–5.9)
RBC # BLD AUTO: 6.5 X10*6/UL (ref 4.5–5.9)
SODIUM SERPL-SCNC: 135 MMOL/L (ref 136–145)
VANCOMYCIN SERPL-MCNC: 7.9 UG/ML (ref 5–20)
WBC # BLD AUTO: 18.4 X10*3/UL (ref 4.4–11.3)
WBC # BLD AUTO: 18.4 X10*3/UL (ref 4.4–11.3)

## 2024-06-01 PROCEDURE — 36415 COLL VENOUS BLD VENIPUNCTURE: CPT | Performed by: STUDENT IN AN ORGANIZED HEALTH CARE EDUCATION/TRAINING PROGRAM

## 2024-06-01 PROCEDURE — 2500000004 HC RX 250 GENERAL PHARMACY W/ HCPCS (ALT 636 FOR OP/ED): Mod: JZ

## 2024-06-01 PROCEDURE — 85025 COMPLETE CBC W/AUTO DIFF WBC: CPT | Performed by: STUDENT IN AN ORGANIZED HEALTH CARE EDUCATION/TRAINING PROGRAM

## 2024-06-01 PROCEDURE — 83735 ASSAY OF MAGNESIUM: CPT | Performed by: STUDENT IN AN ORGANIZED HEALTH CARE EDUCATION/TRAINING PROGRAM

## 2024-06-01 PROCEDURE — 2020000001 HC ICU ROOM DAILY

## 2024-06-01 PROCEDURE — 2500000002 HC RX 250 W HCPCS SELF ADMINISTERED DRUGS (ALT 637 FOR MEDICARE OP, ALT 636 FOR OP/ED): Performed by: SURGERY

## 2024-06-01 PROCEDURE — 2500000004 HC RX 250 GENERAL PHARMACY W/ HCPCS (ALT 636 FOR OP/ED)

## 2024-06-01 PROCEDURE — 80202 ASSAY OF VANCOMYCIN: CPT | Performed by: PHARMACIST

## 2024-06-01 PROCEDURE — 99291 CRITICAL CARE FIRST HOUR: CPT | Performed by: SURGERY

## 2024-06-01 PROCEDURE — 2500000004 HC RX 250 GENERAL PHARMACY W/ HCPCS (ALT 636 FOR OP/ED): Performed by: STUDENT IN AN ORGANIZED HEALTH CARE EDUCATION/TRAINING PROGRAM

## 2024-06-01 PROCEDURE — 82947 ASSAY GLUCOSE BLOOD QUANT: CPT

## 2024-06-01 PROCEDURE — 2500000001 HC RX 250 WO HCPCS SELF ADMINISTERED DRUGS (ALT 637 FOR MEDICARE OP): Performed by: NURSE PRACTITIONER

## 2024-06-01 PROCEDURE — C9113 INJ PANTOPRAZOLE SODIUM, VIA: HCPCS | Performed by: STUDENT IN AN ORGANIZED HEALTH CARE EDUCATION/TRAINING PROGRAM

## 2024-06-01 PROCEDURE — 2500000004 HC RX 250 GENERAL PHARMACY W/ HCPCS (ALT 636 FOR OP/ED): Performed by: PHARMACIST

## 2024-06-01 PROCEDURE — 80069 RENAL FUNCTION PANEL: CPT | Performed by: STUDENT IN AN ORGANIZED HEALTH CARE EDUCATION/TRAINING PROGRAM

## 2024-06-01 PROCEDURE — 2500000004 HC RX 250 GENERAL PHARMACY W/ HCPCS (ALT 636 FOR OP/ED): Performed by: NURSE PRACTITIONER

## 2024-06-01 RX ORDER — HYDRALAZINE HYDROCHLORIDE 20 MG/ML
20 INJECTION INTRAMUSCULAR; INTRAVENOUS EVERY 6 HOURS PRN
Status: DISCONTINUED | OUTPATIENT
Start: 2024-06-01 | End: 2024-06-05 | Stop reason: HOSPADM

## 2024-06-01 RX ORDER — TAMSULOSIN HYDROCHLORIDE 0.4 MG/1
0.4 CAPSULE ORAL DAILY
Status: DISCONTINUED | OUTPATIENT
Start: 2024-06-01 | End: 2024-06-05 | Stop reason: HOSPADM

## 2024-06-01 RX ORDER — ACETAMINOPHEN 325 MG/1
650 TABLET ORAL EVERY 4 HOURS PRN
Status: DISCONTINUED | OUTPATIENT
Start: 2024-06-01 | End: 2024-06-05 | Stop reason: HOSPADM

## 2024-06-01 RX ORDER — HALOPERIDOL 5 MG/ML
INJECTION INTRAMUSCULAR
Status: COMPLETED
Start: 2024-06-01 | End: 2024-06-01

## 2024-06-01 RX ORDER — HALOPERIDOL 5 MG/ML
5 INJECTION INTRAMUSCULAR EVERY 4 HOURS PRN
Status: DISCONTINUED | OUTPATIENT
Start: 2024-06-01 | End: 2024-06-05 | Stop reason: HOSPADM

## 2024-06-01 RX ORDER — HYDRALAZINE HYDROCHLORIDE 20 MG/ML
INJECTION INTRAMUSCULAR; INTRAVENOUS
Status: COMPLETED
Start: 2024-06-01 | End: 2024-06-01

## 2024-06-01 RX ADMIN — CEFTRIAXONE 2 G: 2 INJECTION, POWDER, FOR SOLUTION INTRAMUSCULAR; INTRAVENOUS at 13:00

## 2024-06-01 RX ADMIN — HYDRALAZINE HYDROCHLORIDE 20 MG: 20 INJECTION INTRAMUSCULAR; INTRAVENOUS at 12:47

## 2024-06-01 RX ADMIN — ACYCLOVIR SODIUM 680 MG: 50 INJECTION, SOLUTION INTRAVENOUS at 13:01

## 2024-06-01 RX ADMIN — ACYCLOVIR SODIUM 680 MG: 50 INJECTION, SOLUTION INTRAVENOUS at 22:40

## 2024-06-01 RX ADMIN — PANTOPRAZOLE SODIUM 40 MG: 40 TABLET, DELAYED RELEASE ORAL at 10:29

## 2024-06-01 RX ADMIN — PANTOPRAZOLE SODIUM 40 MG: 40 INJECTION, POWDER, FOR SOLUTION INTRAVENOUS at 09:00

## 2024-06-01 RX ADMIN — AMLODIPINE BESYLATE 5 MG: 5 TABLET ORAL at 09:00

## 2024-06-01 RX ADMIN — CEFTRIAXONE 2 G: 2 INJECTION, POWDER, FOR SOLUTION INTRAMUSCULAR; INTRAVENOUS at 01:00

## 2024-06-01 RX ADMIN — HALOPERIDOL 5 MG: 5 INJECTION INTRAMUSCULAR at 12:46

## 2024-06-01 RX ADMIN — THIAMINE HYDROCHLORIDE 100 MG: 100 INJECTION, SOLUTION INTRAMUSCULAR; INTRAVENOUS at 10:29

## 2024-06-01 RX ADMIN — ENOXAPARIN SODIUM 40 MG: 100 INJECTION SUBCUTANEOUS at 09:00

## 2024-06-01 RX ADMIN — ACETAMINOPHEN 650 MG: 325 TABLET ORAL at 11:47

## 2024-06-01 RX ADMIN — TAMSULOSIN HYDROCHLORIDE 0.4 MG: 0.4 CAPSULE ORAL at 12:47

## 2024-06-01 RX ADMIN — VANCOMYCIN HYDROCHLORIDE 1250 MG: 1.25 INJECTION, POWDER, LYOPHILIZED, FOR SOLUTION INTRAVENOUS at 10:36

## 2024-06-01 RX ADMIN — HALOPERIDOL LACTATE 5 MG: 5 INJECTION, SOLUTION INTRAMUSCULAR at 12:46

## 2024-06-01 RX ADMIN — ACETAMINOPHEN 650 MG: 325 TABLET ORAL at 19:22

## 2024-06-01 RX ADMIN — POTASSIUM CHLORIDE AND SODIUM CHLORIDE 125 ML/HR: 900; 150 INJECTION, SOLUTION INTRAVENOUS at 06:16

## 2024-06-01 RX ADMIN — POTASSIUM CHLORIDE AND SODIUM CHLORIDE 125 ML/HR: 900; 150 INJECTION, SOLUTION INTRAVENOUS at 14:38

## 2024-06-01 RX ADMIN — POTASSIUM CHLORIDE AND SODIUM CHLORIDE 125 ML/HR: 900; 150 INJECTION, SOLUTION INTRAVENOUS at 10:31

## 2024-06-01 RX ADMIN — ACYCLOVIR SODIUM 680 MG: 50 INJECTION, SOLUTION INTRAVENOUS at 06:16

## 2024-06-01 RX ADMIN — SODIUM CHLORIDE, POTASSIUM CHLORIDE, SODIUM LACTATE AND CALCIUM CHLORIDE 100 ML/HR: 600; 310; 30; 20 INJECTION, SOLUTION INTRAVENOUS at 21:47

## 2024-06-01 RX ADMIN — SODIUM CHLORIDE, POTASSIUM CHLORIDE, SODIUM LACTATE AND CALCIUM CHLORIDE 100 ML/HR: 600; 310; 30; 20 INJECTION, SOLUTION INTRAVENOUS at 08:00

## 2024-06-01 RX ADMIN — NICARDIPINE HYDROCHLORIDE 2.5 MG/HR: 0.2 INJECTION, SOLUTION INTRAVENOUS at 07:26

## 2024-06-01 ASSESSMENT — PAIN - FUNCTIONAL ASSESSMENT
PAIN_FUNCTIONAL_ASSESSMENT: 0-10

## 2024-06-01 ASSESSMENT — PAIN SCALES - GENERAL
PAINLEVEL_OUTOF10: 0 - NO PAIN
PAINLEVEL_OUTOF10: 3
PAINLEVEL_OUTOF10: 0 - NO PAIN
PAINLEVEL_OUTOF10: 0 - NO PAIN
PAINLEVEL_OUTOF10: 3
PAINLEVEL_OUTOF10: 0 - NO PAIN

## 2024-06-01 NOTE — PROGRESS NOTES
"Nutrition Note    Late entry:   Chart reviewed, events noted.   Pt transferred from OSH, overdose, encephalopathy/altered mental status, hypertensive emergency.     Pt remains NPO d/t altered mental status.     Visit Vitals  /81   Pulse 109   Temp 37.5 °C (99.5 °F) (Temporal)   Resp 20   Ht 1.727 m (5' 7.99\")   Wt 82.8 kg (182 lb 8.7 oz)   SpO2 98%   BMI 27.76 kg/m²   Smoking Status Every Day   BSA 1.99 m²     Wt Readings from Last 20 Encounters:   06/01/24 82.8 kg (182 lb 8.7 oz)   05/08/24 84.8 kg (187 lb)   05/08/24 86.4 kg (190 lb 6.4 oz)   04/23/24 82.1 kg (181 lb)   04/04/24 82.8 kg (182 lb 9.6 oz)   03/25/24 81.6 kg (180 lb)   03/16/24 76.1 kg (167 lb 12.3 oz)   03/12/24 76.2 kg (168 lb)   02/26/24 76.2 kg (168 lb)   02/23/24 79.4 kg (175 lb 0.7 oz)   01/24/22 74.8 kg (165 lb)   12/06/19 83.9 kg (184 lb 15.5 oz)           Scheduled medications  acyclovir, 10 mg/kg (Ideal), intravenous, q8h  amLODIPine, 5 mg, oral, Daily  cefTRIAXone, 2 g, intravenous, q12h  enoxaparin, 40 mg, subcutaneous, Daily  pantoprazole, 40 mg, oral, Daily before breakfast  pantoprazole, 40 mg, intravenous, Daily  perflutren lipid microspheres, 0.5-10 mL of dilution, intravenous, Once in imaging  perflutren protein A microsphere, 0.5 mL, intravenous, Once in imaging  sulfur hexafluoride microsphr, 2 mL, intravenous, Once in imaging  thiamine, 100 mg, intravenous, Daily  vancomycin, 1,250 mg, intravenous, q12h      Continuous medications  dexmedeTOMIDine, 0.1-1.5 mcg/kg/hr, Last Rate: Stopped (05/31/24 8596)  lactated Ringer's, 100 mL/hr  niCARdipine, 2.5-15 mg/hr, Last Rate: 5 mg/hr (06/01/24 0656)  potassium chloride in 0.9%NaCl, 125 mL/hr, Last Rate: 125 mL/hr (06/01/24 0616)      PRN medications  PRN medications: acetaminophen, metoprolol, vancomycin   Latest Reference Range & Units 05/31/24 11:45   GLUCOSE 74 - 99 mg/dL 110 (H)   SODIUM 136 - 145 mmol/L 134 (L)   POTASSIUM 3.5 - 5.3 mmol/L 3.8   CHLORIDE 98 - 107 mmol/L 102 "   Bicarbonate 21 - 32 mmol/L 23   Anion Gap 10 - 20 mmol/L 13   Blood Urea Nitrogen 6 - 23 mg/dL 15   Creatinine 0.50 - 1.30 mg/dL 0.80   EGFR >60 mL/min/1.73m*2 >90   Calcium 8.6 - 10.3 mg/dL 8.6   PHOSPHORUS 2.5 - 4.9 mg/dL 2.1 (L)   Albumin 3.4 - 5.0 g/dL 3.8   HDL CHOLESTEROL mg/dL 53.3   Cholesterol/HDL Ratio  3.8   LDL Calculated <=99 mg/dL 115 (H)   VLDL 0 - 40 mg/dL 31   TRIGLYCERIDES 0 - 149 mg/dL 157 (H)   Non HDL Cholesterol 0 - 149 mg/dL 147   Creatine Kinase 0 - 325 U/L 710 (H)   MAGNESIUM 1.60 - 2.40 mg/dL 2.10   CHOLESTEROL 0 - 199 mg/dL 200 (H)   (H): Data is abnormally high  (L): Data is abnormally low    Complete assessment/recommendation to follow. Wt stable per wt hx/chart review. Recommend oral diet when appropriate per MD. If pt remains NPO for greater than seven days, will need to consider alternative means of nutrition support.     RDN to follow.

## 2024-06-01 NOTE — PROGRESS NOTES
Douglas Wilson is a 53 y.o. male on day 2 of admission presenting with Encephalopathy.    Subjective   HD # 2 for this 53 year old male with MMP but also a significant history or illicit substance abuse transferred from [facility] ([facility]) with suspicion of drug overdose and AMS.     This 52 YO male returned to the ED of [facility] after a previous admission for abdominal pain during which a splenic artery aneurysm was embolized. Days after he was discharged he again returned to the ED with abdominal pain a repeat CT scan of the abdomen which showed no intra-abdominal pathology but some pulmonary emboli. He was admitted for further treatment and a request for transfer to [facility] placed, the patient was encephalopathic and as a result was transferred to [facility]. He is being treated for an OD of an unknown substance as he has not responded to Naloxone which would be suspected for an opioid overdose. His presentation was that with eyes open but not communicative nor responsive to commands. In the [facility] ED He was tachycardic (HR > 130) and hypotensive. An ABG revealed a metabolic acidosis with a pH ~7.2, lactate of 10, anion gap > 30 and blood glucose > 280 (down trending on second assessment to 235). No ketones ,  elevated  Troponin> NO history of trauma family has no additional information .Parkinson scan Brain, C Spine, Chest Abd Pelvis plain and CTA all negative, His toxicology screen was positive for fentanyl . He has an [id number] medical history including multiple admissions at [facility] including:   Feb 2024 - Splenic aneurysm rupture requiring emergent coiling; also had a NSTEMI (Troponin ~1200)  Feb 2024 - Return to  with bilateral PE - started on a DOAC  March 2024 - Return to  with abdominal secondary to appendicitis - underwent lap. 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 Denies alcohol           Objective     Physical Exam    Last Recorded Vitals  Blood pressure 153/79, pulse 98, temperature 36.4 °C (97.5 °F), temperature source Temporal, resp. rate (!) 29, height 1.727 m (5' 7.99\"), weight 82.8 kg (182 lb 8.7 oz), SpO2 99%.  Intake/Output last 3 Shifts:  I/O last 3 completed shifts:  In: 5184.1 (62.6 mL/kg) [P.O.:805; I.V.:2601.9 (31.4 mL/kg); IV Piggyback:1777.2]  Out: 2647 (32 mL/kg) [Urine:2647 (0.9 mL/kg/hr)]  Weight: 82.8 kg   Awakens to names, disregards examiner. Oriented x 2 somewhat vague on time. Will follow multi step commands symmetric chest expansion RRR ABD soft non tender bland no masses. Wife at bedside all questions answered. Imp remains hypophosphatemic, mild hypokalemia ( < 3.5)  Will repeat CK. Social service consult for acute drug rehabilitation Drug Tox screen positive only for fentanyl  lasting effects of diluting or cutting  agent  ? Versus non tested illicit Remains under treatment for encephalitis but waxing waning nature of AMS belies against this as a primary diagnosis           Assessment/Plan   Principal Problem:    Encephalopathy  Drug Abuse encephalopathy     LABS:  CMP:  Results from last 7 days   Lab Units 06/01/24  0526 05/31/24  1145 05/30/24 2025 05/30/24  1857   SODIUM mmol/L 135* 134* 134* 134*   POTASSIUM mmol/L 3.6 3.8 3.4* 3.2*   CHLORIDE mmol/L 102 102 101 100   CO2 mmol/L 22 23 18* 18*   ANION GAP mmol/L 15 13 18 19   BUN mg/dL 13 15 16 17   CREATININE mg/dL 0.76 0.80 0.93 0.97   EGFR mL/min/1.73m*2 >90 >90 >90 >90   MAGNESIUM mg/dL 2.10 2.10  --   --    ALBUMIN g/dL 3.7 3.8 3.8 4.2   ALT U/L  --   --   --  16   AST U/L  --   --   --  30 " "  BILIRUBIN TOTAL mg/dL  --   --   --  0.7     CBC:  Results from last 7 days   Lab Units 06/01/24  0526 05/31/24  0603 05/30/24  2025   WBC AUTO x10*3/uL 18.4* 22.2* 22.5*   HEMOGLOBIN g/dL 14.9 15.9 16.0   HEMATOCRIT % 44.9 47.4 47.7   PLATELETS AUTO x10*3/uL 213 317 259   MCV fL 69* 68* 68*     COAG:     ABO: No results found for: \"ABO\"  HEME/ENDO:     CARDIAC:   Results from last 7 days   Lab Units 05/31/24  0603 05/31/24  0006 05/30/24  1857   TROPHS ng/L 1,391* 2,315* 1,863*     This critically ill patient continues to be at-risk for clinically significant deterioration / failure due to the above mentioned dysfunctional, unstable organ systems.  I have personally identified and managed all complex critical care issues to prevent aforementioned clinical deterioration.  Critical care time is spent at bedside and/or the immediate area and has included, but is not limited to, the review of diagnostic tests, labs, radiographs, serial assessments of hemodynamics, respiratory status, ventilatory management, and family updates.  Time spent in procedures and teaching are reported separately.     CRITICAL CARE TIME: 80 minutes    Pernell Lagos MD      "

## 2024-06-01 NOTE — PROGRESS NOTES
Vancomycin Dosing by Pharmacy- FOLLOW UP    Douglas Wilson is a 53 y.o. year old male who Pharmacy has been consulted for vancomycin dosing for CNS/meningoencephalitis. Based on the patient's indication and renal status this patient is being dosed based on a goal AUC of 500-600.     Renal function is currently stable.    Current vancomycin dose: 1000 mg given every 12 hours    Estimated vancomycin AUC on current dose: 196 mg/L.hr     Visit Vitals  BP (!) 171/110   Pulse 109   Temp 37.5 °C (99.5 °F) (Temporal)   Resp 16        Lab Results   Component Value Date    CREATININE 0.76 2024    CREATININE 0.80 2024    CREATININE 0.93 2024    CREATININE 0.97 2024        Patient weight is as follows:   Vitals:    24 0600   Weight: 82.8 kg (182 lb 8.7 oz)       Cultures:  No results found for the encounter in last 14 days.       I/O last 3 completed shifts:  In: 5184.1 (62.6 mL/kg) [P.O.:805; I.V.:2601.9 (31.4 mL/kg); IV Piggyback:1777.2]  Out: 2647 (32 mL/kg) [Urine:2647 (0.9 mL/kg/hr)]  Weight: 82.8 kg   I/O during current shift:  No intake/output data recorded.    Temp (24hrs), Av.6 °C (99.7 °F), Min:36.9 °C (98.5 °F), Max:38.6 °C (101.5 °F)      Assessment/Plan    Below goal AUC. Orders placed for new vancomcyin regimen of 1250 every 12 hours to begin at 1100.     This dosing regimen is predicted by [employer] to result in the following pharmacokinetic parameters:  Loading dose: N/A  Regimen: 1250 mg IV every 8 hours.  Start time: 23:14 on 2024  Exposure target: AUC24 (range)400-600 mg/L.hr   AUC24,ss: 521 mg/L.hr  Probability of AUC24 > 400: 94 %  Ctrough,ss: 14.8 mg/L  Probability of Ctrough,ss > 20: 22 %  Probability of nephrotoxicity (Lodise BJ ): 10 %    The next level will be obtained on 6/3 at 0500. May be obtained sooner if clinically indicated.   Will continue to monitor renal function daily while on vancomycin and order serum creatinine at least every 48 hours if not  already ordered.  Follow for continued vancomycin needs, clinical response, and signs/symptoms of toxicity.       Yessi Atwood, PharmD

## 2024-06-02 LAB
ALBUMIN SERPL BCP-MCNC: 3.3 G/DL (ref 3.4–5)
ANION GAP SERPL CALC-SCNC: 15 MMOL/L (ref 10–20)
BASOPHILS # BLD AUTO: 0.04 X10*3/UL (ref 0–0.1)
BASOPHILS NFR BLD AUTO: 0.4 %
BUN SERPL-MCNC: 12 MG/DL (ref 6–23)
CALCIUM SERPL-MCNC: 8.2 MG/DL (ref 8.6–10.3)
CHLORIDE SERPL-SCNC: 107 MMOL/L (ref 98–107)
CO2 SERPL-SCNC: 20 MMOL/L (ref 21–32)
CREAT SERPL-MCNC: 0.89 MG/DL (ref 0.5–1.3)
EGFRCR SERPLBLD CKD-EPI 2021: >90 ML/MIN/1.73M*2
EOSINOPHIL # BLD AUTO: 0.03 X10*3/UL (ref 0–0.7)
EOSINOPHIL NFR BLD AUTO: 0.3 %
ERYTHROCYTE [DISTWIDTH] IN BLOOD BY AUTOMATED COUNT: 15.2 % (ref 11.5–14.5)
GLUCOSE SERPL-MCNC: 101 MG/DL (ref 74–99)
HCT VFR BLD AUTO: 36.6 % (ref 41–52)
HGB BLD-MCNC: 12.1 G/DL (ref 13.5–17.5)
IMM GRANULOCYTES # BLD AUTO: 0.04 X10*3/UL (ref 0–0.7)
IMM GRANULOCYTES NFR BLD AUTO: 0.4 % (ref 0–0.9)
LYMPHOCYTES # BLD AUTO: 2.34 X10*3/UL (ref 1.2–4.8)
LYMPHOCYTES NFR BLD AUTO: 21.1 %
MAGNESIUM SERPL-MCNC: 1.8 MG/DL (ref 1.6–2.4)
MCH RBC QN AUTO: 22.6 PG (ref 26–34)
MCHC RBC AUTO-ENTMCNC: 33.1 G/DL (ref 32–36)
MCV RBC AUTO: 68 FL (ref 80–100)
MONOCYTES # BLD AUTO: 1.33 X10*3/UL (ref 0.1–1)
MONOCYTES NFR BLD AUTO: 12 %
NEUTROPHILS # BLD AUTO: 7.3 X10*3/UL (ref 1.2–7.7)
NEUTROPHILS NFR BLD AUTO: 65.8 %
NRBC BLD-RTO: 0 /100 WBCS (ref 0–0)
PHOSPHATE SERPL-MCNC: 2.4 MG/DL (ref 2.5–4.9)
PLATELET # BLD AUTO: 185 X10*3/UL (ref 150–450)
POTASSIUM SERPL-SCNC: 3.5 MMOL/L (ref 3.5–5.3)
RBC # BLD AUTO: 5.36 X10*6/UL (ref 4.5–5.9)
SODIUM SERPL-SCNC: 138 MMOL/L (ref 136–145)
WBC # BLD AUTO: 11.1 X10*3/UL (ref 4.4–11.3)

## 2024-06-02 PROCEDURE — 83735 ASSAY OF MAGNESIUM: CPT | Performed by: STUDENT IN AN ORGANIZED HEALTH CARE EDUCATION/TRAINING PROGRAM

## 2024-06-02 PROCEDURE — 2500000001 HC RX 250 WO HCPCS SELF ADMINISTERED DRUGS (ALT 637 FOR MEDICARE OP): Performed by: NURSE PRACTITIONER

## 2024-06-02 PROCEDURE — 80069 RENAL FUNCTION PANEL: CPT | Performed by: STUDENT IN AN ORGANIZED HEALTH CARE EDUCATION/TRAINING PROGRAM

## 2024-06-02 PROCEDURE — 2500000004 HC RX 250 GENERAL PHARMACY W/ HCPCS (ALT 636 FOR OP/ED): Mod: JZ | Performed by: FAMILY MEDICINE

## 2024-06-02 PROCEDURE — 36415 COLL VENOUS BLD VENIPUNCTURE: CPT | Performed by: SURGERY

## 2024-06-02 PROCEDURE — 85025 COMPLETE CBC W/AUTO DIFF WBC: CPT | Performed by: SURGERY

## 2024-06-02 PROCEDURE — 2500000004 HC RX 250 GENERAL PHARMACY W/ HCPCS (ALT 636 FOR OP/ED): Performed by: SURGERY

## 2024-06-02 PROCEDURE — C9113 INJ PANTOPRAZOLE SODIUM, VIA: HCPCS | Performed by: STUDENT IN AN ORGANIZED HEALTH CARE EDUCATION/TRAINING PROGRAM

## 2024-06-02 PROCEDURE — 2500000002 HC RX 250 W HCPCS SELF ADMINISTERED DRUGS (ALT 637 FOR MEDICARE OP, ALT 636 FOR OP/ED): Performed by: SURGERY

## 2024-06-02 PROCEDURE — 2500000004 HC RX 250 GENERAL PHARMACY W/ HCPCS (ALT 636 FOR OP/ED): Performed by: PHARMACIST

## 2024-06-02 PROCEDURE — 2500000004 HC RX 250 GENERAL PHARMACY W/ HCPCS (ALT 636 FOR OP/ED): Performed by: NURSE PRACTITIONER

## 2024-06-02 PROCEDURE — 2500000004 HC RX 250 GENERAL PHARMACY W/ HCPCS (ALT 636 FOR OP/ED): Performed by: STUDENT IN AN ORGANIZED HEALTH CARE EDUCATION/TRAINING PROGRAM

## 2024-06-02 PROCEDURE — 1200000002 HC GENERAL ROOM WITH TELEMETRY DAILY

## 2024-06-02 PROCEDURE — 2500000004 HC RX 250 GENERAL PHARMACY W/ HCPCS (ALT 636 FOR OP/ED): Mod: JZ

## 2024-06-02 PROCEDURE — 97161 PT EVAL LOW COMPLEX 20 MIN: CPT | Mod: GP

## 2024-06-02 RX ADMIN — VANCOMYCIN HYDROCHLORIDE 1250 MG: 1.25 INJECTION, POWDER, LYOPHILIZED, FOR SOLUTION INTRAVENOUS at 00:41

## 2024-06-02 RX ADMIN — ACYCLOVIR SODIUM 680 MG: 50 INJECTION, SOLUTION INTRAVENOUS at 09:10

## 2024-06-02 RX ADMIN — VANCOMYCIN HYDROCHLORIDE 1250 MG: 1.25 INJECTION, POWDER, LYOPHILIZED, FOR SOLUTION INTRAVENOUS at 11:35

## 2024-06-02 RX ADMIN — ACYCLOVIR SODIUM 680 MG: 50 INJECTION, SOLUTION INTRAVENOUS at 13:28

## 2024-06-02 RX ADMIN — TAMSULOSIN HYDROCHLORIDE 0.4 MG: 0.4 CAPSULE ORAL at 09:08

## 2024-06-02 RX ADMIN — CEFTRIAXONE 2 G: 2 INJECTION, POWDER, FOR SOLUTION INTRAMUSCULAR; INTRAVENOUS at 03:08

## 2024-06-02 RX ADMIN — PANTOPRAZOLE SODIUM 40 MG: 40 INJECTION, POWDER, FOR SOLUTION INTRAVENOUS at 09:15

## 2024-06-02 RX ADMIN — CEFTRIAXONE 2 G: 2 INJECTION, POWDER, FOR SOLUTION INTRAMUSCULAR; INTRAVENOUS at 14:40

## 2024-06-02 RX ADMIN — POTASSIUM CHLORIDE AND SODIUM CHLORIDE 125 ML/HR: 900; 150 INJECTION, SOLUTION INTRAVENOUS at 16:32

## 2024-06-02 RX ADMIN — AMLODIPINE BESYLATE 5 MG: 5 TABLET ORAL at 09:08

## 2024-06-02 RX ADMIN — ACETAMINOPHEN 650 MG: 325 TABLET ORAL at 00:45

## 2024-06-02 RX ADMIN — ENOXAPARIN SODIUM 40 MG: 100 INJECTION SUBCUTANEOUS at 09:15

## 2024-06-02 RX ADMIN — SODIUM CHLORIDE, POTASSIUM CHLORIDE, SODIUM LACTATE AND CALCIUM CHLORIDE 100 ML/HR: 600; 310; 30; 20 INJECTION, SOLUTION INTRAVENOUS at 09:10

## 2024-06-02 RX ADMIN — PANTOPRAZOLE SODIUM 40 MG: 40 TABLET, DELAYED RELEASE ORAL at 09:07

## 2024-06-02 RX ADMIN — HYDRALAZINE HYDROCHLORIDE 20 MG: 20 INJECTION INTRAMUSCULAR; INTRAVENOUS at 03:11

## 2024-06-02 RX ADMIN — VANCOMYCIN HYDROCHLORIDE 1250 MG: 1.25 INJECTION, POWDER, LYOPHILIZED, FOR SOLUTION INTRAVENOUS at 23:58

## 2024-06-02 RX ADMIN — THIAMINE HYDROCHLORIDE 100 MG: 100 INJECTION, SOLUTION INTRAMUSCULAR; INTRAVENOUS at 09:15

## 2024-06-02 RX ADMIN — POTASSIUM CHLORIDE AND SODIUM CHLORIDE 125 ML/HR: 900; 150 INJECTION, SOLUTION INTRAVENOUS at 09:10

## 2024-06-02 RX ADMIN — POTASSIUM CHLORIDE AND SODIUM CHLORIDE 125 ML/HR: 900; 150 INJECTION, SOLUTION INTRAVENOUS at 00:34

## 2024-06-02 ASSESSMENT — PAIN - FUNCTIONAL ASSESSMENT
PAIN_FUNCTIONAL_ASSESSMENT: 0-10

## 2024-06-02 ASSESSMENT — PAIN SCALES - GENERAL
PAINLEVEL_OUTOF10: 0 - NO PAIN

## 2024-06-02 ASSESSMENT — COGNITIVE AND FUNCTIONAL STATUS - GENERAL
TURNING FROM BACK TO SIDE WHILE IN FLAT BAD: A LITTLE
MOVING FROM LYING ON BACK TO SITTING ON SIDE OF FLAT BED WITH BEDRAILS: A LITTLE
WALKING IN HOSPITAL ROOM: A LITTLE
MOBILITY SCORE: 24
STANDING UP FROM CHAIR USING ARMS: A LITTLE
CLIMB 3 TO 5 STEPS WITH RAILING: A LITTLE
MOBILITY SCORE: 18
DAILY ACTIVITIY SCORE: 24
MOVING TO AND FROM BED TO CHAIR: A LITTLE

## 2024-06-02 ASSESSMENT — ACTIVITIES OF DAILY LIVING (ADL): ADL_ASSISTANCE: INDEPENDENT

## 2024-06-02 NOTE — NURSING NOTE
During bedside report the patient's family ( significant other and mom)  expressed frustration and that they wanted to speak with a provider about the patient's NPO status

## 2024-06-02 NOTE — PROGRESS NOTES
Douglas Wilson is a 53 y.o. male on day 3 of admission presenting with Encephalopathy.      Subjective   Pt admitted to icu from [facility] for change in mental status and drug overdose suspected, urine tox +ve for fentanyl, pt has been stablized and is going to be transferred to floor for ongoing care  Does have multiple medical issues  His chart reviewed   HPI: Patient is non-communicative at present. As history taken from handover from transferring MD and from the chart.     Patient presented to [facility] ED for evaluation of an overdose. Received naloxone prior to arrival to the ED without improvement.  Patient was opening eyes, intermittent yawning, but was non-communicative nor able to follow instructions.  Noted with tachycardiac (HR > 130) and hypotensive. ABG demonstrated a metabolic acidosis with a pH ~7.2, lactate of 10, anion gap > 30 and blood glucose > 280 (down trending on second assessment to 235). No ketones in urine. Troponin also elevated at 268. It was unclear if the patient had experienced a seizure prior to arrival, but bite marks noted to the tongue. Limited collateral history from family (father). No history of falls or trauma.      Investigations at [facility]:  CT Brain: No evidence of an acute infarct or other acute parenchymal process.   CT Cervical Spine: No evidence of an acute fracture.   CTA Chest: no evidence of thoracic aortic intramural  hematoma. No lung consolidation. No pleural effusion  CTA Abdo/Pelvis: Mild gallbladder wall thickening and pericholecystic fluid. No cholelithiasis or biliary duct dilation is identified. No mass, ascites or fluid collection.   US gallbladder: No cholelithiasis.  No sonographic findings for acute cholecystitis.      Patient resuscitated with 30cc/kg of crystalloid with improved hemodynamics. Metabolic status improved with a normalization of the pH to 7.38 and lactate down trending to 6.3 on a venous blood gas at 1549 hrs. F     Urine tox  screen:   Phencyclidine - Negative   Benzodiazepines -  Negative   Cocaine - Negative   Amphetamines - Negative   Cannabinoids - Negative   Opiates -  Negative, however positive for fentanyl.   Barbiturates - Negative   Ethanol, Urine <11   Oxycodone - Negative      Family requesting transfer to [facility] due to previous care at this institution.     Recent  admissions for:  Feb 2024 - Splenic aneurysm rupture requiring emergent coiling; also had a NSTEMI (Troponin ~1200)  Feb 2024 - Return to  with bilateral PE - started on a DOAC  March 2024 - Return to  with abdominal secondary to appendicitis - underwent lap. Appendectomy  March 2024 - Hematemesis - EGD undertaken demonstrating Grade C esophagitis in the lower third of the esophagus, Moderate, patchy erythematous mucosa in the body of the stomach and antrum and Type I hiatal hernia     TTE  2/27/24  CONCLUSIONS:   1. Left ventricular systolic function is normal with a 60-65% estimated ejection fraction.   2. RVSP within normal limits.     Coronary Artery CT Angio (4/19/22):  1. Minor coronary artery disease.  2. The proximal LAD has a small/focal region of calcified plaque with minimal luminal stenosis (<25%).  3. The LM, LCx and RCA have no significant atherosclerotic change or stenotic disease.  4. Right-dominant coronary artery system.     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 He has never used smokeless tobacco. He reports current drug use. Drug: Heroin. He reports that he does not drink alcohol.     Family History  Family History          Family History   Problem Relation Name Age of Onset    Diabetes Father        Hypertension Father        No Known Problems Son             raymond    Deafness Son        Other (orthopedic) Son             wearing braces to help  with walking            Allergies  Patient has no known allergies.       Today pt is feeling better  No chest pain  No shortness of breath  Apatite fair  No difficulty swallowing   No nausea vomiting  Denies depression     Objective     Last Recorded Vitals  /73 (BP Location: Left arm, Patient Position: Sitting)   Pulse 87 Comment: 89 after activity  Temp 37.8 °C (100 °F) (Temporal)   Resp 23   Wt 82.8 kg (182 lb 8.7 oz)   SpO2 91% Comment: 98% after activity  Intake/Output last 3 Shifts:    Intake/Output Summary (Last 24 hours) at 6/2/2024 1538  Last data filed at 6/2/2024 0800  Gross per 24 hour   Intake 7579.87 ml   Output 2850 ml   Net 4729.87 ml       Admission Weight  Weight: 81.9 kg (180 lb 8.9 oz) (05/30/24 1826)    Daily Weight  06/02/24 : 82.8 kg (182 lb 8.7 oz)    Image Results  MR brain w and wo IV contrast  Narrative: Interpreted By:  Jeremy Adkins,   STUDY:  MR BRAIN W AND WO IV CONTRAST;  5/31/2024 10:38 pm      INDICATION:  Signs/Symptoms:Decreased LOC.      COMPARISON:  None.      ACCESSION NUMBER(S):  [id number]      ORDERING CLINICIAN:  FABIO HARRISON      TECHNIQUE:  Axial diffusion, axial T2, axial FLAIR, axial gradient echo T2, axial  T1, post gadolinium volumetric T1, as well as post gadolinium axial  T1 weighted MRI images of the brain were obtained. The patient  received  16 mL of  Dotarem gadolinium intravenously.      FINDINGS:  The study is degraded by motion.      There are motion degraded diffusion-weighted images fail to  demonstrate evidence of abnormal diffusion restriction to suggest  acute infarction.      The cerebellar tonsils are low lying with the right cerebellar tonsil  extending approximately 5 mm below level of the foramen magnum which  is borderline by MRI criteria for a Chiari malformation.      There are a few small nonspecific white matter changes noted within  cerebral hemispheres bilaterally. While nonspecific, white matter  changes can be seen with  small-vessel ischemic change or  demyelinating processes among others.      There are subtle patchy areas of mild increased FLAIR signal along  cortex and subcortical white matter of the cerebral hemispheres  bilaterally best appreciated within the frontal, parietal, and  occipital regions which may be technical/artifactual in origin  although if real, possible considerations could include an underlying  process such as posterior reversible encephalopathy syndrome (PRES),  encephalitis, or vasculitis among others.      No abnormal intracranial mass lesion or abnormal intracranial  enhancement is identified on the postcontrast images.      There is minimal mucosal thickening noted within the inferior right  maxillary sinus and a few scattered ethmoid air cells.      The mastoid air cells are clear.      Impression: The study is degraded by motion.      There are motion degraded diffusion-weighted images fail to  demonstrate evidence of abnormal diffusion restriction to suggest  acute infarction.      The cerebellar tonsils are low lying with the right cerebellar tonsil  extending approximately 5 mm below level of the foramen magnum which  is borderline by MRI criteria for a Chiari malformation.      There are a few small nonspecific white matter changes noted within  cerebral hemispheres bilaterally. While nonspecific, white matter  changes can be seen with small-vessel ischemic change or  demyelinating processes among others.      There are subtle patchy areas of mild increased FLAIR signal along  cortex and subcortical white matter of the cerebral hemispheres  bilaterally best appreciated within the frontal, parietal, and  occipital regions which may be technical/artifactual in origin  although if real, possible considerations could include an underlying  process such as posterior reversible encephalopathy syndrome (PRES),  encephalitis, or vasculitis among others.      MACRO:  None.      Signed by: Jeremy Adkins  6/1/2024 9:01 AM  Dictation workstation:   [id number]      Physical Exam  Alert appropriate no distress  Chest clear  CVS regular   Ext no edema  HEENT left eye does have increased blinking and prior surgery   Neck supple no jvd   Psych flat affect  Abdo soft nontender bs active, no masses  Relevant Results             Assessment/Plan   This patient currently has cardiac telemetry ordered; if you would like to modify or discontinue the telemetry order, click here to go to the orders activity to modify/discontinue the order.              Principal Problem:    Encephalopathy  Elevated troponin noted cardio input , pt will need cath prior to discharge    HTN controlled    H/o PE   H/o spleenic art aneurysm rupture s/p coiling in feb 2024  Concern for meningitis pt is on acyclovir, and rocephin will ask ID to see  Anticoagulation pt was on eliquis prior to admission , will reassess for resuming   Dvt ppx lovenox     Ok for floor with tele              Nilton Ramirez MD

## 2024-06-02 NOTE — PROGRESS NOTES
Douglas Wilson is a 53 y.o. male on day 3 of admission presenting with Encephalopathy.    Subjective   HD # 3 for this 53 year old male with MMP but also a significant history or illicit substance abuse transferred from OSH ([facility]) with suspicion of drug overdose and AMS three days ago.      This 52 YO male returned to the ED of [facility] for further treatment and a request for transfer to [facility], the patient was encephalopathic and as a result was transferred to [facility]. He is being treated for an OD of an unknown substance as he has not responded to Naloxone which would be suspected for an opioid overdose. His presentation was that with eyes open but not communicative nor responsive to commands. In the OSH ED He was tachycardic (HR > 130) and hypotensive. An ABG revealed a metabolic acidosis with a pH ~7.2, lactate of 10, anion gap > 30 and blood glucose > 280 (down trending on second assessment to 235). No ketones ,  elevated  Troponin> NO history of trauma family has no additional information .Parkinson scan Brain, C Spine, Chest Abd Pelvis plain and CTA all negative, His toxicology screen was positive for fentanyl . He has an [id number] medical history including multiple admissions at [facility] including:   Feb 2024 - Splenic aneurysm rupture requiring emergent coiling; also had a NSTEMI (Troponin ~1200)  Feb 2024 - Return to  with bilateral PE - started on a DOAC  March 2024 - Return to  with abdominal secondary to appendicitis - underwent lap. Appendectomy  March 2024 - Hematemesis - EGD undertaken demonstrating Grade C esophagitis in the lower third of the esophagus, Moderate, patchy erythematous mucosa in the body of the stomach and antrum and Type I hiatal hernia       PMH   CAD - recent NSTEMI  Previous PE - On Eliquis  Splenic Artery Aneurysm rupture - required coiling  Appendectomy  Esophagitis/GI Bleed  BPH  Fentanyl use disorder  Drug abuse Heroin     SH positive cigarettes denies smokeless tobacco +  "illicit drugs Heroin. Denies alcohol        Objective     Physical Exam     Today Awake regards and interacts with examiner. Oriented x 4. Will follow multi step commands symmetric chest expansion RRR ABD soft non-tender bland no masses. Wife at bedside all questions answered. Social service consult for acute drug rehabilitation Drug Tox screen positive only for fentanyl  lasting effects of diluting or cutting  agent  ? versus non-specific testing for metabolite of heroin -- morphine or 6-acetylmorphine (6-AM) , as  heroin is quickly metabolized and not excreted in urine to any appreciable extent, tests may miss this if a specific search for the metabolite is missing. The possibility of other non-tested illicits remains. Still with antibiotic / anti-viral treatment for encephalitis remains afebrile no meningeal signs or fever. Ready for TR to RNF    Last Recorded Vitals  Blood pressure 158/79, pulse 97, temperature 37.8 °C (100 °F), temperature source Temporal, resp. rate (!) 32, height 1.727 m (5' 7.99\"), weight 82.8 kg (182 lb 8.7 oz), SpO2 99%.  Intake/Output last 3 Shifts:  I/O last 3 completed shifts:  In: 06253.6 (129.1 mL/kg) [P.O.:2391; I.V.:6943.2 (83.9 mL/kg); IV Piggyback:1354.4]  Out: 4775 (57.7 mL/kg) [Urine:4775 (1.6 mL/kg/hr)]  Weight: 82.8 kg   LABS:  CMP:  Results from last 7 days   Lab Units 06/02/24  0453 06/01/24  0526 05/31/24  1145 05/30/24 2025 05/30/24  1857   SODIUM mmol/L 138 135* 134* 134* 134*   POTASSIUM mmol/L 3.5 3.6 3.8 3.4* 3.2*   CHLORIDE mmol/L 107 102 102 101 100   CO2 mmol/L 20* 22 23 18* 18*   ANION GAP mmol/L 15 15 13 18 19   BUN mg/dL 12 13 15 16 17   CREATININE mg/dL 0.89 0.76 0.80 0.93 0.97   EGFR mL/min/1.73m*2 >90 >90 >90 >90 >90   MAGNESIUM mg/dL 1.80 2.10 2.10  --   --    ALBUMIN g/dL 3.3* 3.7 3.8 3.8 4.2   ALT U/L  --   --   --   --  16   AST U/L  --   --   --   --  30   BILIRUBIN TOTAL mg/dL  --   --   --   --  0.7     CBC:  Results from last 7 days   Lab Units " "06/02/24  0453 06/01/24  0526 05/31/24  0603 05/30/24  2025   WBC AUTO x10*3/uL 11.1 18.4*  18.4* 22.2* 22.5*   HEMOGLOBIN g/dL 12.1* 14.9  14.9 15.9 16.0   HEMATOCRIT % 36.6* 44.9  44.9 47.4 47.7   PLATELETS AUTO x10*3/uL 185 213  213 317 259   MCV fL 68* 69*  69* 68* 68*     COAG:     ABO: No results found for: \"ABO\"  HEME/ENDO:     CARDIAC:   Results from last 7 days   Lab Units 05/31/24  0603 05/31/24  0006 05/30/24  1857   TROPHS ng/L 1,391* 2,315* 1,863*         This critically ill patient continues to be at-risk for clinically significant deterioration / failure due to the above mentioned dysfunctional, unstable organ systems.  I have personally identified and managed all complex critical care issues to prevent aforementioned clinical deterioration.  Critical care time is spent at bedside and/or the immediate area and has included, but is not limited to, the review of diagnostic tests, labs, radiographs, serial assessments of hemodynamics, respiratory status, ventilatory management, and family updates.  Time spent in procedures and teaching are reported separately.     CRITICAL CARE TIME:  35 minutes          Assessment/Plan   Principal Problem:    Encephalopathy  Undisclosed ilicit substance  ingestion      Pernell Lagos MD      "

## 2024-06-02 NOTE — PROGRESS NOTES
Physical Therapy    Physical Therapy Evaluation    Patient Name: Douglas Wilson  MRN: [id number]  Today's Date: 6/2/2024   Time Calculation  Start Time: 1202  Stop Time: 1213  Time Calculation (min): 11 min    Assessment/Plan   PT Assessment  PT Assessment Results: Decreased endurance, Impaired balance, Decreased mobility, Decreased safety awareness  Rehab Prognosis: Good  Barriers to Discharge: None identified  Evaluation/Treatment Tolerance: Patient tolerated treatment well  Medical Staff Made Aware: Yes  Strengths: Ability to acquire knowledge, Premorbid level of function  Barriers to Participation:  (None identified)  End of Session Communication: Bedside nurse  Assessment Comment: Pt present today with BLE strength WFL, fair balance, and endurance. Pt would benefit from continued PT to attempt stair negotiation and progress gait training to bring the pt closer to the PLOF while minimizing fall risk.  End of Session Patient Position: Up in chair, Alarm on  IP OR SWING BED PT PLAN  Inpatient or Swing Bed: Inpatient  PT Plan  Treatment/Interventions: Bed mobility, Transfer training, Gait training, Stair training, Balance training, Endurance training, Therapeutic exercise, Therapeutic activity, Postural re-education  PT Plan: Skilled PT  PT Frequency: 3 times per week  PT Discharge Recommendations: Low intensity level of continued care  PT Recommended Transfer Status: Stand by assist  PT - OK to Discharge: Yes (Per PT POC)    Subjective   General Visit Information:  General  Reason for Referral: 52 y/o M presenting with encephalopathy, abdominal pain, and suspected drug overdose  Referred By: TAMERA Marcano (APRN-CNP)  Past Medical History Relevant to Rehab:   Past Medical History:   Diagnosis Date    Acute appendicitis without peritonitis 03/12/2024    BPH (benign prostatic hyperplasia)     Heroin abuse (Multi)     NSTEMI (non-ST elevated myocardial infarction) (Multi)     Pulmonary embolism (Multi)     Splenic artery  aneurysm (CMS-HCC) 02/2024    rupture s/p coil embolization    Upper GI bleed 03/15/2024    EGD with esophagitis, no active bleeding     Family/Caregiver Present: Yes  Caregiver Feedback: Irineo present  Prior to Session Communication: Bedside nurse  Patient Position Received: Up in chair, Alarm on  Preferred Learning Style: auditory, kinesthetic, visual  General Comment: Pt up in chair upon PT arrival. Cleared to participate with RN, and agreeable to PT evaluation.  Home Living:  Home Living  Type of Home: House  Lives With: Significant other  Home Adaptive Equipment: None  Home Layout: One level, Laundry in basement, Able to live on main level with bedroom/bathroom  Home Access: Stairs to enter with rails  Entrance Stairs-Rails: Right  Entrance Stairs-Number of Steps: 2  Bathroom Shower/Tub: Tub/shower unit  Bathroom Toilet: Handicapped height  Bathroom Equipment: Grab bars in shower, Grab bars around toilet  Prior Level of Function:  Prior Function Per Pt/Caregiver Report  Level of [place]: Independent with ADLs and functional transfers, Independent with homemaking with ambulation  ADL Assistance: Independent  Homemaking Assistance: Independent  Ambulatory Assistance: Independent (No AD)  Vocational: Full time employment (Pt is a full time )  Precautions:  Precautions  Medical Precautions: Fall precautions  Vital Signs:  Vital Signs  Heart Rate: 87 (89 after activity)  Heart Rate Source: Monitor  SpO2: 91 % (98% after activity)  BP: 152/73  BP Location: Left arm  BP Method: Automatic  Patient Position: Sitting    Objective   Pain:  Pain Assessment  Pain Assessment: 0-10  Pain Score: 0 - No pain  Cognition:  Cognition  Orientation Level: Oriented X4    General Assessments:  Activity Tolerance  Activity Tolerance Comments: Fair tolerance to ambulation and standing activity    Sensation  Light Touch: No apparent deficits    Coordination  Movements are Fluid and Coordinated: Yes  Rapid Alternating  Movements: Intact  Alternating Toe Taps: Intact    Postural Control  Postural Control: Impaired  Head Control: Forward head with gaze directed at the floor in sitting and standing    Static Sitting Balance  Static Sitting-Balance Support: Bilateral upper extremity supported, Feet supported  Static Sitting-Level of Assistance: Distant supervision  Static Sitting-Comment/Number of Minutes: With posterior trunk support of recliner chair  Dynamic Sitting Balance  Dynamic Sitting-Balance Support: Feet supported, Bilateral upper extremity supported  Dynamic Sitting-Balance: Forward lean  Dynamic Sitting-Comments: Pt able to lean forward in recliner chair for more upright posture    Static Standing Balance  Static Standing-Balance Support: Bilateral upper extremity supported, No upper extremity supported  Static Standing-Level of Assistance: Contact guard  Static Standing-Comment/Number of Minutes: Trials with and without FWW support  Dynamic Standing Balance  Dynamic Standing-Balance Support: Bilateral upper extremity supported, No upper extremity supported  Dynamic Standing-Comments: Trials with and without FWW support  Functional Assessments:  Bed Mobility  Bed Mobility: No (Pt up in chair upon PT arrival and returned to chair at the end of the session. RN aware.)    Transfers  Transfer: Yes  Transfer 1  Transfer From 1: Chair with arms to  Transfer to 1: Stand  Technique 1: Sit to stand  Transfer Level of Assistance 1: Close supervision (Progressed from CGA)  Trials/Comments 1: x2 Trials. First trial with FWW support. Pt able to demonstrate BUE push from armrests of the chair to  a reasonable amount of time without overt LOB. Second trial without AD. No LOB noted in standing.  Transfers 2  Transfer From 2: Stand to  Transfer to 2: Chair with arms  Technique 2: Stand to sit  Transfer Level of Assistance 2: Close supervision  Trials/Comments 2: x2 Trials. Good BLE positioning against chair before attempting to  sit. FAir eccentric control on descent to the chair.    Ambulation/Gait Training  Ambulation/Gait Training Performed: Yes  Ambulation/Gait Training 1  Surface 1: Level tile  Device 1: No device, Rolling walker  Assistance 1: Contact guard, Minimal verbal cues  Comments/Distance (ft) 1: 23 ft x1 with FWW, 22 ft x 1 without AD. Increased martha with wide PAULA noted. Pt with gaze directed at the floor. VC for stable martha and forward gaze. No overt LOB noted. Good step length and reciprocal step through pattern observed.    Stairs  Stairs: No  Extremity/Trunk Assessments:  RLE   RLE : Within Functional Limits  LLE   LLE : Within Functional Limits  Outcome Measures:  [facility] Basic Mobility  Turning from your back to your side while in a flat bed without using bedrails: A little  Moving from lying on your back to sitting on the side of a flat bed without using bedrails: A little  Moving to and from bed to chair (including a wheelchair): A little  Standing up from a chair using your arms (e.g. wheelchair or bedside chair): A little  To walk in hospital room: A little  Climbing 3-5 steps with railing: A little  Basic Mobility - Total Score: 18    Encounter Problems       Encounter Problems (Active)       Balance       Goal 1 (Progressing)       Start:  06/02/24    Expected End:  06/16/24       Pt performs all sitting and standing balance IND            Mobility       STG - Patient will navigate 2 steps with right HR support mod IND (Not Progressing)       Start:  06/02/24    Expected End:  06/16/24            STG - Patient will ambulate (Progressing)       Start:  06/02/24    Expected End:  06/16/24       150 ft with supervision            PT Transfers       STG - Patient to transfer to and from sit to supine (Not Progressing)       Start:  06/02/24    Expected End:  06/16/24       IND         STG - Patient will transfer sit to and from stand (Progressing)       Start:  06/02/24    Expected End:  06/16/24       IND               Education Documentation  Body Mechanics, taught by Jos Tyson, PT at 6/2/2024  1:37 PM.  Learner: Patient  Readiness: Acceptance  Method: Explanation, Demonstration  Response: Demonstrated Understanding    Mobility Training, taught by Jos Tyson PT at 6/2/2024  1:37 PM.  Learner: Patient  Readiness: Acceptance  Method: Explanation, Demonstration  Response: Demonstrated Understanding    Education Comments  No comments found.

## 2024-06-03 ENCOUNTER — APPOINTMENT (OUTPATIENT)
Dept: RADIOLOGY | Facility: HOSPITAL | Age: 54
End: 2024-06-03
Payer: COMMERCIAL

## 2024-06-03 LAB
ALBUMIN SERPL BCP-MCNC: 3.4 G/DL (ref 3.4–5)
ANION GAP SERPL CALC-SCNC: 13 MMOL/L (ref 10–20)
BASOPHILS # BLD MANUAL: 0 X10*3/UL (ref 0–0.1)
BASOPHILS NFR BLD MANUAL: 0 %
BUN SERPL-MCNC: 7 MG/DL (ref 6–23)
CALCIUM SERPL-MCNC: 8.3 MG/DL (ref 8.6–10.3)
CHLORIDE SERPL-SCNC: 105 MMOL/L (ref 98–107)
CO2 SERPL-SCNC: 24 MMOL/L (ref 21–32)
CREAT SERPL-MCNC: 0.89 MG/DL (ref 0.5–1.3)
DACRYOCYTES BLD QL SMEAR: ABNORMAL
EGFRCR SERPLBLD CKD-EPI 2021: >90 ML/MIN/1.73M*2
EOSINOPHIL # BLD MANUAL: 0.38 X10*3/UL (ref 0–0.7)
EOSINOPHIL NFR BLD MANUAL: 4 %
ERYTHROCYTE [DISTWIDTH] IN BLOOD BY AUTOMATED COUNT: 16.2 % (ref 11.5–14.5)
GLUCOSE SERPL-MCNC: 111 MG/DL (ref 74–99)
HCT VFR BLD AUTO: 38.5 % (ref 41–52)
HGB BLD-MCNC: 11.9 G/DL (ref 13.5–17.5)
IMM GRANULOCYTES # BLD AUTO: 0.06 X10*3/UL (ref 0–0.7)
IMM GRANULOCYTES NFR BLD AUTO: 0.6 % (ref 0–0.9)
LYMPHOCYTES # BLD MANUAL: 0.96 X10*3/UL (ref 1.2–4.8)
LYMPHOCYTES NFR BLD MANUAL: 10 %
MAGNESIUM SERPL-MCNC: 1.7 MG/DL (ref 1.6–2.4)
MCH RBC QN AUTO: 21.9 PG (ref 26–34)
MCHC RBC AUTO-ENTMCNC: 30.9 G/DL (ref 32–36)
MCV RBC AUTO: 71 FL (ref 80–100)
METAMYELOCYTES # BLD MANUAL: 0.1 X10*3/UL
METAMYELOCYTES NFR BLD MANUAL: 1 %
MONOCYTES # BLD MANUAL: 0.67 X10*3/UL (ref 0.1–1)
MONOCYTES NFR BLD MANUAL: 7 %
NEUTS SEG # BLD MANUAL: 6.62 X10*3/UL (ref 1.2–7)
NEUTS SEG NFR BLD MANUAL: 69 %
NRBC BLD-RTO: 0 /100 WBCS (ref 0–0)
OVALOCYTES BLD QL SMEAR: ABNORMAL
PHOSPHATE SERPL-MCNC: 3.3 MG/DL (ref 2.5–4.9)
PLATELET # BLD AUTO: 223 X10*3/UL (ref 150–450)
POTASSIUM SERPL-SCNC: 3.3 MMOL/L (ref 3.5–5.3)
RBC # BLD AUTO: 5.44 X10*6/UL (ref 4.5–5.9)
RBC MORPH BLD: ABNORMAL
SODIUM SERPL-SCNC: 139 MMOL/L (ref 136–145)
TARGETS BLD QL SMEAR: ABNORMAL
TOTAL CELLS COUNTED BLD: 100
VANCOMYCIN SERPL-MCNC: 11.3 UG/ML (ref 5–20)
VARIANT LYMPHS # BLD MANUAL: 0.86 X10*3/UL (ref 0–0.5)
VARIANT LYMPHS NFR BLD: 9 %
WBC # BLD AUTO: 9.6 X10*3/UL (ref 4.4–11.3)

## 2024-06-03 PROCEDURE — A4217 STERILE WATER/SALINE, 500 ML: HCPCS | Performed by: PHARMACIST

## 2024-06-03 PROCEDURE — 2500000004 HC RX 250 GENERAL PHARMACY W/ HCPCS (ALT 636 FOR OP/ED): Performed by: PHARMACIST

## 2024-06-03 PROCEDURE — 97530 THERAPEUTIC ACTIVITIES: CPT | Mod: GO

## 2024-06-03 PROCEDURE — 2500000004 HC RX 250 GENERAL PHARMACY W/ HCPCS (ALT 636 FOR OP/ED): Performed by: FAMILY MEDICINE

## 2024-06-03 PROCEDURE — 2500000001 HC RX 250 WO HCPCS SELF ADMINISTERED DRUGS (ALT 637 FOR MEDICARE OP): Performed by: INTERNAL MEDICINE

## 2024-06-03 PROCEDURE — 2500000001 HC RX 250 WO HCPCS SELF ADMINISTERED DRUGS (ALT 637 FOR MEDICARE OP): Performed by: FAMILY MEDICINE

## 2024-06-03 PROCEDURE — 75574 CT ANGIO HRT W/3D IMAGE: CPT | Performed by: RADIOLOGY

## 2024-06-03 PROCEDURE — 99232 SBSQ HOSP IP/OBS MODERATE 35: CPT | Performed by: INTERNAL MEDICINE

## 2024-06-03 PROCEDURE — 85027 COMPLETE CBC AUTOMATED: CPT | Performed by: FAMILY MEDICINE

## 2024-06-03 PROCEDURE — 80202 ASSAY OF VANCOMYCIN: CPT | Performed by: FAMILY MEDICINE

## 2024-06-03 PROCEDURE — 2500000002 HC RX 250 W HCPCS SELF ADMINISTERED DRUGS (ALT 637 FOR MEDICARE OP, ALT 636 FOR OP/ED): Performed by: INTERNAL MEDICINE

## 2024-06-03 PROCEDURE — 80069 RENAL FUNCTION PANEL: CPT | Performed by: FAMILY MEDICINE

## 2024-06-03 PROCEDURE — 85007 BL SMEAR W/DIFF WBC COUNT: CPT | Performed by: FAMILY MEDICINE

## 2024-06-03 PROCEDURE — 2500000002 HC RX 250 W HCPCS SELF ADMINISTERED DRUGS (ALT 637 FOR MEDICARE OP, ALT 636 FOR OP/ED): Performed by: FAMILY MEDICINE

## 2024-06-03 PROCEDURE — 83735 ASSAY OF MAGNESIUM: CPT | Performed by: FAMILY MEDICINE

## 2024-06-03 PROCEDURE — 75574 CT ANGIO HRT W/3D IMAGE: CPT

## 2024-06-03 PROCEDURE — C9113 INJ PANTOPRAZOLE SODIUM, VIA: HCPCS | Performed by: FAMILY MEDICINE

## 2024-06-03 PROCEDURE — 36415 COLL VENOUS BLD VENIPUNCTURE: CPT | Performed by: FAMILY MEDICINE

## 2024-06-03 PROCEDURE — 2550000001 HC RX 255 CONTRASTS: Performed by: INTERNAL MEDICINE

## 2024-06-03 PROCEDURE — 1200000002 HC GENERAL ROOM WITH TELEMETRY DAILY

## 2024-06-03 RX ORDER — METOPROLOL TARTRATE 1 MG/ML
5 INJECTION, SOLUTION INTRAVENOUS ONCE AS NEEDED
Status: DISCONTINUED | OUTPATIENT
Start: 2024-06-03 | End: 2024-06-03

## 2024-06-03 RX ORDER — METOPROLOL TARTRATE 1 MG/ML
5 INJECTION, SOLUTION INTRAVENOUS ONCE
Status: DISCONTINUED | OUTPATIENT
Start: 2024-06-03 | End: 2024-06-03

## 2024-06-03 RX ORDER — AMLODIPINE BESYLATE 10 MG/1
10 TABLET ORAL DAILY
Status: DISCONTINUED | OUTPATIENT
Start: 2024-06-04 | End: 2024-06-05 | Stop reason: HOSPADM

## 2024-06-03 RX ORDER — NITROGLYCERIN 0.4 MG/1
0.8 TABLET SUBLINGUAL ONCE
Status: COMPLETED | OUTPATIENT
Start: 2024-06-03 | End: 2024-06-03

## 2024-06-03 RX ORDER — POTASSIUM CHLORIDE 20 MEQ/1
20 TABLET, EXTENDED RELEASE ORAL ONCE
Status: COMPLETED | OUTPATIENT
Start: 2024-06-03 | End: 2024-06-03

## 2024-06-03 RX ORDER — LORAZEPAM 2 MG/ML
0.5 INJECTION INTRAMUSCULAR EVERY 5 MIN PRN
Status: DISCONTINUED | OUTPATIENT
Start: 2024-06-03 | End: 2024-06-03

## 2024-06-03 RX ORDER — METOPROLOL TARTRATE 50 MG/1
100 TABLET ORAL ONCE
Status: COMPLETED | OUTPATIENT
Start: 2024-06-03 | End: 2024-06-03

## 2024-06-03 RX ORDER — TRIAMTERENE/HYDROCHLOROTHIAZID 37.5-25 MG
1 TABLET ORAL DAILY
Status: DISCONTINUED | OUTPATIENT
Start: 2024-06-03 | End: 2024-06-05 | Stop reason: HOSPADM

## 2024-06-03 RX ORDER — METOPROLOL TARTRATE 50 MG/1
100 TABLET ORAL ONCE AS NEEDED
Status: DISCONTINUED | OUTPATIENT
Start: 2024-06-03 | End: 2024-06-03

## 2024-06-03 RX ADMIN — PANTOPRAZOLE SODIUM 40 MG: 40 TABLET, DELAYED RELEASE ORAL at 06:55

## 2024-06-03 RX ADMIN — ACYCLOVIR SODIUM 680 MG: 50 INJECTION, SOLUTION INTRAVENOUS at 05:11

## 2024-06-03 RX ADMIN — TAMSULOSIN HYDROCHLORIDE 0.4 MG: 0.4 CAPSULE ORAL at 08:34

## 2024-06-03 RX ADMIN — METOPROLOL TARTRATE 100 MG: 50 TABLET, FILM COATED ORAL at 13:16

## 2024-06-03 RX ADMIN — TRIAMTERENE AND HYDROCHLOROTHIAZIDE 1 TABLET: 37.5; 25 TABLET ORAL at 13:16

## 2024-06-03 RX ADMIN — VANCOMYCIN HYDROCHLORIDE 1750 MG: 10 INJECTION, POWDER, LYOPHILIZED, FOR SOLUTION INTRAVENOUS at 23:33

## 2024-06-03 RX ADMIN — NITROGLYCERIN 0.8 MG: 0.4 TABLET SUBLINGUAL at 15:09

## 2024-06-03 RX ADMIN — IOHEXOL 75 ML: 350 INJECTION, SOLUTION INTRAVENOUS at 15:04

## 2024-06-03 RX ADMIN — ENOXAPARIN SODIUM 40 MG: 100 INJECTION SUBCUTANEOUS at 09:00

## 2024-06-03 RX ADMIN — THIAMINE HYDROCHLORIDE 100 MG: 100 INJECTION, SOLUTION INTRAMUSCULAR; INTRAVENOUS at 08:34

## 2024-06-03 RX ADMIN — POTASSIUM CHLORIDE 20 MEQ: 1500 TABLET, EXTENDED RELEASE ORAL at 11:29

## 2024-06-03 RX ADMIN — PANTOPRAZOLE SODIUM 40 MG: 40 INJECTION, POWDER, FOR SOLUTION INTRAVENOUS at 08:33

## 2024-06-03 RX ADMIN — VANCOMYCIN HYDROCHLORIDE 1750 MG: 10 INJECTION, POWDER, LYOPHILIZED, FOR SOLUTION INTRAVENOUS at 12:41

## 2024-06-03 RX ADMIN — CEFTRIAXONE 2 G: 2 INJECTION, POWDER, FOR SOLUTION INTRAMUSCULAR; INTRAVENOUS at 01:44

## 2024-06-03 RX ADMIN — ACETAMINOPHEN 650 MG: 325 TABLET ORAL at 20:44

## 2024-06-03 RX ADMIN — CEFTRIAXONE 2 G: 2 INJECTION, POWDER, FOR SOLUTION INTRAMUSCULAR; INTRAVENOUS at 17:01

## 2024-06-03 RX ADMIN — AMLODIPINE BESYLATE 5 MG: 5 TABLET ORAL at 08:34

## 2024-06-03 RX ADMIN — ACYCLOVIR SODIUM 680 MG: 50 INJECTION, SOLUTION INTRAVENOUS at 16:02

## 2024-06-03 ASSESSMENT — COGNITIVE AND FUNCTIONAL STATUS - GENERAL
MOBILITY SCORE: 24
DAILY ACTIVITIY SCORE: 24
DAILY ACTIVITIY SCORE: 24

## 2024-06-03 ASSESSMENT — PAIN SCALES - GENERAL
PAINLEVEL_OUTOF10: 0 - NO PAIN

## 2024-06-03 ASSESSMENT — PAIN - FUNCTIONAL ASSESSMENT
PAIN_FUNCTIONAL_ASSESSMENT: 0-10

## 2024-06-03 ASSESSMENT — VISUAL ACUITY: OU: 1

## 2024-06-03 NOTE — PROGRESS NOTES
Vancomycin Dosing by Pharmacy- FOLLOW UP    Douglas Wilson is a 53 y.o. year old male who Pharmacy has been consulted for vancomycin dosing for CNS/meningoencephalitis. Based on the patient's indication and renal status this patient is being dosed based on a goal AUC of 500-600.     Renal function is currently stable.    Current vancomycin dose: 1250 mg given every 12 hours    Estimated vancomycin AUC on current dose: 374 mg/L.hr     Visit Vitals  BP (!) 150/96 (BP Location: Left arm, Patient Position: Lying)   Pulse 76   Temp 36.9 °C (98.5 °F) (Oral)   Resp 16        Lab Results   Component Value Date    CREATININE 0.89 2024    CREATININE 0.89 2024    CREATININE 0.76 2024    CREATININE 0.80 2024        Patient weight is as follows:   Vitals:    24 0700   Weight: 79.6 kg (175 lb 7.8 oz)       Cultures:  No results found for the encounter in last 14 days.       I/O last 3 completed shifts:  In: 9243.5 (111.6 mL/kg) [P.O.:1586; I.V.:6416.7 (77.5 mL/kg); IV Piggyback:1240.8]  Out: 4725 (57.1 mL/kg) [Urine:4725 (1.6 mL/kg/hr)]  Weight: 82.8 kg   I/O during current shift:  No intake/output data recorded.    Temp (24hrs), Av.4 °C (99.3 °F), Min:36.9 °C (98.5 °F), Max:37.8 °C (100 °F)      Assessment/Plan    Below goal AUC. Orders placed for new vancomcyin regimen of 1750mg every 12 hours to begin at 1100.     This dosing regimen is predicted by [employer] to result in the following pharmacokinetic parameters:  Exposure target: AUC24 (range)400-600 mg/L.hr   AUC24,ss: 523 mg/L.hr  Probability of AUC24 > 400: 95 %  Ctrough,ss: 11.4 mg/L  Probability of Ctrough,ss > 20: 0 %  Probability of nephrotoxicity (Lodise BJ ): 7 %    The next level will be obtained on 6/5 at 0500. May be obtained sooner if clinically indicated.   Will continue to monitor renal function daily while on vancomycin and order serum creatinine at least every 48 hours if not already ordered.  Follow for continued  vancomycin needs, clinical response, and signs/symptoms of toxicity.       Cherelle Reynolds, PharmD

## 2024-06-03 NOTE — CARE PLAN
The patient's goals for the shift include      The clinical goals for the shift include Patient to remain hemodynamically stable for shift      Problem: Pain  Goal: My pain/discomfort is manageable  Outcome: Progressing     Problem: Safety  Goal: Patient will be injury free during hospitalization  Outcome: Progressing  Goal: I will remain free of falls  Outcome: Progressing     Problem: Daily Care  Goal: Daily care needs are met  Outcome: Progressing     Problem: Psychosocial Needs  Goal: Demonstrates ability to cope with hospitalization/illness  Outcome: Progressing  Goal: Collaborate with me, my family, and caregiver to identify my specific goals  Outcome: Progressing     Problem: Discharge Barriers  Goal: My discharge needs are met  Outcome: Progressing     Problem: Fall/Injury  Goal: Not fall by end of shift  Outcome: Progressing  Goal: Be free from injury by end of the shift  Outcome: Progressing  Goal: Verbalize understanding of personal risk factors for fall in the hospital  Outcome: Progressing  Goal: Verbalize understanding of risk factor reduction measures to prevent injury from fall in the home  Outcome: Progressing  Goal: Use assistive devices by end of the shift  Outcome: Progressing  Goal: Pace activities to prevent fatigue by end of the shift  Outcome: Progressing     Problem: Skin  Goal: Participates in plan/prevention/treatment measures  Outcome: Progressing  Goal: Prevent/manage excess moisture  Outcome: Progressing  Goal: Prevent/minimize sheer/friction injuries  Outcome: Progressing  Goal: Promote/optimize nutrition  Outcome: Progressing  Goal: Promote skin healing  Outcome: Progressing

## 2024-06-03 NOTE — CONSULTS
INFECTIOUS DISEASE INPATIENT INITIAL CONSULTATION    Referred By: Nilton Ramirez    Reason For Consult:  pt with change in mental status and is being treated with rocephin and acyclovir for further recommendations    HPI:  This is a 53 y.o. male with PMH of heroin abuse, hx PE, hx appendectomy, hx splenic aneurysm rupture s/p coil who presented with drug overdose.    Patient initially at [facility] with suspected drug overdose, unknown type given no clinical response to Naloxone which should have worked for opioids. Tox screen did have Fentanyl. There may have been a seizure. MRI here (motion limited study) showing white matter abnormalities possibly artifact but if real could represent multiple potential issues including PRED/encephalitis/vasculitis. He is on IV Vanc/CTX/Acyclovir for possible meningoencephalitis. LP was on hold for Plavix washout. He did have fever here to 101 which is resolved and leukocytosis at 22 which is also resolved.    He is much improved now. Denies HA, neck stiffness. He says he does not recall events prior to admission. Cannot tell me what he was doing, who he as with, if he was doing drugs, what drugs he may have been using. Denies having fevers/chills though. He says he wants to leave the hospital today or tomorrow as he needs to get back to work.    Allergies:  Patient has no known allergies.     Vitals (Last 24 Hours):  Heart Rate:  []   Temp:  [37.2 °C (99 °F)-37.8 °C (100 °F)]   Resp:  [18-32]   BP: (137-190)/(68-96)   Weight:  [79.6 kg (175 lb 7.8 oz)-82.8 kg (182 lb 8.7 oz)]   SpO2:  [91 %-100 %]      PHYSICAL EXAM:  Gen - NAD, laying in bed  Neck - no stiffness/meningismus  Heart - RRR  Lungs - clear bilaterally, no wheezing  Abd - soft, no ttp, BS present  Skin - no rash    MEDS:    Current Facility-Administered Medications:     acetaminophen (Tylenol) tablet 650 mg, 650 mg, oral, q4h PRN, Nilton Ramirez MD, 650 mg at 06/02/24 0045    acyclovir (Zovirax) 680 mg in  dextrose 5% 100 mL IV, 10 mg/kg (Ideal), intravenous, q8h, Nilton Ramirez MD, Stopped at 06/03/24 0611    amLODIPine (Norvasc) tablet 5 mg, 5 mg, oral, Daily, Nilton Ramirez MD, 5 mg at 06/02/24 0908    cefTRIAXone (Rocephin) in dextrose 5% IV 2 g, 2 g, intravenous, q12h, Nilton Ramirez MD, Stopped at 06/03/24 0214    enoxaparin (Lovenox) syringe 40 mg, 40 mg, subcutaneous, Daily, Nilton Ramirez MD, 40 mg at 06/02/24 0915    haloperidol lactate (Haldol) injection 5 mg, 5 mg, intravenous, q4h PRN, Nilton Ramirez MD, 5 mg at 06/01/24 1246    hydrALAZINE (Apresoline) injection 20 mg, 20 mg, intravenous, q6h PRN, Nilton Ramirez MD, 20 mg at 06/02/24 0311    metoprolol tartrate (Lopressor) injection 5 mg, 5 mg, intravenous, q6h PRN, Nilton Ramirez MD, 5 mg at 05/31/24 0336    pantoprazole (ProtoNix) EC tablet 40 mg, 40 mg, oral, Daily before breakfast, Nilton Ramirez MD, 40 mg at 06/03/24 0655    pantoprazole (ProtoNix) injection 40 mg, 40 mg, intravenous, Daily, Nilton Ramirez MD, 40 mg at 06/02/24 0915    perflutren lipid microspheres (Definity) injection 0.5-10 mL of dilution, 0.5-10 mL of dilution, intravenous, Once in imaging, Nilton Ramirez MD    perflutren protein A microsphere (Optison) injection 0.5 mL, 0.5 mL, intravenous, Once in imaging, Nilton Ramirez MD    sodium chloride 0.9 % with KCl 20 mEq/L infusion, 125 mL/hr, intravenous, Continuous, Nilton Ramirez MD, Last Rate: 125 mL/hr at 06/03/24 0500, 125 mL/hr at 06/03/24 0500    sulfur hexafluoride microsphr (Lumason) injection 24.28 mg, 2 mL, intravenous, Once in imaging, Nilton Ramirez MD    tamsulosin (Flomax) 24 hr capsule 0.4 mg, 0.4 mg, oral, Daily, Nilton Ramirez MD, 0.4 mg at 06/02/24 0908    thiamine (Vitamin B1) injection 100 mg, 100 mg, intravenous, Daily, Nilton Ramirez MD, 100 mg at 06/02/24 0915    vancomycin (Vancocin) in dextrose 5 % water (D5W) 250 mL IV 1,250 mg, 1,250 mg, intravenous, q12h, Nilton Ramirez MD, Stopped at 06/03/24 0113     vancomycin (Vancocin) pharmacy to dose - pharmacy monitoring, , miscellaneous, Daily PRN, Nilton Ramirez MD     LABS:  Lab Results   Component Value Date    WBC 11.1 06/02/2024    HGB 12.1 (L) 06/02/2024    HCT 36.6 (L) 06/02/2024    MCV 68 (L) 06/02/2024     06/02/2024      Results from last 72 hours   Lab Units 06/02/24  0453   SODIUM mmol/L 138   POTASSIUM mmol/L 3.5   CHLORIDE mmol/L 107   CO2 mmol/L 20*   BUN mg/dL 12   CREATININE mg/dL 0.89   GLUCOSE mg/dL 101*   CALCIUM mg/dL 8.2*   ANION GAP mmol/L 15   EGFR mL/min/1.73m*2 >90     Results from last 72 hours   Lab Units 06/02/24  0453   ALBUMIN g/dL 3.3*     Estimated Creatinine Clearance: 91.8 mL/min (by C-G formula based on SCr of 0.89 mg/dL).      IMAGING:  MRI Brain 5/31  IMPRESSION:  The study is degraded by motion.    There are motion degraded diffusion-weighted images fail to  demonstrate evidence of abnormal diffusion restriction to suggest  acute infarction.    The cerebellar tonsils are low lying with the right cerebellar tonsil  extending approximately 5 mm below level of the foramen magnum which  is borderline by MRI criteria for a Chiari malformation.    There are a few small nonspecific white matter changes noted within  cerebral hemispheres bilaterally. While nonspecific, white matter  changes can be seen with small-vessel ischemic change or  demyelinating processes among others.    There are subtle patchy areas of mild increased FLAIR signal along  cortex and subcortical white matter of the cerebral hemispheres  bilaterally best appreciated within the frontal, parietal, and  occipital regions which may be technical/artifactual in origin  although if real, possible considerations could include an underlying  process such as posterior reversible encephalopathy syndrome (PRES),  encephalitis, or vasculitis among others.      ASSESSMENT/PLAN:    Possible Meningitis - has been on empiric therapy for D4 now for potential meningitis. MRI read  possibly concerning for this vs motion artifact. His history seems more consistent with drug overdose. However this would not really explain fever. Leukocytosis could have been reactive vs from infection as well. ICU/neurology had recommended LP after Plavix washout which should be OK to do today. I did discuss with patient and he seems willing to have LP done today. It is difficult for me to determine what to do with his antibiotics/antiviral therapy without further information. He wants to leave the hospital either today or tomorrow which he may have to do AMA if we cannot make a determination on treatment plan regarding antimicrobial therapy.    For now can stay on IV Vanc/CTX/Acyclovir.    IR consultation for LP - appreciate help! CSF labs ordered.    Will follow. Thanks! D/w Dr. James Meza MD  [facility]  Office #[phone]

## 2024-06-03 NOTE — SIGNIFICANT EVENT
IR consulted for Lumbar Puncture r/o meningitis.   Eliquis has been held.   Patient did receive Lovenox this morning at 0800.   Guidelines recommend holding Lovenox for 12 hrs prior to lumbar puncture.   Please hold Lovenox tomorrow AM.   Will eval for LP Tuesday.   Does not need to be NPO for procedure.

## 2024-06-03 NOTE — CONSULTS
"Nutrition Assessment Note  Nutrition Assessment      Reason for Assessment  Reason for Assessment: Provider consult order    Met with pt earlier today, pt stated his appetite is okay, denies difficulty chewing/swallowing. Pt declined oral nutrition supplements with meals, denies weight loss.     Pt to have LP today, r/o meningitis.     History:  Food and Nutrient History  Energy Intake: Good > 75 %  Food and Nutrient History: pt stated appetite good PTA,  declined adding Ensure with meals       Dietary Orders (From admission, onward)       Start     Ordered    06/03/24 1119  NPO Diet Except: Sips with meds; Effective now  Diet effective now        Question:  Except:  Answer:  Sips with meds    06/03/24 1120                    Anthropometrics:  Height: 172.7 cm (5' 7.99\")  Weight: 79.6 kg (175 lb 7.8 oz)  BMI (Calculated): 26.69    Weight Change  Weight History / % Weight Change: 79.4 kg 2/23/24; pt state UBW between 180-190 lbs, denies weight loss PTA. Noted wt was 82.8 kg 4/4/24 per chart review; [facility] wt today =179.5 lbs (81.6 kg)  Significant Weight Loss: No     IBW/kg (Dietitian Calculated): 70 kg  Percent of IBW: 114 %     Scheduled medications  acyclovir, 10 mg/kg (Ideal), intravenous, q8h  [START ON 6/4/2024] amLODIPine, 10 mg, oral, Daily  cefTRIAXone, 2 g, intravenous, q12h  enoxaparin, 40 mg, subcutaneous, Daily  pantoprazole, 40 mg, oral, Daily before breakfast  pantoprazole, 40 mg, intravenous, Daily  perflutren lipid microspheres, 0.5-10 mL of dilution, intravenous, Once in imaging  perflutren protein A microsphere, 0.5 mL, intravenous, Once in imaging  sulfur hexafluoride microsphr, 2 mL, intravenous, Once in imaging  tamsulosin, 0.4 mg, oral, Daily  thiamine, 100 mg, intravenous, Daily  triamterene-hydrochlorothiazid, 1 tablet, oral, Daily  vancomycin, 1,750 mg, intravenous, q12h      Continuous medications  potassium chloride in 0.9%NaCl, 125 mL/hr, Last Rate: Stopped (06/03/24 1100)      PRN " "medications  PRN medications: acetaminophen, haloperidol lactate, hydrALAZINE, metoprolol, vancomycin     Latest Reference Range & Units 06/03/24 06:42   GLUCOSE 74 - 99 mg/dL 111 (H)   SODIUM 136 - 145 mmol/L 139   POTASSIUM 3.5 - 5.3 mmol/L 3.3 (L)   CHLORIDE 98 - 107 mmol/L 105   Bicarbonate 21 - 32 mmol/L 24   Anion Gap 10 - 20 mmol/L 13   Blood Urea Nitrogen 6 - 23 mg/dL 7   Creatinine 0.50 - 1.30 mg/dL 0.89   EGFR >60 mL/min/1.73m*2 >90   Calcium 8.6 - 10.3 mg/dL 8.3 (L)   PHOSPHORUS 2.5 - 4.9 mg/dL 3.3   Albumin 3.4 - 5.0 g/dL 3.4   MAGNESIUM 1.60 - 2.40 mg/dL 1.70   (H): Data is abnormally high  (L): Data is abnormally low        Energy Needs:  Calculated Energy Needs Using Equations  Height: 172.7 cm (5' 7.99\")  Temp: 37.3 °C (99.2 °F)    Estimated Energy Needs  Total Energy Estimated Needs (kCal): 2400 kCal  Total Estimated Energy Need per Day (kCal/kg): 30 kCal/kg    Estimated Protein Needs  Total Protein Estimated Needs (g): 85 g  Total Protein Estimated Needs (g/kg): 1.2 g/kg    Estimated Fluid Needs  Total Fluid Estimated Needs (mL): 2400 mL  Total Fluid Estimated Needs (mL/kg): 30 mL/kg  Method for Estimating Needs: or per MD         Nutrition Focused Physical Findings:  Subcutaneous Fat Loss  Orbital Fat Pads: Mild-Moderate (slight dark circles and slight hollowing)  Buccal Fat Pads: Well nourished (full, rounded cheeks)    Muscle Wasting  Temporalis: Mild-Moderate (slight depression)  Pectoralis (Clavicular Region): Well nourished (clavicle not visible)  Deltoid/Trapezius: Well nourished (rounded appearance at arm, shoulder, neck)  Interosseous: Well nourished (muscle bulges)    Edema  Edema: none         Physical Findings (Nutrition Deficiency/Toxicity)  Skin: Negative       Nutrition Diagnosis   Malnutrition Diagnosis  Patient has Malnutrition Diagnosis: No    Patient has Nutrition Diagnosis: Yes  Nutrition Diagnosis 1: Altered nutrition related to laboratory values  Diagnosis Status (1): " New  Related to (1): physiological cause  As Evidenced by (1): hypokalememia this morning                 Nutrition Interventions/Recommendations   Nutrition Prescription  Individualized Nutrition Prescription Provided for : resume regular diet post procedure,as tolerated  - recommend MVI once daily, po   - monitor wt trend, oral intake     Food and/or Nutrient Delivery Interventions  Meals and Snacks: General healthful diet  Goal: oral intake at least 75% of meals                     Coordination of Nutrition Care by a Nutrition Professional  Collaboration and Referral of Nutrition Care: Collaboration by nutrition professional with other providers    Education Documentation  No documentation found.           Nutrition Monitoring and Evaluation   Food and Nutrient Related History  Energy Intake: Estimated energy intake  Criteria: monitor    Fluid Intake: Estimated fluid intake  Criteria: monitor    Anthropometrics: Body Composition/Growth/Weight History  Weight: Weight change  Criteria: monitor       Biochemical Data, Medical Tests and Procedures  Electrolyte and Renal Panel: Other (Comment), BUN, Phosphorus, Potassium, Calcium, serum, Sodium, Chloride, Creatinine, Magnesium  Criteria: monitor    Gastrointestinal Profile: Other (Comment), Bilirubin, total, Aspartate aminotransferase (AST), Alanine aminotransferase (ALT)  Criteria: monitor    Glucose/Endocrine Profile: Other (Comment), Glucose, casual  Criteria: as indicated    Nutritional Anemia Profile: Other (Comment), Hemoglobin, Hematocrit, Iron, serum  Criteria: as indicated    Vitamin Profile: Other (Comment), Vitamin D, 25 hydroxy  Criteria: as indicated    Nutrition Focused Physical Findings: monitor          Follow Up  Time Spent (min): 60 minutes  Last Date of Nutrition Visit: 06/03/24  Nutrition Follow-Up Needed?: Dietitian to reassess per policy  Follow up Comment: oral diet; GENOVEVA

## 2024-06-03 NOTE — PROGRESS NOTES
Occupational Therapy    OT Treatment    Patient Name: Douglas Wilson  MRN: [id number]  Today's Date: 6/3/2024  Time Calculation  Start Time: 0817  Stop Time: 0825  Time Calculation (min): 8 min         Assessment:  OT Assessment: Patient currently at baseline function. OT d/c plan of care this date, no acute OT concerns.  Prognosis: Fair  Barriers to Discharge: Decreased caregiver support  Evaluation/Treatment Tolerance: Patient tolerated treatment well  Medical Staff Made Aware: Yes  End of Session Communication: Bedside nurse  End of Session Patient Position: Bed, 3 rail up, Alarm off, not on at start of session  Prognosis: Fair  Barriers to Discharge: Decreased caregiver support  Evaluation/Treatment Tolerance: Patient tolerated treatment well  Medical Staff Made Aware: Yes  Plan:  Treatment Interventions: ADL retraining, Functional transfer training, Endurance training, Cognitive reorientation, Patient/family training, Equipment evaluation/education, Neuromuscular reeducation  OT Frequency: 2 times per week  OT Discharge Recommendations: No OT needed after discharge, No further acute OT  Equipment Recommended upon Discharge:  (TBD)  OT Recommended Transfer Status: Independent  OT - OK to Discharge: Yes  Treatment Interventions: ADL retraining, Functional transfer training, Endurance training, Cognitive reorientation, Patient/family training, Equipment evaluation/education, Neuromuscular reeducation    Subjective   Previous Visit Info:  OT Last Visit  OT Received On: 06/03/24  General:  General  Reason for Referral: 54 y/o M presenting with encephalopathy, abdominal pain, and suspected drug overdose  Referred By: TAMERA Marcano (APRN-CNP)  Past Medical History Relevant to Rehab:   Past Medical History:   Diagnosis Date    Acute appendicitis without peritonitis 03/12/2024    BPH (benign prostatic hyperplasia)     Heroin abuse (Multi)     NSTEMI (non-ST elevated myocardial infarction) (Multi)     Pulmonary embolism (Multi)      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  Precautions:  Medical Precautions: Fall precautions    Pain:  Pain Assessment  Pain Assessment: 0-10  Pain Score: 0 - No pain    Objective    Cognition:  Cognition  Orientation Level: Oriented X4  Coordination:  Movements are Fluid and Coordinated: Yes  Bed Mobility/Transfers: Bed Mobility 1  Bed Mobility 1: Supine to sitting  Level of Assistance 1: Modified independent  Bed Mobility 2  Bed Mobility  2: Sitting to supine  Level of Assistance 2: Modified independent    Transfer 1  Transfer From 1: Bed to  Transfer to 1: Stand  Technique 1: Sit to stand, Stand to sit  Transfer Device 1:  (none)  Transfer Level of Assistance 1: Distant supervision    Functional Mobility:  Functional Mobility 1  Surface 1: Level tile  Device 1: No device  Assistance 1: Distant supervision    Outcome Measures:[facility] Daily Activity  Putting on and taking off regular lower body clothing: None  Bathing (including washing, rinsing, drying): None  Putting on and taking off regular upper body clothing: None  Toileting, which includes using toilet, bedpan or urinal: None  Taking care of personal grooming such as brushing teeth: None  Eating Meals: None  Daily Activity - Total Score: 24      Education Documentation  Body Mechanics, taught by Norah Lemus OT at 6/3/2024  8:32 AM.  Learner: Patient  Readiness: Acceptance  Method: Explanation  Response: Verbalizes Understanding    Precautions, taught by Norah Lemus OT at 6/3/2024  8:32 AM.  Learner: Patient  Readiness: Acceptance  Method:  Explanation  Response: Verbalizes Understanding    ADL Training, taught by Norah Lemus OT at 6/3/2024  8:32 AM.  Learner: Patient  Readiness: Acceptance  Method: Explanation  Response: Verbalizes Understanding    Education Comments  No comments found.        OP EDUCATION:       Goals:  Encounter Problems       Encounter Problems (Resolved)       ADLs       Patient with complete upper body dressing with set-up level of assistance donning and doffing all UE clothes with PRN adaptive equipment while edge of bed  (Met)       Start:  05/31/24    Expected End:  06/14/24    Resolved:  06/03/24         Patient with complete lower body dressing with set-up level of assistance donning and doffing all LE clothes  with PRN adaptive equipment while edge of bed  (Met)       Start:  05/31/24    Expected End:  06/14/24    Resolved:  06/03/24         Patient will complete toileting including hygiene clothing management/hygiene with set-up level of assistance and grab bars. (Met)       Start:  05/31/24    Expected End:  06/14/24    Resolved:  06/03/24            BALANCE       Pt will maintain dynamic standing balance during ADL task with modified independent level of assistance in order to demonstrate decreased risk of falling and improved postural control. (Met)       Start:  05/31/24    Expected End:  06/14/24    Resolved:  06/03/24            MOBILITY       Patient will perform Functional mobility max Household distances/Community Distances with modified independent level of assistance and least restrictive device in order to improve safety and functional mobility. (Met)       Start:  05/31/24    Expected End:  06/14/24    Resolved:  06/03/24            TRANSFERS       Patient will complete functional transfer to all surfaces with least restrictive device with modified independent level of assistance. (Met)       Start:  05/31/24    Expected End:  06/14/24    Resolved:  06/03/24

## 2024-06-03 NOTE — PROGRESS NOTES
"Subjective Data:  No complaints. Looking to leave today.         Objective Data:  Last Recorded Vitals:  Vitals:    24 2116 24 0052 24 0700 24 0758   BP: 167/82 (!) 168/93  (!) 150/96   BP Location:    Left arm   Patient Position: Lying Lying  Lying   Pulse: 84 89  76   Resp: 18 18  16   Temp: 37.4 °C (99.3 °F) 37.3 °C (99.1 °F)  36.9 °C (98.5 °F)   TempSrc: Temporal Oral  Oral   SpO2: 97% 98%  99%   Weight:   79.6 kg (175 lb 7.8 oz)    Height:         Medical Gas Therapy: None (Room air)  Weight  Av kg (180 lb 11.7 oz)  Min: 79.6 kg (175 lb 7.8 oz)  Max: 82.8 kg (182 lb 8.7 oz)    LABS:  CMP:  Results from last 7 days   Lab Units 24  1145 24  1857   SODIUM mmol/L 139 138 135* 134* 134* 134*   POTASSIUM mmol/L 3.3* 3.5 3.6 3.8 3.4* 3.2*   CHLORIDE mmol/L 105 107 102 102 101 100   CO2 mmol/L 24 20* 22 23 18* 18*   ANION GAP mmol/L 13 15 15 13 18 19   BUN mg/dL 7 12 13 15 16 17   CREATININE mg/dL 0.89 0.89 0.76 0.80 0.93 0.97   EGFR mL/min/1.73m*2 >90 >90 >90 >90 >90 >90   MAGNESIUM mg/dL 1.70 1.80 2.10 2.10  --   --    ALBUMIN g/dL 3.4 3.3* 3.7 3.8 3.8 4.2   ALT U/L  --   --   --   --   --  16   AST U/L  --   --   --   --   --  30   BILIRUBIN TOTAL mg/dL  --   --   --   --   --  0.7     CBC:  Results from last 7 days   Lab Units 24/31/24  0603 24  2025   WBC AUTO x10*3/uL 9.6 11.1 18.4*  18.4* 22.2* 22.5*   HEMOGLOBIN g/dL 11.9* 12.1* 14.9  14.9 15.9 16.0   HEMATOCRIT % 38.5* 36.6* 44.9  44.9 47.4 47.7   PLATELETS AUTO x10*3/uL 223 185 213  213 317 259   MCV fL 71* 68* 69*  69* 68* 68*     COAG:     ABO: No results found for: \"ABO\"  HEME/ENDO:     CARDIAC:   Results from last 7 days   Lab Units 24  0603 24  0006 24  1857   TROPHS ng/L 1,391* 2,315* 1,863*      Results from last 7 days   Lab Units 24  1145   LDL CALC mg/dL 115*   VLDL mg/dL " 31   CHOLESTEROL/HDL RATIO  3.8        Last I/O:    Intake/Output Summary (Last 24 hours) at 6/3/2024 1112  Last data filed at 6/3/2024 1107  Gross per 24 hour   Intake 2668.18 ml   Output 1875 ml   Net 793.18 ml     Net IO Since Admission: 7,860.15 mL [06/03/24 1112]            Inpatient Medications:  Scheduled medications   Medication Dose Route Frequency    acyclovir  10 mg/kg (Ideal) intravenous q8h    amLODIPine  5 mg oral Daily    cefTRIAXone  2 g intravenous q12h    enoxaparin  40 mg subcutaneous Daily    pantoprazole  40 mg oral Daily before breakfast    pantoprazole  40 mg intravenous Daily    perflutren lipid microspheres  0.5-10 mL of dilution intravenous Once in imaging    perflutren protein A microsphere  0.5 mL intravenous Once in imaging    potassium chloride CR  20 mEq oral Once    sulfur hexafluoride microsphr  2 mL intravenous Once in imaging    tamsulosin  0.4 mg oral Daily    thiamine  100 mg intravenous Daily    vancomycin  1,750 mg intravenous q12h     PRN medications   Medication    acetaminophen    haloperidol lactate    hydrALAZINE    metoprolol    vancomycin     Continuous Medications   Medication Dose Last Rate    potassium chloride in 0.9%NaCl  125 mL/hr Stopped (06/03/24 1100)           Physical Exam:  Gen Well appearing middle aged male sitting up in NAD. Body mass index is 26.69 kg/m².   CV rrr. No m/r/g appreciated. No JVD or leg edema.    Pulm Lungs clear with normal respiratory effort.  Neuro Alert and conversant. Grossly nonfocal.         Assessment:  Elevated troponin  Non-MI troponin elevation / acute non-traumatic myocardial injury in the setting of reported hypotension, some noted marked hypertension / tachycardia / drug overdose, etc. Core measures do not apply. TTE unremarkable.  Hypertension   BP uncontrolled.       Recommendations:  Coronary CT angiography. Ordered but may not be able to be completed until tomorrow. He is adamant about leaving today--the procedure can be  outpatient if he is to be discharged. Increasing Norvasc. Adding Maxzide.        Wei Newton MD

## 2024-06-03 NOTE — PROGRESS NOTES
Douglas Wilson is a 53 y.o. male     Events of last few days reviewed  Patient is alert oriented x 3  Denies using drugs  Has no idea how he tested positive for fentanyl    Review of Systems     Constitutional: no fever, no chills, not feeling poorly, not feeling tired   Cardiovascular: no chest pain   Respiratory: no cough, wheezing or shortness of breath a  Gastrointestinal: no abdominal pain, no constipation, no melena, no nausea, no diarrhea, no vomiting and no blood in stools.   Neurological: no headache,   All other systems have been reviewed and are negative for complaint.       Vitals:    06/03/24 0758   BP: (!) 150/96   Pulse: 76   Resp: 16   Temp: 36.9 °C (98.5 °F)   SpO2: 99%        Scheduled medications  acyclovir, 10 mg/kg (Ideal), intravenous, q8h  amLODIPine, 5 mg, oral, Daily  cefTRIAXone, 2 g, intravenous, q12h  enoxaparin, 40 mg, subcutaneous, Daily  pantoprazole, 40 mg, oral, Daily before breakfast  pantoprazole, 40 mg, intravenous, Daily  perflutren lipid microspheres, 0.5-10 mL of dilution, intravenous, Once in imaging  perflutren protein A microsphere, 0.5 mL, intravenous, Once in imaging  potassium chloride CR, 20 mEq, oral, Once  sulfur hexafluoride microsphr, 2 mL, intravenous, Once in imaging  tamsulosin, 0.4 mg, oral, Daily  thiamine, 100 mg, intravenous, Daily  vancomycin, 1,750 mg, intravenous, q12h      Continuous medications  potassium chloride in 0.9%NaCl, 125 mL/hr, Last Rate: 125 mL/hr (06/03/24 0500)      PRN medications  PRN medications: acetaminophen, haloperidol lactate, hydrALAZINE, metoprolol, vancomycin    Lab Review   Results from last 7 days   Lab Units 06/03/24 0642 06/02/24 0453 06/01/24  0526   WBC AUTO x10*3/uL 9.6 11.1 18.4*  18.4*   HEMOGLOBIN g/dL 11.9* 12.1* 14.9  14.9   HEMATOCRIT % 38.5* 36.6* 44.9  44.9   PLATELETS AUTO x10*3/uL 223 185 213  213     Results from last 7 days   Lab Units 06/03/24 0642 06/02/24 0453 06/01/24  0526 05/30/24  2028  05/30/24  1857   SODIUM mmol/L 139 138 135*   < > 134*   POTASSIUM mmol/L 3.3* 3.5 3.6   < > 3.2*   CHLORIDE mmol/L 105 107 102   < > 100   CO2 mmol/L 24 20* 22   < > 18*   BUN mg/dL 7 12 13   < > 17   CREATININE mg/dL 0.89 0.89 0.76   < > 0.97   CALCIUM mg/dL 8.3* 8.2* 8.7   < > 8.9   PROTEIN TOTAL g/dL  --   --   --   --  8.0   BILIRUBIN TOTAL mg/dL  --   --   --   --  0.7   ALK PHOS U/L  --   --   --   --  68   ALT U/L  --   --   --   --  16   AST U/L  --   --   --   --  30   GLUCOSE mg/dL 111* 101* 107*   < > 126*    < > = values in this interval not displayed.     Results from last 7 days   Lab Units 05/31/24  0603 05/31/24  0006 05/30/24  1857   TROPHS ng/L 1,391* 2,315* 1,863*        MR brain w and wo IV contrast   Final Result   The study is degraded by motion.        There are motion degraded diffusion-weighted images fail to   demonstrate evidence of abnormal diffusion restriction to suggest   acute infarction.        The cerebellar tonsils are low lying with the right cerebellar tonsil   extending approximately 5 mm below level of the foramen magnum which   is borderline by MRI criteria for a Chiari malformation.        There are a few small nonspecific white matter changes noted within   cerebral hemispheres bilaterally. While nonspecific, white matter   changes can be seen with small-vessel ischemic change or   demyelinating processes among others.        There are subtle patchy areas of mild increased FLAIR signal along   cortex and subcortical white matter of the cerebral hemispheres   bilaterally best appreciated within the frontal, parietal, and   occipital regions which may be technical/artifactual in origin   although if real, possible considerations could include an underlying   process such as posterior reversible encephalopathy syndrome (PRES),   encephalitis, or vasculitis among others.        MACRO:   None.        Signed by: Jeremy Adkins 6/1/2024 9:01 AM   Dictation workstation:   [id number]       Transthoracic Echo (TTE) Complete   Final Result      XR chest 1 view   Final Result   1.  No evidence of acute cardiopulmonary process.                  MACRO:   None        Signed by: Raman Valenzuela 5/31/2024 8:07 AM   Dictation workstation:   [id number]      Consult to Interventional Radiology    (Results Pending)         Physical Exam     Constitutional   General appearance: Alert and in no acute distress.     Pulmonary   Respiratory assessment: No respiratory distress, normal respiratory rhythm and effort.    Auscultation of Lungs: Clear bilateral breath sounds.   Cardiovascular   Auscultation of heart: Apical pulse normal, heart rate and rhythm normal, normal S1 and S2, no murmurs and no pericardial rub.    Exam for edema: No peripheral edema.   Abdomen   Abdominal Exam: No bruits, normal bowel sounds, soft, non-tender, no abdominal mass palpated.    Liver and Spleen exam: No hepato-splenomegaly.   Musculoskeletal     Skin inspection: Normal skin color and pigmentation, normal skin turgor and no visible rash.   Neurologic   Cranial nerves: Nerves 2-12 were intact, no focal neuro defects.     Assessment/Plan      #Acute encephalopathy  #Positive urine tox  Patient denies using drugs  Continue IV antibiotics  Discussed with infectious disease who recommend a lumbar puncture  MRI findings noted  Will get neurology opinion on the MRI findings    #Coronary artery disease with elevated troponins  Holding Eliquis for now  Cardiology planning on cardiac cath    #Hypertension  Labile  Will adjust medications as needed    #Dyslipidemia  Continue statins and maintain LDL less than 70    #History of pulmonary embolism  S/p splenic artery aneurysm rupture with coiling  Will need to be on Eliquis through August  Holding for now for the LP and possible cardiac cath

## 2024-06-03 NOTE — PROGRESS NOTES
Douglas Wilson is a 53 y.o. male on day 4 of admission presenting with Encephalopathy.      Subjective   Seen and examined patient at bedside  Resting comfortably without any complaints  Eager to be discharged     Objective     Last Recorded Vitals  /73   Pulse 64   Temp 37.3 °C (99.2 °F) (Oral)   Resp 16   Wt 79.6 kg (175 lb 7.8 oz)   SpO2 97%   Intake/Output last 3 Shifts:    Intake/Output Summary (Last 24 hours) at 6/3/2024 1517  Last data filed at 6/3/2024 1107  Gross per 24 hour   Intake 2668.18 ml   Output 1875 ml   Net 793.18 ml       Admission Weight  Weight: 81.9 kg (180 lb 8.9 oz) (05/30/24 1826)    Daily Weight  06/03/24 : 79.6 kg (175 lb 7.8 oz)    Image Results  MR brain w and wo IV contrast  Narrative: Interpreted By:  Jeremy Adkins,   STUDY:  MR BRAIN W AND WO IV CONTRAST;  5/31/2024 10:38 pm      INDICATION:  Signs/Symptoms:Decreased LOC.      COMPARISON:  None.      ACCESSION NUMBER(S):  [id number]      ORDERING CLINICIAN:  FABIO HARRISON      TECHNIQUE:  Axial diffusion, axial T2, axial FLAIR, axial gradient echo T2, axial  T1, post gadolinium volumetric T1, as well as post gadolinium axial  T1 weighted MRI images of the brain were obtained. The patient  received  16 mL of  Dotarem gadolinium intravenously.      FINDINGS:  The study is degraded by motion.      There are motion degraded diffusion-weighted images fail to  demonstrate evidence of abnormal diffusion restriction to suggest  acute infarction.      The cerebellar tonsils are low lying with the right cerebellar tonsil  extending approximately 5 mm below level of the foramen magnum which  is borderline by MRI criteria for a Chiari malformation.      There are a few small nonspecific white matter changes noted within  cerebral hemispheres bilaterally. While nonspecific, white matter  changes can be seen with small-vessel ischemic change or  demyelinating processes among others.      There are subtle patchy areas of mild increased  FLAIR signal along  cortex and subcortical white matter of the cerebral hemispheres  bilaterally best appreciated within the frontal, parietal, and  occipital regions which may be technical/artifactual in origin  although if real, possible considerations could include an underlying  process such as posterior reversible encephalopathy syndrome (PRES),  encephalitis, or vasculitis among others.      No abnormal intracranial mass lesion or abnormal intracranial  enhancement is identified on the postcontrast images.      There is minimal mucosal thickening noted within the inferior right  maxillary sinus and a few scattered ethmoid air cells.      The mastoid air cells are clear.      Impression: The study is degraded by motion.      There are motion degraded diffusion-weighted images fail to  demonstrate evidence of abnormal diffusion restriction to suggest  acute infarction.      The cerebellar tonsils are low lying with the right cerebellar tonsil  extending approximately 5 mm below level of the foramen magnum which  is borderline by MRI criteria for a Chiari malformation.      There are a few small nonspecific white matter changes noted within  cerebral hemispheres bilaterally. While nonspecific, white matter  changes can be seen with small-vessel ischemic change or  demyelinating processes among others.      There are subtle patchy areas of mild increased FLAIR signal along  cortex and subcortical white matter of the cerebral hemispheres  bilaterally best appreciated within the frontal, parietal, and  occipital regions which may be technical/artifactual in origin  although if real, possible considerations could include an underlying  process such as posterior reversible encephalopathy syndrome (PRES),  encephalitis, or vasculitis among others.      MACRO:  None.      Signed by: Jeremy Adkins 6/1/2024 9:01 AM  Dictation workstation:   [id number]      Physical Exam  Vitals and nursing note reviewed.   Constitutional:        Appearance: Normal appearance.   HENT:      Head: Normocephalic.      Nose: Nose normal.      Mouth/Throat:      Lips: Pink.      Mouth: Mucous membranes are moist.      Pharynx: Oropharynx is clear.   Eyes:      General: Lids are normal. Lids are everted, no foreign bodies appreciated. Vision grossly intact. Gaze aligned appropriately.      Extraocular Movements: Extraocular movements intact.      Conjunctiva/sclera: Conjunctivae normal.   Neck:      Trachea: Trachea normal.   Cardiovascular:      Rate and Rhythm: Normal rate and regular rhythm.      Pulses: Normal pulses.      Heart sounds: Normal heart sounds.   Pulmonary:      Effort: Pulmonary effort is normal.      Breath sounds: Normal breath sounds.   Abdominal:      General: Abdomen is flat. Bowel sounds are normal.      Palpations: Abdomen is soft.   Musculoskeletal:         General: Normal range of motion.      Cervical back: Full passive range of motion without pain.   Feet:      Right foot:      Skin integrity: Skin integrity normal.      Left foot:      Skin integrity: Skin integrity normal.   Skin:     General: Skin is warm and dry.      Capillary Refill: Capillary refill takes less than 2 seconds.   Neurological:      General: No focal deficit present.      Mental Status: He is alert and oriented to person, place, and time. Mental status is at baseline.   Psychiatric:         Attention and Perception: Attention and perception normal.         Mood and Affect: Mood and affect normal.         Speech: Speech normal.         Behavior: Behavior normal. Behavior is cooperative.         Thought Content: Thought content normal.         Cognition and Memory: Cognition normal.         Judgment: Judgment normal.         Relevant Results  Scheduled medications  acyclovir, 10 mg/kg (Ideal), intravenous, q8h  [START ON 6/4/2024] amLODIPine, 10 mg, oral, Daily  cefTRIAXone, 2 g, intravenous, q12h  enoxaparin, 40 mg, subcutaneous, Daily  metoprolol, 5 mg,  intravenous, Once  pantoprazole, 40 mg, oral, Daily before breakfast  pantoprazole, 40 mg, intravenous, Daily  perflutren lipid microspheres, 0.5-10 mL of dilution, intravenous, Once in imaging  perflutren protein A microsphere, 0.5 mL, intravenous, Once in imaging  sulfur hexafluoride microsphr, 2 mL, intravenous, Once in imaging  tamsulosin, 0.4 mg, oral, Daily  thiamine, 100 mg, intravenous, Daily  triamterene-hydrochlorothiazid, 1 tablet, oral, Daily  vancomycin, 1,750 mg, intravenous, q12h      Continuous medications  potassium chloride in 0.9%NaCl, 125 mL/hr, Last Rate: Stopped (06/03/24 1100)      PRN medications  PRN medications: acetaminophen, haloperidol lactate, hydrALAZINE, LORazepam, metoprolol, metoprolol, metoprolol, metoprolol, metoprolol, metoprolol tartrate, vancomycin  Results for orders placed or performed during the hospital encounter of 05/30/24 (from the past 24 hour(s))   Magnesium   Result Value Ref Range    Magnesium 1.70 1.60 - 2.40 mg/dL   Renal Function Panel   Result Value Ref Range    Glucose 111 (H) 74 - 99 mg/dL    Sodium 139 136 - 145 mmol/L    Potassium 3.3 (L) 3.5 - 5.3 mmol/L    Chloride 105 98 - 107 mmol/L    Bicarbonate 24 21 - 32 mmol/L    Anion Gap 13 10 - 20 mmol/L    Urea Nitrogen 7 6 - 23 mg/dL    Creatinine 0.89 0.50 - 1.30 mg/dL    eGFR >90 >60 mL/min/1.73m*2    Calcium 8.3 (L) 8.6 - 10.3 mg/dL    Phosphorus 3.3 2.5 - 4.9 mg/dL    Albumin 3.4 3.4 - 5.0 g/dL   Vancomycin   Result Value Ref Range    Vancomycin 11.3 5.0 - 20.0 ug/mL   CBC and Auto Differential   Result Value Ref Range    WBC 9.6 4.4 - 11.3 x10*3/uL    nRBC 0.0 0.0 - 0.0 /100 WBCs    RBC 5.44 4.50 - 5.90 x10*6/uL    Hemoglobin 11.9 (L) 13.5 - 17.5 g/dL    Hematocrit 38.5 (L) 41.0 - 52.0 %    MCV 71 (L) 80 - 100 fL    MCH 21.9 (L) 26.0 - 34.0 pg    MCHC 30.9 (L) 32.0 - 36.0 g/dL    RDW 16.2 (H) 11.5 - 14.5 %    Platelets 223 150 - 450 x10*3/uL    Immature Granulocytes %, Automated 0.6 0.0 - 0.9 %    Immature  Granulocytes Absolute, Automated 0.06 0.00 - 0.70 x10*3/uL   Manual Differential   Result Value Ref Range    Neutrophils %, Manual 69.0 40.0 - 80.0 %    Lymphocytes %, Manual 10.0 13.0 - 44.0 %    Monocytes %, Manual 7.0 2.0 - 10.0 %    Eosinophils %, Manual 4.0 0.0 - 6.0 %    Basophils %, Manual 0.0 0.0 - 2.0 %    Atypical Lymphocytes %, Manual 9.0 0.0 - 2.0 %    Metamyelocytes %, Manual 1.0 0.0 - 0.0 %    Seg Neutrophils Absolute, Manual 6.62 1.20 - 7.00 x10*3/uL    Lymphocytes Absolute, Manual 0.96 (L) 1.20 - 4.80 x10*3/uL    Monocytes Absolute, Manual 0.67 0.10 - 1.00 x10*3/uL    Eosinophils Absolute, Manual 0.38 0.00 - 0.70 x10*3/uL    Basophils Absolute, Manual 0.00 0.00 - 0.10 x10*3/uL    Atypical Lymphs Absolute, Manual 0.86 (H) 0.00 - 0.50 x10*3/uL    Metamyelocytes Absolute, Manual 0.10 0.00 - 0.00 x10*3/uL    Total Cells Counted 100     RBC Morphology See Below     Target Cells Few     Ovalocytes Few     Teardrop Cells Few        Assessment/Plan      Douglas Wilson is a 53 y.o. male with history of CAD - recent NSTEMI, previous PE - on Eliquis, splenic Artery Aneurysm rupture, appendectomy, esophagitis/GI Bleed, BPH, Fentanyl use disorder, Drug abuse Heroin presenting with Altered level of consciousness/ encephalopathy and abdominal pain.    Altered mental status/ acute encephalopathy  Urine Tox= +fentanyl   CTH: negative  MRI Brain interpretation by neurology  Neurology consultation appreciated- will need close neurology follow up outpatient   R/o meningitis- pending LP    Plan for LP on 6/4/24 (eliquis on hold, hold lovenox 12 hours prior)  ID consultation appreciated  Continue IV Vanc/CTX/Acyclovir     Elevated troponin  Non-MI troponin elevation 1,863/2,315/1,391  Acute non-traumatic myocardial injury (secondary hypotension/ hypertension / tachycardia / drug overdose)  ECHO: EF 55%  Coronary CT angiography  Cardiology following    Hypertension   BP improving    VTE/GI prophylaxis  -SCDs (eliquis and  lovenox on hold for procedures)/ PPI/ Miralax    Chart, medical history, and labs/testing reviewed in detail.   Interdisciplinary rounding completed.  Plan of care discussed and agreed upon with Dr Corado     Discharge Disposition:  Home no needs    Patient voicing concern about new job, and getting back to work asap. Patient stating he will leave AMA if he has too. Discussed with patient risk of leaving AMA. Encouraged patient to stay for complete work up. Patient agreeable to stay at this time.     Nae Bailon, APRN-CNP

## 2024-06-03 NOTE — PROGRESS NOTES
06/03/24 1242   Discharge Planning   Who is requesting discharge planning? Provider   Home or Post Acute Services None   Patient expects to be discharged to: [facility]   Does the patient need discharge transport arranged? No   RoundTrip coordination needed? No     6/3/24 1242  Patient adamant about leaving today.  Per cardio, coronary CTA can be done as outpatient.  Anticipate discharge home today with no needs.  Angelina Lowe RN T[facility]

## 2024-06-04 ENCOUNTER — APPOINTMENT (OUTPATIENT)
Dept: PULMONOLOGY | Facility: CLINIC | Age: 54
End: 2024-06-04
Payer: COMMERCIAL

## 2024-06-04 ENCOUNTER — APPOINTMENT (OUTPATIENT)
Dept: RADIOLOGY | Facility: HOSPITAL | Age: 54
End: 2024-06-04
Payer: COMMERCIAL

## 2024-06-04 LAB
ALBUMIN SERPL BCP-MCNC: 3.8 G/DL (ref 3.4–5)
ANION GAP SERPL CALC-SCNC: 14 MMOL/L (ref 10–20)
APPEARANCE CSF: CLEAR
BACTERIA BLD CULT: NORMAL
BACTERIA BLD CULT: NORMAL
BASOPHILS # BLD MANUAL: 0 X10*3/UL (ref 0–0.1)
BASOPHILS NFR BLD MANUAL: 0 %
BASOPHILS NFR CSF MANUAL: 0 %
BLASTS CSF MANUAL: 0 %
BUN SERPL-MCNC: 10 MG/DL (ref 6–23)
CALCIUM SERPL-MCNC: 8.8 MG/DL (ref 8.6–10.3)
CHLORIDE SERPL-SCNC: 100 MMOL/L (ref 98–107)
CO2 SERPL-SCNC: 25 MMOL/L (ref 21–32)
COLOR CSF: COLORLESS
COLOR SPUN CSF: COLORLESS
CREAT SERPL-MCNC: 0.94 MG/DL (ref 0.5–1.3)
EGFRCR SERPLBLD CKD-EPI 2021: >90 ML/MIN/1.73M*2
EOSINOPHIL # BLD MANUAL: 0.45 X10*3/UL (ref 0–0.7)
EOSINOPHIL NFR BLD MANUAL: 4 %
EOSINOPHIL NFR CSF MANUAL: 0 %
ERYTHROCYTE [DISTWIDTH] IN BLOOD BY AUTOMATED COUNT: 15.2 % (ref 11.5–14.5)
GLUCOSE CSF-MCNC: 70 MG/DL (ref 40–70)
GLUCOSE SERPL-MCNC: 116 MG/DL (ref 74–99)
HCT VFR BLD AUTO: 38.3 % (ref 41–52)
HGB BLD-MCNC: 12.5 G/DL (ref 13.5–17.5)
HOWELL-JOLLY BOD BLD QL SMEAR: PRESENT
IMM GRANULOCYTES # BLD AUTO: 0.05 X10*3/UL (ref 0–0.7)
IMM GRANULOCYTES NFR BLD AUTO: 0.4 % (ref 0–0.9)
IMM GRANULOCYTES NFR CSF: 0 %
LYMPHOCYTES # BLD MANUAL: 1.79 X10*3/UL (ref 1.2–4.8)
LYMPHOCYTES NFR BLD MANUAL: 16 %
LYMPHOCYTES NFR CSF MANUAL: 29 % (ref 28–96)
MAGNESIUM SERPL-MCNC: 2 MG/DL (ref 1.6–2.4)
MCH RBC QN AUTO: 22.2 PG (ref 26–34)
MCHC RBC AUTO-ENTMCNC: 32.6 G/DL (ref 32–36)
MCV RBC AUTO: 68 FL (ref 80–100)
MONOCYTES # BLD MANUAL: 0.78 X10*3/UL (ref 0.1–1)
MONOCYTES NFR BLD MANUAL: 7 %
MONOS+MACROS NFR CSF MANUAL: 43 % (ref 16–56)
NEUTS SEG # BLD MANUAL: 7.73 X10*3/UL (ref 1.2–7)
NEUTS SEG NFR BLD MANUAL: 69 %
NEUTS SEG NFR CSF MANUAL: 29 % (ref 0–5)
NRBC BLD-RTO: 0 /100 WBCS (ref 0–0)
OTHER CELLS NFR CSF MANUAL: 0 %
OVALOCYTES BLD QL SMEAR: ABNORMAL
PHOSPHATE SERPL-MCNC: 4.5 MG/DL (ref 2.5–4.9)
PLASMA CELLS NFR CSF MICRO: 0 %
PLATELET # BLD AUTO: 265 X10*3/UL (ref 150–450)
POTASSIUM SERPL-SCNC: 3.4 MMOL/L (ref 3.5–5.3)
PROT CSF-MCNC: 46 MG/DL (ref 15–45)
RBC # BLD AUTO: 5.62 X10*6/UL (ref 4.5–5.9)
RBC # CSF AUTO: 2 /UL (ref 0–5)
RBC MORPH BLD: ABNORMAL
SODIUM SERPL-SCNC: 136 MMOL/L (ref 136–145)
TARGETS BLD QL SMEAR: ABNORMAL
TOTAL CELLS COUNTED BLD: 100
TOTAL CELLS COUNTED CSF: 7
TUBE # CSF: ABNORMAL
VARIANT LYMPHS # BLD MANUAL: 0.45 X10*3/UL (ref 0–0.5)
VARIANT LYMPHS NFR BLD: 4 %
WBC # BLD AUTO: 11.2 X10*3/UL (ref 4.4–11.3)
WBC # CSF AUTO: 7 /UL (ref 1–5)

## 2024-06-04 PROCEDURE — 36415 COLL VENOUS BLD VENIPUNCTURE: CPT | Performed by: FAMILY MEDICINE

## 2024-06-04 PROCEDURE — 80069 RENAL FUNCTION PANEL: CPT | Performed by: FAMILY MEDICINE

## 2024-06-04 PROCEDURE — 2500000004 HC RX 250 GENERAL PHARMACY W/ HCPCS (ALT 636 FOR OP/ED): Performed by: FAMILY MEDICINE

## 2024-06-04 PROCEDURE — 2500000002 HC RX 250 W HCPCS SELF ADMINISTERED DRUGS (ALT 637 FOR MEDICARE OP, ALT 636 FOR OP/ED): Performed by: FAMILY MEDICINE

## 2024-06-04 PROCEDURE — A4217 STERILE WATER/SALINE, 500 ML: HCPCS | Performed by: PHARMACIST

## 2024-06-04 PROCEDURE — 82945 GLUCOSE OTHER FLUID: CPT | Performed by: INTERNAL MEDICINE

## 2024-06-04 PROCEDURE — 99232 SBSQ HOSP IP/OBS MODERATE 35: CPT | Performed by: INTERNAL MEDICINE

## 2024-06-04 PROCEDURE — 85027 COMPLETE CBC AUTOMATED: CPT | Performed by: FAMILY MEDICINE

## 2024-06-04 PROCEDURE — 1200000002 HC GENERAL ROOM WITH TELEMETRY DAILY

## 2024-06-04 PROCEDURE — 84157 ASSAY OF PROTEIN OTHER: CPT | Performed by: INTERNAL MEDICINE

## 2024-06-04 PROCEDURE — 89051 BODY FLUID CELL COUNT: CPT | Performed by: INTERNAL MEDICINE

## 2024-06-04 PROCEDURE — 2500000004 HC RX 250 GENERAL PHARMACY W/ HCPCS (ALT 636 FOR OP/ED): Performed by: PHARMACIST

## 2024-06-04 PROCEDURE — 009U3ZX DRAINAGE OF SPINAL CANAL, PERCUTANEOUS APPROACH, DIAGNOSTIC: ICD-10-PCS | Performed by: NURSE PRACTITIONER

## 2024-06-04 PROCEDURE — 2500000001 HC RX 250 WO HCPCS SELF ADMINISTERED DRUGS (ALT 637 FOR MEDICARE OP): Performed by: FAMILY MEDICINE

## 2024-06-04 PROCEDURE — C9113 INJ PANTOPRAZOLE SODIUM, VIA: HCPCS | Performed by: FAMILY MEDICINE

## 2024-06-04 PROCEDURE — 62328 DX LMBR SPI PNXR W/FLUOR/CT: CPT

## 2024-06-04 PROCEDURE — 2500000002 HC RX 250 W HCPCS SELF ADMINISTERED DRUGS (ALT 637 FOR MEDICARE OP, ALT 636 FOR OP/ED): Performed by: NURSE PRACTITIONER

## 2024-06-04 PROCEDURE — 83735 ASSAY OF MAGNESIUM: CPT | Performed by: FAMILY MEDICINE

## 2024-06-04 PROCEDURE — 85007 BL SMEAR W/DIFF WBC COUNT: CPT | Performed by: FAMILY MEDICINE

## 2024-06-04 PROCEDURE — 2500000001 HC RX 250 WO HCPCS SELF ADMINISTERED DRUGS (ALT 637 FOR MEDICARE OP): Performed by: INTERNAL MEDICINE

## 2024-06-04 PROCEDURE — 87075 CULTR BACTERIA EXCEPT BLOOD: CPT | Mod: AHULAB | Performed by: INTERNAL MEDICINE

## 2024-06-04 RX ORDER — POTASSIUM CHLORIDE 20 MEQ/1
20 TABLET, EXTENDED RELEASE ORAL ONCE
Status: COMPLETED | OUTPATIENT
Start: 2024-06-04 | End: 2024-06-04

## 2024-06-04 RX ORDER — ATORVASTATIN CALCIUM 10 MG/1
10 TABLET, FILM COATED ORAL NIGHTLY
Status: DISCONTINUED | OUTPATIENT
Start: 2024-06-04 | End: 2024-06-05 | Stop reason: HOSPADM

## 2024-06-04 RX ORDER — IBUPROFEN 200 MG
1 TABLET ORAL DAILY
Status: DISCONTINUED | OUTPATIENT
Start: 2024-06-04 | End: 2024-06-05 | Stop reason: HOSPADM

## 2024-06-04 RX ADMIN — VANCOMYCIN HYDROCHLORIDE 1750 MG: 10 INJECTION, POWDER, LYOPHILIZED, FOR SOLUTION INTRAVENOUS at 23:51

## 2024-06-04 RX ADMIN — POTASSIUM CHLORIDE 20 MEQ: 1500 TABLET, EXTENDED RELEASE ORAL at 09:02

## 2024-06-04 RX ADMIN — CEFTRIAXONE 2 G: 2 INJECTION, POWDER, FOR SOLUTION INTRAMUSCULAR; INTRAVENOUS at 13:34

## 2024-06-04 RX ADMIN — TRIAMTERENE AND HYDROCHLOROTHIAZIDE 1 TABLET: 37.5; 25 TABLET ORAL at 08:10

## 2024-06-04 RX ADMIN — ACYCLOVIR SODIUM 680 MG: 50 INJECTION, SOLUTION INTRAVENOUS at 01:47

## 2024-06-04 RX ADMIN — THIAMINE HYDROCHLORIDE 100 MG: 100 INJECTION, SOLUTION INTRAMUSCULAR; INTRAVENOUS at 08:10

## 2024-06-04 RX ADMIN — VANCOMYCIN HYDROCHLORIDE 1750 MG: 10 INJECTION, POWDER, LYOPHILIZED, FOR SOLUTION INTRAVENOUS at 11:30

## 2024-06-04 RX ADMIN — PANTOPRAZOLE SODIUM 40 MG: 40 TABLET, DELAYED RELEASE ORAL at 06:38

## 2024-06-04 RX ADMIN — ATORVASTATIN CALCIUM 10 MG: 10 TABLET, FILM COATED ORAL at 20:13

## 2024-06-04 RX ADMIN — TAMSULOSIN HYDROCHLORIDE 0.4 MG: 0.4 CAPSULE ORAL at 08:10

## 2024-06-04 RX ADMIN — ACYCLOVIR SODIUM 680 MG: 50 INJECTION, SOLUTION INTRAVENOUS at 17:15

## 2024-06-04 RX ADMIN — AMLODIPINE BESYLATE 10 MG: 10 TABLET ORAL at 08:10

## 2024-06-04 RX ADMIN — CEFTRIAXONE 2 G: 2 INJECTION, POWDER, FOR SOLUTION INTRAMUSCULAR; INTRAVENOUS at 05:45

## 2024-06-04 RX ADMIN — ACYCLOVIR SODIUM 680 MG: 50 INJECTION, SOLUTION INTRAVENOUS at 09:02

## 2024-06-04 RX ADMIN — PANTOPRAZOLE SODIUM 40 MG: 40 INJECTION, POWDER, FOR SOLUTION INTRAVENOUS at 08:10

## 2024-06-04 ASSESSMENT — COGNITIVE AND FUNCTIONAL STATUS - GENERAL
DAILY ACTIVITIY SCORE: 24
MOBILITY SCORE: 24

## 2024-06-04 ASSESSMENT — PAIN SCALES - GENERAL
PAINLEVEL_OUTOF10: 0 - NO PAIN

## 2024-06-04 ASSESSMENT — PAIN - FUNCTIONAL ASSESSMENT
PAIN_FUNCTIONAL_ASSESSMENT: 0-10

## 2024-06-04 NOTE — PROGRESS NOTES
06/04/24 1116   Discharge Planning   Who is requesting discharge planning? Provider   Home or Post Acute Services None   Patient expects to be discharged to: Home   Does the patient need discharge transport arranged? No     6/4/24 1116  Coronary CTA completed yesterday.  Patient to go to IR for LP to r/o meningitis today.  Discharge home no needs pending results.  Angelina Lowe RN [facility]

## 2024-06-04 NOTE — PROGRESS NOTES
"Subjective Data:  No complaints. Denies cardiovascular symptoms.         Objective Data:  Last Recorded Vitals:  Vitals:    24 0116 24 0637 24 0747 24 0900   BP: 137/76 131/77 134/77    BP Location: Left arm Left arm Left arm    Patient Position: Lying Lying Lying    Pulse:   76 76   Resp: 21      Temp: 37.5 °C (99.5 °F) 36.9 °C (98.4 °F) 36.8 °C (98.3 °F)    TempSrc: Temporal Temporal Oral    SpO2: 95% 98% 97%    Weight:  76.4 kg (168 lb 6.9 oz)     Height:         Medical Gas Therapy: None (Room air)  Weight  Av.8 kg (178 lb 3.6 oz)  Min: 76.4 kg (168 lb 6.9 oz)  Max: 82.8 kg (182 lb 8.7 oz)    LABS:  CMP:  Results from last 7 days   Lab Units 24  0617 24  0642 24  0453 24  0526 24  1145 24  2025 24  1857   SODIUM mmol/L 136 139 138 135* 134* 134* 134*   POTASSIUM mmol/L 3.4* 3.3* 3.5 3.6 3.8 3.4* 3.2*   CHLORIDE mmol/L 100 105 107 102 102 101 100   CO2 mmol/L 25 24 20* 22 23 18* 18*   ANION GAP mmol/L 14 13 15 15 13 18 19   BUN mg/dL 10 7 12 13 15 16 17   CREATININE mg/dL 0.94 0.89 0.89 0.76 0.80 0.93 0.97   EGFR mL/min/1.73m*2 >90 >90 >90 >90 >90 >90 >90   MAGNESIUM mg/dL 2.00 1.70 1.80 2.10 2.10  --   --    ALBUMIN g/dL 3.8 3.4 3.3* 3.7 3.8 3.8 4.2   ALT U/L  --   --   --   --   --   --  16   AST U/L  --   --   --   --   --   --  30   BILIRUBIN TOTAL mg/dL  --   --   --   --   --   --  0.7     CBC:  Results from last 7 days   Lab Units 24  0617 24  0642 24  0453 24  0526 24  0603 24   WBC AUTO x10*3/uL 11.2 9.6 11.1 18.4*  18.4* 22.2* 22.5*   HEMOGLOBIN g/dL 12.5* 11.9* 12.1* 14.9  14.9 15.9 16.0   HEMATOCRIT % 38.3* 38.5* 36.6* 44.9  44.9 47.4 47.7   PLATELETS AUTO x10*3/uL 265 223 185 213  213 317 259   MCV fL 68* 71* 68* 69*  69* 68* 68*     COAG:     ABO: No results found for: \"ABO\"  HEME/ENDO:     CARDIAC:   Results from last 7 days   Lab Units 24  0603 24  0006 24  1857 "   TROPHS ng/L 1,391* 2,315* 1,863*      Results from last 7 days   Lab Units 05/31/24  1145   LDL CALC mg/dL 115*   VLDL mg/dL 31   CHOLESTEROL/HDL RATIO  3.8        Last I/O:    Intake/Output Summary (Last 24 hours) at 6/4/2024 0928  Last data filed at 6/4/2024 0913  Gross per 24 hour   Intake 1762.14 ml   Output 450 ml   Net 1312.14 ml     Net IO Since Admission: 8,167.71 mL [06/04/24 0928]            Inpatient Medications:  Scheduled medications   Medication Dose Route Frequency    acyclovir  10 mg/kg (Ideal) intravenous q8h    amLODIPine  10 mg oral Daily    atorvastatin  10 mg oral Nightly    cefTRIAXone  2 g intravenous q12h    [Held by provider] enoxaparin  40 mg subcutaneous Daily    pantoprazole  40 mg oral Daily before breakfast    pantoprazole  40 mg intravenous Daily    perflutren lipid microspheres  0.5-10 mL of dilution intravenous Once in imaging    perflutren protein A microsphere  0.5 mL intravenous Once in imaging    sulfur hexafluoride microsphr  2 mL intravenous Once in imaging    tamsulosin  0.4 mg oral Daily    thiamine  100 mg intravenous Daily    triamterene-hydrochlorothiazid  1 tablet oral Daily    vancomycin  1,750 mg intravenous q12h     PRN medications   Medication    acetaminophen    haloperidol lactate    hydrALAZINE    metoprolol    vancomycin     Continuous Medications   Medication Dose Last Rate           Physical Exam:  Gen Well appearing middle aged male sitting up in NAD. Body mass index is 25.62 kg/m².   CV rrr. No m/r/g appreciated. No JVD or leg edema.    Pulm Lungs clear with normal respiratory effort.  Neuro Alert and conversant. Grossly nonfocal.         Assessment:  Elevated troponin  Non-MI troponin elevation / acute non-traumatic myocardial injury in the setting of reported hypotension, some noted marked hypertension / tachycardia / drug overdose, etc. Core measures do not apply. TTE unremarkable. Coronary CT angiography complete being notable for no obstructive coronary  disease.    Hypertension   BP improved and presently acceptable. His present regimen is to continue.     3. Hyperlipidemia   CAC score <7 but still slightly elevated. 10 year risk > 10%    Recommendations:  Added statin. No further cardiac testing. No cardiac barriers to discharge.     Thank you for the consult. Will sign off, but we are available for any ?'s         Wei Newton MD

## 2024-06-04 NOTE — PROGRESS NOTES
"  INFECTIOUS DISEASE DAILY PROGRESS NOTE    SUBJECTIVE:    No overnight events. No new complaints. Afebrile. No rash/itching/diarrhea. LP on hold yesterday due to Lovenox use, planned for today. He is OK with this.    OBJECTIVE:  VITALS (Last 24 Hours)  /77 (BP Location: Left arm, Patient Position: Lying)   Pulse 65   Temp 36.8 °C (98.3 °F) (Oral)   Resp 21   Ht 1.727 m (5' 7.99\")   Wt 76.4 kg (168 lb 6.9 oz)   SpO2 97%   BMI 25.62 kg/m²     PHYSICAL EXAM:  Gen - NAD, laying in bed  Neck - no stiffness/meningismus  Lungs - no wheezing  Abd - soft, no ttp, BS present  Skin - no rashes    ABX: IV Vanc/CTX/Acyclovir    LABS:  Lab Results   Component Value Date    WBC 11.2 06/04/2024    HGB 12.5 (L) 06/04/2024    HCT 38.3 (L) 06/04/2024    MCV 68 (L) 06/04/2024     06/04/2024     Lab Results   Component Value Date    GLUCOSE 111 (H) 06/03/2024    CALCIUM 8.3 (L) 06/03/2024     06/03/2024    K 3.3 (L) 06/03/2024    CO2 24 06/03/2024     06/03/2024    BUN 7 06/03/2024    CREATININE 0.89 06/03/2024     Results from last 72 hours   Lab Units 06/03/24  0642   ALBUMIN g/dL 3.4     Estimated Creatinine Clearance: 91.8 mL/min (by C-G formula based on SCr of 0.89 mg/dL).    ASSESSMENT/PLAN:     Possible Meningitis - has been on empiric therapy for D4 now for potential meningitis. MRI read possibly concerning for this vs motion artifact. His history seems more consistent with drug overdose. However this would not really explain fever. Leukocytosis could have been reactive vs from infection as well. ICU/neurology had recommended LP after Plavix washout which should be OK to do today. I did discuss with patient and he seems willing to have LP done today. It is difficult for me to determine what to do with his antibiotics/antiviral therapy without further information. He wants to leave the hospital either today or tomorrow which he may have to do AMA if we cannot make a determination on treatment plan " regarding antimicrobial therapy.     IV Vanc/CTX/Acyclovir. Will determine further need for these based on LP results although interpretation may be difficult given length of therapy already received.     IR consultation for LP - appreciate help! CSF labs ordered.    Monitoring for adverse effects of abx such as rash/itching/diarrhea - none.     Will follow. Thanks! D/w Dr. Mak Meza MD  [facility]  Office #[phone]

## 2024-06-04 NOTE — PROGRESS NOTES
Medicine NP follow up note    Subjective:  Seen in am and again this afternoon, d/w mother at bedside.   Pt eager to leave, agrees to stay until tomorrow.     Vitals (Last 24 Hours):  Heart Rate:  [65-82]   Temp:  [36.6 °C (97.8 °F)-37.8 °C (100 °F)]   Resp:  [16-21]   BP: (106-145)/(68-83)   Weight:  [76.4 kg (168 lb 6.9 oz)]   SpO2:  [95 %-98 %]     I have reviewed all imaging reports and labs pertinent to this visit / presenting problem    PHYSICAL EXAM:  Constitutional: NAD, alert and cooperative  Eyes: no icterus, baseline eyelid tremor L>R  ENMT: mucous membranes moist, no lesions  Head/Neck: supple  Respiratory/Thorax: CTA bilaterally, non-labored breathing, no cough, on RA  Cardiovascular: RRR, no murmurs heard  Gastrointestinal: ND/S/NT  : no Salazar, no SP/flank discomfort  Musculoskeletal: no joint swelling, ROM intact  Extremities: no edema  Neurological: non-focal  Skin: warm and dry  Psych: calm, stable mood     MEDS:  Scheduled meds  acyclovir, 10 mg/kg (Ideal), intravenous, q8h  amLODIPine, 10 mg, oral, Daily  atorvastatin, 10 mg, oral, Nightly  cefTRIAXone, 2 g, intravenous, q12h  [Held by provider] enoxaparin, 40 mg, subcutaneous, Daily  pantoprazole, 40 mg, oral, Daily before breakfast  pantoprazole, 40 mg, intravenous, Daily  tamsulosin, 0.4 mg, oral, Daily  thiamine, 100 mg, intravenous, Daily  triamterene-hydrochlorothiazid, 1 tablet, oral, Daily  vancomycin, 1,750 mg, intravenous, q12h    PRN meds  PRN medications: acetaminophen, haloperidol lactate, hydrALAZINE, metoprolol, vancomycin    ASSESSMENT/PLAN:  53 y.o. male with PMH of tobacco use, former polysubstance abuse, bilat PE Feb'24 - on eliquis through August'24, splenic aneurysm rupture s/p coil Feb'24, esophagitis, hiatal hernia, HLD, BPH, LUC, minimal CAD, presented to OSH on 5/30/24 with suspected drug overdose, given Narcan by EMS, admitted to ICU, urine tox pos for fentanyl. MRI yanique (motion limited study) with white matter  abnormalities possibly artifact but if real could represent multiple potential issues including PRED/encephalitis/vasculitis. Started on IV Vanc/CTX/Acyclovir for possible meningoencephalitis, LP done 6/4.     Encephalopathy (resolved)   Suspected drug overdose   Possible meningitis   -s/p LP today, remains on IV Vanc/CTX/Acyclovir, ID following, FU CSF results, neuro consulted re MRI findings   -fever 38 on 6/2 noted, downtrending since     Elevated troponin   -ACS ruled out, in setting of initial reported hypotension / subsequent marked hypertension / tachycardia / drug overdose, TTE and CTA stable     HTN emergency s/p Nicardipine  -BP stable on current meds     HypoK  -repleted     HLD  -continue statin     Hx PE  -resume eliquis when ok with IR     Nicotine dependence   -nicotine patch     VTE / GI prophylaxis   -Eliquis on hold for LP, PPI in place     Discharge planning  -no PT needs     Discussed with Dr. Corado and the interdisciplinary team     Shefali Adams, APRN-CNP

## 2024-06-04 NOTE — INTERVAL H&P NOTE
H&P reviewed. The patient was examined and there are no changes to the H&P.  L.P. ordered to eval for meningitis. Plan for fl guided LP today with specimens.

## 2024-06-04 NOTE — CARE PLAN
The patient's goals for the shift include      The clinical goals for the shift include Patient to continue antibiotic therapy    Over the shift, the patient did not make progress toward the following goals. Barriers to progression include . Recommendations to address these barriers include   Problem: Pain  Goal: My pain/discomfort is manageable  Outcome: Progressing     Problem: Safety  Goal: Patient will be injury free during hospitalization  Outcome: Progressing  Goal: I will remain free of falls  Outcome: Progressing     Problem: Daily Care  Goal: Daily care needs are met  Outcome: Progressing     Problem: Psychosocial Needs  Goal: Demonstrates ability to cope with hospitalization/illness  Outcome: Progressing  Goal: Collaborate with me, my family, and caregiver to identify my specific goals  Outcome: Progressing     Problem: Discharge Barriers  Goal: My discharge needs are met  Outcome: Progressing     Problem: Fall/Injury  Goal: Not fall by end of shift  Outcome: Progressing  Goal: Be free from injury by end of the shift  Outcome: Progressing  Goal: Verbalize understanding of personal risk factors for fall in the hospital  Outcome: Progressing  Goal: Verbalize understanding of risk factor reduction measures to prevent injury from fall in the home  Outcome: Progressing  Goal: Use assistive devices by end of the shift  Outcome: Progressing  Goal: Pace activities to prevent fatigue by end of the shift  Outcome: Progressing     Problem: Skin  Goal: Participates in plan/prevention/treatment measures  Outcome: Progressing  Goal: Prevent/manage excess moisture  Outcome: Progressing  Goal: Prevent/minimize sheer/friction injuries  Outcome: Progressing  Goal: Promote/optimize nutrition  Outcome: Progressing  Goal: Promote skin healing  Outcome: Progressing   .

## 2024-06-04 NOTE — CARE PLAN
The patient's goals for the shift include      The clinical goals for the shift include remain comfortable during shift      Problem: Pain  Goal: My pain/discomfort is manageable  Outcome: Progressing     Problem: Safety  Goal: Patient will be injury free during hospitalization  Outcome: Progressing  Goal: I will remain free of falls  Outcome: Progressing     Problem: Daily Care  Goal: Daily care needs are met  Outcome: Progressing     Problem: Psychosocial Needs  Goal: Demonstrates ability to cope with hospitalization/illness  Outcome: Progressing  Goal: Collaborate with me, my family, and caregiver to identify my specific goals  Outcome: Progressing     Problem: Discharge Barriers  Goal: My discharge needs are met  Outcome: Progressing     Problem: Fall/Injury  Goal: Not fall by end of shift  Outcome: Progressing  Goal: Be free from injury by end of the shift  Outcome: Progressing  Goal: Verbalize understanding of personal risk factors for fall in the hospital  Outcome: Progressing  Goal: Verbalize understanding of risk factor reduction measures to prevent injury from fall in the home  Outcome: Progressing  Goal: Use assistive devices by end of the shift  Outcome: Progressing  Goal: Pace activities to prevent fatigue by end of the shift  Outcome: Progressing     Problem: Skin  Goal: Participates in plan/prevention/treatment measures  Outcome: Progressing  Goal: Prevent/manage excess moisture  Outcome: Progressing  Goal: Prevent/minimize sheer/friction injuries  Outcome: Progressing  Goal: Promote/optimize nutrition  Outcome: Progressing  Goal: Promote skin healing  Outcome: Progressing

## 2024-06-04 NOTE — PROGRESS NOTES
Douglas Wilson is a 53 y.o. male     Patient feels okay  CT angio negative  LP to be done today    Review of Systems     Constitutional: no fever, no chills, not feeling poorly, not feeling tired   Cardiovascular: no chest pain   Respiratory: no cough, wheezing or shortness of breath a  Gastrointestinal: no abdominal pain, no constipation, no melena, no nausea, no diarrhea, no vomiting and no blood in stools.   Neurological: no headache,   All other systems have been reviewed and are negative for complaint.       Vitals:    06/04/24 1518   BP: 137/83   Pulse: 82   Resp: 18   Temp: 36.6 °C (97.8 °F)   SpO2: 96%        Scheduled medications  acyclovir, 10 mg/kg (Ideal), intravenous, q8h  amLODIPine, 10 mg, oral, Daily  atorvastatin, 10 mg, oral, Nightly  cefTRIAXone, 2 g, intravenous, q12h  [Held by provider] enoxaparin, 40 mg, subcutaneous, Daily  pantoprazole, 40 mg, oral, Daily before breakfast  pantoprazole, 40 mg, intravenous, Daily  perflutren lipid microspheres, 0.5-10 mL of dilution, intravenous, Once in imaging  perflutren protein A microsphere, 0.5 mL, intravenous, Once in imaging  sulfur hexafluoride microsphr, 2 mL, intravenous, Once in imaging  tamsulosin, 0.4 mg, oral, Daily  thiamine, 100 mg, intravenous, Daily  triamterene-hydrochlorothiazid, 1 tablet, oral, Daily  vancomycin, 1,750 mg, intravenous, q12h      Continuous medications       PRN medications  PRN medications: acetaminophen, haloperidol lactate, hydrALAZINE, metoprolol, vancomycin    Lab Review   Results from last 7 days   Lab Units 06/04/24 0617 06/03/24 0642 06/02/24  0453   WBC AUTO x10*3/uL 11.2 9.6 11.1   HEMOGLOBIN g/dL 12.5* 11.9* 12.1*   HEMATOCRIT % 38.3* 38.5* 36.6*   PLATELETS AUTO x10*3/uL 265 223 185     Results from last 7 days   Lab Units 06/04/24  0617 06/03/24  0642 06/02/24  0453 05/30/24 2025 05/30/24  1857   SODIUM mmol/L 136 139 138   < > 134*   POTASSIUM mmol/L 3.4* 3.3* 3.5   < > 3.2*   CHLORIDE mmol/L 100 105 107    < > 100   CO2 mmol/L 25 24 20*   < > 18*   BUN mg/dL 10 7 12   < > 17   CREATININE mg/dL 0.94 0.89 0.89   < > 0.97   CALCIUM mg/dL 8.8 8.3* 8.2*   < > 8.9   PROTEIN TOTAL g/dL  --   --   --   --  8.0   BILIRUBIN TOTAL mg/dL  --   --   --   --  0.7   ALK PHOS U/L  --   --   --   --  68   ALT U/L  --   --   --   --  16   AST U/L  --   --   --   --  30   GLUCOSE mg/dL 116* 111* 101*   < > 126*    < > = values in this interval not displayed.     Results from last 7 days   Lab Units 05/31/24  0603 05/31/24  0006 05/30/24  1857   TROPHS ng/L 1,391* 2,315* 1,863*        FL guided lumbar puncture   Final Result   Successful fluoroscopic guided lumbar puncture L4-5.        Procedure completed and dictated at [facility].        Signed by: Nora Garza 6/4/2024 11:24 AM   Dictation workstation:   [id number]      CT angio coronary art with heartflow if score >30%   Final Result   1.  Nonobstructive coronary artery disease involving the proximal LAD   with minimal (1-24%) stenosis. Calcium score is 6.36.   2. Probable small PFO.   3. Small hiatal hernia.             MACRO:   None        Signed by: Aaron Barfield 6/3/2024 4:00 PM   Dictation workstation:   [id number]      MR brain w and wo IV contrast   Final Result   The study is degraded by motion.        There are motion degraded diffusion-weighted images fail to   demonstrate evidence of abnormal diffusion restriction to suggest   acute infarction.        The cerebellar tonsils are low lying with the right cerebellar tonsil   extending approximately 5 mm below level of the foramen magnum which   is borderline by MRI criteria for a Chiari malformation.        There are a few small nonspecific white matter changes noted within   cerebral hemispheres bilaterally. While nonspecific, white matter   changes can be seen with small-vessel ischemic change or   demyelinating processes among others.        There are subtle patchy areas of mild increased FLAIR signal  along   cortex and subcortical white matter of the cerebral hemispheres   bilaterally best appreciated within the frontal, parietal, and   occipital regions which may be technical/artifactual in origin   although if real, possible considerations could include an underlying   process such as posterior reversible encephalopathy syndrome (PRES),   encephalitis, or vasculitis among others.        MACRO:   None.        Signed by: Jeremy Adkins 6/1/2024 9:01 AM   Dictation workstation:   [id number]      Transthoracic Echo (TTE) Complete   Final Result      XR chest 1 view   Final Result   1.  No evidence of acute cardiopulmonary process.                  MACRO:   None        Signed by: Raman Valenzuela 5/31/2024 8:07 AM   Dictation workstation:   [id number]            Physical Exam     Constitutional   General appearance: Alert and in no acute distress.     Pulmonary   Respiratory assessment: No respiratory distress, normal respiratory rhythm and effort.    Auscultation of Lungs: Clear bilateral breath sounds.   Cardiovascular   Auscultation of heart: Apical pulse normal, heart rate and rhythm normal, normal S1 and S2, no murmurs and no pericardial rub.    Exam for edema: No peripheral edema.   Abdomen   Abdominal Exam: No bruits, normal bowel sounds, soft, non-tender, no abdominal mass palpated.    Liver and Spleen exam: No hepato-splenomegaly.   Musculoskeletal     Skin inspection: Normal skin color and pigmentation, normal skin turgor and no visible rash.   Neurologic   Cranial nerves: Nerves 2-12 were intact, no focal neuro defects.     Assessment/Plan      #Acute encephalopathy  #Positive urine tox  Patient denies using drugs  Continue IV antibiotics  LP today    #Coronary artery disease with elevated troponins  CT angio normal  No need for cardiac cath    #Hypertension  Labile  Will adjust medications as needed    #Dyslipidemia  Continue statins and maintain LDL less than 70    #History of pulmonary embolism  S/p splenic  artery aneurysm rupture with coiling  Will need to be on Eliquis through August  Holding for now for the LP and possible cardiac cath

## 2024-06-04 NOTE — PROGRESS NOTES
Physical Therapy                 Therapy Communication Note    Patient Name: Douglas Wilson  MRN: [id number]  Today's Date: 6/4/2024     Discipline: Physical Therapy    Missed Visit Reason: Missed Visit Reason:  (pt stated he just got into bed and did not want to do anything at this time.  RN notified)    Missed Time: Attempt    Comment:

## 2024-06-04 NOTE — PROGRESS NOTES
Physical Therapy                 Therapy Communication Note    Patient Name: Douglas Wilson  MRN: [id number]  Today's Date: 6/4/2024     Discipline: Physical Therapy    Missed Visit Reason: Missed Visit Reason:  (per RN pt leaving floor for lumber puncture testing)    Missed Time: Attempt    Comment:

## 2024-06-05 VITALS
RESPIRATION RATE: 16 BRPM | SYSTOLIC BLOOD PRESSURE: 146 MMHG | TEMPERATURE: 98.2 F | WEIGHT: 168.43 LBS | OXYGEN SATURATION: 98 % | HEART RATE: 76 BPM | HEIGHT: 68 IN | BODY MASS INDEX: 25.53 KG/M2 | DIASTOLIC BLOOD PRESSURE: 85 MMHG

## 2024-06-05 LAB
ALBUMIN SERPL BCP-MCNC: 4 G/DL (ref 3.4–5)
ANION GAP SERPL CALC-SCNC: 14 MMOL/L (ref 10–20)
BUN SERPL-MCNC: 10 MG/DL (ref 6–23)
CALCIUM SERPL-MCNC: 9.2 MG/DL (ref 8.6–10.3)
CHLORIDE SERPL-SCNC: 99 MMOL/L (ref 98–107)
CO2 SERPL-SCNC: 25 MMOL/L (ref 21–32)
CREAT SERPL-MCNC: 1.02 MG/DL (ref 0.5–1.3)
EGFRCR SERPLBLD CKD-EPI 2021: 87 ML/MIN/1.73M*2
ERYTHROCYTE [DISTWIDTH] IN BLOOD BY AUTOMATED COUNT: 15.8 % (ref 11.5–14.5)
GLUCOSE SERPL-MCNC: 116 MG/DL (ref 74–99)
HCT VFR BLD AUTO: 40.2 % (ref 41–52)
HGB BLD-MCNC: 12.7 G/DL (ref 13.5–17.5)
MAGNESIUM SERPL-MCNC: 1.9 MG/DL (ref 1.6–2.4)
MCH RBC QN AUTO: 21.8 PG (ref 26–34)
MCHC RBC AUTO-ENTMCNC: 31.6 G/DL (ref 32–36)
MCV RBC AUTO: 69 FL (ref 80–100)
NRBC BLD-RTO: 0 /100 WBCS (ref 0–0)
PHOSPHATE SERPL-MCNC: 4.1 MG/DL (ref 2.5–4.9)
PLATELET # BLD AUTO: 348 X10*3/UL (ref 150–450)
POTASSIUM SERPL-SCNC: 4 MMOL/L (ref 3.5–5.3)
RBC # BLD AUTO: 5.82 X10*6/UL (ref 4.5–5.9)
SODIUM SERPL-SCNC: 134 MMOL/L (ref 136–145)
VANCOMYCIN SERPL-MCNC: 17.2 UG/ML (ref 5–20)
WBC # BLD AUTO: 14.5 X10*3/UL (ref 4.4–11.3)

## 2024-06-05 PROCEDURE — 80069 RENAL FUNCTION PANEL: CPT | Performed by: NURSE PRACTITIONER

## 2024-06-05 PROCEDURE — 85027 COMPLETE CBC AUTOMATED: CPT | Performed by: NURSE PRACTITIONER

## 2024-06-05 PROCEDURE — 80202 ASSAY OF VANCOMYCIN: CPT | Performed by: PHARMACIST

## 2024-06-05 PROCEDURE — 2500000004 HC RX 250 GENERAL PHARMACY W/ HCPCS (ALT 636 FOR OP/ED): Performed by: FAMILY MEDICINE

## 2024-06-05 PROCEDURE — 99239 HOSP IP/OBS DSCHRG MGMT >30: CPT | Performed by: INTERNAL MEDICINE

## 2024-06-05 PROCEDURE — 83735 ASSAY OF MAGNESIUM: CPT | Performed by: FAMILY MEDICINE

## 2024-06-05 PROCEDURE — 2500000001 HC RX 250 WO HCPCS SELF ADMINISTERED DRUGS (ALT 637 FOR MEDICARE OP): Performed by: FAMILY MEDICINE

## 2024-06-05 RX ORDER — AMLODIPINE BESYLATE 10 MG/1
10 TABLET ORAL DAILY
Qty: 30 TABLET | Refills: 0 | Status: SHIPPED | OUTPATIENT
Start: 2024-06-06 | End: 2024-07-06

## 2024-06-05 RX ORDER — ATORVASTATIN CALCIUM 10 MG/1
10 TABLET, FILM COATED ORAL NIGHTLY
Qty: 30 TABLET | Refills: 0 | Status: SHIPPED | OUTPATIENT
Start: 2024-06-05 | End: 2024-07-05

## 2024-06-05 RX ADMIN — CEFTRIAXONE 2 G: 2 INJECTION, POWDER, FOR SOLUTION INTRAMUSCULAR; INTRAVENOUS at 01:50

## 2024-06-05 RX ADMIN — ACYCLOVIR SODIUM 680 MG: 50 INJECTION, SOLUTION INTRAVENOUS at 02:27

## 2024-06-05 RX ADMIN — PANTOPRAZOLE SODIUM 40 MG: 40 TABLET, DELAYED RELEASE ORAL at 06:05

## 2024-06-05 ASSESSMENT — COGNITIVE AND FUNCTIONAL STATUS - GENERAL
DAILY ACTIVITIY SCORE: 24
MOBILITY SCORE: 24

## 2024-06-05 ASSESSMENT — PAIN - FUNCTIONAL ASSESSMENT: PAIN_FUNCTIONAL_ASSESSMENT: 0-10

## 2024-06-05 ASSESSMENT — PAIN SCALES - GENERAL: PAINLEVEL_OUTOF10: 0 - NO PAIN

## 2024-06-05 NOTE — DISCHARGE SUMMARY
Discharge Diagnosis  Encephalopathy    Issues Requiring Follow-Up  Recommend outpatient drug treatment program    Test Results Pending At Discharge  Pending Labs       Order Current Status    Bath Salts (Illicit), Urine In process    CSF Culture/Smear In process    HSV PCR, CSF In process            Hospital Course   Patient with a past medical history significant for hypertension dyslipidemia history of polysubstance abuse was admitted to the ICU with acute change in mental status  He did test positive for fentanyl at [facility] but did not respond to naloxone  Patient was started on broad-spectrum antibiotics for possible sepsis/meningitis/encephalitis  After being stabilized he was transferred to the floor  Underwent an LP which was negative so the antibiotics were stopped  Underwent a CT angiogram which showed minimal coronary artery disease  Patient is back to his baseline and is alert oriented x 3  Counseled the patient on stopping drug use  Strongly recommend outpatient treatment program  Discharge the patient home on his hypertensive and statins  Resume Eliquis for the recent pulmonary embolism which she is to continue through August    Pertinent Physical Exam At Time of Discharge  Physical Exam  Constitutional   General appearance: Alert and in no acute distress.     Pulmonary   Respiratory assessment: No respiratory distress, normal respiratory rhythm and effort.    Auscultation of Lungs: Clear bilateral breath sounds.   Cardiovascular   Auscultation of heart: Apical pulse normal, heart rate and rhythm normal, normal S1 and S2, no murmurs and no pericardial rub.    Exam for edema: No peripheral edema.   Abdomen   Abdominal Exam: No bruits, normal bowel sounds, soft, non-tender, no abdominal mass palpated.    Liver and Spleen exam: No hepato-splenomegaly.   Musculoskeletal   Examination of gait: Normal.    Inspection of digits and nails: No clubbing or cyanosis of the fingernails.    Inspection/palpation  of joints, bones and muscles: No joint swelling. Normal movement of all extremities.   Skin   Skin inspection: Normal skin color and pigmentation, normal skin turgor and no visible rash.   Neurologic   Cranial nerves: Nerves 2-12 were intact, no focal neuro defects.       Home Medications     Medication List      ASK your doctor about these medications     apixaban 5 mg tablet; Commonly known as: Eliquis; Take 1 tablet (5 mg)   by mouth 2 times a day.   cholecalciferol 1,250 mcg (50,000 unit) tablet; Commonly known as:   Vitamin D3; Take 1 tablet (50,000 Units) by mouth every 7 days.   ferrous gluconate 324 (38 Fe) mg tablet; Commonly known as: Fergon; Take   1 tablet (38 mg of iron) by mouth every other day.   folic acid 1 mg tablet; Commonly known as: Folvite   furosemide 20 mg tablet; Commonly known as: Lasix; Take 1 tablet (20 mg)   by mouth once daily.   pantoprazole 40 mg EC tablet; Commonly known as: ProtoNix; Take 1 tablet   (40 mg) by mouth 2 times a day before meals. Do not crush, chew, or split.   potassium chloride CR 10 mEq ER tablet; Commonly known as: Klor-Con;   Take 1 tablet (10 mEq) by mouth once daily. Do not crush, chew, or split.   sennosides-docusate sodium 8.6-50 mg tablet; Commonly known as:   Senokot-S; Take 2 tablets by mouth once daily at bedtime.   tadalafil 5 mg tablet; Commonly known as: Cialis; Take 1 tablet (5 mg)   by mouth once daily.   tamsulosin 0.4 mg 24 hr capsule; Commonly known as: Flomax   Viagra 100 mg tablet; Generic drug: sildenafil       Outpatient Follow-Up  Future Appointments   Date Time Provider Department Center   6/12/2024  8:00 AM Chucho De Leon MD [id number] [facility]   8/20/2024  1:00 PM Katerin Alvarado MD [id number] [facility]     Patient seen at bedside. Events from the last visit reviewed. Discussed with staff. Results of tests and investigations from last visit reviewed and discussed with patient/Family. Electronic chart on [facility] reviewed. Input / Recommendations   from consultants  appreciated and reviewed and agreed with.     discharge summary and profile completed. medications reviewed and discussed with patient and family.  scripts completed and signed.     total discharge time in excess of 30 minutes.    Anuja Corado MD

## 2024-06-05 NOTE — CARE PLAN
The patient's goals for the shift include      The clinical goals for the shift include Pt will remain HDS      Problem: Pain  Goal: My pain/discomfort is manageable  Outcome: Progressing     Problem: Safety  Goal: Patient will be injury free during hospitalization  Outcome: Progressing  Goal: I will remain free of falls  Outcome: Progressing     Problem: Daily Care  Goal: Daily care needs are met  Outcome: Progressing     Problem: Psychosocial Needs  Goal: Demonstrates ability to cope with hospitalization/illness  Outcome: Progressing  Goal: Collaborate with me, my family, and caregiver to identify my specific goals  Outcome: Progressing     Problem: Discharge Barriers  Goal: My discharge needs are met  Outcome: Progressing     Problem: Fall/Injury  Goal: Not fall by end of shift  Outcome: Progressing  Goal: Be free from injury by end of the shift  Outcome: Progressing  Goal: Verbalize understanding of personal risk factors for fall in the hospital  Outcome: Progressing  Goal: Verbalize understanding of risk factor reduction measures to prevent injury from fall in the home  Outcome: Progressing  Goal: Use assistive devices by end of the shift  Outcome: Progressing  Goal: Pace activities to prevent fatigue by end of the shift  Outcome: Progressing     Problem: Skin  Goal: Participates in plan/prevention/treatment measures  Outcome: Progressing  Goal: Prevent/manage excess moisture  Outcome: Progressing  Goal: Prevent/minimize sheer/friction injuries  Outcome: Progressing  Goal: Promote/optimize nutrition  Outcome: Progressing  Goal: Promote skin healing  Outcome: Progressing

## 2024-06-05 NOTE — PROGRESS NOTES
Vancomycin Dosing by Pharmacy- Cessation of Therapy    Consult to pharmacy for vancomycin dosing has been discontinued by the prescriber, pharmacy will sign off at this time.    Please call pharmacy if there are further questions or re-enter a consult if vancomycin is resumed.     Cherelle Reynolds, PharmD

## 2024-06-05 NOTE — PROGRESS NOTES
"  INFECTIOUS DISEASE DAILY PROGRESS NOTE    SUBJECTIVE:    No fevers. No new symptoms. LP was done yesterday. WBC is 14.5 today.    OBJECTIVE:  VITALS (Last 24 Hours)  /65 (BP Location: Left arm, Patient Position: Lying)   Pulse 88   Temp 36.8 °C (98.3 °F) (Temporal)   Resp 16   Ht 1.727 m (5' 7.99\")   Wt 76.4 kg (168 lb 6.9 oz)   SpO2 97%   BMI 25.62 kg/m²     PHYSICAL EXAM:  Gen - NAD, laying in bed  Lungs - no wheezing  Abd - soft, no ttp, BS present  Skin - no rash    ABX: IV Vanc/CTX/Acyclovir    LABS:  Lab Results   Component Value Date    WBC 14.5 (H) 06/05/2024    HGB 12.7 (L) 06/05/2024    HCT 40.2 (L) 06/05/2024    MCV 69 (L) 06/05/2024     06/05/2024     Lab Results   Component Value Date    GLUCOSE 116 (H) 06/04/2024    CALCIUM 8.8 06/04/2024     06/04/2024    K 3.4 (L) 06/04/2024    CO2 25 06/04/2024     06/04/2024    BUN 10 06/04/2024    CREATININE 0.94 06/04/2024     Results from last 72 hours   Lab Units 06/04/24  0617   ALBUMIN g/dL 3.8     Estimated Creatinine Clearance: 86.9 mL/min (by C-G formula based on SCr of 0.94 mg/dL).    ASSESSMENT/PLAN:     Drug Overdose  Fever    He is feeling completely fine now with no symptoms.    Meningitis ruled out with essentially normal LP. Stopped antimicrobials and OK to go home.    Monitoring for adverse effects of abx such as rash/itching/diarrhea - none thus far.     Will sign off. Please call back with questions. Thanks!    José Meza MD  [facility]  Office #[phone]      "

## 2024-06-07 ENCOUNTER — HOSPITAL ENCOUNTER (EMERGENCY)
Facility: HOSPITAL | Age: 54
Discharge: HOME | End: 2024-06-07
Attending: EMERGENCY MEDICINE
Payer: COMMERCIAL

## 2024-06-07 VITALS
BODY MASS INDEX: 25.18 KG/M2 | HEART RATE: 65 BPM | SYSTOLIC BLOOD PRESSURE: 131 MMHG | HEIGHT: 69 IN | OXYGEN SATURATION: 100 % | RESPIRATION RATE: 16 BRPM | WEIGHT: 170 LBS | DIASTOLIC BLOOD PRESSURE: 87 MMHG | TEMPERATURE: 98.1 F

## 2024-06-07 DIAGNOSIS — M54.2 NECK PAIN: Primary | ICD-10-CM

## 2024-06-07 LAB — MEPHEDRONE UR CFM-MCNC: NORMAL NG/ML

## 2024-06-07 PROCEDURE — 99283 EMERGENCY DEPT VISIT LOW MDM: CPT

## 2024-06-07 RX ORDER — NAPROXEN 500 MG/1
500 TABLET ORAL
Qty: 30 TABLET | Refills: 0 | Status: SHIPPED | OUTPATIENT
Start: 2024-06-07 | End: 2024-06-22

## 2024-06-07 RX ORDER — BACLOFEN 20 MG/1
20 TABLET ORAL 3 TIMES DAILY
Qty: 15 TABLET | Refills: 0 | Status: SHIPPED | OUTPATIENT
Start: 2024-06-07 | End: 2024-06-12

## 2024-06-07 RX ORDER — DIAZEPAM 5 MG/1
5 TABLET ORAL NIGHTLY PRN
Qty: 5 TABLET | Refills: 0 | Status: SHIPPED | OUTPATIENT
Start: 2024-06-07 | End: 2024-06-12

## 2024-06-07 RX ORDER — TRAMADOL HYDROCHLORIDE 50 MG/1
50 TABLET ORAL EVERY 6 HOURS PRN
Qty: 15 TABLET | Refills: 0 | Status: SHIPPED | OUTPATIENT
Start: 2024-06-07 | End: 2024-06-11

## 2024-06-07 ASSESSMENT — PAIN SCALES - GENERAL: PAINLEVEL_OUTOF10: 10 - WORST POSSIBLE PAIN

## 2024-06-07 ASSESSMENT — PAIN - FUNCTIONAL ASSESSMENT: PAIN_FUNCTIONAL_ASSESSMENT: 0-10

## 2024-06-07 NOTE — ED PROVIDER NOTES
HPI   Chief Complaint   Patient presents with    Neck Pain       53-year-old male just discharged from this hospital 3 days ago.  Returns now with neck pain unrelieved with tramadol and NSAIDs.  He was admitted for encephalopathy and undergoing neurological evaluation including a spinal tap done 2 days ago.  He feels better lying supine.  Denies having a headache.  He is on Eliquis.  No trauma or injury to the head or neck.  He was given antibiotics while in the hospital but were discontinued after negative studies.  Not currently on antibiotics.  He has no cardiorespiratory symptoms.  No other neurological symptoms.  Denies fever chills sweats.  No nausea vomiting.      History provided by:  Patient   used: No                        No data recorded                   Patient History   Past Medical History:   Diagnosis Date    Acute appendicitis without peritonitis 03/12/2024    BPH (benign prostatic hyperplasia)     Heroin abuse (Multi)     NSTEMI (non-ST elevated myocardial infarction) (Multi)     Pulmonary embolism (Multi)     Splenic artery aneurysm ([facility]) 02/2024    rupture s/p coil embolization    Upper GI bleed 03/15/2024    EGD with esophagitis, no active bleeding     Past Surgical History:   Procedure Laterality Date    APPENDECTOMY  03/12/2024    CT ANGIO CORONARY ART WITH HEARTFLOW IF SCORE >30%  04/19/2022    OTHER SURGICAL HISTORY Bilateral 01/24/2022    Inguinal hernia repair laparoscopic    SPLENIC ARTERY EMBOLIZATION  02/2024     Family History   Problem Relation Name Age of Onset    Diabetes Father      Hypertension Father      No Known Problems Son          raymond    Deafness Son      Other (orthopedic) Son          wearing braces to help with walking     Social History     Tobacco Use    Smoking status: Every Day     Current packs/day: 1.00     Types: Cigarettes    Smokeless tobacco: Never   Vaping Use    Vaping status: Not on file   Substance Use Topics    Alcohol use:  Never    Drug use: Yes     Types: Heroin     Comment: in past       Physical Exam   ED Triage Vitals [06/07/24 1432]   Temperature Heart Rate Respirations BP   36.7 °C (98.1 °F) 65 16 131/87      Pulse Ox Temp Source Heart Rate Source Patient Position   100 % Temporal -- --      BP Location FiO2 (%)     -- --       Physical Exam  Vitals and nursing note reviewed.   Constitutional:       General: He is not in acute distress.     Appearance: Normal appearance. He is normal weight. He is not ill-appearing, toxic-appearing or diaphoretic.   HENT:      Head: Normocephalic and atraumatic.      Nose: Nose normal.   Cardiovascular:      Rate and Rhythm: Normal rate and regular rhythm.   Pulmonary:      Effort: Pulmonary effort is normal.      Breath sounds: Normal breath sounds.   Abdominal:      Palpations: Abdomen is soft.      Tenderness: There is no abdominal tenderness.   Musculoskeletal:         General: Normal range of motion.      Cervical back: Normal range of motion and neck supple. No rigidity.   Skin:     General: Skin is warm and dry.   Neurological:      General: No focal deficit present.      Mental Status: He is alert and oriented to person, place, and time.      Cranial Nerves: No cranial nerve deficit.      Sensory: No sensory deficit.      Motor: No weakness.   Psychiatric:         Mood and Affect: Mood normal.         Behavior: Behavior normal.         Thought Content: Thought content normal.         Judgment: Judgment normal.         ED Course & MDM   Diagnoses as of 06/07/24 1859   Neck pain       Medical Decision Making  Returns to ED for evaluation for atraumatic neck pain.  Likely musculoskeletal pain.  No headache no neurological symptoms.  No fever.  Neck is supple no meningismus.    The patient has also been seen and evaluated by the ER physician.  Arranged for discharge outpatient management follow-up.            Procedure  Procedures     Christopher Ga PA-C  06/07/24 1900

## 2024-06-07 NOTE — ED TRIAGE NOTES
Patient presents to the ED for neck pain. Patient states pain developed throughout the day. No injury to neck, back or head. Patient able to turn head side to side.

## 2024-06-12 ENCOUNTER — APPOINTMENT (OUTPATIENT)
Dept: PRIMARY CARE | Facility: CLINIC | Age: 54
End: 2024-06-12
Payer: COMMERCIAL

## 2024-06-12 LAB
BACTERIA CSF CULT: NORMAL
GRAM STN SPEC: NORMAL
GRAM STN SPEC: NORMAL

## 2024-06-18 ENCOUNTER — LAB (OUTPATIENT)
Dept: LAB | Facility: LAB | Age: 54
End: 2024-06-18
Payer: COMMERCIAL

## 2024-06-18 ENCOUNTER — OFFICE VISIT (OUTPATIENT)
Dept: PRIMARY CARE | Facility: HOSPITAL | Age: 54
End: 2024-06-18
Payer: COMMERCIAL

## 2024-06-18 VITALS
HEART RATE: 67 BPM | DIASTOLIC BLOOD PRESSURE: 70 MMHG | BODY MASS INDEX: 25.96 KG/M2 | WEIGHT: 175.8 LBS | OXYGEN SATURATION: 96 % | TEMPERATURE: 99.3 F | SYSTOLIC BLOOD PRESSURE: 110 MMHG

## 2024-06-18 DIAGNOSIS — D64.9 ANEMIA, UNSPECIFIED TYPE: ICD-10-CM

## 2024-06-18 DIAGNOSIS — R53.83 OTHER FATIGUE: Primary | ICD-10-CM

## 2024-06-18 DIAGNOSIS — M25.50 ARTHRALGIA, UNSPECIFIED JOINT: ICD-10-CM

## 2024-06-18 DIAGNOSIS — E55.9 VITAMIN D DEFICIENCY: ICD-10-CM

## 2024-06-18 DIAGNOSIS — R56.9 SEIZURE (MULTI): ICD-10-CM

## 2024-06-18 DIAGNOSIS — R19.7 DIARRHEA, UNSPECIFIED TYPE: ICD-10-CM

## 2024-06-18 DIAGNOSIS — R53.83 OTHER FATIGUE: ICD-10-CM

## 2024-06-18 DIAGNOSIS — R35.0 BENIGN PROSTATIC HYPERPLASIA WITH URINARY FREQUENCY: ICD-10-CM

## 2024-06-18 DIAGNOSIS — N40.1 BENIGN PROSTATIC HYPERPLASIA WITH URINARY FREQUENCY: ICD-10-CM

## 2024-06-18 LAB
25(OH)D3 SERPL-MCNC: 67 NG/ML (ref 30–100)
ALBUMIN SERPL BCP-MCNC: 4.2 G/DL (ref 3.4–5)
ALP SERPL-CCNC: 62 U/L (ref 33–120)
ALT SERPL W P-5'-P-CCNC: 9 U/L (ref 10–52)
ANION GAP SERPL CALC-SCNC: 10 MMOL/L (ref 10–20)
AST SERPL W P-5'-P-CCNC: 11 U/L (ref 9–39)
BASOPHILS # BLD AUTO: 0.05 X10*3/UL (ref 0–0.1)
BASOPHILS NFR BLD AUTO: 0.6 %
BILIRUB SERPL-MCNC: 0.3 MG/DL (ref 0–1.2)
BUN SERPL-MCNC: 7 MG/DL (ref 6–23)
CALCIUM SERPL-MCNC: 9.5 MG/DL (ref 8.6–10.3)
CHLORIDE SERPL-SCNC: 104 MMOL/L (ref 98–107)
CO2 SERPL-SCNC: 31 MMOL/L (ref 21–32)
CREAT SERPL-MCNC: 0.95 MG/DL (ref 0.5–1.3)
EGFRCR SERPLBLD CKD-EPI 2021: >90 ML/MIN/1.73M*2
EOSINOPHIL # BLD AUTO: 0.25 X10*3/UL (ref 0–0.7)
EOSINOPHIL NFR BLD AUTO: 3.1 %
ERYTHROCYTE [DISTWIDTH] IN BLOOD BY AUTOMATED COUNT: 18.4 % (ref 11.5–14.5)
ERYTHROCYTE [SEDIMENTATION RATE] IN BLOOD BY WESTERGREN METHOD: 11 MM/H (ref 0–20)
FERRITIN SERPL-MCNC: 98 NG/ML (ref 20–300)
GLUCOSE SERPL-MCNC: 101 MG/DL (ref 74–99)
HCT VFR BLD AUTO: 41.2 % (ref 41–52)
HGB BLD-MCNC: 12.9 G/DL (ref 13.5–17.5)
IMM GRANULOCYTES # BLD AUTO: 0.02 X10*3/UL (ref 0–0.7)
IMM GRANULOCYTES NFR BLD AUTO: 0.2 % (ref 0–0.9)
IRON SATN MFR SERPL: 20 % (ref 25–45)
IRON SERPL-MCNC: 61 UG/DL (ref 35–150)
LYMPHOCYTES # BLD AUTO: 1.95 X10*3/UL (ref 1.2–4.8)
LYMPHOCYTES NFR BLD AUTO: 24.2 %
MCH RBC QN AUTO: 22.8 PG (ref 26–34)
MCHC RBC AUTO-ENTMCNC: 31.3 G/DL (ref 32–36)
MCV RBC AUTO: 73 FL (ref 80–100)
MONOCYTES # BLD AUTO: 0.62 X10*3/UL (ref 0.1–1)
MONOCYTES NFR BLD AUTO: 7.7 %
NEUTROPHILS # BLD AUTO: 5.16 X10*3/UL (ref 1.2–7.7)
NEUTROPHILS NFR BLD AUTO: 64.2 %
NRBC BLD-RTO: 0 /100 WBCS (ref 0–0)
PLATELET # BLD AUTO: 349 X10*3/UL (ref 150–450)
POTASSIUM SERPL-SCNC: 4.3 MMOL/L (ref 3.5–5.3)
PROT SERPL-MCNC: 6.9 G/DL (ref 6.4–8.2)
RBC # BLD AUTO: 5.66 X10*6/UL (ref 4.5–5.9)
RHEUMATOID FACT SER NEPH-ACNC: <10 IU/ML (ref 0–15)
SODIUM SERPL-SCNC: 141 MMOL/L (ref 136–145)
TIBC SERPL-MCNC: 300 UG/DL (ref 240–445)
TRANSFERRIN SERPL-MCNC: 212 MG/DL (ref 200–360)
TSH SERPL-ACNC: 0.93 MIU/L (ref 0.44–3.98)
UIBC SERPL-MCNC: 239 UG/DL (ref 110–370)
WBC # BLD AUTO: 8.1 X10*3/UL (ref 4.4–11.3)

## 2024-06-18 PROCEDURE — 83516 IMMUNOASSAY NONANTIBODY: CPT

## 2024-06-18 PROCEDURE — 84402 ASSAY OF FREE TESTOSTERONE: CPT

## 2024-06-18 PROCEDURE — 82306 VITAMIN D 25 HYDROXY: CPT

## 2024-06-18 PROCEDURE — 83550 IRON BINDING TEST: CPT

## 2024-06-18 PROCEDURE — 85025 COMPLETE CBC W/AUTO DIFF WBC: CPT

## 2024-06-18 PROCEDURE — 84466 ASSAY OF TRANSFERRIN: CPT

## 2024-06-18 PROCEDURE — 85652 RBC SED RATE AUTOMATED: CPT

## 2024-06-18 PROCEDURE — 82728 ASSAY OF FERRITIN: CPT

## 2024-06-18 PROCEDURE — 86038 ANTINUCLEAR ANTIBODIES: CPT

## 2024-06-18 PROCEDURE — 36415 COLL VENOUS BLD VENIPUNCTURE: CPT

## 2024-06-18 PROCEDURE — 83540 ASSAY OF IRON: CPT

## 2024-06-18 PROCEDURE — 84443 ASSAY THYROID STIM HORMONE: CPT

## 2024-06-18 PROCEDURE — 80053 COMPREHEN METABOLIC PANEL: CPT

## 2024-06-18 PROCEDURE — 86431 RHEUMATOID FACTOR QUANT: CPT

## 2024-06-18 RX ORDER — TAMSULOSIN HYDROCHLORIDE 0.4 MG/1
0.4 CAPSULE ORAL DAILY
Qty: 30 CAPSULE | Refills: 5 | Status: SHIPPED | OUTPATIENT
Start: 2024-06-18

## 2024-06-18 NOTE — ASSESSMENT & PLAN NOTE
Recent loss of consciousness with no in response to Narcan suggests something other than fentanyl poisoning.  EEG normal, but there was subtle white matter changes on his MRI.  LP was not suggestive of meningitis or encephalitis.  I encouraged her to follow-up with neurology to review his MRI and discuss of antiseizure medication as indicated based on his clinical presentation and MRI findings.

## 2024-06-18 NOTE — ASSESSMENT & PLAN NOTE
Will check blood count and TSH.  Douglas has mild bilateral ptosis in addition to profound fatigue, he will be screened for myasthenia.

## 2024-06-18 NOTE — PROGRESS NOTES
Chief Complaint:   Follow-up (Hospital stay 05/30 patient had seizure )     History Of Present Illness:    Douglas Wilson is a 53 y.o. male with active medical problems outlined below who presents for post-hospitalization follow up.     INITIAL VISIT 4/4/24  Douglas has hx of smoking, BPH, former polysubstance use who has been sick since  February.  2/22/24 - 3 d of abdominal pain and diagnosed with splenic artery aneurysm and underwent coiling procedure.  Had troponin leak to 1,252.  Echocardiogram normal.  CCTA in 2022 showed minimal CAD.   2/25/24 - presented with chest pain and SOB.  Diagnosed with multiple bilateral pulmonary emboli.  Started Eliquis and DC home  3/12/24 - presented with abdominal pain and diagnosed with appendicitis.  Underwent urgent appendectomy  3/14/24 - presented with vomiting and poss hematemesis.   EGD showed hiatal hernia and esophagitis.  Started BID protonix.  Significant findings from labs in hospital - anemia, hypocalcemia, elevated troponin.   Is slowly improving since his hospital discharge.  He endorses fatigue but denies chest pain or shortness of breath.  Appetite is normal.  He reports no further nausea or vomiting.  He has been constipated.  No diarrhea and no blood in his stool.  No black appearing stools.  Obstructive type lower urinary tract symptoms are managed with tamsulosin.  He has ED and would like to go back on a phosphodiesterase inhibitor.  He did a lot of lifting at work earlier today, he is now experiencing pain in his left lower quadrant.  He has some tenderness to palpation around his incision.     Echocardiogram 2/27/24  CONCLUSIONS:   1. Left ventricular systolic function is normal with a 60-65% estimated ejection fraction.   2. RVSP within normal limits.   3. The LVEF is no longer hyperdynamic. No focal wall motion abnormalities.    4/19/22 Coronary Artery CT Angio  IMPRESSION:  1. Minor coronary artery disease.  2. The proximal LAD has a small/focal region  "of calcified plaque with  minimal luminal stenosis (<25%).  3. The LM, LCx and RCA have no significant atherosclerotic change or  stenotic disease.  4. Right-dominant coronary artery system.    8/15/22 CT abdomen pelvis   Bladder wall thickening     2/22/24 - splenic artery aneurysm and bleed - 3 d of pain - peak troponin 1252      INTERVAL HISTORY 6/18/24  Douglas was admitted to the ICU with acute change in mental status 5/30/24  Found unresponsive by his son in his home at 10:30 that day.   He tested positive for fentanyl at [facility] but did not respond to naloxone.  Fever, lack of response to naloxone and tongue bite suggested seizure or possible meningitis/encephalitis.  Patient was started on broad-spectrum antibiotics for possible sepsis/meningitis/encephalitis  After being stabilized he was transferred to the floor  Underwent an LP which was negative so the antibiotics were stopped. EEG on 5/31/24 normal.  MRI on 5/31/24 demonstrated:    \"There are a few small nonspecific white matter changes noted within  cerebral hemispheres bilaterally. While nonspecific, white matter  changes can be seen with small-vessel ischemic change or  demyelinating processes among others.\"    CT angiogram of abdomen on 5/30/2024 was negative for aortic aneurysm or acute process.  There is some suggestion of gallbladder wall thickening.  Right upper quadrant ultrasound was then performed on 5/30/2024 without evidence of acute cholecystitis.    Current symptoms stabilized, and he was discharged home on 6/5/2024.  Since coming home, he describes severe fatigue.  He goes to his job and is able to complete his shift, but he comes home and has to rest or sleep.  He is not feeling out of breath or having chest pain.  He denies headaches dizziness or any sudden blurred vision.  He has had a gradual decrease in his visual acuity over time.  He has a droop in his right eyelid which is chronic.  He also describes diarrhea.  He is having a " stool at normal frequency, but the stool is loose.  It is nonbloody.  He denies abdominal pain.      Patient Active Problem List   Diagnosis    Acute pulmonary embolism without acute cor pulmonale (Multi)    Acute blood loss anemia    Nicotine use disorder    BPH (benign prostatic hyperplasia)    Gastro-esophageal reflux disease without esophagitis    Hiatal hernia    Hyperlipidemia    Obstructive sleep apnea syndrome    Hypocalcemia    Erectile dysfunction    Aneurysm of splenic artery (CMS-HCC)    Peripheral edema    Iron deficiency anemia    Vitamin D deficiency    Constipation    Encephalopathy    Other fatigue    Diarrhea    Arthralgia    Anemia    Seizure (Multi)       Current Outpatient Medications:     amLODIPine (Norvasc) 10 mg tablet, Take 1 tablet (10 mg) by mouth once daily., Disp: 30 tablet, Rfl: 0    apixaban (Eliquis) 5 mg tablet, Take 1 tablet (5 mg) by mouth 2 times a day., Disp: 60 tablet, Rfl: 3    atorvastatin (Lipitor) 10 mg tablet, Take 1 tablet (10 mg) by mouth once daily at bedtime., Disp: 30 tablet, Rfl: 0    ferrous gluconate 324 (38 Fe) mg tablet, Take 1 tablet (38 mg of iron) by mouth every other day., Disp: 45 tablet, Rfl: 1    pantoprazole (ProtoNix) 40 mg EC tablet, Take 1 tablet (40 mg) by mouth 2 times a day before meals. Do not crush, chew, or split., Disp: 60 tablet, Rfl: 3    folic acid (Folvite) 1 mg tablet, Take 1 tablet (1 mg) by mouth once daily., Disp: , Rfl:     naproxen (Naprosyn) 500 mg tablet, Take 1 tablet (500 mg) by mouth 2 times daily (morning and late afternoon) for 15 days. As needed for pain (Patient not taking: Reported on 6/18/2024), Disp: 30 tablet, Rfl: 0    tamsulosin (Flomax) 0.4 mg 24 hr capsule, Take 1 capsule (0.4 mg) by mouth once daily., Disp: 30 capsule, Rfl: 5  Patient has no known allergies.  Social History     Tobacco Use    Smoking status: Every Day     Current packs/day: 1.00     Types: Cigarettes    Smokeless tobacco: Never   Substance Use Topics     Alcohol use: Never    Drug use: Yes     Types: Heroin     Comment: in past         All pertinent aspects of medical and surgical history were reviewed and updated in chart    Review of Systems   Pertinent positives in review of systems outlined above.  Complete ROS otherwise negative.        Last Recorded Vitals:  Patient Vitals for the past 24 hrs:   BP Temp Pulse SpO2 Weight   06/18/24 0910 110/70 -- -- -- --   06/18/24 0830 (!) 134/91 37.4 °C (99.3 °F) 67 96 % 79.7 kg (175 lb 12.8 oz)          Physical Exam  HENT:      Mouth/Throat:      Pharynx: Oropharynx is clear.   Eyes:      Extraocular Movements: Extraocular movements intact.      Conjunctiva/sclera: Conjunctivae normal.      Pupils: Pupils are equal, round, and reactive to light.   Cardiovascular:      Rate and Rhythm: Normal rate and regular rhythm.      Pulses: Normal pulses.      Heart sounds: Normal heart sounds.   Pulmonary:      Effort: Pulmonary effort is normal.      Breath sounds: Normal breath sounds.   Musculoskeletal:      Right lower leg: No edema.      Left lower leg: No edema.   Neurological:      General: No focal deficit present.      Comments: Mild bilateral ptosis            Assessment/Plan   Problem List Items Addressed This Visit       BPH (benign prostatic hyperplasia)     Continue tamsulosin.  PSA up-to-date.         Relevant Medications    tamsulosin (Flomax) 0.4 mg 24 hr capsule    Vitamin D deficiency     Vitamin D level due now         Relevant Orders    Comprehensive Metabolic Panel    Vitamin D 25-Hydroxy,Total (for eval of Vitamin D levels)    Other fatigue - Primary     Will check blood count and TSH.  Douglas has mild bilateral ptosis in addition to profound fatigue, he will be screened for myasthenia.         Relevant Orders    Comprehensive Metabolic Panel    CBC and Auto Differential    TSH with reflex to Free T4 if abnormal    Testosterone,Free and Total    Acetylcholine Receptor Blocking Antibody    Diarrhea     Will  check comprehensive metabolic panel, CBC and screening for celiac disease.         Relevant Orders    Comprehensive Metabolic Panel    Tissue Transglutaminase IgA    Arthralgia     Hiram describes arthralgia in his elbows, wrists, knees and hands.  ANAI, sed rate and rheumatoid factor will be included with his blood work.         Relevant Orders    Comprehensive Metabolic Panel    Sedimentation Rate    ANAI with Reflex to BILL    Rheumatoid Factor    Anemia     New onset anemia during recent hospitalization.  Will repeat CBC, iron studies and B12.         Relevant Orders    Ferritin    Iron and TIBC    Transferrin    Tissue Transglutaminase IgA    Seizure (Multi)     Recent loss of consciousness with no in response to Narcan suggests something other than fentanyl poisoning.  EEG normal, but there was subtle white matter changes on his MRI.  LP was not suggestive of meningitis or encephalitis.  I encouraged her to follow-up with neurology to review his MRI and discuss of antiseizure medication as indicated based on his clinical presentation and MRI findings.         Relevant Orders    Referral to Neurology       A total of 40 minutes was spent reviewing the chart and recent testing and discussing plan of care.         Chucho De Leon MD

## 2024-06-18 NOTE — ASSESSMENT & PLAN NOTE
Hiram describes arthralgia in his elbows, wrists, knees and hands.  NAAI, sed rate and rheumatoid factor will be included with his blood work.

## 2024-06-19 LAB — TTG IGA SER IA-ACNC: <1 U/ML

## 2024-06-20 LAB
ACHR BLOCK AB/ACHR TOTAL SFR SER: 14 % (ref 0–26)
ANA SER QL HEP2 SUBST: NEGATIVE

## 2024-06-23 ENCOUNTER — PATIENT MESSAGE (OUTPATIENT)
Dept: PRIMARY CARE | Facility: HOSPITAL | Age: 54
End: 2024-06-23
Payer: COMMERCIAL

## 2024-06-23 DIAGNOSIS — D50.9 IRON DEFICIENCY ANEMIA, UNSPECIFIED IRON DEFICIENCY ANEMIA TYPE: Primary | ICD-10-CM

## 2024-06-23 LAB
TESTOSTERONE FREE (CHAN): 122.8 PG/ML (ref 35–155)
TESTOSTERONE,TOTAL,LC-MS/MS: 614 NG/DL (ref 250–1100)

## 2024-06-24 RX ORDER — FERROUS SULFATE 325(65) MG
325 TABLET ORAL
Qty: 30 TABLET | Refills: 3 | Status: SHIPPED | OUTPATIENT
Start: 2024-06-24

## 2024-06-30 DIAGNOSIS — I10 PRIMARY HYPERTENSION: ICD-10-CM

## 2024-06-30 RX ORDER — AMLODIPINE BESYLATE 10 MG/1
10 TABLET ORAL DAILY
Qty: 90 TABLET | Refills: 1 | Status: SHIPPED | OUTPATIENT
Start: 2024-06-30

## 2024-07-13 DIAGNOSIS — D62 ACUTE BLOOD LOSS ANEMIA: ICD-10-CM

## 2024-07-15 RX ORDER — PANTOPRAZOLE SODIUM 40 MG/1
40 TABLET, DELAYED RELEASE ORAL
Qty: 180 TABLET | Refills: 1 | Status: SHIPPED | OUTPATIENT
Start: 2024-07-15

## 2024-07-20 ENCOUNTER — HOSPITAL ENCOUNTER (EMERGENCY)
Facility: HOSPITAL | Age: 54
Discharge: HOME | End: 2024-07-20
Attending: EMERGENCY MEDICINE
Payer: COMMERCIAL

## 2024-07-20 ENCOUNTER — APPOINTMENT (OUTPATIENT)
Dept: RADIOLOGY | Facility: HOSPITAL | Age: 54
End: 2024-07-20
Payer: COMMERCIAL

## 2024-07-20 ENCOUNTER — HOSPITAL ENCOUNTER (OUTPATIENT)
Dept: CARDIOLOGY | Facility: HOSPITAL | Age: 54
Discharge: HOME | End: 2024-07-20
Payer: COMMERCIAL

## 2024-07-20 VITALS
BODY MASS INDEX: 25.18 KG/M2 | HEART RATE: 60 BPM | TEMPERATURE: 98.4 F | DIASTOLIC BLOOD PRESSURE: 71 MMHG | HEIGHT: 69 IN | OXYGEN SATURATION: 96 % | RESPIRATION RATE: 14 BRPM | SYSTOLIC BLOOD PRESSURE: 124 MMHG | WEIGHT: 170 LBS

## 2024-07-20 DIAGNOSIS — R10.9 ABDOMINAL PAIN, UNSPECIFIED ABDOMINAL LOCATION: Primary | ICD-10-CM

## 2024-07-20 LAB
ALBUMIN SERPL BCP-MCNC: 4.2 G/DL (ref 3.4–5)
ALP SERPL-CCNC: 55 U/L (ref 33–120)
ALT SERPL W P-5'-P-CCNC: 11 U/L (ref 10–52)
AMPHETAMINES UR QL SCN: ABNORMAL
ANION GAP SERPL CALC-SCNC: 12 MMOL/L (ref 10–20)
APAP SERPL-MCNC: <10 UG/ML
APPEARANCE UR: CLEAR
AST SERPL W P-5'-P-CCNC: 18 U/L (ref 9–39)
BARBITURATES UR QL SCN: ABNORMAL
BASOPHILS # BLD AUTO: 0.06 X10*3/UL (ref 0–0.1)
BASOPHILS NFR BLD AUTO: 0.7 %
BENZODIAZ UR QL SCN: ABNORMAL
BILIRUB SERPL-MCNC: 0.4 MG/DL (ref 0–1.2)
BILIRUB UR STRIP.AUTO-MCNC: NEGATIVE MG/DL
BUN SERPL-MCNC: 5 MG/DL (ref 6–23)
BZE UR QL SCN: ABNORMAL
CALCIUM SERPL-MCNC: 8.9 MG/DL (ref 8.6–10.3)
CANNABINOIDS UR QL SCN: ABNORMAL
CARDIAC TROPONIN I PNL SERPL HS: 5 NG/L (ref 0–20)
CHLORIDE SERPL-SCNC: 99 MMOL/L (ref 98–107)
CO2 SERPL-SCNC: 30 MMOL/L (ref 21–32)
COLOR UR: COLORLESS
CREAT SERPL-MCNC: 0.76 MG/DL (ref 0.5–1.3)
EGFRCR SERPLBLD CKD-EPI 2021: >90 ML/MIN/1.73M*2
EOSINOPHIL # BLD AUTO: 0.4 X10*3/UL (ref 0–0.7)
EOSINOPHIL NFR BLD AUTO: 4.3 %
ERYTHROCYTE [DISTWIDTH] IN BLOOD BY AUTOMATED COUNT: 17.5 % (ref 11.5–14.5)
ETHANOL SERPL-MCNC: <10 MG/DL
FENTANYL+NORFENTANYL UR QL SCN: ABNORMAL
GLUCOSE SERPL-MCNC: 113 MG/DL (ref 74–99)
GLUCOSE UR STRIP.AUTO-MCNC: NORMAL MG/DL
HCT VFR BLD AUTO: 35.4 % (ref 41–52)
HGB BLD-MCNC: 11.3 G/DL (ref 13.5–17.5)
IMM GRANULOCYTES # BLD AUTO: 0.05 X10*3/UL (ref 0–0.7)
IMM GRANULOCYTES NFR BLD AUTO: 0.5 % (ref 0–0.9)
INR PPP: 1 (ref 0.9–1.1)
KETONES UR STRIP.AUTO-MCNC: NEGATIVE MG/DL
LACTATE SERPL-SCNC: 1.1 MMOL/L (ref 0.4–2)
LEUKOCYTE ESTERASE UR QL STRIP.AUTO: NEGATIVE
LIPASE SERPL-CCNC: 14 U/L (ref 9–82)
LYMPHOCYTES # BLD AUTO: 1.81 X10*3/UL (ref 1.2–4.8)
LYMPHOCYTES NFR BLD AUTO: 19.6 %
MCH RBC QN AUTO: 22.9 PG (ref 26–34)
MCHC RBC AUTO-ENTMCNC: 31.9 G/DL (ref 32–36)
MCV RBC AUTO: 72 FL (ref 80–100)
METHADONE UR QL SCN: ABNORMAL
MONOCYTES # BLD AUTO: 0.87 X10*3/UL (ref 0.1–1)
MONOCYTES NFR BLD AUTO: 9.4 %
NEUTROPHILS # BLD AUTO: 6.04 X10*3/UL (ref 1.2–7.7)
NEUTROPHILS NFR BLD AUTO: 65.5 %
NITRITE UR QL STRIP.AUTO: NEGATIVE
NRBC BLD-RTO: 0 /100 WBCS (ref 0–0)
OPIATES UR QL SCN: ABNORMAL
OXYCODONE+OXYMORPHONE UR QL SCN: ABNORMAL
PCP UR QL SCN: ABNORMAL
PH UR STRIP.AUTO: 7 [PH]
PLATELET # BLD AUTO: 251 X10*3/UL (ref 150–450)
POTASSIUM SERPL-SCNC: 3.5 MMOL/L (ref 3.5–5.3)
PROT SERPL-MCNC: 6.9 G/DL (ref 6.4–8.2)
PROT UR STRIP.AUTO-MCNC: NEGATIVE MG/DL
PROTHROMBIN TIME: 11.8 SECONDS (ref 9.8–12.8)
RBC # BLD AUTO: 4.94 X10*6/UL (ref 4.5–5.9)
RBC # UR STRIP.AUTO: ABNORMAL /UL
RBC #/AREA URNS AUTO: >20 /HPF
SALICYLATES SERPL-MCNC: <3 MG/DL
SODIUM SERPL-SCNC: 137 MMOL/L (ref 136–145)
SP GR UR STRIP.AUTO: 1
UROBILINOGEN UR STRIP.AUTO-MCNC: NORMAL MG/DL
WBC # BLD AUTO: 9.2 X10*3/UL (ref 4.4–11.3)
WBC #/AREA URNS AUTO: ABNORMAL /HPF

## 2024-07-20 PROCEDURE — 83690 ASSAY OF LIPASE: CPT

## 2024-07-20 PROCEDURE — 70450 CT HEAD/BRAIN W/O DYE: CPT | Performed by: RADIOLOGY

## 2024-07-20 PROCEDURE — 84484 ASSAY OF TROPONIN QUANT: CPT

## 2024-07-20 PROCEDURE — 2500000004 HC RX 250 GENERAL PHARMACY W/ HCPCS (ALT 636 FOR OP/ED)

## 2024-07-20 PROCEDURE — 93005 ELECTROCARDIOGRAM TRACING: CPT

## 2024-07-20 PROCEDURE — 70450 CT HEAD/BRAIN W/O DYE: CPT

## 2024-07-20 PROCEDURE — 85025 COMPLETE CBC W/AUTO DIFF WBC: CPT

## 2024-07-20 PROCEDURE — 85610 PROTHROMBIN TIME: CPT

## 2024-07-20 PROCEDURE — 99285 EMERGENCY DEPT VISIT HI MDM: CPT | Mod: 25

## 2024-07-20 PROCEDURE — 71275 CT ANGIOGRAPHY CHEST: CPT | Performed by: RADIOLOGY

## 2024-07-20 PROCEDURE — 2550000001 HC RX 255 CONTRASTS: Performed by: EMERGENCY MEDICINE

## 2024-07-20 PROCEDURE — 71275 CT ANGIOGRAPHY CHEST: CPT

## 2024-07-20 PROCEDURE — 81001 URINALYSIS AUTO W/SCOPE: CPT

## 2024-07-20 PROCEDURE — 74174 CTA ABD&PLVS W/CONTRAST: CPT | Performed by: RADIOLOGY

## 2024-07-20 PROCEDURE — 36415 COLL VENOUS BLD VENIPUNCTURE: CPT

## 2024-07-20 PROCEDURE — 80143 DRUG ASSAY ACETAMINOPHEN: CPT

## 2024-07-20 PROCEDURE — 83605 ASSAY OF LACTIC ACID: CPT

## 2024-07-20 PROCEDURE — 80053 COMPREHEN METABOLIC PANEL: CPT

## 2024-07-20 PROCEDURE — 96360 HYDRATION IV INFUSION INIT: CPT

## 2024-07-20 PROCEDURE — 80307 DRUG TEST PRSMV CHEM ANLYZR: CPT

## 2024-07-20 RX ORDER — PHENOL/SODIUM PHENOLATE
20 AEROSOL, SPRAY (ML) MUCOUS MEMBRANE DAILY
Qty: 30 TABLET | Refills: 0 | Status: SHIPPED | OUTPATIENT
Start: 2024-07-20

## 2024-07-20 RX ADMIN — IOHEXOL 100 ML: 350 INJECTION, SOLUTION INTRAVENOUS at 04:36

## 2024-07-20 RX ADMIN — SODIUM CHLORIDE 500 ML: 9 INJECTION, SOLUTION INTRAVENOUS at 03:15

## 2024-07-20 NOTE — Clinical Note
Douglas Wilson was seen and treated in our emergency department on 7/20/2024.  He may return to work on 07/22/2024.       If you have any questions or concerns, please don't hesitate to call.      Jennifer Ohara PA-C

## 2024-07-20 NOTE — ED PROVIDER NOTES
HPI   Chief Complaint   Patient presents with    Abdominal Pain    Hypertension       HPI  HISTORY OF PRESENT ILLNESS:  53 y.o. male presenting to the ED with complaint of abdominal pain and high blood pressure.  On initial evaluation, patient appears intoxicated, slow to respond to questioning, slurring words, falling asleep intermittently during sentences on evaluation.  He however states that he is just tired, states he had a long day.  He denies any alcohol or drug use. When asked when he last used fentanyl, patient states 'I don't know', but denies any fentanyl use today. Patient states that his blood pressure was elevated at home today since 9 PM, states that it was 190 systolic on his home blood pressure monitor.  He states that he then had some pressure and pain in his stomach.  He is unable to further characterize this.  Unable to describe location or character of the pain.  He also reported to triage that he has not had a bowel movement in 2 days.  He denies chest pain or shortness of breath.  Denies nausea or vomiting.  Denies headache.  Denies syncope.  Denies melena or hematochezia.  Denies urinary symptoms.    12 point review of systems was performed and is negative unless otherwise specified in HPI.    PMH: PE on Eliquis, splenic artery aneurysm, seizures, GERD, BPH, HTN, HLD, LUC, encephalopathy  Family history: noncontributory  Social history: + Smoker, +fentanyl use, denies other current illicit drug use, history of polysubstance abuse, history of heroin use, no ETOH    Recent  admissions for:  Feb 2024 - Splenic aneurysm rupture requiring emergent coiling; also had a NSTEMI (Troponin ~1200)  Feb 2024 - Return to  with bilateral PE - started on a DOAC  March 2024 - Return to  with abdominal secondary to appendicitis - underwent lap. Appendectomy  March 2024 - Hematemesis - EGD undertaken demonstrating Grade C esophagitis in the lower third of the esophagus, Moderate, patchy erythematous  mucosa in the body of the stomach and antrum and Type I hiatal hernia  May 2024 -possible overdose encephalopathy, UDS positive for fentanyl but did not respond to naloxone,ultimately returned to baseline and was discharged  Past Medical History:   Diagnosis Date    Acute appendicitis without peritonitis 03/12/2024    BPH (benign prostatic hyperplasia)     Heroin abuse (Multi)     NSTEMI (non-ST elevated myocardial infarction) (Multi)     Pulmonary embolism (Multi)     Splenic artery aneurysm (CMS-HCC) 02/2024    rupture s/p coil embolization    Upper GI bleed 03/15/2024    EGD with esophagitis, no active bleeding         Abnormal Labs Reviewed - No abnormal labs to display   No orders to display           Patient History   Past Medical History:   Diagnosis Date    Acute appendicitis without peritonitis 03/12/2024    BPH (benign prostatic hyperplasia)     Heroin abuse (Multi)     NSTEMI (non-ST elevated myocardial infarction) (Multi)     Pulmonary embolism (Multi)     Splenic artery aneurysm (CMS-HCC) 02/2024    rupture s/p coil embolization    Upper GI bleed 03/15/2024    EGD with esophagitis, no active bleeding     Past Surgical History:   Procedure Laterality Date    APPENDECTOMY  03/12/2024    CT ANGIO CORONARY ART WITH HEARTFLOW IF SCORE >30%  04/19/2022    OTHER SURGICAL HISTORY Bilateral 01/24/2022    Inguinal hernia repair laparoscopic    SPLENIC ARTERY EMBOLIZATION  02/2024     Family History   Problem Relation Name Age of Onset    Diabetes Father      Hypertension Father      No Known Problems Son          raymond    Deafness Son      Other (orthopedic) Son          wearing braces to help with walking     Social History     Tobacco Use    Smoking status: Every Day     Current packs/day: 1.00     Types: Cigarettes    Smokeless tobacco: Never   Vaping Use    Vaping status: Not on file   Substance Use Topics    Alcohol use: Never    Drug use: Yes     Types: Heroin     Comment: in past       Physical Exam   ED  Triage Vitals [07/20/24 0155]   Temperature Heart Rate Respirations BP   36.9 °C (98.4 °F) 64 16 (!) 151/98      Pulse Ox Temp Source Heart Rate Source Patient Position   96 % Temporal Monitor Sitting      BP Location FiO2 (%)     Right arm --       Physical Exam  Constitutional:       General: He is not in acute distress.     Appearance: He is not toxic-appearing.   HENT:      Head: Normocephalic and atraumatic.      Nose: No congestion.      Mouth/Throat:      Mouth: Mucous membranes are moist.   Eyes:      General: No scleral icterus.     Extraocular Movements: Extraocular movements intact.   Cardiovascular:      Rate and Rhythm: Normal rate and regular rhythm.      Pulses: Normal pulses.   Pulmonary:      Effort: Pulmonary effort is normal. No respiratory distress.   Abdominal:      General: There is no distension. There are no signs of injury.      Palpations: Abdomen is soft.      Tenderness: There is no abdominal tenderness. There is no guarding or rebound.   Musculoskeletal:         General: Normal range of motion.      Cervical back: Normal range of motion and neck supple. No rigidity.      Right lower leg: No edema.      Left lower leg: No edema.   Skin:     General: Skin is warm and dry.      Capillary Refill: Capillary refill takes less than 2 seconds.   Neurological:      General: No focal deficit present.      Mental Status: He is lethargic.      Cranial Nerves: Cranial nerves 2-12 are intact. No cranial nerve deficit or facial asymmetry.      Sensory: Sensation is intact.      Motor: Motor function is intact. No weakness.      Gait: Gait normal.      Comments: Lethargic, slow to respond to questioning, slurring words, falling asleep intermittently during sentences on evaluation.  No focal deficits, no asymmetric weakness, no sensory deficits, no cranial nerve deficits.   Psychiatric:         Judgment: Judgment normal.           ED Course & MDM   ED Course as of 07/20/24 0536   Sat Jul 20, 2024   2177  02:37 12 lead EKG interpreted by myself and my ED attending reveals sinus rhythm with a rate of 51 beats per minute.  Normal Axis.  LVH changes and some ST elevation in V2 -V6 with high voltage QRS, no ST depressions. T wave inversions in V1. [EH]      ED Course User Index  [EH] Jennifer Ohara PA-C         Diagnoses as of 07/20/24 0536   Abdominal pain, unspecified abdominal location       Medical Decision Making  ED course / MDM     Summary:  Patient presented with hypertension and abdominal pain.  Vital signs stable, patient is nontoxic-appearing, though abnormal behavior on initial evaluation, appears intoxicated, somewhat lethargic, falling asleep during sentences, slightly slurred speech, slow to respond to questioning.  No neurologic deficits, no asymmetric weakness or motor or sensory deficits, normal pupils and visual fields, no ataxia.  Abdomen soft nontender.  Lungs clear to auscultation, heart regular rate and rhythm. Patient case discussed with ED attending Dr. Fuentes after initial evaluation, who also saw and evaluated the patient. IV established, labs drawn. Patient given 500 mL IV fluids.  Labs show no leukocytosis, hemoglobin 11.3, no significant electrode abnormalities, normal kidney and liver function.  Normal lipase and lactate.  UA noninfected.  EKG nonischemic, and troponin negative.  Acute blood toxicology panel negative.  UA not infected.  CT head shows no acute process.  CT angio chest abdomen pelvis shows no acute process, possible gastritis, underdistention of the colon versus colitis, has no diarrhea, felt to be less likely, possible cystitis, UA not reflective of this.  Shows mild compression fractures of indeterminate age, and other incidental findings which were discussed in detail with patient.  On reevaluation, patient appears significantly improved.  No longer somnolent or lethargic, ambulating unassisted, responds quickly and appropriately to questioning.  Possible true  fatigue on arrival versus substance use.  Discussed disposition options, patient would like to be discharged.  We did discuss option for admission, but as his workup is reassuring and his symptoms and mental status have improved significantly, vitals are stable, no evidence of sepsis or severe systemic illness, no sign of CVA or other acute neurologic process, no evidence of occult acute intra-abdominal pathology given normal labs and lack of abdominal pain or tenderness, agree patient is stable for discharge with close outpatient follow-up.  Prescription sent for omeprazole given CT findings of gastritis.  He was provided with a work note as requested.  Advised to follow-up with his PCP as well as his gastroenterologist.  Instructed to return for any new or worsening symptoms. Patient was given strict return precautions, understands reasons to return to the ED. Also discussed supportive care instructions. I expressed the importance of outpatient follow up with their PCP. All questions were answered, patient expressed understanding and stated that they would comply.    Impression:  1. See diagnosis     Disposition: Discharge    Patient seen and discussed with Dr. Fuentes    This note has been transcribed using voice recognition and may contain grammatical errors, misplaced words, incorrect words, incorrect phrases or other errors.   Procedure  Procedures     Jennifer Ohara PA-C  07/20/24 0539

## 2024-07-20 NOTE — ED TRIAGE NOTES
Patient stated he arrived to the ed for his high blood pressure. He stated it had been 190 systolic since 9pm. He continued to state that he had not had a bowel movement for 2 days and was beginning to feel a pressure and pain in his stomach.

## 2024-07-22 LAB
ATRIAL RATE: 51 BPM
P AXIS: 69 DEGREES
P OFFSET: 190 MS
P ONSET: 135 MS
PR INTERVAL: 168 MS
Q ONSET: 219 MS
QRS COUNT: 8 BEATS
QRS DURATION: 88 MS
QT INTERVAL: 416 MS
QTC CALCULATION(BAZETT): 383 MS
QTC FREDERICIA: 394 MS
R AXIS: 48 DEGREES
T AXIS: 41 DEGREES
T OFFSET: 427 MS
VENTRICULAR RATE: 51 BPM

## 2024-07-23 ENCOUNTER — OFFICE VISIT (OUTPATIENT)
Dept: PULMONOLOGY | Facility: CLINIC | Age: 54
End: 2024-07-23
Payer: COMMERCIAL

## 2024-07-23 ENCOUNTER — OFFICE VISIT (OUTPATIENT)
Dept: PRIMARY CARE | Facility: HOSPITAL | Age: 54
End: 2024-07-23
Payer: COMMERCIAL

## 2024-07-23 VITALS
DIASTOLIC BLOOD PRESSURE: 84 MMHG | SYSTOLIC BLOOD PRESSURE: 149 MMHG | TEMPERATURE: 98.5 F | HEIGHT: 69 IN | BODY MASS INDEX: 25.18 KG/M2 | HEART RATE: 55 BPM | RESPIRATION RATE: 18 BRPM | OXYGEN SATURATION: 96 % | WEIGHT: 170 LBS

## 2024-07-23 VITALS
OXYGEN SATURATION: 96 % | SYSTOLIC BLOOD PRESSURE: 130 MMHG | TEMPERATURE: 96.3 F | RESPIRATION RATE: 17 BRPM | WEIGHT: 170 LBS | BODY MASS INDEX: 25.1 KG/M2 | DIASTOLIC BLOOD PRESSURE: 74 MMHG | HEART RATE: 61 BPM

## 2024-07-23 DIAGNOSIS — D64.9 ANEMIA, UNSPECIFIED TYPE: Primary | ICD-10-CM

## 2024-07-23 DIAGNOSIS — I10 HYPERTENSION, UNSPECIFIED TYPE: ICD-10-CM

## 2024-07-23 DIAGNOSIS — F17.200 NICOTINE USE DISORDER: Primary | ICD-10-CM

## 2024-07-23 DIAGNOSIS — N52.9 ERECTILE DYSFUNCTION, UNSPECIFIED ERECTILE DYSFUNCTION TYPE: ICD-10-CM

## 2024-07-23 DIAGNOSIS — E78.49 OTHER HYPERLIPIDEMIA: ICD-10-CM

## 2024-07-23 DIAGNOSIS — I26.99 ACUTE PULMONARY EMBOLISM WITHOUT ACUTE COR PULMONALE, UNSPECIFIED PULMONARY EMBOLISM TYPE (MULTI): ICD-10-CM

## 2024-07-23 PROCEDURE — 99213 OFFICE O/P EST LOW 20 MIN: CPT | Performed by: INTERNAL MEDICINE

## 2024-07-23 PROCEDURE — 99215 OFFICE O/P EST HI 40 MIN: CPT | Performed by: INTERNAL MEDICINE

## 2024-07-23 PROCEDURE — 3078F DIAST BP <80 MM HG: CPT | Performed by: INTERNAL MEDICINE

## 2024-07-23 PROCEDURE — 3008F BODY MASS INDEX DOCD: CPT | Performed by: INTERNAL MEDICINE

## 2024-07-23 PROCEDURE — 4004F PT TOBACCO SCREEN RCVD TLK: CPT | Performed by: INTERNAL MEDICINE

## 2024-07-23 PROCEDURE — 3075F SYST BP GE 130 - 139MM HG: CPT | Performed by: INTERNAL MEDICINE

## 2024-07-23 RX ORDER — SILDENAFIL 100 MG/1
100 TABLET, FILM COATED ORAL DAILY PRN
Qty: 30 TABLET | Refills: 3 | Status: SHIPPED | OUTPATIENT
Start: 2024-07-23

## 2024-07-23 ASSESSMENT — PAIN SCALES - GENERAL: PAINLEVEL: 0-NO PAIN

## 2024-07-23 NOTE — PROGRESS NOTES
Chief Complaint:   Follow-up (Pt has concerns about bp)     History Of Present Illness:    Douglas Wilson is a 53 y.o. male with active medical problems outlined below who presents for BP check.    INITIAL VISIT 4/4/24  Douglas has hx of smoking, BPH, former polysubstance use who has been sick since  February.  2/22/24 - 3 d of abdominal pain and diagnosed with splenic artery aneurysm and underwent coiling procedure.  Had troponin leak to 1,252.  Echocardiogram normal.  CCTA in 2022 showed minimal CAD.   2/25/24 - presented with chest pain and SOB.  Diagnosed with multiple bilateral pulmonary emboli.  Started Eliquis and DC home  3/12/24 - presented with abdominal pain and diagnosed with appendicitis.  Underwent urgent appendectomy  3/14/24 - presented with vomiting and poss hematemesis.   EGD showed hiatal hernia and esophagitis.  Started BID protonix.  Significant findings from labs in hospital - anemia, hypocalcemia, elevated troponin.   Is slowly improving since his hospital discharge.  He endorses fatigue but denies chest pain or shortness of breath.  Appetite is normal.  He reports no further nausea or vomiting.  He has been constipated.  No diarrhea and no blood in his stool.  No black appearing stools.  Obstructive type lower urinary tract symptoms are managed with tamsulosin.  He has ED and would like to go back on a phosphodiesterase inhibitor.  He did a lot of lifting at work earlier today, he is now experiencing pain in his left lower quadrant.  He has some tenderness to palpation around his incision.     Echocardiogram 2/27/24  CONCLUSIONS:   1. Left ventricular systolic function is normal with a 60-65% estimated ejection fraction.   2. RVSP within normal limits.   3. The LVEF is no longer hyperdynamic. No focal wall motion abnormalities.    4/19/22 Coronary Artery CT Angio  IMPRESSION:  1. Minor coronary artery disease.  2. The proximal LAD has a small/focal region of calcified plaque with  minimal  "luminal stenosis (<25%).  3. The LM, LCx and RCA have no significant atherosclerotic change or  stenotic disease.  4. Right-dominant coronary artery system.    8/15/22 CT abdomen pelvis   Bladder wall thickening     2/22/24 - splenic artery aneurysm and bleed - 3 d of pain - peak troponin 1252      INTERVAL HISTORY 6/18/24  Douglas was admitted to the ICU with acute change in mental status 5/30/24  Found unresponsive by his son in his home at 10:30 that day.   He tested positive for fentanyl at [facility] but did not respond to naloxone.  Fever, lack of response to naloxone and tongue bite suggested seizure or possible meningitis/encephalitis.  Patient was started on broad-spectrum antibiotics for possible sepsis/meningitis/encephalitis  After being stabilized he was transferred to the floor  Underwent an LP which was negative so the antibiotics were stopped. EEG on 5/31/24 normal.  MRI on 5/31/24 demonstrated:    \"There are a few small nonspecific white matter changes noted within  cerebral hemispheres bilaterally. While nonspecific, white matter  changes can be seen with small-vessel ischemic change or  demyelinating processes among others.\"    CT angiogram of abdomen on 5/30/2024 was negative for aortic aneurysm or acute process.  There is some suggestion of gallbladder wall thickening.  Right upper quadrant ultrasound was then performed on 5/30/2024 without evidence of acute cholecystitis.    Current symptoms stabilized, and he was discharged home on 6/5/2024.  Since coming home, he describes severe fatigue.  He goes to his job and is able to complete his shift, but he comes home and has to rest or sleep.  He is not feeling out of breath or having chest pain.  He denies headaches dizziness or any sudden blurred vision.  He has had a gradual decrease in his visual acuity over time.  He has a droop in his right eyelid which is chronic.  He also describes diarrhea.  He is having a stool at normal frequency, but " the stool is loose.  It is nonbloody.  He denies abdominal pain.      INTERVAL HISTORY 7/23/24  Recently evaluated in the ER on 7/20/24 for evaluation of elevated blood pressure, abdominal pain and mental status changes.    Douglas was at work and coworkers thought he didn't look good, his managers and the nurse at work got involved, took BP and was very high and sent to the ER.  A supervisor mentioned that he fell asleep at work.   Presented to the ER appearing intoxicated.   UDS pos for fentanyl and cocaine.  ER workup included EKG and troponin - no evidence of ischemia.  CTA abdomen - thickening in esophagus, thickening in colon   New end plate changes in thoracic spine   Mental status cleared and discharged home.    No abdominal pain but bowels alternating between constipation and loose stool    CTA 7/20/24    IMPRESSION:  1. No aortic aneurysm or acute dissection.  2. Circumferential wall thickening at the distal esophagus, may be  seen with esophagitis. Evaluation with upper endoscopy as clinically  warranted.  3. Diffuse wall thickening at the descending colon, sigmoid colon and  rectum, may be secondary to underdistention versus colitis.  4. Mild circumferential wall thickening of the urinary bladder.  Please correlate with urinalysis to exclude superimposed cystitis.  5. Mild bilateral inguinal adenopathy.  6. Linear sclerotic areas at the superior endplates of T5, T6, T7 and  T8, suggestive of mild compression fractures, of indeterminate age,  new since prior CT 03/14/2024. Please correlate with clinical  history/point tenderness to evaluate for acute/subacute fracture.    EGD 3/15/24  Moderate grade C esophagitis with multiple mucosal breaks measuring 5 mm or more, continuous between folds, covering less than 75% of the circumference, showing ulcerated mucosa in the lower third of the esophagus  Moderate, patchy erythematous mucosa in the body of the stomach and antrum; performed cold forceps biopsy to  rule out H. pylori;  Sliding hiatal hernia (type I hiatal hernia) - GE junction 4 cm from the incisors  The duodenal bulb and 2nd part of the duodenum appeared normal.    Patient Active Problem List   Diagnosis    Acute pulmonary embolism without acute cor pulmonale (Multi)    Acute blood loss anemia    Nicotine use disorder    BPH (benign prostatic hyperplasia)    Gastro-esophageal reflux disease without esophagitis    Hiatal hernia    Hyperlipidemia    Obstructive sleep apnea syndrome    Polysubstance abuse (Multi)    Hypocalcemia    Erectile dysfunction    Aneurysm of splenic artery (CMS-HCC)    Peripheral edema    Iron deficiency anemia    Vitamin D deficiency    Constipation    Encephalopathy    Other fatigue    Diarrhea    Arthralgia    Anemia    Seizure (Multi)    Hypertension       Current Outpatient Medications:     amLODIPine (Norvasc) 10 mg tablet, TAKE 1 TABLET BY MOUTH EVERY DAY, Disp: 90 tablet, Rfl: 1    apixaban (Eliquis) 5 mg tablet, Take 1 tablet (5 mg) by mouth 2 times a day., Disp: 60 tablet, Rfl: 3    atorvastatin (Lipitor) 10 mg tablet, Take 1 tablet (10 mg) by mouth once daily at bedtime., Disp: 30 tablet, Rfl: 0    ferrous sulfate, 325 mg ferrous sulfate, (Iron, ferrous sulfate,) tablet, Take 1 tablet by mouth once daily with breakfast., Disp: 30 tablet, Rfl: 3    folic acid (Folvite) 1 mg tablet, Take 1 tablet (1 mg) by mouth once daily., Disp: , Rfl:     omeprazole (PriLOSEC) 20 mg tablet,delayed release (DR/EC) EC tablet, Take 1 tablet (20 mg) by mouth once daily., Disp: 30 tablet, Rfl: 0    pantoprazole (ProtoNix) 40 mg EC tablet, TAKE 1 TABLET (40 MG) BY MOUTH 2 TIMES A DAY BEFORE MEALS. DO NOT CRUSH, CHEW, OR SPLIT., Disp: 180 tablet, Rfl: 1    tamsulosin (Flomax) 0.4 mg 24 hr capsule, Take 1 capsule (0.4 mg) by mouth once daily., Disp: 30 capsule, Rfl: 5    sildenafil (Viagra) 100 mg tablet, Take 1 tablet (100 mg) by mouth once daily as needed for erectile dysfunction., Disp: 30  tablet, Rfl: 3  Patient has no known allergies.  Social History     Tobacco Use    Smoking status: Every Day     Current packs/day: 1.00     Types: Cigarettes    Smokeless tobacco: Never   Substance Use Topics    Alcohol use: Not Currently    Drug use: Not Currently     Types: Heroin     Comment: in past         All pertinent aspects of medical and surgical history were reviewed and updated in chart    Review of Systems   Pertinent positives in review of systems outlined above.  Complete ROS otherwise negative.        Last Recorded Vitals:  No data found.         Physical Exam  HENT:      Mouth/Throat:      Pharynx: Oropharynx is clear.   Eyes:      Extraocular Movements: Extraocular movements intact.      Conjunctiva/sclera: Conjunctivae normal.      Pupils: Pupils are equal, round, and reactive to light.   Cardiovascular:      Rate and Rhythm: Normal rate and regular rhythm.      Pulses: Normal pulses.      Heart sounds: Normal heart sounds.   Pulmonary:      Effort: Pulmonary effort is normal.      Breath sounds: Normal breath sounds.   Abdominal:      General: Abdomen is flat. Bowel sounds are normal.      Palpations: Abdomen is soft.   Musculoskeletal:      Right lower leg: No edema.      Left lower leg: No edema.          Assessment/Plan   Problem List Items Addressed This Visit       Hyperlipidemia     Continue atorvastatin.  Fasting lipids prior to next visit.         Relevant Orders    Comprehensive Metabolic Panel    Erectile dysfunction    Relevant Medications    sildenafil (Viagra) 100 mg tablet    Anemia - Primary     Hiram has new microcytic anemia evident on blood work over the past 6 weeks.  He completed an endoscopy in March demonstrating significant gastritis.  He has never had a colonoscopy.  I encouraged him to continue his proton pump inhibitor on a daily basis, and he will continue his oral iron supplement.  A blood count and iron studies will be repeated in 1 month.  Colonoscopy will be  arranged at that time.         Relevant Orders    Comprehensive Metabolic Panel    CBC and Auto Differential    Hypertension     Blood pressure is within acceptable range at his visit today.  I encouraged him to continue his antihypertensives on a daily basis.  We discussed that cocaine intoxication will drive up blood pressure and that opiate withdrawal will also affect blood pressure.  I encouraged him to avoid both substances.  Blood pressure will be repeated in 1 month         Relevant Orders    Comprehensive Metabolic Panel       A total of 40 minutes was spent reviewing the chart and recent testing and discussing plan of care.         Chucho De Leon MD

## 2024-07-23 NOTE — PATIENT INSTRUCTIONS
Blood work in one month     Your health is important. Get expert care.  For more information about our Addiction Services, call [phone].

## 2024-07-23 NOTE — PROGRESS NOTES
Department of Medicine  Division of Pulmonary, Critical Care, and Sleep Medicine  Follow Up  [facility], [address]    Douglas Wilson is a 53 y.o. male who returns as a follow up for PE. Last visit was on 4/3/2024.    Physician HPI (7/23/2024):  Since last visit he had a normal PFT. Lower extremity duplex studies were negative for DVT. Denies any new respiratory complaints. Not interested in quitting smoking at this time. No mention of bleeding problems. Still taking apixaban. He was admitted to [facility] ICU in June 2024 for seizure, encephalopathy, thought to be due to drug overdose.    Per previous notes:  53 y.o. year-old male here for hospital follow up after being diagnosed with pulmonary embolism.  He is an active 1 pack/day smoker from the age of 12.  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     He initially presented to [facility] in February 2024 with abdominal pain, and was found to have a splenic artery aneurysmal rupture with hemoperitoneum for which she underwent splenic artery embolization on 2/22/2024.  He was discharged on February 24 and returned a day later with recurrent abdominal pain.  He underwent CT angiography of his chest which demonstrated subsegmental pulmonary emboli which were not noticed on previous CTA from 3 days ago.  He had bilateral lower extremity duplex evaluation which was negative for DVT.  PERT team was contacted for PE, and he was eventually transition to oral apixaban.  He did not have any RV strain on echocardiogram. He returned again to [facility] on March 12, this time with acute appendicitis requiring appendectomy. He had another CTA of his chest performed on March 12, however, this was negative for any pulmonary embolism. He was discharged on March 13 and then came back to the emergency room on March 14 with upper GI bleed.  EGD was notable for grade C esophagitis.  Given all these recent admissions to the hospital, he finally was able to stay on any maintenance dose of  "apixaban from mid March.    I have personally reviewed PMH, PSH, Family History in the HISTORY tab of this chart, and unless noted above are not pertinent to the presenting complaint.  I have personally reviewed Social History as provided in the electronic medical record.    Immunization History:  Immunization History   Administered Date(s) Administered    PPD Test 2023       Current Medications:  Current Outpatient Medications   Medication Instructions    amLODIPine (NORVASC) 10 mg, oral, Daily    apixaban (ELIQUIS) 5 mg, oral, 2 times daily    atorvastatin (LIPITOR) 10 mg, oral, Nightly    ferrous sulfate, 325 mg ferrous sulfate, (Iron, ferrous sulfate,) tablet 1 tablet, oral, Daily with breakfast    folic acid (FOLVITE) 1 mg, oral, Daily RT    omeprazole (PRILOSEC) 20 mg, oral, Daily    pantoprazole (PROTONIX) 40 mg, oral, 2 times daily before meals, Do not crush, chew, or split.    sildenafil (VIAGRA) 100 mg, oral, Daily PRN    tamsulosin (FLOMAX) 0.4 mg, oral, Daily        Drug Allergies/Intolerances:  No Known Allergies     Review of Systems:  All other review of systems are negative and/or non-contributory.    Physical Examination:  Vitals:    24 1001   BP: 149/84   BP Location: Right arm   Patient Position: Sitting   Pulse: 55   Resp: 18   Temp: 36.9 °C (98.5 °F)   TempSrc: Temporal   SpO2: 96%   Weight: 77.1 kg (170 lb)   Height: 1.753 m (5' 9\")          GEN: appears well. No respiratory difficulties  CV: RRR, no m/g/r  LUNGS: good effort, clear bilaterally, no w/r/r  EXT: no edema, cyanosis, clubbing, no tenderness    Exacerbation History     N/A    Pulmonary Function Test Results     2024:  FVC: 3.96 L (94%)  FEV1: 3.13 L (96%)  FEV1/FVC: 81  T.19 L (85%)  DLCO: 84%    O2 Requirements     6MWT:    CAT score     N/A    Peak Flow and ACT     N/A    Chest Radiograph     2024: No evidence of acute cardiopulmonary process.     Chest CT Scan     CTA 3/12/2024:  PULMONARY ARTERIES: No " "acute pulmonary embolism.   LUNG, PLEURA, LARGE AIRWAYS: There is bilateral atelectasis. No  consolidation, pulmonary edema, pleural effusion, or pneumothorax.     CTA 2/25/2024:  1. Acute segmental and subsegmental pulmonary emboli within the right  lower lobe, right middle lobe, and left lower lobe. No evidence of  right heart strain.      2. Status post splenic artery embolization with coils involving the  mid splenic artery; however, distal to the last embolization \", the  splenic artery remains opacified and the known splenic artery  pseudoaneurysm remains opacified, again measuring ~ 0.7 cm. No  active extravasation from the spleen/splenic pseudoaneurysm or along  the course of the splenic artery within limitations of obscured  visualization by beam hardening artifact. This supports relatively  stable hemoglobin level since initial study.      3. Moderate volume hemoperitoneum, overall similar in quantity to  02/22/2024. There is an increased amount of blood products in the  rectovesical recess and decreased amount of blood products in the  upper abdomen around the liver, spleen. Decreased amount of  perisplenic hemorrhage favors absence of active bleeding.     CTA 2/22/2024:  PULMONARY ARTERIES: No acute pulmonary embolism.        Labs     Lab Results   Component Value Date    WBC 9.2 07/20/2024    HGB 11.3 (L) 07/20/2024    HCT 35.4 (L) 07/20/2024    MCV 72 (L) 07/20/2024     07/20/2024     Lab Results   Component Value Date    BNP 69 02/26/2024     Lab Results   Component Value Date    EOSABS 0.40 07/20/2024    EOSABS 0.45 06/04/2024       Echocardiogram     2/26/2024:   1. Left ventricular systolic function is normal with a 60-65% estimated ejection fraction.   2. RVSP within normal limits.   3. The LVEF is no longer hyperdynamic. No focal wall motion abnormalities.       ASSESSMENT & PLAN     Problem List Items Addressed This Visit       Acute pulmonary embolism without acute cor pulmonale (Multi) "    Nicotine use disorder - Primary        SUMMARY:  53 y.o. male here for PFT and lower extremity duplex results follow up. He has completed 5 months of anticoagulation. Recommendation for first onset VTE is 3 months therapy. If no other indication for anticoagulation, recommend he stop taking apixaban. PFT ruled out COPD. Encouraged to quit smoking, however, patient does not wish to quit at this time. No need for further pulmonology follow up.    Follow-up: HALEY Sims DO  Staff Physician - Pulmonary & Critical Care  07/23/24 10:38 AM  Office number: [phone]   Fax number:  [phone]

## 2024-07-24 PROBLEM — I10 HYPERTENSION: Status: ACTIVE | Noted: 2024-07-24

## 2024-07-24 NOTE — ASSESSMENT & PLAN NOTE
Blood pressure is within acceptable range at his visit today.  I encouraged him to continue his antihypertensives on a daily basis.  We discussed that cocaine intoxication will drive up blood pressure and that opiate withdrawal will also affect blood pressure.  I encouraged him to avoid both substances.  Blood pressure will be repeated in 1 month

## 2024-07-24 NOTE — ASSESSMENT & PLAN NOTE
Hiram has new microcytic anemia evident on blood work over the past 6 weeks.  He completed an endoscopy in March demonstrating significant gastritis.  He has never had a colonoscopy.  I encouraged him to continue his proton pump inhibitor on a daily basis, and he will continue his oral iron supplement.  A blood count and iron studies will be repeated in 1 month.  Colonoscopy will be arranged at that time.

## 2024-07-24 NOTE — ASSESSMENT & PLAN NOTE
Douglas has had multiple urine drug screens positive for fentanyl and cocaine over the past couple of years.  We discussed the health risks of cocaine use including effects on blood pressure and risk for cardiovascular events like stroke and heart attack.  We also discussed the potential for ischemic bowel.  He states that he is using cocaine and fentanyl recreationally and not on a daily basis.  I advised him that any use is harmful.  He is working with his employee assistance program to get into a rehabilitation program.  I provided the contact information for the [facility] addiction and recovery program.

## 2024-07-26 ENCOUNTER — APPOINTMENT (OUTPATIENT)
Dept: PULMONOLOGY | Facility: CLINIC | Age: 54
End: 2024-07-26
Payer: COMMERCIAL

## 2024-07-31 ENCOUNTER — HOSPITAL ENCOUNTER (EMERGENCY)
Facility: HOSPITAL | Age: 54
Discharge: LEFT BEFORE DISCHARGE | End: 2024-07-31
Payer: COMMERCIAL

## 2024-07-31 PROCEDURE — 4500999001 HC ED NO CHARGE

## 2024-08-01 ENCOUNTER — APPOINTMENT (OUTPATIENT)
Dept: CARDIOLOGY | Facility: HOSPITAL | Age: 54
End: 2024-08-01
Payer: COMMERCIAL

## 2024-08-01 ENCOUNTER — HOSPITAL ENCOUNTER (EMERGENCY)
Facility: HOSPITAL | Age: 54
Discharge: HOME | End: 2024-08-01
Attending: EMERGENCY MEDICINE
Payer: COMMERCIAL

## 2024-08-01 VITALS
TEMPERATURE: 98.9 F | SYSTOLIC BLOOD PRESSURE: 131 MMHG | DIASTOLIC BLOOD PRESSURE: 95 MMHG | HEART RATE: 84 BPM | WEIGHT: 170 LBS | HEIGHT: 69 IN | RESPIRATION RATE: 16 BRPM | BODY MASS INDEX: 25.18 KG/M2 | OXYGEN SATURATION: 100 %

## 2024-08-01 DIAGNOSIS — I10 HYPERTENSION, UNSPECIFIED TYPE: ICD-10-CM

## 2024-08-01 DIAGNOSIS — R51.9 NONINTRACTABLE HEADACHE, UNSPECIFIED CHRONICITY PATTERN, UNSPECIFIED HEADACHE TYPE: Primary | ICD-10-CM

## 2024-08-01 LAB
ALBUMIN SERPL BCP-MCNC: 4.3 G/DL (ref 3.4–5)
ALP SERPL-CCNC: 58 U/L (ref 33–120)
ALT SERPL W P-5'-P-CCNC: 9 U/L (ref 10–52)
ANION GAP SERPL CALC-SCNC: 10 MMOL/L (ref 10–20)
AST SERPL W P-5'-P-CCNC: 12 U/L (ref 9–39)
ATRIAL RATE: 86 BPM
BASOPHILS # BLD AUTO: 0.03 X10*3/UL (ref 0–0.1)
BASOPHILS NFR BLD AUTO: 0.3 %
BILIRUB DIRECT SERPL-MCNC: 0.1 MG/DL (ref 0–0.3)
BILIRUB SERPL-MCNC: 0.5 MG/DL (ref 0–1.2)
BUN SERPL-MCNC: 12 MG/DL (ref 6–23)
CALCIUM SERPL-MCNC: 9.4 MG/DL (ref 8.6–10.3)
CARDIAC TROPONIN I PNL SERPL HS: <3 NG/L (ref 0–20)
CARDIAC TROPONIN I PNL SERPL HS: <3 NG/L (ref 0–20)
CHLORIDE SERPL-SCNC: 102 MMOL/L (ref 98–107)
CO2 SERPL-SCNC: 29 MMOL/L (ref 21–32)
CREAT SERPL-MCNC: 1.08 MG/DL (ref 0.5–1.3)
EGFRCR SERPLBLD CKD-EPI 2021: 82 ML/MIN/1.73M*2
EOSINOPHIL # BLD AUTO: 0.07 X10*3/UL (ref 0–0.7)
EOSINOPHIL NFR BLD AUTO: 0.8 %
ERYTHROCYTE [DISTWIDTH] IN BLOOD BY AUTOMATED COUNT: 19.1 % (ref 11.5–14.5)
GLUCOSE SERPL-MCNC: 124 MG/DL (ref 74–99)
HCT VFR BLD AUTO: 45 % (ref 41–52)
HGB BLD-MCNC: 14 G/DL (ref 13.5–17.5)
IMM GRANULOCYTES # BLD AUTO: 0.04 X10*3/UL (ref 0–0.7)
IMM GRANULOCYTES NFR BLD AUTO: 0.5 % (ref 0–0.9)
LYMPHOCYTES # BLD AUTO: 1.78 X10*3/UL (ref 1.2–4.8)
LYMPHOCYTES NFR BLD AUTO: 20.7 %
MCH RBC QN AUTO: 22.5 PG (ref 26–34)
MCHC RBC AUTO-ENTMCNC: 31.1 G/DL (ref 32–36)
MCV RBC AUTO: 72 FL (ref 80–100)
MONOCYTES # BLD AUTO: 0.58 X10*3/UL (ref 0.1–1)
MONOCYTES NFR BLD AUTO: 6.7 %
NEUTROPHILS # BLD AUTO: 6.1 X10*3/UL (ref 1.2–7.7)
NEUTROPHILS NFR BLD AUTO: 71 %
NRBC BLD-RTO: 0 /100 WBCS (ref 0–0)
P AXIS: 75 DEGREES
P OFFSET: 205 MS
P ONSET: 150 MS
PLATELET # BLD AUTO: 306 X10*3/UL (ref 150–450)
POTASSIUM SERPL-SCNC: 4 MMOL/L (ref 3.5–5.3)
PR INTERVAL: 152 MS
PROT SERPL-MCNC: 7.4 G/DL (ref 6.4–8.2)
Q ONSET: 226 MS
QRS COUNT: 15 BEATS
QRS DURATION: 76 MS
QT INTERVAL: 356 MS
QTC CALCULATION(BAZETT): 426 MS
QTC FREDERICIA: 401 MS
R AXIS: 2 DEGREES
RBC # BLD AUTO: 6.23 X10*6/UL (ref 4.5–5.9)
SODIUM SERPL-SCNC: 137 MMOL/L (ref 136–145)
T AXIS: 52 DEGREES
T OFFSET: 404 MS
VENTRICULAR RATE: 86 BPM
WBC # BLD AUTO: 8.6 X10*3/UL (ref 4.4–11.3)

## 2024-08-01 PROCEDURE — 84484 ASSAY OF TROPONIN QUANT: CPT | Performed by: EMERGENCY MEDICINE

## 2024-08-01 PROCEDURE — 36415 COLL VENOUS BLD VENIPUNCTURE: CPT | Performed by: EMERGENCY MEDICINE

## 2024-08-01 PROCEDURE — 85025 COMPLETE CBC W/AUTO DIFF WBC: CPT | Performed by: EMERGENCY MEDICINE

## 2024-08-01 PROCEDURE — 80053 COMPREHEN METABOLIC PANEL: CPT | Performed by: EMERGENCY MEDICINE

## 2024-08-01 PROCEDURE — 99283 EMERGENCY DEPT VISIT LOW MDM: CPT

## 2024-08-01 PROCEDURE — 93005 ELECTROCARDIOGRAM TRACING: CPT

## 2024-08-01 PROCEDURE — 82248 BILIRUBIN DIRECT: CPT | Performed by: EMERGENCY MEDICINE

## 2024-08-01 ASSESSMENT — PAIN SCALES - GENERAL: PAINLEVEL_OUTOF10: 8

## 2024-08-01 ASSESSMENT — PAIN - FUNCTIONAL ASSESSMENT: PAIN_FUNCTIONAL_ASSESSMENT: 0-10

## 2024-08-01 NOTE — ED TRIAGE NOTES
Pt to ED for headache and dizziness. Pt states his blood pressure was high at home last night. Pt's BP is 131/95 in triage today. Pt states he takes blood pressure medication at home, and took and extra dose last night to try to bring his BP was in the 170s for the systolic. Pt states last week his BP reached 194/100. Pt awake, alert, and ambulatory upon arrival.

## 2024-08-11 NOTE — ED PROVIDER NOTES
HPI   Chief Complaint   Patient presents with    Dizziness    Headache       HPI: []  53-year-old male history of hypertension, CAD, splenic artery aneurysm, pulm embolism today comes with high blood pressure.  He states that his blood pressure running high at home.  Took extra dose last night.  Came to the ER was too busy left having seen.  Today no symptoms.  Patient denies any chest pain pressure heaviness fever chills nausea vomit diarrhea cough congestion incontinence seizures syncope or near syncope no paresthesias numbness tingling weakness upper lower extremities no trauma or falls no hematemesis melena hematochezia, no hemoptysis.    Past history: Hypertension, pulm embolus, CAD  Social history tobacco use denies current alcohol drug abuse.      REVIEW OF SYSTEMS:    GENERAL.: No weight loss, fatigue, anorexia, insomnia, fever.    EYES: No vision loss, double vision, drainage, eye pain.    ENT: No pharyngitis, dry mouth.    CARDIOPULMONARY: No chest pain, palpitations, syncope, near syncope. No shortness of breath, cough, hemoptysis.    GI: No abdominal pain, change in bowel habits, melena, hematemesis, hematochezia, nausea, vomiting, diarrhea.    : No discharge, dysuria, frequency, urgency, hematuria.    MS: No limb pain, joint pain, joint swelling.    SKIN: No rashes.    PSYCH: No depression, anxiety, suicidality, homicidality.    Review of systems is otherwise negative unless stated above or in history of present illness.  Social history, family history, allergies reviewed.  PHYSICAL EXAM:    GENERAL: Vitals noted, no distress. Alert and oriented  x 3. Non-toxic.      EENT: TMs clear. Posterior oropharynx unremarkable. No meningismus. No LAD.     NECK: Supple. Nontender. No midline tenderness.     CARDIAC: Regular, rate, rhythm. No murmurs rubs or gallops. No JVD    PULMONARY: Lungs clear bilaterally with good aeration. No wheezes rales or rhonchi. No respiratory distress.     ABDOMEN: Soft,  nonsurgical. Nontender. No peritoneal signs. Normoactive bowel sounds. No pulsatile masses.     EXTREMITIES: No peripheral edema. Negative Homans bilaterally, no cords.  2+ bounding pulses well-perfused.    SKIN: No rash. Intact.     NEURO: No focal neurologic deficits, NIH score of 0. Cranial nerves normal as tested from II through XII.     MEDICAL DECISION MAKING:  EKG on my interpretation shows a normal sinus rhythm normal axis rate mid 70s with no acute ischemic change.    CBC with differential chemistries LFTs are within normal limits troponin x 2 is negative.    Treatment today: None    ED course: Patient remains asymptomatic remains normotensive no tachycardia hypoxia    Impression: #1 hypertension    Plan/MDM: 53-year-old white male history of hypertension CAD pulm embolism presents with high blood pressure last few days.  Currently asymptomatic.  Currently no signs of stroke TIA STEMI NSTEMI ACS dissection or pulm embolism.  Patient asymptomatic.  Blood pressure reasonable control.  Examination is normal.  Stroke scale is 0 GCS 15, patient reassured will be discharged home and by supportive care continue current regimen of medications close outpatient follow-up recommended with strict return precaution.                  Patient History   Past Medical History:   Diagnosis Date    Acute appendicitis without peritonitis 03/12/2024    BPH (benign prostatic hyperplasia)     Heroin abuse (Multi)     NSTEMI (non-ST elevated myocardial infarction) (Multi)     Pulmonary embolism (Multi)     Splenic artery aneurysm (CMS-HCC) 02/2024    rupture s/p coil embolization    Upper GI bleed 03/15/2024    EGD with esophagitis, no active bleeding     Past Surgical History:   Procedure Laterality Date    APPENDECTOMY  03/12/2024    CT ANGIO CORONARY ART WITH HEARTFLOW IF SCORE >30%  04/19/2022    OTHER SURGICAL HISTORY Bilateral 01/24/2022    Inguinal hernia repair laparoscopic    SPLENIC ARTERY EMBOLIZATION  02/2024     Family  History   Problem Relation Name Age of Onset    Diabetes Father      Hypertension Father      No Known Problems Son          raymond    Deafness Son      Other (orthopedic) Son          wearing braces to help with walking     Social History     Tobacco Use    Smoking status: Every Day     Current packs/day: 1.00     Types: Cigarettes    Smokeless tobacco: Never   Vaping Use    Vaping status: Not on file   Substance Use Topics    Alcohol use: Not Currently    Drug use: Not Currently     Types: Heroin     Comment: in past       Physical Exam   ED Triage Vitals [08/01/24 1153]   Temperature Heart Rate Respirations BP   37.2 °C (98.9 °F) 84 16 (!) 131/95      Pulse Ox Temp src Heart Rate Source Patient Position   100 % -- -- --      BP Location FiO2 (%)     -- --       Physical Exam      ED Course & MDM   ED Course as of 08/11/24 1758   Thu Aug 01, 2024   1609 EKG on my interpretation shows a normal sinus rhythm normal axis rate mid 70s with no ischemic changes.  CBC with differential chemistries LFTs unremarkable troponin negative.  On repeat eval patient feeling remarkably remains asymptomatic blood pressure is downtrending currently no suspicion for stroke TIA congestive heart failure STEMI NSTEMI or recurrent pulm embolism, patient be discharged home and by supportive care continue home medications close outpatient follow-up with strict return precaution. [MT]      ED Course User Index  [MT] Paola Ortiz MD         Diagnoses as of 08/11/24 1758   Nonintractable headache, unspecified chronicity pattern, unspecified headache type   Hypertension, unspecified type                 No data recorded     [place] Coma Scale Score: 15 (08/01/24 1629 : Yamil Dozier RN)                           Medical Decision Making      Procedure  Procedures     Paola Ortiz MD  08/11/24 1807

## 2024-08-19 RX ORDER — ASPIRIN 325 MG
1 TABLET, DELAYED RELEASE (ENTERIC COATED) ORAL
COMMUNITY
Start: 2024-05-08

## 2024-08-19 RX ORDER — TADALAFIL 5 MG/1
1 TABLET ORAL
COMMUNITY
Start: 2024-07-17

## 2024-08-20 ENCOUNTER — OFFICE VISIT (OUTPATIENT)
Dept: CARDIOLOGY | Facility: CLINIC | Age: 54
End: 2024-08-20
Payer: COMMERCIAL

## 2024-08-20 VITALS
WEIGHT: 172 LBS | BODY MASS INDEX: 25.48 KG/M2 | HEIGHT: 69 IN | SYSTOLIC BLOOD PRESSURE: 112 MMHG | DIASTOLIC BLOOD PRESSURE: 61 MMHG | HEART RATE: 52 BPM | OXYGEN SATURATION: 95 %

## 2024-08-20 DIAGNOSIS — I26.94 MULTIPLE SUBSEGMENTAL PULMONARY EMBOLI WITHOUT ACUTE COR PULMONALE (MULTI): Primary | ICD-10-CM

## 2024-08-20 PROCEDURE — 99213 OFFICE O/P EST LOW 20 MIN: CPT | Performed by: INTERNAL MEDICINE

## 2024-08-20 PROCEDURE — 3008F BODY MASS INDEX DOCD: CPT | Performed by: INTERNAL MEDICINE

## 2024-08-20 PROCEDURE — 3078F DIAST BP <80 MM HG: CPT | Performed by: INTERNAL MEDICINE

## 2024-08-20 PROCEDURE — 3074F SYST BP LT 130 MM HG: CPT | Performed by: INTERNAL MEDICINE

## 2024-08-20 ASSESSMENT — PAIN SCALES - GENERAL: PAINLEVEL: 0-NO PAIN

## 2024-08-20 ASSESSMENT — ENCOUNTER SYMPTOMS
OCCASIONAL FEELINGS OF UNSTEADINESS: 0
LOSS OF SENSATION IN FEET: 0
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
## 2024-08-26 ENCOUNTER — APPOINTMENT (OUTPATIENT)
Dept: PRIMARY CARE | Facility: HOSPITAL | Age: 54
End: 2024-08-26
Payer: COMMERCIAL

## 2024-08-27 ENCOUNTER — APPOINTMENT (OUTPATIENT)
Dept: PRIMARY CARE | Facility: HOSPITAL | Age: 54
End: 2024-08-27
Payer: COMMERCIAL

## 2024-08-28 ENCOUNTER — APPOINTMENT (OUTPATIENT)
Dept: PRIMARY CARE | Facility: CLINIC | Age: 54
End: 2024-08-28
Payer: COMMERCIAL

## 2024-08-28 ENCOUNTER — HOSPITAL ENCOUNTER (OUTPATIENT)
Dept: RADIOLOGY | Facility: HOSPITAL | Age: 54
Discharge: HOME | End: 2024-08-28
Payer: COMMERCIAL

## 2024-08-28 VITALS
TEMPERATURE: 98.1 F | WEIGHT: 169.4 LBS | HEART RATE: 67 BPM | OXYGEN SATURATION: 94 % | SYSTOLIC BLOOD PRESSURE: 110 MMHG | BODY MASS INDEX: 25.02 KG/M2 | DIASTOLIC BLOOD PRESSURE: 70 MMHG

## 2024-08-28 DIAGNOSIS — M54.6 ACUTE MIDLINE THORACIC BACK PAIN: ICD-10-CM

## 2024-08-28 DIAGNOSIS — R10.9 ABDOMINAL PAIN, UNSPECIFIED ABDOMINAL LOCATION: ICD-10-CM

## 2024-08-28 DIAGNOSIS — R31.29 MICROSCOPIC HEMATURIA: Primary | ICD-10-CM

## 2024-08-28 DIAGNOSIS — I10 HYPERTENSION, UNSPECIFIED TYPE: ICD-10-CM

## 2024-08-28 PROCEDURE — 3078F DIAST BP <80 MM HG: CPT | Performed by: INTERNAL MEDICINE

## 2024-08-28 PROCEDURE — 99215 OFFICE O/P EST HI 40 MIN: CPT | Performed by: INTERNAL MEDICINE

## 2024-08-28 PROCEDURE — 72072 X-RAY EXAM THORAC SPINE 3VWS: CPT

## 2024-08-28 PROCEDURE — 3074F SYST BP LT 130 MM HG: CPT | Performed by: INTERNAL MEDICINE

## 2024-08-28 RX ORDER — PHENOL/SODIUM PHENOLATE
20 AEROSOL, SPRAY (ML) MUCOUS MEMBRANE DAILY
Qty: 30 TABLET | Refills: 11 | Status: SHIPPED | OUTPATIENT
Start: 2024-08-28

## 2024-08-28 NOTE — LETTER
August 28, 2024     Patient: Douglas Wilson   YOB: 1970   Date of Visit: 8/28/2024       To Whom It May Concern:    Douglas iWlson is a patient under my care.  He has a diagnosis of hypertension and was recently evaluated in the emergency room on 7/19/24 for hypertensive urgency when his systolic blood pressure increased to 190.  He has been treated, and his blood pressure has returned into the normal range.l       If you have any questions or concerns, please don't hesitate to call.         Sincerely,         Chucho De Leon MD        CC: No Recipients

## 2024-08-28 NOTE — PROGRESS NOTES
Chief Complaint:   Follow-up     History Of Present Illness:    Douglas Wilson is a 53 y.o. male with active medical problems outlined below who presents for blood pressure check and to follow up on recent ER evaluation for hypertension and abdominal pain.      INITIAL VISIT 4/4/24  Douglas has hx of smoking, BPH, former polysubstance use who has been sick since  February.  2/22/24 - 3 d of abdominal pain and diagnosed with splenic artery aneurysm and underwent coiling procedure.  Had troponin leak to 1,252.  Echocardiogram normal.  CCTA in 2022 showed minimal CAD.   2/25/24 - presented with chest pain and SOB.  Diagnosed with multiple bilateral pulmonary emboli.  Started Eliquis and DC home  3/12/24 - presented with abdominal pain and diagnosed with appendicitis.  Underwent urgent appendectomy  3/14/24 - presented with vomiting and poss hematemesis.   EGD showed hiatal hernia and esophagitis.  Started BID protonix.  Significant findings from labs in hospital - anemia, hypocalcemia, elevated troponin.   Is slowly improving since his hospital discharge.  He endorses fatigue but denies chest pain or shortness of breath.  Appetite is normal.  He reports no further nausea or vomiting.  He has been constipated.  No diarrhea and no blood in his stool.  No black appearing stools.  Obstructive type lower urinary tract symptoms are managed with tamsulosin.  He has ED and would like to go back on a phosphodiesterase inhibitor.  He did a lot of lifting at work earlier today, he is now experiencing pain in his left lower quadrant.  He has some tenderness to palpation around his incision.     Echocardiogram 2/27/24  CONCLUSIONS:   1. Left ventricular systolic function is normal with a 60-65% estimated ejection fraction.   2. RVSP within normal limits.   3. The LVEF is no longer hyperdynamic. No focal wall motion abnormalities.    4/19/22 Coronary Artery CT Angio  IMPRESSION:  1. Minor coronary artery disease.  2. The proximal LAD  "has a small/focal region of calcified plaque with  minimal luminal stenosis (<25%).  3. The LM, LCx and RCA have no significant atherosclerotic change or  stenotic disease.  4. Right-dominant coronary artery system.    (8/15/22) CT abdomen pelvis   Bladder wall thickening       Admitted to ICU 5/30/24  Found unresponsive by his son in his home at 10:30 that day.   He tested positive for fentanyl at [facility] but did not respond to naloxone.  Fever, lack of response to naloxone and tongue bite suggested seizure or possible meningitis/encephalitis.  Patient was started on broad-spectrum antibiotics for possible sepsis/meningitis/encephalitis  After being stabilized he was transferred to the floor  Underwent an LP which was negative so the antibiotics were stopped. EEG on 5/31/24 normal.    MRI on 5/31/24 demonstrated:  \"There are a few small nonspecific white matter changes noted within  cerebral hemispheres bilaterally. While nonspecific, white matter  changes can be seen with small-vessel ischemic change or  demyelinating processes among others.\"    CT angiogram of abdomen on 5/30/2024 was negative for aortic aneurysm or acute process.  There is some suggestion of gallbladder wall thickening.  Right upper quadrant ultrasound was then performed on 5/30/2024 without evidence of acute cholecystitis.    Symptoms stabilized, and he was discharged home on 6/5/2024.      ER again on 6/7/24 for evaluation of neck pain.  Diagnosed with musculoskeletal pain and treated with naproxen.    ER on 7/20/24 for elevated blood pressure, abdominal pain and mental status changes.    Douglas was at work and coworkers thought he didn't look good, his managers and the nurse at work got involved, took BP and was very high and sent to the ER.  A supervisor mentioned that he fell asleep at work.   Presented to the ER appearing intoxicated.   UDS pos for fentanyl and cocaine.  ER workup included EKG and troponin - no evidence of " ischemia.  CTA abdomen - thickening in esophagus, thickening in colon   New end plate changes in thoracic spine   Mental status cleared and discharged home.    No abdominal pain but bowels alternating between constipation and loose stool    CTA 7/20/24  IMPRESSION:  1. No aortic aneurysm or acute dissection.  2. Circumferential wall thickening at the distal esophagus, may be  seen with esophagitis. Evaluation with upper endoscopy as clinically  warranted.  3. Diffuse wall thickening at the descending colon, sigmoid colon and  rectum, may be secondary to underdistention versus colitis.  4. Mild circumferential wall thickening of the urinary bladder.  Please correlate with urinalysis to exclude superimposed cystitis.  5. Mild bilateral inguinal adenopathy.  6. Linear sclerotic areas at the superior endplates of T5, T6, T7 and  T8, suggestive of mild compression fractures, of indeterminate age,  new since prior CT 03/14/2024. Please correlate with clinical  history/point tenderness to evaluate for acute/subacute fracture.    ER on 8/1/24 with elevated blood pressure.  Unremarkable workup and discharged home.     Met with pulmonary on 7/23/24 and dc'd eliquis   No substances in one month  Mood and energy are better, ups and downs with mood  Douglas needs a letter for work stating that he has high blood pressure and is on treatment.        Patient Active Problem List   Diagnosis    Abdominal pain    Acute pulmonary embolism without acute cor pulmonale (Multi)    Acute blood loss anemia    Nicotine use disorder    BPH (benign prostatic hyperplasia)    Gastro-esophageal reflux disease without esophagitis    Hiatal hernia    Hyperlipidemia    Obstructive sleep apnea syndrome    Polysubstance abuse (Multi)    Hypocalcemia    Erectile dysfunction    Aneurysm of splenic artery (CMS-HCC)    Peripheral edema    Iron deficiency anemia    Vitamin D deficiency    Constipation    Encephalopathy    Other fatigue    Diarrhea     Arthralgia    Anemia    Seizure (Multi)    Hypertension    Microscopic hematuria    Acute midline thoracic back pain       Current Outpatient Medications:     amLODIPine (Norvasc) 10 mg tablet, TAKE 1 TABLET BY MOUTH EVERY DAY, Disp: 90 tablet, Rfl: 1    apixaban (Eliquis) 5 mg tablet, Take 1 tablet (5 mg) by mouth 2 times a day., Disp: 60 tablet, Rfl: 3    atorvastatin (Lipitor) 10 mg tablet, Take 1 tablet (10 mg) by mouth once daily at bedtime., Disp: 30 tablet, Rfl: 0    cholecalciferol (Vitamin D-3) 50,000 unit capsule, Take 1 capsule (50,000 Units) by mouth every 7 days., Disp: , Rfl:     ferrous sulfate, 325 mg ferrous sulfate, (Iron, ferrous sulfate,) tablet, Take 1 tablet by mouth once daily with breakfast., Disp: 30 tablet, Rfl: 3    folic acid (Folvite) 1 mg tablet, Take 1 tablet (1 mg) by mouth once daily., Disp: , Rfl:     sildenafil (Viagra) 100 mg tablet, Take 1 tablet (100 mg) by mouth once daily as needed for erectile dysfunction., Disp: 30 tablet, Rfl: 3    tadalafil (Cialis) 5 mg tablet, Take 1 tablet (5 mg) by mouth early in the morning.., Disp: , Rfl:     tamsulosin (Flomax) 0.4 mg 24 hr capsule, Take 1 capsule (0.4 mg) by mouth once daily., Disp: 30 capsule, Rfl: 5    omeprazole (PriLOSEC) 20 mg tablet,delayed release (DR/EC) EC tablet, Take 1 tablet (20 mg) by mouth once daily., Disp: 30 tablet, Rfl: 11    pantoprazole (ProtoNix) 40 mg EC tablet, TAKE 1 TABLET (40 MG) BY MOUTH 2 TIMES A DAY BEFORE MEALS. DO NOT CRUSH, CHEW, OR SPLIT., Disp: 180 tablet, Rfl: 1  Patient has no known allergies.  Social History     Tobacco Use    Smoking status: Every Day     Current packs/day: 1.00     Types: Cigarettes    Smokeless tobacco: Never   Substance Use Topics    Alcohol use: Not Currently    Drug use: Not Currently     Types: Heroin     Comment: in past         All pertinent aspects of medical and surgical history were reviewed and updated in chart    Review of Systems   Pertinent positives in review of  systems outlined above.  Complete ROS otherwise negative.        Last Recorded Vitals:  No data found.         Physical Exam  HENT:      Mouth/Throat:      Pharynx: Oropharynx is clear.   Eyes:      Extraocular Movements: Extraocular movements intact.      Conjunctiva/sclera: Conjunctivae normal.      Pupils: Pupils are equal, round, and reactive to light.   Cardiovascular:      Pulses: Normal pulses.      Heart sounds: Normal heart sounds.   Pulmonary:      Effort: Pulmonary effort is normal.      Breath sounds: Normal breath sounds.   Abdominal:      General: Abdomen is flat. Bowel sounds are normal.      Palpations: Abdomen is soft.   Musculoskeletal:      Comments: 1+ bilateral edema bilateral.  No erythema, no pain, no palpable cord.    Neurological:      General: No focal deficit present.          Assessment/Plan   Problem List Items Addressed This Visit       Abdominal pain     To continue PPI         Relevant Medications    omeprazole (PriLOSEC) 20 mg tablet,delayed release (DR/EC) EC tablet    Hypertension     BP in a better range.  Not using any substances for several weeks.  Encouraged sobriety.  Will check BP again in 3mo         Microscopic hematuria - Primary     Douglas continues to smoke and has microscopic hematuria.  CTA abdomen on 5/30/24 showed normal kidneys.  Referred to urology for cystoscopy.          Relevant Orders    Referral to Urology    Acute midline thoracic back pain     Suggesting of compression fractures on CTA. Wll check plain films of T spine to confirm            A total of 40 minutes was spent reviewing the chart and recent testing and discussing plan of care.         Chucho De Leon MD

## 2024-09-01 PROBLEM — R31.29 MICROSCOPIC HEMATURIA: Status: ACTIVE | Noted: 2024-09-01

## 2024-09-01 PROBLEM — M54.6 ACUTE MIDLINE THORACIC BACK PAIN: Status: ACTIVE | Noted: 2024-09-01

## 2024-09-01 PROBLEM — R10.9 ABDOMINAL PAIN: Status: ACTIVE | Noted: 2024-02-22

## 2024-09-02 NOTE — ASSESSMENT & PLAN NOTE
BP in a better range.  Not using any substances for several weeks.  Encouraged sobriety.  Will check BP again in 3mo

## 2024-09-02 NOTE — ASSESSMENT & PLAN NOTE
Douglas continues to smoke and has microscopic hematuria.  CTA abdomen on 5/30/24 showed normal kidneys.  Referred to urology for cystoscopy.

## 2024-09-29 ENCOUNTER — APPOINTMENT (OUTPATIENT)
Dept: RADIOLOGY | Facility: HOSPITAL | Age: 54
End: 2024-09-29
Payer: COMMERCIAL

## 2024-09-29 ENCOUNTER — HOSPITAL ENCOUNTER (EMERGENCY)
Facility: HOSPITAL | Age: 54
Discharge: HOME | End: 2024-09-29
Payer: COMMERCIAL

## 2024-09-29 VITALS
RESPIRATION RATE: 16 BRPM | BODY MASS INDEX: 25.92 KG/M2 | TEMPERATURE: 98 F | HEIGHT: 69 IN | SYSTOLIC BLOOD PRESSURE: 117 MMHG | OXYGEN SATURATION: 96 % | HEART RATE: 62 BPM | DIASTOLIC BLOOD PRESSURE: 83 MMHG | WEIGHT: 175 LBS

## 2024-09-29 DIAGNOSIS — S61.402A EXTENSOR TENDON LACERATION OF LEFT HAND WITH OPEN WOUND, INITIAL ENCOUNTER: ICD-10-CM

## 2024-09-29 DIAGNOSIS — S66.822A EXTENSOR TENDON LACERATION OF LEFT HAND WITH OPEN WOUND, INITIAL ENCOUNTER: ICD-10-CM

## 2024-09-29 DIAGNOSIS — M79.642 LEFT HAND PAIN: Primary | ICD-10-CM

## 2024-09-29 PROCEDURE — 12001 RPR S/N/AX/GEN/TRNK 2.5CM/<: CPT | Performed by: SURGERY

## 2024-09-29 PROCEDURE — 73130 X-RAY EXAM OF HAND: CPT | Mod: LEFT SIDE | Performed by: RADIOLOGY

## 2024-09-29 PROCEDURE — 73130 X-RAY EXAM OF HAND: CPT | Mod: LT

## 2024-09-29 PROCEDURE — 99283 EMERGENCY DEPT VISIT LOW MDM: CPT | Mod: 25

## 2024-09-29 ASSESSMENT — PAIN SCALES - GENERAL: PAINLEVEL_OUTOF10: 2

## 2024-09-29 ASSESSMENT — PAIN - FUNCTIONAL ASSESSMENT: PAIN_FUNCTIONAL_ASSESSMENT: 0-10

## 2024-09-29 ASSESSMENT — PAIN DESCRIPTION - LOCATION: LOCATION: FINGER (COMMENT WHICH ONE)

## 2024-09-29 ASSESSMENT — PAIN DESCRIPTION - PROGRESSION: CLINICAL_PROGRESSION: NOT CHANGED

## 2024-09-29 ASSESSMENT — PAIN DESCRIPTION - ONSET: ONSET: ONGOING

## 2024-09-29 ASSESSMENT — PAIN DESCRIPTION - PAIN TYPE: TYPE: ACUTE PAIN

## 2024-09-29 ASSESSMENT — PAIN DESCRIPTION - FREQUENCY: FREQUENCY: CONSTANT/CONTINUOUS

## 2024-09-29 ASSESSMENT — PAIN DESCRIPTION - ORIENTATION: ORIENTATION: LEFT
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
## 2024-09-30 NOTE — ED PROVIDER NOTES
Chief Complaint   Patient presents with    Finger Laceration    Extremity Laceration     HPI:   Douglas Wilson is an 53 y.o. male presenting to the ED for left hand pain.  Explains that he was playing by a fence when he caught himself with his left hand.  The fence penetrated his skin and he sustained rather deep laceration.  Denies numbness or tingling.  He is full motion on his hand.  No pain at the wrist.  No other injuries.  No head injury.  He states that his tetanus is up-to-date.  He is a cigarette smoker.    No Known Allergies:  Past Medical History:   Diagnosis Date    Acute appendicitis without peritonitis 03/12/2024    BPH (benign prostatic hyperplasia)     Heroin abuse (Multi)     NSTEMI (non-ST elevated myocardial infarction) (Multi)     Pulmonary embolism (Multi)     Splenic artery aneurysm (CMS-HCC) 02/2024    rupture s/p coil embolization    Upper GI bleed 03/15/2024    EGD with esophagitis, no active bleeding     Past Surgical History:   Procedure Laterality Date    APPENDECTOMY  03/12/2024    CT ANGIO CORONARY ART WITH HEARTFLOW IF SCORE >30%  04/19/2022    OTHER SURGICAL HISTORY Bilateral 01/24/2022    Inguinal hernia repair laparoscopic    SPLENIC ARTERY EMBOLIZATION  02/2024     Family History   Problem Relation Name Age of Onset    Diabetes Father      Hypertension Father      No Known Problems Son          raymond    Deafness Son      Other (orthopedic) Son          wearing braces to help with walking        Physical Exam  Vitals and nursing note reviewed.   Constitutional:       General: He is not in acute distress.     Appearance: He is well-developed.   HENT:      Head: Normocephalic and atraumatic.   Eyes:      Conjunctiva/sclera: Conjunctivae normal.   Cardiovascular:      Rate and Rhythm: Normal rate and regular rhythm.      Heart sounds: No murmur heard.  Pulmonary:      Effort: Pulmonary effort is normal. No respiratory distress.      Breath sounds: Normal breath sounds.   Abdominal:       Palpations: Abdomen is soft.      Tenderness: There is no abdominal tenderness.   Musculoskeletal:         General: No swelling.      Cervical back: Neck supple.      Comments: Flexor tendons are intact on the right hand.   Skin:     General: Skin is warm and dry.      Capillary Refill: Capillary refill takes less than 2 seconds.      Comments: 1 cm deep laceration on the right palm   Neurological:      Mental Status: He is alert.   Psychiatric:         Mood and Affect: Mood normal.        Laceration Repair    Performed by: José Manuel Ngo PA-C  Authorized by: José Manuel Ngo PA-C    Consent:     Consent obtained:  Verbal    Consent given by:  Patient    Risks, benefits, and alternatives were discussed: yes      Risks discussed:  Infection, need for additional repair, poor wound healing and pain  Universal protocol:     Patient identity confirmed:  Verbally with patient  Anesthesia:     Anesthesia method:  None  Laceration details:     Location:  Hand    Hand location:  L palm    Length (cm):  1.5    Depth (mm):  8  Exploration:     Imaging obtained: x-ray      Imaging outcome: foreign body noted      Wound exploration: wound explored through full range of motion      Wound extent: no areolar tissue violation noted, no fascia violation noted, no foreign bodies/material noted and no muscle damage noted    Treatment:     Area cleansed with:  Chlorhexidine    Amount of cleaning:  Standard    Irrigation solution:  Sterile saline    Irrigation method:  Pressure wash    Debridement:  Minimal    Undermining:  None    Scar revision: no    Skin repair:     Repair method:  Sutures    Suture size:  5-0    Suture material:  Prolene    Suture technique:  Simple interrupted    Number of sutures:  5  Approximation:     Approximation:  Close  Repair type:     Repair type:  Simple  Post-procedure details:     Dressing:  Non-adherent dressing    Procedure completion:  Tolerated well, no immediate complications      VS: As documented in  the triage note and EMR flowsheet from this visit were reviewed.    Medical Decision Making: This is a 53-year-old male presenting to the ED for a left hand laceration that he sustained from a fence.  Patient states that his tetanus is up-to-date however I could not find any documentation of this in the chart.  Recommended he have his tetanus booster today in which she declined.  On exam there is about a 1.5 cm rather deep laceration sustained to his left palm.  He is right-handed.  Tendons are visible however they are intact.  The wound is not draining or grossly contaminated.  I did aggressively cleanse the wound with chlorhexidine.  I do not think antibiotics are warranted at this time.  Wound was slightly debrided and repaired with 5 Prolene sutures.  Patient understands return precautions.  I did consider antibiotics however wound was thoroughly and aggressively cleansed in the ED.  Not grossly contaminated.  He is appropriate for outpatient management.  He understands return precautions and suture removal in 10 to 14 days.    Diagnoses as of 09/29/24 2016   Left hand pain   Extensor tendon laceration of left hand with open wound, initial encounter     Counseling: Spoke with the patient and discussed today´s findings, in addition to providing specific details for the plan of care and expected course.  Patient was given the opportunity to ask questions.    Discussed return precautions and importance of follow-up.  Advised to follow-up with PCP.  I specifically advised to return to the ED for changing or worsening symptoms, new symptoms, complaint specific precautions, and precautions listed on the discharge paperwork.  Educated on the common potential side effects of medications prescribed.    I advised the patient that the emergency evaluation and treatment provided today doesn't end their need for medical care. It is very important that they follow-up with their primary care provider or other specialist as  instructed.    The plan of care was mutually agreed upon with the patient. The patient and/or family were given the opportunity to ask questions. All questions asked today in the ED were answered to the best of my ability with today's information.    This patient was cared for in the setting of nationwide stress on resources and staffing.    This report was transcribed using voice recognition software.  Every effort was made to ensure accuracy, however, inadvertently computerized transcription errors may be present.       José Manuel Ngo PA-C  09/29/24 [id number]

## 2024-11-27 ENCOUNTER — APPOINTMENT (OUTPATIENT)
Dept: PRIMARY CARE | Facility: CLINIC | Age: 54
End: 2024-11-27
Payer: COMMERCIAL

## 2024-11-27 VITALS
SYSTOLIC BLOOD PRESSURE: 104 MMHG | HEART RATE: 61 BPM | BODY MASS INDEX: 27.64 KG/M2 | WEIGHT: 187.2 LBS | DIASTOLIC BLOOD PRESSURE: 60 MMHG | TEMPERATURE: 98.2 F | OXYGEN SATURATION: 94 %

## 2024-11-27 DIAGNOSIS — I10 PRIMARY HYPERTENSION: ICD-10-CM

## 2024-11-27 DIAGNOSIS — N52.9 ERECTILE DYSFUNCTION, UNSPECIFIED ERECTILE DYSFUNCTION TYPE: ICD-10-CM

## 2024-11-27 DIAGNOSIS — K21.9 GASTROESOPHAGEAL REFLUX DISEASE, UNSPECIFIED WHETHER ESOPHAGITIS PRESENT: Primary | ICD-10-CM

## 2024-11-27 DIAGNOSIS — D50.9 IRON DEFICIENCY ANEMIA, UNSPECIFIED IRON DEFICIENCY ANEMIA TYPE: ICD-10-CM

## 2024-11-27 DIAGNOSIS — H53.8 BLURRED VISION: ICD-10-CM

## 2024-11-27 DIAGNOSIS — F19.10 POLYSUBSTANCE ABUSE (MULTI): ICD-10-CM

## 2024-11-27 DIAGNOSIS — G89.29 CHRONIC MIDLINE BACK PAIN, UNSPECIFIED BACK LOCATION: ICD-10-CM

## 2024-11-27 DIAGNOSIS — N40.1 BENIGN PROSTATIC HYPERPLASIA WITH URINARY FREQUENCY: ICD-10-CM

## 2024-11-27 DIAGNOSIS — M79.89 LEG SWELLING: ICD-10-CM

## 2024-11-27 DIAGNOSIS — E78.49 OTHER HYPERLIPIDEMIA: ICD-10-CM

## 2024-11-27 DIAGNOSIS — M54.9 CHRONIC MIDLINE BACK PAIN, UNSPECIFIED BACK LOCATION: ICD-10-CM

## 2024-11-27 DIAGNOSIS — R35.0 BENIGN PROSTATIC HYPERPLASIA WITH URINARY FREQUENCY: ICD-10-CM

## 2024-11-27 PROCEDURE — 3074F SYST BP LT 130 MM HG: CPT | Performed by: INTERNAL MEDICINE

## 2024-11-27 PROCEDURE — 3078F DIAST BP <80 MM HG: CPT | Performed by: INTERNAL MEDICINE

## 2024-11-27 PROCEDURE — 99214 OFFICE O/P EST MOD 30 MIN: CPT | Performed by: INTERNAL MEDICINE

## 2024-11-27 RX ORDER — ATORVASTATIN CALCIUM 10 MG/1
10 TABLET, FILM COATED ORAL NIGHTLY
Qty: 90 TABLET | Refills: 1 | Status: SHIPPED | OUTPATIENT
Start: 2024-11-27

## 2024-11-27 RX ORDER — METHYLPREDNISOLONE 4 MG/1
TABLET ORAL
Qty: 21 TABLET | Refills: 0 | Status: SHIPPED | OUTPATIENT
Start: 2024-11-27 | End: 2024-12-03

## 2024-11-27 RX ORDER — AMLODIPINE BESYLATE 10 MG/1
10 TABLET ORAL DAILY
Qty: 90 TABLET | Refills: 1 | Status: SHIPPED | OUTPATIENT
Start: 2024-11-27

## 2024-11-27 RX ORDER — SILDENAFIL 100 MG/1
100 TABLET, FILM COATED ORAL DAILY PRN
Qty: 90 TABLET | Refills: 3 | Status: SHIPPED | OUTPATIENT
Start: 2024-11-27

## 2024-11-27 RX ORDER — TAMSULOSIN HYDROCHLORIDE 0.4 MG/1
0.4 CAPSULE ORAL DAILY
Qty: 90 CAPSULE | Refills: 1 | Status: SHIPPED | OUTPATIENT
Start: 2024-11-27

## 2024-11-27 RX ORDER — OMEPRAZOLE 40 MG/1
40 CAPSULE, DELAYED RELEASE ORAL
Qty: 90 CAPSULE | Refills: 1 | Status: SHIPPED | OUTPATIENT
Start: 2024-11-27

## 2024-11-27 NOTE — PROGRESS NOTES
Chief Complaint:   Follow-up     History Of Present Illness:    Douglas Wilson is a 53 y.o. male with active medical problems outlined below who presents for BP  check.       INITIAL VISIT 4/4/24  Douglas has hx of smoking, BPH, former polysubstance use who has been sick since  February.  2/22/24 - 3 d of abdominal pain and diagnosed with splenic artery aneurysm and underwent coiling procedure.  Had troponin leak to 1,252.  Echocardiogram normal.  CCTA in 2022 showed minimal CAD.   2/25/24 - presented with chest pain and SOB.  Diagnosed with multiple bilateral pulmonary emboli.  Started Eliquis and DC home  3/12/24 - presented with abdominal pain and diagnosed with appendicitis.  Underwent urgent appendectomy  3/14/24 - presented with vomiting and poss hematemesis.   EGD showed hiatal hernia and esophagitis.  Started BID protonix.  Significant findings from labs in hospital - anemia, hypocalcemia, elevated troponin.   Is slowly improving since his hospital discharge.  He endorses fatigue but denies chest pain or shortness of breath.  Appetite is normal.  He reports no further nausea or vomiting.  He has been constipated.  No diarrhea and no blood in his stool.  No black appearing stools.  Obstructive type lower urinary tract symptoms are managed with tamsulosin.  He has ED and would like to go back on a phosphodiesterase inhibitor.  He did a lot of lifting at work earlier today, he is now experiencing pain in his left lower quadrant.  He has some tenderness to palpation around his incision.     Echocardiogram 2/27/24  CONCLUSIONS:   1. Left ventricular systolic function is normal with a 60-65% estimated ejection fraction.   2. RVSP within normal limits.   3. The LVEF is no longer hyperdynamic. No focal wall motion abnormalities.    4/19/22 Coronary Artery CT Angio  IMPRESSION:  1. Minor coronary artery disease.  2. The proximal LAD has a small/focal region of calcified plaque with  minimal luminal stenosis (<25%).  3.  "The LM, LCx and RCA have no significant atherosclerotic change or  stenotic disease.  4. Right-dominant coronary artery system.    (8/15/22) CT abdomen pelvis   Bladder wall thickening       Admitted to ICU 5/30/24  Found unresponsive by his son in his home at 10:30 that day.   He tested positive for fentanyl at [facility] but did not respond to naloxone.  Fever, lack of response to naloxone and tongue bite suggested seizure or possible meningitis/encephalitis.  Patient was started on broad-spectrum antibiotics for possible sepsis/meningitis/encephalitis  After being stabilized he was transferred to the floor  Underwent an LP which was negative so the antibiotics were stopped. EEG on 5/31/24 normal.    MRI on 5/31/24 demonstrated:  \"There are a few small nonspecific white matter changes noted within  cerebral hemispheres bilaterally. While nonspecific, white matter  changes can be seen with small-vessel ischemic change or  demyelinating processes among others.\"    CT angiogram of abdomen on 5/30/2024 was negative for aortic aneurysm or acute process.  There is some suggestion of gallbladder wall thickening.  Right upper quadrant ultrasound was then performed on 5/30/2024 without evidence of acute cholecystitis.    Symptoms stabilized, and he was discharged home on 6/5/2024.      ER again on 6/7/24 for evaluation of neck pain.  Diagnosed with musculoskeletal pain and treated with naproxen.    ER on 7/20/24 for elevated blood pressure, abdominal pain and mental status changes.    Douglas was at work and coworkers thought he didn't look good, his managers and the nurse at work got involved, took BP and was very high and sent to the ER.  A supervisor mentioned that he fell asleep at work.   Presented to the ER appearing intoxicated.   UDS pos for fentanyl and cocaine.  ER workup included EKG and troponin - no evidence of ischemia.  CTA abdomen - thickening in esophagus, thickening in colon   New end plate changes in " "thoracic spine   Mental status cleared and discharged home.    No abdominal pain but bowels alternating between constipation and loose stool    CTA 7/20/24  IMPRESSION:  1. No aortic aneurysm or acute dissection.  2. Circumferential wall thickening at the distal esophagus, may be  seen with esophagitis. Evaluation with upper endoscopy as clinically  warranted.  3. Diffuse wall thickening at the descending colon, sigmoid colon and  rectum, may be secondary to underdistention versus colitis.  4. Mild circumferential wall thickening of the urinary bladder.  Please correlate with urinalysis to exclude superimposed cystitis.  5. Mild bilateral inguinal adenopathy.  6. Linear sclerotic areas at the superior endplates of T5, T6, T7 and  T8, suggestive of mild compression fractures, of indeterminate age,  new since prior CT 03/14/2024. Please correlate with clinical  history/point tenderness to evaluate for acute/subacute fracture.    ER on 8/1/24 with elevated blood pressure.  Unremarkable workup and discharged home.     Met with pulmonary on 7/23/24 and dc'd eliquis   No substances in one month  Mood and energy are better, ups and downs with mood  Douglas needs a letter for work stating that he has high blood pressure and is on treatment.       INTERVAL HISTORY 11/27/24  Sustained laceration on left hand - cut while climbing a fence - 5 sutures on 9/29/24  No substances since last visit   Going to work and that has been going well  Now has custody of son and he is at the house, that has been an improvement as well (11yo)  Energy level \"not bad\"  Appetite is good, weight is up, less constipated.  Now having bm daily or every other day   Tamsulosin helping with urine flow.   Having pain in back - points to lower thoracic spine and upper lumbar spine.  Naprosyn not helpful.  Does not radiate to legs.  Legs are not weak, no numbness of tingling.    Still having swelling in legs 2/27/24, 5/31/24 echo with normal EF , 5/15/24 " venous doppler normal       Patient Active Problem List   Diagnosis    Abdominal pain    Acute pulmonary embolism without acute cor pulmonale (Multi)    Acute blood loss anemia    Nicotine use disorder    BPH (benign prostatic hyperplasia)    Gastro-esophageal reflux disease without esophagitis    Hiatal hernia    Hyperlipidemia    Obstructive sleep apnea syndrome    Polysubstance abuse (Multi)    Hypocalcemia    Erectile dysfunction    Aneurysm of splenic artery (CMS-HCC)    Peripheral edema    Iron deficiency anemia    Vitamin D deficiency    Constipation    Encephalopathy    Other fatigue    Diarrhea    Arthralgia    Anemia    Seizure (Multi)    Hypertension    Microscopic hematuria    Acute midline thoracic back pain    Chronic midline back pain       Current Outpatient Medications:     cholecalciferol (Vitamin D-3) 50,000 unit capsule, Take 1 capsule (50,000 Units) by mouth every 7 days., Disp: , Rfl:     ferrous sulfate, 325 mg ferrous sulfate, (Iron, ferrous sulfate,) tablet, Take 1 tablet by mouth once daily with breakfast., Disp: 30 tablet, Rfl: 3    folic acid (Folvite) 1 mg tablet, Take 1 tablet (1 mg) by mouth once daily., Disp: , Rfl:     tadalafil (Cialis) 5 mg tablet, Take 1 tablet (5 mg) by mouth early in the morning.., Disp: , Rfl:     amLODIPine (Norvasc) 10 mg tablet, Take 1 tablet (10 mg) by mouth once daily., Disp: 90 tablet, Rfl: 1    atorvastatin (Lipitor) 10 mg tablet, Take 1 tablet (10 mg) by mouth once daily at bedtime., Disp: 90 tablet, Rfl: 1    methylPREDNISolone (Medrol Dospak) 4 mg tablets, Take as directed on package., Disp: 21 tablet, Rfl: 0    omeprazole (PriLOSEC) 40 mg DR capsule, Take 1 capsule (40 mg) by mouth once daily in the morning. Take before meals. Do not crush or chew., Disp: 90 capsule, Rfl: 1    sildenafil (Viagra) 100 mg tablet, Take 1 tablet (100 mg) by mouth once daily as needed for erectile dysfunction., Disp: 90 tablet, Rfl: 3    tamsulosin (Flomax) 0.4 mg 24 hr  capsule, Take 1 capsule (0.4 mg) by mouth once daily., Disp: 90 capsule, Rfl: 1  Patient has no known allergies.  Social History     Tobacco Use    Smoking status: Every Day     Current packs/day: 1.00     Types: Cigarettes    Smokeless tobacco: Never   Substance Use Topics    Alcohol use: Not Currently    Drug use: Not Currently     Types: Heroin     Comment: in past         All pertinent aspects of medical and surgical history were reviewed and updated in chart    Review of Systems   Pertinent positives in review of systems outlined above.  Complete ROS otherwise negative.        Last Recorded Vitals:  No data found.       Physical Exam  HENT:      Mouth/Throat:      Pharynx: Oropharynx is clear.   Eyes:      Extraocular Movements: Extraocular movements intact.      Conjunctiva/sclera: Conjunctivae normal.      Pupils: Pupils are equal, round, and reactive to light.   Cardiovascular:      Rate and Rhythm: Normal rate and regular rhythm.      Pulses: Normal pulses.      Heart sounds: Normal heart sounds.   Pulmonary:      Effort: Pulmonary effort is normal.      Breath sounds: Normal breath sounds.   Musculoskeletal:      Comments: Bilateral varicosities, 1+ bilateral edema.  No cord or redness         30      Assessment/Plan   Problem List Items Addressed This Visit       BPH (benign prostatic hyperplasia)     PSA due now.  Continue tadalafil and tamsulosin          Relevant Medications    tamsulosin (Flomax) 0.4 mg 24 hr capsule    Hyperlipidemia     Lipids at target.  Continue current medications.          Relevant Medications    atorvastatin (Lipitor) 10 mg tablet    Other Relevant Orders    Comprehensive Metabolic Panel    Polysubstance abuse (Multi)     Now sober for 3mo         Erectile dysfunction     Continue tadalafil          Relevant Medications    sildenafil (Viagra) 100 mg tablet    Iron deficiency anemia     Will check iron levels now.          Relevant Orders    CBC and Auto Differential    Ferritin     Iron and TIBC    Transferrin    Hypertension     BP now in low end of normal range.  Will repeat in 3 mo and reduce dose of amlodipine if bp is still low.          Relevant Medications    amLODIPine (Norvasc) 10 mg tablet    Other Relevant Orders    Comprehensive Metabolic Panel    Chronic midline back pain     To begin medrol dosepak.  Will refer to PT if back pain continues.          Relevant Medications    methylPREDNISolone (Medrol Dospak) 4 mg tablets     Other Visit Diagnoses       Gastroesophageal reflux disease, unspecified whether esophagitis present    -  Primary    Relevant Medications    omeprazole (PriLOSEC) 40 mg DR capsule    Blurred vision        Relevant Orders    Referral to Ophthalmology    Leg swelling        Relevant Orders    Referral to Vascular Medicine            A total of 30 minutes was spent reviewing the chart and recent testing and discussing plan of care.         Chucho De Leon MD

## 2024-11-27 NOTE — LETTER
November 27, 2024     Patient: Douglas Wilson   YOB: 1970   Date of Visit: 11/27/2024       To Whom It May Concern:    Douglas Wilson was seen in my clinic on 11/27/2024 at 2:20 pm. Please excuse Douglas for his absence from work on this day to make the appointment.    If you have any questions or concerns, please don't hesitate to call.         Sincerely,         Chucho De Leon MD        CC: No Recipients

## 2024-11-29 PROBLEM — G89.29 CHRONIC MIDLINE BACK PAIN: Status: ACTIVE | Noted: 2024-11-29

## 2024-11-29 PROBLEM — M54.9 CHRONIC MIDLINE BACK PAIN: Status: ACTIVE | Noted: 2024-11-29

## 2024-11-29 NOTE — ASSESSMENT & PLAN NOTE
BP now in low end of normal range.  Will repeat in 3 mo and reduce dose of amlodipine if bp is still low.

## 2025-01-08 ENCOUNTER — APPOINTMENT (OUTPATIENT)
Dept: RADIOLOGY | Facility: HOSPITAL | Age: 55
End: 2025-01-08
Payer: COMMERCIAL

## 2025-01-08 ENCOUNTER — HOSPITAL ENCOUNTER (INPATIENT)
Facility: HOSPITAL | Age: 55
LOS: 2 days | Discharge: HOME | End: 2025-01-10
Attending: INTERNAL MEDICINE | Admitting: INTERNAL MEDICINE
Payer: COMMERCIAL

## 2025-01-08 ENCOUNTER — APPOINTMENT (OUTPATIENT)
Dept: CARDIOLOGY | Facility: HOSPITAL | Age: 55
End: 2025-01-08
Payer: COMMERCIAL

## 2025-01-08 DIAGNOSIS — M79.604 PAIN IN RIGHT LEG: ICD-10-CM

## 2025-01-08 DIAGNOSIS — T81.43XA INFECTION OF ORGAN OR ORGAN SPACE AFTER SURGERY, INITIAL ENCOUNTER: ICD-10-CM

## 2025-01-08 DIAGNOSIS — M79.605 PAIN IN LEFT LEG: ICD-10-CM

## 2025-01-08 DIAGNOSIS — I26.99 ACUTE PULMONARY EMBOLISM, UNSPECIFIED PULMONARY EMBOLISM TYPE, UNSPECIFIED WHETHER ACUTE COR PULMONALE PRESENT (MULTI): Primary | ICD-10-CM

## 2025-01-08 DIAGNOSIS — K81.9 CHOLECYSTITIS: ICD-10-CM

## 2025-01-08 LAB
ALBUMIN SERPL BCP-MCNC: 4.1 G/DL (ref 3.4–5)
ALP SERPL-CCNC: 54 U/L (ref 33–120)
ALT SERPL W P-5'-P-CCNC: 23 U/L (ref 10–52)
ANION GAP SERPL CALC-SCNC: 11 MMOL/L (ref 10–20)
APTT PPP: 26 SECONDS (ref 27–38)
AST SERPL W P-5'-P-CCNC: 22 U/L (ref 9–39)
BASOPHILS # BLD AUTO: 0.06 X10*3/UL (ref 0–0.1)
BASOPHILS NFR BLD AUTO: 0.4 %
BILIRUB SERPL-MCNC: 0.6 MG/DL (ref 0–1.2)
BNP SERPL-MCNC: 25 PG/ML (ref 0–99)
BUN SERPL-MCNC: 6 MG/DL (ref 6–23)
CALCIUM SERPL-MCNC: 9.2 MG/DL (ref 8.6–10.3)
CARDIAC TROPONIN I PNL SERPL HS: 7 NG/L (ref 0–20)
CARDIAC TROPONIN I PNL SERPL HS: 7 NG/L (ref 0–20)
CHLORIDE SERPL-SCNC: 106 MMOL/L (ref 98–107)
CO2 SERPL-SCNC: 26 MMOL/L (ref 21–32)
CREAT SERPL-MCNC: 0.94 MG/DL (ref 0.5–1.3)
EGFRCR SERPLBLD CKD-EPI 2021: >90 ML/MIN/1.73M*2
EOSINOPHIL # BLD AUTO: 0.13 X10*3/UL (ref 0–0.7)
EOSINOPHIL NFR BLD AUTO: 0.8 %
ERYTHROCYTE [DISTWIDTH] IN BLOOD BY AUTOMATED COUNT: 16.1 % (ref 11.5–14.5)
GLUCOSE SERPL-MCNC: 101 MG/DL (ref 74–99)
HCT VFR BLD AUTO: 39.5 % (ref 41–52)
HGB BLD-MCNC: 12.8 G/DL (ref 13.5–17.5)
IMM GRANULOCYTES # BLD AUTO: 0.09 X10*3/UL (ref 0–0.7)
IMM GRANULOCYTES NFR BLD AUTO: 0.6 % (ref 0–0.9)
INR PPP: 1 (ref 0.9–1.1)
LACTATE SERPL-SCNC: 1.3 MMOL/L (ref 0.4–2)
LIPASE SERPL-CCNC: 37 U/L (ref 9–82)
LYMPHOCYTES # BLD AUTO: 2.07 X10*3/UL (ref 1.2–4.8)
LYMPHOCYTES NFR BLD AUTO: 13.3 %
MCH RBC QN AUTO: 23.2 PG (ref 26–34)
MCHC RBC AUTO-ENTMCNC: 32.4 G/DL (ref 32–36)
MCV RBC AUTO: 72 FL (ref 80–100)
MONOCYTES # BLD AUTO: 0.88 X10*3/UL (ref 0.1–1)
MONOCYTES NFR BLD AUTO: 5.7 %
NEUTROPHILS # BLD AUTO: 12.3 X10*3/UL (ref 1.2–7.7)
NEUTROPHILS NFR BLD AUTO: 79.2 %
NRBC BLD-RTO: 0 /100 WBCS (ref 0–0)
PLATELET # BLD AUTO: 447 X10*3/UL (ref 150–450)
POTASSIUM SERPL-SCNC: 4.9 MMOL/L (ref 3.5–5.3)
PROT SERPL-MCNC: 7.4 G/DL (ref 6.4–8.2)
PROTHROMBIN TIME: 11.8 SECONDS (ref 9.8–12.8)
RBC # BLD AUTO: 5.52 X10*6/UL (ref 4.5–5.9)
SODIUM SERPL-SCNC: 138 MMOL/L (ref 136–145)
WBC # BLD AUTO: 15.5 X10*3/UL (ref 4.4–11.3)

## 2025-01-08 PROCEDURE — 93005 ELECTROCARDIOGRAM TRACING: CPT

## 2025-01-08 PROCEDURE — 99222 1ST HOSP IP/OBS MODERATE 55: CPT

## 2025-01-08 PROCEDURE — 83690 ASSAY OF LIPASE: CPT | Performed by: INTERNAL MEDICINE

## 2025-01-08 PROCEDURE — 80053 COMPREHEN METABOLIC PANEL: CPT | Performed by: INTERNAL MEDICINE

## 2025-01-08 PROCEDURE — 71275 CT ANGIOGRAPHY CHEST: CPT

## 2025-01-08 PROCEDURE — 85610 PROTHROMBIN TIME: CPT | Performed by: INTERNAL MEDICINE

## 2025-01-08 PROCEDURE — 74177 CT ABD & PELVIS W/CONTRAST: CPT

## 2025-01-08 PROCEDURE — 84484 ASSAY OF TROPONIN QUANT: CPT | Performed by: INTERNAL MEDICINE

## 2025-01-08 PROCEDURE — 99285 EMERGENCY DEPT VISIT HI MDM: CPT | Mod: 25 | Performed by: INTERNAL MEDICINE

## 2025-01-08 PROCEDURE — 83605 ASSAY OF LACTIC ACID: CPT | Performed by: INTERNAL MEDICINE

## 2025-01-08 PROCEDURE — 96361 HYDRATE IV INFUSION ADD-ON: CPT

## 2025-01-08 PROCEDURE — 96365 THER/PROPH/DIAG IV INF INIT: CPT

## 2025-01-08 PROCEDURE — 96366 THER/PROPH/DIAG IV INF ADDON: CPT

## 2025-01-08 PROCEDURE — 1200000002 HC GENERAL ROOM WITH TELEMETRY DAILY

## 2025-01-08 PROCEDURE — 2550000001 HC RX 255 CONTRASTS: Performed by: INTERNAL MEDICINE

## 2025-01-08 PROCEDURE — 71275 CT ANGIOGRAPHY CHEST: CPT | Performed by: STUDENT IN AN ORGANIZED HEALTH CARE EDUCATION/TRAINING PROGRAM

## 2025-01-08 PROCEDURE — 96375 TX/PRO/DX INJ NEW DRUG ADDON: CPT

## 2025-01-08 PROCEDURE — 85025 COMPLETE CBC W/AUTO DIFF WBC: CPT | Performed by: INTERNAL MEDICINE

## 2025-01-08 PROCEDURE — 36415 COLL VENOUS BLD VENIPUNCTURE: CPT | Performed by: INTERNAL MEDICINE

## 2025-01-08 PROCEDURE — 2500000004 HC RX 250 GENERAL PHARMACY W/ HCPCS (ALT 636 FOR OP/ED): Performed by: INTERNAL MEDICINE

## 2025-01-08 PROCEDURE — 87040 BLOOD CULTURE FOR BACTERIA: CPT | Mod: AHULAB | Performed by: INTERNAL MEDICINE

## 2025-01-08 PROCEDURE — 83880 ASSAY OF NATRIURETIC PEPTIDE: CPT | Performed by: INTERNAL MEDICINE

## 2025-01-08 PROCEDURE — 74177 CT ABD & PELVIS W/CONTRAST: CPT | Performed by: STUDENT IN AN ORGANIZED HEALTH CARE EDUCATION/TRAINING PROGRAM

## 2025-01-08 RX ORDER — ACETAMINOPHEN 325 MG/1
650 TABLET ORAL EVERY 6 HOURS PRN
Status: DISCONTINUED | OUTPATIENT
Start: 2025-01-08 | End: 2025-01-10 | Stop reason: HOSPADM

## 2025-01-08 RX ORDER — ONDANSETRON HYDROCHLORIDE 2 MG/ML
4 INJECTION, SOLUTION INTRAVENOUS EVERY 6 HOURS PRN
Status: DISCONTINUED | OUTPATIENT
Start: 2025-01-08 | End: 2025-01-10 | Stop reason: HOSPADM

## 2025-01-08 RX ORDER — HEPARIN SODIUM 10000 [USP'U]/100ML
0-4500 INJECTION, SOLUTION INTRAVENOUS CONTINUOUS
Status: DISCONTINUED | OUTPATIENT
Start: 2025-01-08 | End: 2025-01-09 | Stop reason: DRUGHIGH

## 2025-01-08 RX ORDER — ONDANSETRON HYDROCHLORIDE 2 MG/ML
4 INJECTION, SOLUTION INTRAVENOUS ONCE
Status: COMPLETED | OUTPATIENT
Start: 2025-01-08 | End: 2025-01-08

## 2025-01-08 RX ORDER — PANTOPRAZOLE SODIUM 40 MG/1
40 TABLET, DELAYED RELEASE ORAL
Status: DISCONTINUED | OUTPATIENT
Start: 2025-01-09 | End: 2025-01-09

## 2025-01-08 RX ORDER — TALC
3 POWDER (GRAM) TOPICAL NIGHTLY PRN
Status: DISCONTINUED | OUTPATIENT
Start: 2025-01-08 | End: 2025-01-10 | Stop reason: HOSPADM

## 2025-01-08 RX ORDER — HEPARIN SODIUM 10000 [USP'U]/100ML
0-4500 INJECTION, SOLUTION INTRAVENOUS CONTINUOUS
Status: DISCONTINUED | OUTPATIENT
Start: 2025-01-08 | End: 2025-01-08

## 2025-01-08 RX ORDER — HYDROMORPHONE HYDROCHLORIDE 0.2 MG/ML
0.2 INJECTION INTRAMUSCULAR; INTRAVENOUS; SUBCUTANEOUS
Status: DISCONTINUED | OUTPATIENT
Start: 2025-01-08 | End: 2025-01-10 | Stop reason: HOSPADM

## 2025-01-08 RX ORDER — TRAMADOL HYDROCHLORIDE 50 MG/1
50 TABLET ORAL EVERY 6 HOURS PRN
Status: DISCONTINUED | OUTPATIENT
Start: 2025-01-08 | End: 2025-01-10 | Stop reason: HOSPADM

## 2025-01-08 RX ORDER — ALUMINUM HYDROXIDE, MAGNESIUM HYDROXIDE, AND SIMETHICONE 1200; 120; 1200 MG/30ML; MG/30ML; MG/30ML
20 SUSPENSION ORAL 4 TIMES DAILY PRN
Status: DISCONTINUED | OUTPATIENT
Start: 2025-01-08 | End: 2025-01-10 | Stop reason: HOSPADM

## 2025-01-08 RX ADMIN — IOHEXOL 75 ML: 350 INJECTION, SOLUTION INTRAVENOUS at 19:42

## 2025-01-08 RX ADMIN — IOHEXOL 75 ML: 350 INJECTION, SOLUTION INTRAVENOUS at 18:13

## 2025-01-08 RX ADMIN — SODIUM CHLORIDE 1000 ML: 9 INJECTION, SOLUTION INTRAVENOUS at 18:26

## 2025-01-08 RX ADMIN — ONDANSETRON 4 MG: 2 INJECTION, SOLUTION INTRAMUSCULAR; INTRAVENOUS at 18:25

## 2025-01-08 RX ADMIN — HYDROMORPHONE HYDROCHLORIDE 0.5 MG: 1 INJECTION, SOLUTION INTRAMUSCULAR; INTRAVENOUS; SUBCUTANEOUS at 18:25

## 2025-01-08 RX ADMIN — PIPERACILLIN SODIUM AND TAZOBACTAM SODIUM 3.38 G: 3; .375 INJECTION, SOLUTION INTRAVENOUS at 18:32

## 2025-01-08 SDOH — ECONOMIC STABILITY: FOOD INSECURITY: HOW HARD IS IT FOR YOU TO PAY FOR THE VERY BASICS LIKE FOOD, HOUSING, MEDICAL CARE, AND HEATING?: NOT HARD AT ALL

## 2025-01-08 SDOH — ECONOMIC STABILITY: FOOD INSECURITY: WITHIN THE PAST 12 MONTHS, THE FOOD YOU BOUGHT JUST DIDN'T LAST AND YOU DIDN'T HAVE MONEY TO GET MORE.: NEVER TRUE

## 2025-01-08 SDOH — SOCIAL STABILITY: SOCIAL INSECURITY: ARE YOU MARRIED, WIDOWED, DIVORCED, SEPARATED, NEVER MARRIED, OR LIVING WITH A PARTNER?: LIVING WITH PARTNER

## 2025-01-08 SDOH — SOCIAL STABILITY: SOCIAL INSECURITY
WITHIN THE LAST YEAR, HAVE YOU BEEN RAPED OR FORCED TO HAVE ANY KIND OF SEXUAL ACTIVITY BY YOUR PARTNER OR EX-PARTNER?: NO

## 2025-01-08 SDOH — SOCIAL STABILITY: SOCIAL INSECURITY: HAVE YOU HAD ANY THOUGHTS OF HARMING ANYONE ELSE?: NO

## 2025-01-08 SDOH — SOCIAL STABILITY: SOCIAL INSECURITY: ARE THERE ANY APPARENT SIGNS OF INJURIES/BEHAVIORS THAT COULD BE RELATED TO ABUSE/NEGLECT?: NO

## 2025-01-08 SDOH — HEALTH STABILITY: PHYSICAL HEALTH: ON AVERAGE, HOW MANY DAYS PER WEEK DO YOU ENGAGE IN MODERATE TO STRENUOUS EXERCISE (LIKE A BRISK WALK)?: 2 DAYS

## 2025-01-08 SDOH — SOCIAL STABILITY: SOCIAL NETWORK: HOW OFTEN DO YOU ATTEND MEETINGS OF THE CLUBS OR ORGANIZATIONS YOU BELONG TO?: NEVER

## 2025-01-08 SDOH — ECONOMIC STABILITY: FOOD INSECURITY: WITHIN THE PAST 12 MONTHS, YOU WORRIED THAT YOUR FOOD WOULD RUN OUT BEFORE YOU GOT THE MONEY TO BUY MORE.: NEVER TRUE

## 2025-01-08 SDOH — SOCIAL STABILITY: SOCIAL INSECURITY: DO YOU FEEL UNSAFE GOING BACK TO THE PLACE WHERE YOU ARE LIVING?: NO

## 2025-01-08 SDOH — SOCIAL STABILITY: SOCIAL NETWORK
DO YOU BELONG TO ANY CLUBS OR ORGANIZATIONS SUCH AS CHURCH GROUPS, UNIONS, FRATERNAL OR ATHLETIC GROUPS, OR SCHOOL GROUPS?: NO

## 2025-01-08 SDOH — SOCIAL STABILITY: SOCIAL INSECURITY: WITHIN THE LAST YEAR, HAVE YOU BEEN HUMILIATED OR EMOTIONALLY ABUSED IN OTHER WAYS BY YOUR PARTNER OR EX-PARTNER?: NO

## 2025-01-08 SDOH — HEALTH STABILITY: MENTAL HEALTH
DO YOU FEEL STRESS - TENSE, RESTLESS, NERVOUS, OR ANXIOUS, OR UNABLE TO SLEEP AT NIGHT BECAUSE YOUR MIND IS TROUBLED ALL THE TIME - THESE DAYS?: NOT AT ALL

## 2025-01-08 SDOH — HEALTH STABILITY: PHYSICAL HEALTH: ON AVERAGE, HOW MANY MINUTES DO YOU ENGAGE IN EXERCISE AT THIS LEVEL?: 30 MIN

## 2025-01-08 SDOH — ECONOMIC STABILITY: INCOME INSECURITY: IN THE PAST 12 MONTHS HAS THE ELECTRIC, GAS, OIL, OR WATER COMPANY THREATENED TO SHUT OFF SERVICES IN YOUR HOME?: NO

## 2025-01-08 SDOH — SOCIAL STABILITY: SOCIAL INSECURITY: DO YOU FEEL ANYONE HAS EXPLOITED OR TAKEN ADVANTAGE OF YOU FINANCIALLY OR OF YOUR PERSONAL PROPERTY?: NO

## 2025-01-08 SDOH — SOCIAL STABILITY: SOCIAL NETWORK: IN A TYPICAL WEEK, HOW MANY TIMES DO YOU TALK ON THE PHONE WITH FAMILY, FRIENDS, OR NEIGHBORS?: THREE TIMES A WEEK

## 2025-01-08 SDOH — SOCIAL STABILITY: SOCIAL INSECURITY: DOES ANYONE TRY TO KEEP YOU FROM HAVING/CONTACTING OTHER FRIENDS OR DOING THINGS OUTSIDE YOUR HOME?: NO

## 2025-01-08 SDOH — SOCIAL STABILITY: SOCIAL INSECURITY: WERE YOU ABLE TO COMPLETE ALL THE BEHAVIORAL HEALTH SCREENINGS?: YES

## 2025-01-08 SDOH — SOCIAL STABILITY: SOCIAL INSECURITY: WITHIN THE LAST YEAR, HAVE YOU BEEN AFRAID OF YOUR PARTNER OR EX-PARTNER?: NO

## 2025-01-08 SDOH — SOCIAL STABILITY: SOCIAL NETWORK: HOW OFTEN DO YOU GET TOGETHER WITH FRIENDS OR RELATIVES?: THREE TIMES A WEEK

## 2025-01-08 SDOH — SOCIAL STABILITY: SOCIAL INSECURITY: HAVE YOU HAD THOUGHTS OF HARMING ANYONE ELSE?: NO

## 2025-01-08 SDOH — SOCIAL STABILITY: SOCIAL INSECURITY: ABUSE: ADULT

## 2025-01-08 SDOH — SOCIAL STABILITY: SOCIAL INSECURITY: ARE YOU OR HAVE YOU BEEN THREATENED OR ABUSED PHYSICALLY, EMOTIONALLY, OR SEXUALLY BY ANYONE?: NO

## 2025-01-08 SDOH — SOCIAL STABILITY: SOCIAL NETWORK: HOW OFTEN DO YOU ATTEND CHURCH OR RELIGIOUS SERVICES?: NEVER

## 2025-01-08 SDOH — SOCIAL STABILITY: SOCIAL INSECURITY: HAS ANYONE EVER THREATENED TO HURT YOUR FAMILY OR YOUR PETS?: NO

## 2025-01-08 ASSESSMENT — ACTIVITIES OF DAILY LIVING (ADL)
BATHING: INDEPENDENT
GROOMING: INDEPENDENT
JUDGMENT_ADEQUATE_SAFELY_COMPLETE_DAILY_ACTIVITIES: YES
ADEQUATE_TO_COMPLETE_ADL: YES
LACK_OF_TRANSPORTATION: NO
HEARING - RIGHT EAR: FUNCTIONAL
WALKS IN HOME: INDEPENDENT
TOILETING: INDEPENDENT
DRESSING YOURSELF: INDEPENDENT
FEEDING YOURSELF: INDEPENDENT
HEARING - LEFT EAR: FUNCTIONAL
PATIENT'S MEMORY ADEQUATE TO SAFELY COMPLETE DAILY ACTIVITIES?: YES

## 2025-01-08 ASSESSMENT — COGNITIVE AND FUNCTIONAL STATUS - GENERAL
PATIENT BASELINE BEDBOUND: NO
DAILY ACTIVITIY SCORE: 24
MOBILITY SCORE: 24

## 2025-01-08 ASSESSMENT — LIFESTYLE VARIABLES
HOW MANY STANDARD DRINKS CONTAINING ALCOHOL DO YOU HAVE ON A TYPICAL DAY: PATIENT DOES NOT DRINK
AUDIT-C TOTAL SCORE: 0
SKIP TO QUESTIONS 9-10: 1
HOW OFTEN DO YOU HAVE 6 OR MORE DRINKS ON ONE OCCASION: NEVER
SUBSTANCE_ABUSE_PAST_12_MONTHS: NO
PRESCIPTION_ABUSE_PAST_12_MONTHS: NO
HOW OFTEN DO YOU HAVE A DRINK CONTAINING ALCOHOL: NEVER
AUDIT-C TOTAL SCORE: 0

## 2025-01-08 ASSESSMENT — PAIN SCALES - GENERAL
PAINLEVEL_OUTOF10: 2
PAINLEVEL_OUTOF10: 10 - WORST POSSIBLE PAIN

## 2025-01-08 ASSESSMENT — PAIN - FUNCTIONAL ASSESSMENT
PAIN_FUNCTIONAL_ASSESSMENT: 0-10
PAIN_FUNCTIONAL_ASSESSMENT: 0-10

## 2025-01-08 ASSESSMENT — PAIN DESCRIPTION - LOCATION: LOCATION: ABDOMEN

## 2025-01-08 NOTE — Clinical Note
"Diagnostic wire exchanged with GUIDEWIRE, STIFF SHAFT, ANGLE TIP, .035\" IVELISSE, 80 CM,  3 CM TIP. Old biliary tube removed."

## 2025-01-08 NOTE — ED TRIAGE NOTES
Pt presents to ED from home for abdominal pain. Pain is incisional. He recently had gallbladder surgery. Rates pain as a 10/10. Pt is hypertensive at 199/114. Dx with acute cholecystitis.

## 2025-01-08 NOTE — Clinical Note
Catheter exchanged with CATHETER, M-DRAIN BILIARY, 12F X 40CM, LOCKING PIGTAIL. Accordian drain canister attached to drain and catheter sutured in place.

## 2025-01-08 NOTE — Clinical Note
Diagnostic wire exchanged with CATHETER, ANGIO, [facility], 4 FR X 40 CM, 0.035 IN, BRAIDED.

## 2025-01-09 ENCOUNTER — APPOINTMENT (OUTPATIENT)
Dept: CARDIOLOGY | Facility: HOSPITAL | Age: 55
End: 2025-01-09
Payer: COMMERCIAL

## 2025-01-09 ENCOUNTER — APPOINTMENT (OUTPATIENT)
Dept: RADIOLOGY | Facility: HOSPITAL | Age: 55
End: 2025-01-09
Payer: COMMERCIAL

## 2025-01-09 LAB
ATRIAL RATE: 87 BPM
HOLD SPECIMEN: NORMAL
P AXIS: 69 DEGREES
P OFFSET: 205 MS
P ONSET: 142 MS
PR INTERVAL: 164 MS
Q ONSET: 224 MS
QRS COUNT: 15 BEATS
QRS DURATION: 84 MS
QT INTERVAL: 352 MS
QTC CALCULATION(BAZETT): 423 MS
QTC FREDERICIA: 398 MS
R AXIS: 0 DEGREES
T AXIS: 69 DEGREES
T OFFSET: 400 MS
UFH PPP CHRO-ACNC: 0.4 IU/ML
UFH PPP CHRO-ACNC: 0.5 IU/ML
UFH PPP CHRO-ACNC: 1 IU/ML
VENTRICULAR RATE: 87 BPM

## 2025-01-09 PROCEDURE — 99153 MOD SED SAME PHYS/QHP EA: CPT

## 2025-01-09 PROCEDURE — 99153 MOD SED SAME PHYS/QHP EA: CPT | Performed by: RADIOLOGY

## 2025-01-09 PROCEDURE — 85520 HEPARIN ASSAY: CPT | Performed by: PHARMACIST

## 2025-01-09 PROCEDURE — 99232 SBSQ HOSP IP/OBS MODERATE 35: CPT

## 2025-01-09 PROCEDURE — 47490 INCISION OF GALLBLADDER: CPT | Performed by: RADIOLOGY

## 2025-01-09 PROCEDURE — 36415 COLL VENOUS BLD VENIPUNCTURE: CPT | Performed by: PHARMACIST

## 2025-01-09 PROCEDURE — 99222 1ST HOSP IP/OBS MODERATE 55: CPT | Performed by: INTERNAL MEDICINE

## 2025-01-09 PROCEDURE — 99253 IP/OBS CNSLTJ NEW/EST LOW 45: CPT

## 2025-01-09 PROCEDURE — 99152 MOD SED SAME PHYS/QHP 5/>YRS: CPT

## 2025-01-09 PROCEDURE — 2500000004 HC RX 250 GENERAL PHARMACY W/ HCPCS (ALT 636 FOR OP/ED): Performed by: INTERNAL MEDICINE

## 2025-01-09 PROCEDURE — C1887 CATHETER, GUIDING: HCPCS

## 2025-01-09 PROCEDURE — C1729 CATH, DRAINAGE: HCPCS

## 2025-01-09 PROCEDURE — BF131ZZ FLUOROSCOPY OF GALLBLADDER AND BILE DUCTS USING LOW OSMOLAR CONTRAST: ICD-10-PCS | Performed by: RADIOLOGY

## 2025-01-09 PROCEDURE — 2550000001 HC RX 255 CONTRASTS: Performed by: RADIOLOGY

## 2025-01-09 PROCEDURE — 2500000004 HC RX 250 GENERAL PHARMACY W/ HCPCS (ALT 636 FOR OP/ED): Performed by: PHARMACIST

## 2025-01-09 PROCEDURE — 2720000007 HC OR 272 NO HCPCS

## 2025-01-09 PROCEDURE — 2500000004 HC RX 250 GENERAL PHARMACY W/ HCPCS (ALT 636 FOR OP/ED): Performed by: RADIOLOGY

## 2025-01-09 PROCEDURE — 76942 ECHO GUIDE FOR BIOPSY: CPT

## 2025-01-09 PROCEDURE — 2500000001 HC RX 250 WO HCPCS SELF ADMINISTERED DRUGS (ALT 637 FOR MEDICARE OP): Performed by: INTERNAL MEDICINE

## 2025-01-09 PROCEDURE — C1769 GUIDE WIRE: HCPCS

## 2025-01-09 PROCEDURE — 2500000005 HC RX 250 GENERAL PHARMACY W/O HCPCS: Performed by: RADIOLOGY

## 2025-01-09 PROCEDURE — 0F9430Z DRAINAGE OF GALLBLADDER WITH DRAINAGE DEVICE, PERCUTANEOUS APPROACH: ICD-10-PCS | Performed by: RADIOLOGY

## 2025-01-09 PROCEDURE — 99152 MOD SED SAME PHYS/QHP 5/>YRS: CPT | Performed by: RADIOLOGY

## 2025-01-09 PROCEDURE — 36415 COLL VENOUS BLD VENIPUNCTURE: CPT | Performed by: INTERNAL MEDICINE

## 2025-01-09 PROCEDURE — 2500000002 HC RX 250 W HCPCS SELF ADMINISTERED DRUGS (ALT 637 FOR MEDICARE OP, ALT 636 FOR OP/ED): Performed by: NURSE PRACTITIONER

## 2025-01-09 PROCEDURE — 85520 HEPARIN ASSAY: CPT | Performed by: INTERNAL MEDICINE

## 2025-01-09 PROCEDURE — 1200000002 HC GENERAL ROOM WITH TELEMETRY DAILY

## 2025-01-09 PROCEDURE — 99231 SBSQ HOSP IP/OBS SF/LOW 25: CPT | Performed by: NURSE PRACTITIONER

## 2025-01-09 PROCEDURE — 2500000004 HC RX 250 GENERAL PHARMACY W/ HCPCS (ALT 636 FOR OP/ED): Performed by: NURSE PRACTITIONER

## 2025-01-09 PROCEDURE — 2500000001 HC RX 250 WO HCPCS SELF ADMINISTERED DRUGS (ALT 637 FOR MEDICARE OP): Performed by: NURSE PRACTITIONER

## 2025-01-09 PROCEDURE — 0FP430Z REMOVAL OF DRAINAGE DEVICE FROM GALLBLADDER, PERCUTANEOUS APPROACH: ICD-10-PCS | Performed by: RADIOLOGY

## 2025-01-09 RX ORDER — PANTOPRAZOLE SODIUM 40 MG/1
1 TABLET, DELAYED RELEASE ORAL DAILY
COMMUNITY
Start: 2025-01-01 | End: 2025-01-31

## 2025-01-09 RX ORDER — TAMSULOSIN HYDROCHLORIDE 0.4 MG/1
0.4 CAPSULE ORAL DAILY
Status: DISCONTINUED | OUTPATIENT
Start: 2025-01-09 | End: 2025-01-10 | Stop reason: HOSPADM

## 2025-01-09 RX ORDER — LEVOFLOXACIN 500 MG/1
500 TABLET, FILM COATED ORAL DAILY
COMMUNITY
Start: 2025-01-06 | End: 2025-01-10 | Stop reason: HOSPADM

## 2025-01-09 RX ORDER — FENTANYL CITRATE 50 UG/ML
INJECTION, SOLUTION INTRAMUSCULAR; INTRAVENOUS AS NEEDED
Status: DISCONTINUED | OUTPATIENT
Start: 2025-01-09 | End: 2025-01-09 | Stop reason: HOSPADM

## 2025-01-09 RX ORDER — AMLODIPINE BESYLATE 10 MG/1
10 TABLET ORAL DAILY
Status: DISCONTINUED | OUTPATIENT
Start: 2025-01-09 | End: 2025-01-10 | Stop reason: HOSPADM

## 2025-01-09 RX ORDER — LIDOCAINE HYDROCHLORIDE 20 MG/ML
INJECTION, SOLUTION INFILTRATION; PERINEURAL AS NEEDED
Status: DISCONTINUED | OUTPATIENT
Start: 2025-01-09 | End: 2025-01-09 | Stop reason: HOSPADM

## 2025-01-09 RX ORDER — POLYETHYLENE GLYCOL 3350 17 G/17G
17 POWDER, FOR SOLUTION ORAL DAILY
Status: DISCONTINUED | OUTPATIENT
Start: 2025-01-09 | End: 2025-01-10 | Stop reason: HOSPADM

## 2025-01-09 RX ORDER — HEPARIN SODIUM 10000 [USP'U]/100ML
0-4500 INJECTION, SOLUTION INTRAVENOUS CONTINUOUS
Status: DISCONTINUED | OUTPATIENT
Start: 2025-01-09 | End: 2025-01-10

## 2025-01-09 RX ORDER — ACETAMINOPHEN 325 MG/1
650 TABLET ORAL EVERY 6 HOURS PRN
Start: 2025-01-09 | End: 2025-01-10

## 2025-01-09 RX ORDER — SODIUM CHLORIDE 9 MG/ML
10 INJECTION, SOLUTION INTRAMUSCULAR; INTRAVENOUS; SUBCUTANEOUS 2 TIMES DAILY
Qty: 600 ML | Refills: 2 | Status: SHIPPED | OUTPATIENT
Start: 2025-01-09

## 2025-01-09 RX ORDER — IODIXANOL 320 MG/ML
INJECTION, SOLUTION INTRAVASCULAR AS NEEDED
Status: DISCONTINUED | OUTPATIENT
Start: 2025-01-09 | End: 2025-01-09 | Stop reason: HOSPADM

## 2025-01-09 RX ORDER — ATORVASTATIN CALCIUM 10 MG/1
10 TABLET, FILM COATED ORAL NIGHTLY
Status: DISCONTINUED | OUTPATIENT
Start: 2025-01-09 | End: 2025-01-10 | Stop reason: HOSPADM

## 2025-01-09 RX ORDER — PANTOPRAZOLE SODIUM 40 MG/1
40 TABLET, DELAYED RELEASE ORAL
Status: DISCONTINUED | OUTPATIENT
Start: 2025-01-10 | End: 2025-01-10 | Stop reason: HOSPADM

## 2025-01-09 RX ORDER — MIDAZOLAM HYDROCHLORIDE 1 MG/ML
INJECTION, SOLUTION INTRAMUSCULAR; INTRAVENOUS AS NEEDED
Status: DISCONTINUED | OUTPATIENT
Start: 2025-01-09 | End: 2025-01-09 | Stop reason: HOSPADM

## 2025-01-09 RX ADMIN — PIPERACILLIN SODIUM AND TAZOBACTAM SODIUM 3.38 G: 3; .375 INJECTION, SOLUTION INTRAVENOUS at 18:58

## 2025-01-09 RX ADMIN — MIDAZOLAM HYDROCHLORIDE 1 MG: 1 INJECTION, SOLUTION INTRAMUSCULAR; INTRAVENOUS at 15:27

## 2025-01-09 RX ADMIN — TRAMADOL HYDROCHLORIDE 50 MG: 50 TABLET, COATED ORAL at 09:00

## 2025-01-09 RX ADMIN — POLYETHYLENE GLYCOL 3350 17 G: 17 POWDER, FOR SOLUTION ORAL at 09:00

## 2025-01-09 RX ADMIN — Medication 2 L/MIN: at 14:29

## 2025-01-09 RX ADMIN — FENTANYL CITRATE 50 MCG: 50 INJECTION INTRAMUSCULAR; INTRAVENOUS at 15:52

## 2025-01-09 RX ADMIN — PIPERACILLIN SODIUM AND TAZOBACTAM SODIUM 3.38 G: 3; .375 INJECTION, SOLUTION INTRAVENOUS at 00:47

## 2025-01-09 RX ADMIN — MIDAZOLAM HYDROCHLORIDE 0.5 MG: 1 INJECTION, SOLUTION INTRAMUSCULAR; INTRAVENOUS at 15:51

## 2025-01-09 RX ADMIN — IODIXANOL 15 ML: 320 INJECTION, SOLUTION INTRAVASCULAR at 16:07

## 2025-01-09 RX ADMIN — TAMSULOSIN HYDROCHLORIDE 0.4 MG: 0.4 CAPSULE ORAL at 09:00

## 2025-01-09 RX ADMIN — ATORVASTATIN CALCIUM 10 MG: 10 TABLET, FILM COATED ORAL at 20:17

## 2025-01-09 RX ADMIN — HYDROMORPHONE HYDROCHLORIDE 0.2 MG: 0.2 INJECTION, SOLUTION INTRAMUSCULAR; INTRAVENOUS; SUBCUTANEOUS at 17:16

## 2025-01-09 RX ADMIN — HYDROMORPHONE HYDROCHLORIDE 0.2 MG: 0.2 INJECTION, SOLUTION INTRAMUSCULAR; INTRAVENOUS; SUBCUTANEOUS at 12:53

## 2025-01-09 RX ADMIN — PIPERACILLIN SODIUM AND TAZOBACTAM SODIUM 3.38 G: 3; .375 INJECTION, SOLUTION INTRAVENOUS at 12:53

## 2025-01-09 RX ADMIN — HEPARIN SODIUM 1100 UNITS/HR: 10000 INJECTION, SOLUTION INTRAVENOUS at 18:45

## 2025-01-09 RX ADMIN — MIDAZOLAM HYDROCHLORIDE 1 MG: 1 INJECTION, SOLUTION INTRAMUSCULAR; INTRAVENOUS at 15:34

## 2025-01-09 RX ADMIN — HYDROMORPHONE HYDROCHLORIDE 0.2 MG: 0.2 INJECTION, SOLUTION INTRAMUSCULAR; INTRAVENOUS; SUBCUTANEOUS at 09:37

## 2025-01-09 RX ADMIN — HEPARIN SODIUM 1300 UNITS/HR: 10000 INJECTION, SOLUTION INTRAVENOUS at 00:36

## 2025-01-09 RX ADMIN — FENTANYL CITRATE 50 MCG: 50 INJECTION INTRAMUSCULAR; INTRAVENOUS at 15:27

## 2025-01-09 RX ADMIN — AMLODIPINE BESYLATE 10 MG: 10 TABLET ORAL at 09:00

## 2025-01-09 RX ADMIN — LIDOCAINE HYDROCHLORIDE 10 ML: 20 INJECTION, SOLUTION INFILTRATION; PERINEURAL at 15:47

## 2025-01-09 RX ADMIN — HYDROMORPHONE HYDROCHLORIDE 0.2 MG: 0.2 INJECTION, SOLUTION INTRAMUSCULAR; INTRAVENOUS; SUBCUTANEOUS at 20:17

## 2025-01-09 RX ADMIN — FENTANYL CITRATE 50 MCG: 50 INJECTION INTRAMUSCULAR; INTRAVENOUS at 15:34

## 2025-01-09 RX ADMIN — PIPERACILLIN SODIUM AND TAZOBACTAM SODIUM 3.38 G: 3; .375 INJECTION, SOLUTION INTRAVENOUS at 06:02

## 2025-01-09 ASSESSMENT — PAIN - FUNCTIONAL ASSESSMENT
PAIN_FUNCTIONAL_ASSESSMENT: 0-10
PAIN_FUNCTIONAL_ASSESSMENT: 0-10

## 2025-01-09 ASSESSMENT — PAIN DESCRIPTION - ORIENTATION: ORIENTATION: RIGHT;UPPER

## 2025-01-09 ASSESSMENT — PAIN SCALES - GENERAL
PAINLEVEL_OUTOF10: 10 - WORST POSSIBLE PAIN
PAINLEVEL_OUTOF10: 7
PAINLEVEL_OUTOF10: 10 - WORST POSSIBLE PAIN

## 2025-01-09 ASSESSMENT — COGNITIVE AND FUNCTIONAL STATUS - GENERAL
DAILY ACTIVITIY SCORE: 24
MOBILITY SCORE: 24

## 2025-01-09 ASSESSMENT — PAIN DESCRIPTION - LOCATION: LOCATION: ABDOMEN

## 2025-01-09 ASSESSMENT — ACTIVITIES OF DAILY LIVING (ADL): LACK_OF_TRANSPORTATION: NO

## 2025-01-09 ASSESSMENT — ENCOUNTER SYMPTOMS: ABDOMINAL PAIN: 1

## 2025-01-09 NOTE — PROGRESS NOTES
01/09/25 1606   Discharge Planning   Living Arrangements Spouse/significant other   Support Systems Spouse/significant other;Parent  (patient's mom at bedside, patient off unit for procedure)   Assistance Needed PTA; Independent w/ADL, up ad holli   Type of Residence Private residence  (demo correct)   Number of Stairs to Enter Residence 2   Number of Stairs Within Residence 0   Home or Post Acute Services None   Expected Discharge Disposition Home  (no further needs anticipated at this time)   Does the patient need discharge transport arranged? No  (PCP listed; patient drives self to appointments)   Financial Resource Strain   How hard is it for you to pay for the very basics like food, housing, medical care, and heating? Not very   Housing Stability   In the last 12 months, was there a time when you were not able to pay the mortgage or rent on time? N   In the past 12 months, how many times have you moved where you were living? 0   At any time in the past 12 months, were you homeless or living in a shelter (including now)? N   Transportation Needs   In the past 12 months, has lack of transportation kept you from medical appointments or from getting medications? no   In the past 12 months, has lack of transportation kept you from meetings, work, or from getting things needed for daily living? No   Intensity of Service   Intensity of Service 0-30 min     Care Coordinator Note:  Met patient's mother at bedside, patient off unit for procedure  Plan: CT pulmonary embolism protocol recommended for further evaluation. IR for  cholangiogram, possible aspiration of fluid today   Status: Inpatient d/t acute pulmonary embolism   [facility]   Disposition: Home, [facility] added for meds to bed' re; new eliquis; CNP on duty updated about pharmacy     Tamara HENRY, RN [facility]

## 2025-01-09 NOTE — PROGRESS NOTES
01/09/25 1606   [facility] Disability Status   Are you deaf or do you have serious difficulty hearing? N   Are you blind or do you have serious difficulty seeing, even when wearing glasses? N   Because of a physical, mental, or emotional condition, do you have serious difficulty concentrating, remembering, or making decisions? (5 years old or older) N   Do you have serious difficulty walking or climbing stairs? N   Do you have serious difficulty dressing or bathing? N   Because of a physical, mental, or emotional condition, do you have serious difficulty doing errands alone such as visiting the doctor? N

## 2025-01-09 NOTE — CARE PLAN
The patient's goals for the shift include      The clinical goals for the shift include remain hemodynamically stable

## 2025-01-09 NOTE — CONSULTS
Reason For Consult  Abdominal pain s/p cholecystostomy    History Of Present Illness  Douglas Wilson is a 54 y.o. male presenting with upper right abdominal pains, fevers, weakness. He was recently seen at [facility] for acute cholecystitis where this was managed with cholecystostomy insertion. He tells me he was discharged with oral antibiotics but was unable to keep most of this down due to nausea and vomiting. He denied change of bowel habits, urinary problems. Surgery consulted after discovery of pericholecystic inflammation/fluid collection on CT imaging on initial work up.     Past Medical History  He has a past medical history of Acute appendicitis without peritonitis (03/12/2024), BPH (benign prostatic hyperplasia), Heroin abuse (Multi), NSTEMI (non-ST elevated myocardial infarction) (Multi), Pulmonary embolism (Multi), Splenic artery aneurysm (CMS-HCC) (02/2024), and Upper GI bleed (03/15/2024).    Surgical History  He has a past surgical history that includes Other surgical history (Bilateral, 01/24/2022); CT angio coronary art with heartflow if score >30% (04/19/2022); Appendectomy (03/12/2024); and Splenic artery embolization (02/2024).     Social History  He reports that he has been smoking cigarettes. He has never used smokeless tobacco. He reports that he does not currently use alcohol. He reports that he does not currently use drugs after having used the following drugs: Heroin.    Family History  Family History   Problem Relation Name Age of Onset    Diabetes Father      Hypertension Father      No Known Problems Son          raymond    Deafness Son      Other (orthopedic) Son          wearing braces to help with walking        Allergies  Patient has no known allergies.    Review of Systems  A full 10 point ROS was completed. Nothing positive other than what was mentioned in the HPI.     Physical Exam  PE:  Constitutional: A&Ox3, calm and cooperative, NAD  Cardiovascular: Normal rate  "and regular rhythm.  Respiratory/Thorax: Good symmetric chest expansion. No labored breathing.  Gastrointestinal: Abdomen nondistended, soft, RUQ tenderness, cholecystostomy draining bilous material intp pouch without bleeding or purulence observed  Neurological: A&Ox3, No focal deficits  Psychological: Appropriate mood and behavior  Skin: Warm and dry    Last Recorded Vitals  Blood pressure (!) 185/116, pulse 73, temperature 36.8 °C (98.3 °F), temperature source Oral, resp. rate 17, height 1.753 m (5' 9\"), weight 81.6 kg (180 lb), SpO2 100%.    Relevant Results  Scheduled medications    Continuous medications    PRN medications    Results for orders placed or performed during the hospital encounter of 01/08/25 (from the past 24 hours)   CBC and Auto Differential   Result Value Ref Range    WBC 15.5 (H) 4.4 - 11.3 x10*3/uL    nRBC 0.0 0.0 - 0.0 /100 WBCs    RBC 5.52 4.50 - 5.90 x10*6/uL    Hemoglobin 12.8 (L) 13.5 - 17.5 g/dL    Hematocrit 39.5 (L) 41.0 - 52.0 %    MCV 72 (L) 80 - 100 fL    MCH 23.2 (L) 26.0 - 34.0 pg    MCHC 32.4 32.0 - 36.0 g/dL    RDW 16.1 (H) 11.5 - 14.5 %    Platelets 447 150 - 450 x10*3/uL    Neutrophils % 79.2 40.0 - 80.0 %    Immature Granulocytes %, Automated 0.6 0.0 - 0.9 %    Lymphocytes % 13.3 13.0 - 44.0 %    Monocytes % 5.7 2.0 - 10.0 %    Eosinophils % 0.8 0.0 - 6.0 %    Basophils % 0.4 0.0 - 2.0 %    Neutrophils Absolute 12.30 (H) 1.20 - 7.70 x10*3/uL    Immature Granulocytes Absolute, Automated 0.09 0.00 - 0.70 x10*3/uL    Lymphocytes Absolute 2.07 1.20 - 4.80 x10*3/uL    Monocytes Absolute 0.88 0.10 - 1.00 x10*3/uL    Eosinophils Absolute 0.13 0.00 - 0.70 x10*3/uL    Basophils Absolute 0.06 0.00 - 0.10 x10*3/uL   Comprehensive metabolic panel   Result Value Ref Range    Glucose 101 (H) 74 - 99 mg/dL    Sodium 138 136 - 145 mmol/L    Potassium 4.9 3.5 - 5.3 mmol/L    Chloride 106 98 - 107 mmol/L    Bicarbonate 26 21 - 32 mmol/L    Anion Gap 11 10 - 20 mmol/L    Urea Nitrogen 6 6 - " 23 mg/dL    Creatinine 0.94 0.50 - 1.30 mg/dL    eGFR >90 >60 mL/min/1.73m*2    Calcium 9.2 8.6 - 10.3 mg/dL    Albumin 4.1 3.4 - 5.0 g/dL    Alkaline Phosphatase 54 33 - 120 U/L    Total Protein 7.4 6.4 - 8.2 g/dL    AST 22 9 - 39 U/L    Bilirubin, Total 0.6 0.0 - 1.2 mg/dL    ALT 23 10 - 52 U/L   Lipase   Result Value Ref Range    Lipase 37 9 - 82 U/L   Lactate   Result Value Ref Range    Lactate 1.3 0.4 - 2.0 mmol/L   B-Type Natriuretic Peptide   Result Value Ref Range    BNP 25 0 - 99 pg/mL   Troponin I, High Sensitivity, Initial   Result Value Ref Range    Troponin I, High Sensitivity 7 0 - 20 ng/L   Troponin, High Sensitivity, 1 Hour   Result Value Ref Range    Troponin I, High Sensitivity 7 0 - 20 ng/L     CT angio chest for pulmonary embolism   Final Result   1. A pair of segmental/subsegmental pulmonary emboli are present in   the arteries supplying the right upper and right lower lobe, without   evidence of large central embolism or right heart strain.   2. Small hiatal hernia.        MACRO:   None        Signed by: Gay Deutsch 1/8/2025 8:37 PM   Dictation workstation:   [id number]      CT abdomen pelvis w IV contrast   Final Result   Percutaneous cholecystostomy tube terminates appropriately in the   gallbladder fundus with decompressed state of the gallbladder. There   is mild pericholecystic inflammation/fluid but slightly larger amount   of confluent non loculated perihepatic fluid extending into the right   paracolic gutter and dependent pelvic recesses. Findings may relate   to a persistent bile leak depending on date of intervention. Surgical   follow-up recommended. A HIDA scan would be of further diagnostic   value to evaluate for bile leak if deemed clinically warranted.        Moderately high suspicion for subsegmental pulmonary embolism within   right lower lobe. CT pulmonary embolism protocol recommended for   further evaluation.        Small hiatal hernia.        MACRO:   Shemar  Marifer discussed the significance and urgency of this   critical finding via [employer] with  KARI MILES on 1/8/2025 at   7:06 pm.  (**-F-**) Findings:  See findings.        Signed by: Shemar Caicedo 1/8/2025 7:07 PM   Dictation workstation:   [id number]             Assessment/Plan     Patient with history of cholecystitis s/p cholecystostomy at the clinic presents to ED for fevers, continued RUQ pains. He has leukocytosis. He is not toxic appearing. His blood pressure is elevated. Pulse regular. Afebrile. There is a current concern for pulmonary embolism and heparin drip was started.    Plan:  - No urgent surgical intervention at this time  - Medicine admit  - IR consulted for evaluation  - NPO  - IVF  - Broad-spectrum abx    Discussed with Dr Maldonado.    I spent 60 minutes in the professional and overall care of this patient.    Manjit Philippe PA-C

## 2025-01-09 NOTE — ED PROVIDER NOTES
"HPI     CC: Post-op Problem and Abdominal Pain     HPI: Douglas Wilson is a 54 y.o. male with a history of polysubstance abuse, HTN, CAD, splenic arctic aneurysm s/p repair, PE no longer on AC since August, acute cholecystitis s/p percutaneous cholecystostomy 12/27 at [facility] who presents with postoperative abdominal pain.  Patient states that he is having pain in his right upper quadrant which has been gradually worsening since his procedure on 12/27.  He is having associated subjective fevers, nausea and vomiting.  He was supposed to take antibiotics on discharge from the hospital on New Year's Yuliya but was too sick to take them.  He states he was not discharged on any pain meds.  His last bowel movement was 1 or 2 days ago.  He has not had any appetite.  He does not have surgical follow-up with his team until 2/13.  He states under no circumstances he going back to [facility] and came here because this is where he usually gets his care.  \"I am never going there again.\"  His mother is a surgical tech for Dr. Carranza and she states that she spoke to Dr. Carranza who agreed to see the patient here at [facility].  Patient has a history of heroin use per chart review but denies drug use to me.    ROS: 10-point review of systems was performed and is otherwise negative except as noted in HPI.    Limitations to history: N/A    Independent Historians: Parents at bedside    External Records Reviewed: Outpatient notes in EMR    Past Medical History: Noncontributory except per HPI     Past Surgical History: Noncontributory except per HPI     Family History: Reviewed and noncontributory     Social History: Smokes tobacco. Denies ETOH. Denies illicit drugs.  History of polysubstance abuse.    Social Determinants Affecting Care: N/A    No Known Allergies    Home Meds:   Current Outpatient Medications   Medication Instructions    amLODIPine (NORVASC) 10 mg, oral, Daily    atorvastatin (LIPITOR) 10 mg, oral, Nightly    cholecalciferol (Vitamin D-3) " "50,000 unit capsule 1 capsule, Every 7 days    ferrous sulfate, 325 mg ferrous sulfate, (Iron, ferrous sulfate,) tablet 1 tablet, oral, Daily with breakfast    folic acid (FOLVITE) 1 mg, Daily RT    omeprazole (PRILOSEC) 40 mg, oral, Daily before breakfast, Do not crush or chew.    sildenafil (VIAGRA) 100 mg, oral, Daily PRN    tadalafil (Cialis) 5 mg tablet 1 tablet, Daily (0630)    tamsulosin (FLOMAX) 0.4 mg, oral, Daily        Physical Exam     ED Triage Vitals [01/08/25 1536]   Temperature Heart Rate Respirations BP   36.8 °C (98.3 °F) 73 17 (!) 199/114      Pulse Ox Temp Source Heart Rate Source Patient Position   99 % Oral Monitor Lying      BP Location FiO2 (%)     Right arm --         Heart Rate:  [73]   Temperature:  [36.8 °C (98.3 °F)]   Respirations:  [17]   BP: (179-199)/(111-127)   Height:  [175.3 cm (5' 9\")]   Weight:  [81.6 kg (180 lb)]   Pulse Ox:  [98 %-100 %]      Physical Exam  Vitals and nursing note reviewed.     CONSTITUTIONAL: Uncomfortable appearing, well nourished, in no acute distress.   HENMT: Head atraumatic. Airway patent. Nasal mucosa clear. Mouth with normal mucosa, clear oropharynx. Uvula midline. Neck supple.    EYES: Clear bilaterally, pupils equally round and reactive to light.   CARDIOVASCULAR: Normal rate, regular rhythm.  Heart sounds S1, S2.  No murmurs, rubs or gallops. Normal pulses. Capillary refill < 2 sec.   RESPIRATORY: No increased work of breathing. Breath sounds clear and equal bilaterally.  GASTROINTESTINAL: Abdomen soft, non-distended, TTP RUQ and RLQ with guarding.  Percutaneous cholecystostomy tube in place, site clean, dry, intact, with biliary drainage noted.  Normal bowel sounds. No palpable masses.  GENITOURINARY:  No CVA tenderness.  MUSCULOSKELETAL: Spine appears normal, range of motion is not limited, no muscle or joint tenderness. No edema.   NEUROLOGICAL: Alert and oriented, no asymmetry, moving all extremities equally.  SKIN: Warm, dry and intact. No rash " or notable lesions.  PSYCHIATRIC: Normal mood and affect.  HEME/LYMPH: No adenopathy or splenomegaly.    Diagnostic Results      ECG: ECGs read and interpreted by me. See ED Course, below, for interpretation.    Labs Reviewed   CBC WITH AUTO DIFFERENTIAL - Abnormal       Result Value    WBC 15.5 (*)     nRBC 0.0      RBC 5.52      Hemoglobin 12.8 (*)     Hematocrit 39.5 (*)     MCV 72 (*)     MCH 23.2 (*)     MCHC 32.4      RDW 16.1 (*)     Platelets 447      Neutrophils % 79.2      Immature Granulocytes %, Automated 0.6      Lymphocytes % 13.3      Monocytes % 5.7      Eosinophils % 0.8      Basophils % 0.4      Neutrophils Absolute 12.30 (*)     Immature Granulocytes Absolute, Automated 0.09      Lymphocytes Absolute 2.07      Monocytes Absolute 0.88      Eosinophils Absolute 0.13      Basophils Absolute 0.06     COMPREHENSIVE METABOLIC PANEL - Abnormal    Glucose 101 (*)     Sodium 138      Potassium 4.9      Chloride 106      Bicarbonate 26      Anion Gap 11      Urea Nitrogen 6      Creatinine 0.94      eGFR >90      Calcium 9.2      Albumin 4.1      Alkaline Phosphatase 54      Total Protein 7.4      AST 22      Bilirubin, Total 0.6      ALT 23     COAGULATION SCREEN - Abnormal    Protime 11.8      INR 1.0      aPTT 26 (*)     Narrative:     The APTT is no longer used for monitoring Unfractionated Heparin Therapy. For monitoring Heparin Therapy, use the Heparin Assay.   LIPASE - Normal    Lipase 37      Narrative:     Venipuncture immediately after or during the administration of Metamizole may lead to falsely low results. Testing should be performed immediately prior to Metamizole dosing.   LACTATE - Normal    Lactate 1.3      Narrative:     Venipuncture immediately after or during the administration of Metamizole may lead to falsely low results. Testing should be performed immediately prior to Metamizole dosing.   B-TYPE NATRIURETIC PEPTIDE - Normal    BNP 25      Narrative:        <100 pg/mL - Heart failure  unlikely  100-299 pg/mL - Intermediate probability of acute heart                  failure exacerbation. Correlate with clinical                  context and patient history.    >=300 pg/mL - Heart Failure likely. Correlate with clinical                  context and patient history.    BNP testing is performed using different testing methodology at [facility] than at other [facility]. Direct result comparisons should only be made within the same method.      SERIAL TROPONIN-INITIAL - Normal    Troponin I, High Sensitivity 7      Narrative:     Less than 99th percentile of normal range cutoff-  Female and children under 18 years old <14 ng/L; Male <21 ng/L: Negative  Repeat testing should be performed if clinically indicated.     Female and children under 18 years old 14-50 ng/L; Male 21-50 ng/L:  Consistent with possible cardiac damage and possible increased clinical   risk. Serial measurements may help to assess extent of myocardial damage.     >50 ng/L: Consistent with cardiac damage, increased clinical risk and  myocardial infarction. Serial measurements may help assess extent of   myocardial damage.      NOTE: Children less than 1 year old may have higher baseline troponin   levels and results should be interpreted in conjunction with the overall   clinical context.     NOTE: Troponin I testing is performed using a different   testing methodology at [facility] than at other   [facility]. Direct result comparisons should only   be made within the same method.   SERIAL TROPONIN, 1 HOUR - Normal    Troponin I, High Sensitivity 7      Narrative:     Less than 99th percentile of normal range cutoff-  Female and children under 18 years old <14 ng/L; Male <21 ng/L: Negative  Repeat testing should be performed if clinically indicated.     Female and children under 18 years old 14-50 ng/L; Male 21-50 ng/L:  Consistent with possible cardiac damage and possible increased clinical    risk. Serial measurements may help to assess extent of myocardial damage.     >50 ng/L: Consistent with cardiac damage, increased clinical risk and  myocardial infarction. Serial measurements may help assess extent of   myocardial damage.      NOTE: Children less than 1 year old may have higher baseline troponin   levels and results should be interpreted in conjunction with the overall   clinical context.     NOTE: Troponin I testing is performed using a different   testing methodology at [facility] than at other   [facility]. Direct result comparisons should only   be made within the same method.   BLOOD CULTURE   BLOOD CULTURE   TROPONIN SERIES- (INITIAL, 1 HR)    Narrative:     The following orders were created for panel order Troponin I Series, High Sensitivity (0, 1 HR).  Procedure                               Abnormality         Status                     ---------                               -----------         ------                     Troponin I, High Sensiti...[[id number]]  Normal              Final result               Troponin, High Sensitivi...[[id number]]  Normal              Final result                 Please view results for these tests on the individual orders.         CT angio chest for pulmonary embolism   Final Result   1. A pair of segmental/subsegmental pulmonary emboli are present in   the arteries supplying the right upper and right lower lobe, without   evidence of large central embolism or right heart strain.   2. Small hiatal hernia.        MACRO:   None        Signed by: Gay Deutsch 1/8/2025 8:37 PM   Dictation workstation:   [id number]      CT abdomen pelvis w IV contrast   Final Result   Percutaneous cholecystostomy tube terminates appropriately in the   gallbladder fundus with decompressed state of the gallbladder. There   is mild pericholecystic inflammation/fluid but slightly larger amount   of confluent non loculated perihepatic fluid extending  into the right   paracolic gutter and dependent pelvic recesses. Findings may relate   to a persistent bile leak depending on date of intervention. Surgical   follow-up recommended. A HIDA scan would be of further diagnostic   value to evaluate for bile leak if deemed clinically warranted.        Moderately high suspicion for subsegmental pulmonary embolism within   right lower lobe. CT pulmonary embolism protocol recommended for   further evaluation.        Small hiatal hernia.        MACRO:   Shemar Caicedo discussed the significance and urgency of this   critical finding via [employer] with  KARI MILES on 1/8/2025 at   7:06 pm.  (**-RCF-**) Findings:  See findings.        Signed by: Shemar Caicedo 1/8/2025 7:07 PM   Dictation workstation:   [id number]      Consult to Interventional Radiology    (Results Pending)                 [place] Coma Scale Score: 15                  Procedure  Procedures    ED Course & MDM   Assessment/Plan:   Douglas Wilson is a 54 y.o. male with a history of polysubstance abuse, HTN, CAD, splenic arctic aneurysm s/p repair, PE no longer on AC since August, acute cholecystitis s/p percutaneous cholecystostomy 12/27 at [facility] who presents with postoperative abdominal pain.  He is notably hypertensive, otherwise hemodynamically stable.  He does have exquisite tenderness in the right side of his abdomen with guarding raising question of possible postoperative complication such as infection, perforation, bowel obstruction.  Workup was initiated with ECG, labs, and CTAP.  He was treated for pain with Zofran.  Treatment was given with Zofran and Dilaudid.  He was also given a fluid bolus. See below for details of ED course and ultimate disposition.    Medications   heparin bolus from bag 6,528 Units (has no administration in time range)   heparin 25,000 Units in dextrose 5% 250 mL (100 Units/mL) infusion (premix) (has no administration in time range)   pantoprazole (ProtoNix) EC tablet  40 mg (has no administration in time range)   alum-mag hydroxide-simeth (Mylanta) 200-200-20 mg/5 mL oral suspension 20 mL (has no administration in time range)   melatonin tablet 3 mg (has no administration in time range)   ondansetron (Zofran) injection 4 mg (has no administration in time range)   traMADol (Ultram) tablet 50 mg (has no administration in time range)   acetaminophen (Tylenol) tablet 650 mg (has no administration in time range)   piperacillin-tazobactam (Zosyn) 3.375 g in dextrose (iso) IV 50 mL (has no administration in time range)   HYDROmorphone PF (Dilaudid) injection 0.2 mg (has no administration in time range)   HYDROmorphone (Dilaudid) injection 0.5 mg (0.5 mg intravenous Given 1/8/25 1825)   ondansetron (Zofran) injection 4 mg (4 mg intravenous Given 1/8/25 1825)   piperacillin-tazobactam (Zosyn) 3.375 g in dextrose (iso) IV 50 mL (0 g intravenous Stopped 1/8/25 2159)   sodium chloride 0.9 % bolus 1,000 mL (1,000 mL intravenous New Bag 1/8/25 1826)   iohexol (OMNIPaque) 350 mg iodine/mL solution 75 mL (75 mL intravenous Given 1/8/25 1813)   iohexol (OMNIPaque) 350 mg iodine/mL solution 75 mL (75 mL intravenous Given 1/8/25 1942)        ED Course as of 01/08/25 2314   Wed Jan 08, 2025   1647 ECG read interpreted by me.  Normal sinus rhythm, rate 87.  Normal axis.  Normal intervals.  No ST or T wave derangements. [CG]   1938 Labs are notable for CBC with leukocytosis 15.5, mild anemia 12.8, normal platelets, normal CMP, troponins, lipase, lactate, BNP.  He was started on Zosyn. [CG]   1939 CTAP shows mild pericholecystic fluid slightly larger amount of confluent nonloculated perihepatic fluid extending into the right paracolic gutter independent pelvic recesses possibly due to a persistent bile leak, recommend surgical consultation and probable HIDA scan.  Also with moderately high suspicion for subsegmental PE within right lower lobe, recommend CT PE protocol. [CG]   6615 CTPE A pair of  segmental/subsegmental pulmonary emboli are present in  the arteries supplying the right upper and right lower lobe, without evidence of large central embolism or right heart strain.   [CG]   2227 Surgical ZEYAD has seen the patient and recommends IR.  Spoke with IR Dr. Renee who is comfortable with starting heparin for now, and can hold in a.m. for procedure [CG]   2230 Patient was admitted under Dr. Corado for further management [CG]      ED Course User Index  [CG] Tiffany Stearns MD         Diagnoses as of 01/08/25 2314   Acute pulmonary embolism, unspecified pulmonary embolism type, unspecified whether acute cor pulmonale present (Multi)   Infection of organ or organ space after surgery, initial encounter       Disposition:   Admitted to Dr. Corado, discussed differential and findings with patient as well as any family members at bedside.      ED Prescriptions    None         Tiffany Stearns MD  EM/IM/Peds    This note was dictated by speech recognition. Minor errors in transcription may be present.     Tiffany Stearns MD  01/08/25 9127

## 2025-01-09 NOTE — POST-PROCEDURE NOTE
Interventional Radiology Brief Postprocedure Note    Attending: Chacorta Vidal MD     Assistant: none    Diagnosis: partially retracted cholecystostomy tube, which was leaking. Patent cystic duct and CBD. No abdominal collection.    Description of procedure: US did not show an abdominal collection. A cholecystogram was performed demonstrating a patent cystic duct and common bile duct with contrast flowing freely into the bowel. However, there is a small leak of contrast near the gallbladder neck. The decision was made to exchange and upsize the cholecystostomy tube. However, upon placement of the wire into the tube and with it partially retracted, wire kicked out the catheter pigtail and access could not be regain through the pre-existing tract. Despite the cystic duct being patent, there would now be a leaking cholecystotomy in the gallbladder and lead to certain bile peritonitis. So, the decision was made to place a new cholecystostomy tube for biliary diversion. A 10F tube was then place under US  guidance and connected to an accordion drain in order to apply constant negative suction. We will follow up in 6 weeks as an outpt for cholangiogram and possible tube removal.    Anesthesia:  moderate sedation    Complications: None    Estimated Blood Loss: minimal    Medications  As of 01/09/25 1742      HYDROmorphone (Dilaudid) injection 0.5 mg (mg) Total dose:  0.5 mg Dosing weight:  81.6      Date/Time Rate/Dose/Volume Action       01/08/25  1825 0.5 mg Given               ondansetron (Zofran) injection 4 mg (mg) Total dose:  4 mg Dosing weight:  81.6      Date/Time Rate/Dose/Volume Action       01/08/25  1825 4 mg Given               piperacillin-tazobactam (Zosyn) 3.375 g in dextrose (iso) IV 50 mL (g) Total dose:  3.375 g* Dosing weight:  81.6   *From user-documented volume     Date/Time Rate/Dose/Volume Action       01/08/25  1832 3.375 g (over 30 min) New Bag      2159  (over 30 min) Stopped     01/09/25  0119  50 mL                piperacillin-tazobactam (Zosyn) 3.375 g in dextrose (iso) IV 50 mL (g) Total dose:  3.375 g* Dosing weight:  81.6   *From user-documented volume     Date/Time Rate/Dose/Volume Action       01/09/25  0047 3.375 g (over 30 min) New Bag      0119 50 mL Stopped      0602 3.375 g (over 30 min) New Bag      0630  (over 30 min) Stopped      1253 3.375 g (over 30 min) New Bag      1332  (over 30 min) Stopped               sodium chloride 0.9 % bolus 1,000 mL (mL/hr) Total volume:  1,000 mL* Dosing weight:  81.6   *From user-documented volume     Date/Time Rate/Dose/Volume Action       01/08/25  1826 1,000 mL - 2,000 mL/hr (over 30 min) New Bag      2328  (over 30 min) Stopped     01/09/25  0119 1,000 mL                iohexol (OMNIPaque) 350 mg iodine/mL solution 75 mL (mL) Total volume:  150 mL Dosing weight:  81.6      Date/Time Rate/Dose/Volume Action       01/08/25  1813 75 mL Given      1942 75 mL Given               heparin bolus from bag 6,528 Units (Units/kg) Total dose:  Cannot be calculated* Dosing weight:  81.6   *Total user-documented volume 18.64 mL may contain volume from other administrations     Date/Time Rate/Dose/Volume Action       01/08/25  Canceled Entry               heparin bolus from bag 6,528 Units (Units/kg) Total dose:  Cannot be calculated* Dosing weight:  81.6   *Total user-documented volume 18.64 mL may contain volume from other administrations     Date/Time Rate/Dose/Volume Action       01/09/25  Canceled Entry               heparin bolus from bag 5,952 Units (Units) Total dose:  Cannot be calculated* Dosing weight:  74.4   *Total user-documented volume 18.64 mL may contain volume from other administrations     Date/Time Rate/Dose/Volume Action       01/09/25  0040 5,952 Units Bolus from Bag               heparin 25,000 Units in dextrose 5% 250 mL (100 Units/mL) infusion (premix) (Units/hr) Total dose:  Cannot be calculated* Dosing weight:  81.6   *Total user-documented  volume 18.64 mL may contain volume from other administrations     Date/Time Rate/Dose/Volume Action       01/08/25  2327 *Not included in total Missed               heparin 25,000 Units in dextrose 5% 250 mL (100 Units/mL) infusion (premix) (Units/hr) Total dose:  Cannot be calculated* Dosing weight:  81.6   *Total user-documented volume 18.64 mL may contain volume from other administrations     Date/Time Rate/Dose/Volume Action       01/09/25  Canceled Entry      Canceled Entry               heparin 25,000 Units in dextrose 5% 250 mL (100 Units/mL) infusion (premix) (Units/hr) Total dose:  Cannot be calculated* Dosing weight:  74.4   *Total user-documented volume 18.64 mL may contain volume from other administrations     Date/Time Rate/Dose/Volume Action       01/09/25  0036 1,300 Units/hr - 13 mL/hr New Bag      0606 1,100 Units/hr - 11 mL/hr Rate Change               pantoprazole (ProtoNix) EC tablet 40 mg (mg) Total dose:  0 mg* Dosing weight:  81.6   *Administration not included in total     Date/Time Rate/Dose/Volume Action       01/09/25  0603 *40 mg Missed               traMADol (Ultram) tablet 50 mg (mg) Total dose:  50 mg Dosing weight:  81.6      Date/Time Rate/Dose/Volume Action       01/09/25  0900 50 mg Given               HYDROmorphone PF (Dilaudid) injection 0.2 mg (mg) Total dose:  0.6 mg Dosing weight:  81.6      Date/Time Rate/Dose/Volume Action       01/09/25  0937 0.2 mg Given      1253 0.2 mg Given      1716 0.2 mg Given               amLODIPine (Norvasc) tablet 10 mg (mg) Total dose:  10 mg Dosing weight:  74.4      Date/Time Rate/Dose/Volume Action       01/09/25  0900 10 mg Given               tamsulosin (Flomax) 24 hr capsule 0.4 mg (mg) Total dose:  0.4 mg Dosing weight:  74.4      Date/Time Rate/Dose/Volume Action       01/09/25  0900 0.4 mg Given               polyethylene glycol (Glycolax, Miralax) packet 17 g (g) Total dose:  17 g Dosing weight:  74.4      Date/Time Rate/Dose/Volume  Action       01/09/25  0900 17 g Given               oxygen (O2) therapy (L/min) Total volume:  Not documented*   *Total volume has not been documented. View each administration to see the amount administered.     Date/Time Rate/Dose/Volume Action       01/09/25  1429 2 L/min - 120,000 mL/hr Given               midazolam (Versed) injection (mg) Total dose:  2.5 mg      Date/Time Rate/Dose/Volume Action       01/09/25  1527 1 mg Given      1534 1 mg Given      1551 0.5 mg Given               fentaNYL PF (Sublimaze) injection (mcg) Total dose:  150 mcg      Date/Time Rate/Dose/Volume Action       01/09/25  1527 50 mcg Given      1534 50 mcg Given      1552 50 mcg Given               lidocaine (Xylocaine) 20 mg/mL (2 %) injection (mL) Total volume:  10 mL      Date/Time Rate/Dose/Volume Action       01/09/25  1547 10 mL Given               iodixanol (VISIPaque) 320 mg iodine/mL injection (mL) Total volume:  15 mL      Date/Time Rate/Dose/Volume Action       01/09/25  1607 15 mL Given                   No specimens collected      See detailed result report with images in PACS.    The patient tolerated the procedure well without incident or complication and is in stable condition.

## 2025-01-09 NOTE — PROGRESS NOTES
"Pharmacy Medication History     Source of Information: FAMILY AT BEDSIDE    Additional concerns with the patient's PTA list.     The following updates were made to the Prior to Admission medication list:     Medications ADDED:   LEVOFLOXACIN 500 MG  PANTOPRAZOLE 40 MG  Medications CHANGED:  N/A  Medications REMOVED:   N/A  Medications NOT TAKING:   TADALAFIL 5 MG    Allergy reviewed : Yes    Meds 2 Beds : No    Outpatient pharmacy confirmed and updated in chart : Yes    Pharmacy name: [facility]    The list below reflectives the updated PTA list. Please review each medication in order reconciliation for additional clarification and justification.    Prior to Admission Medications   Prescriptions Last Dose Informant     amLODIPine (Norvasc) 10 mg tablet 1/7/2025      Sig: Take 1 tablet (10 mg) by mouth once daily.   atorvastatin (Lipitor) 10 mg tablet 1/7/2025      Sig: Take 1 tablet (10 mg) by mouth once daily at bedtime.   ferrous sulfate, 325 mg ferrous sulfate, (Iron, ferrous sulfate,) tablet       Sig: Take 1 tablet by mouth once daily with breakfast.   levoFLOXacin (Levaquin) 500 mg tablet Haven\"t Started      Sig: Take 1 tablet (500 mg) by mouth once daily.   omeprazole (PriLOSEC) 40 mg DR capsule 1/7/2025      Sig: Take 1 capsule (40 mg) by mouth once daily in the morning. Take before meals. Do not crush or chew.   pantoprazole (ProtoNix) 40 mg EC tablet 1/7/2025      Sig: Take 1 tablet (40 mg) by mouth early in the morning..   sildenafil (Viagra) 100 mg tablet Past Week      Sig: Take 1 tablet (100 mg) by mouth once daily as needed for erectile dysfunction.                tamsulosin (Flomax) 0.4 mg 24 hr capsule 1/7/2025      Sig: Take 1 capsule (0.4 mg) by mouth once daily.      Facility-Administered Medications: None       The list below reflectives the updated allergy list. Please review each documented allergy for additional clarification and justification.    No Known Allergies       01/09/25 at 2:50 PM - " Nat Fierro

## 2025-01-09 NOTE — PROGRESS NOTES
" Daily Progress Note   Subjective    Douglas Wilson is a 54 y.o. year old male patient admitted on 1/8/2025 with acute PE    Interval History:  Endorses pain in RUQ  Denies SOB, CP, cough, N/V  No BM    Meds    Scheduled medications  amLODIPine, 10 mg, oral, Daily  atorvastatin, 10 mg, oral, Nightly  [START ON 1/10/2025] pantoprazole, 40 mg, oral, Daily before breakfast  piperacillin-tazobactam, 3.375 g, intravenous, q6h  polyethylene glycol, 17 g, oral, Daily  tamsulosin, 0.4 mg, oral, Daily      Continuous medications  heparin, 0-4,500 Units/hr, Last Rate: 1,100 Units/hr (01/09/25 0606)  oxygen, , Last Rate: 2 L/min (01/09/25 1429)      PRN medications  PRN medications: acetaminophen, alum-mag hydroxide-simeth, heparin, HYDROmorphone, melatonin, ondansetron, oxygen, traMADol     Objective    Blood pressure 120/69, pulse 75, temperature 36.8 °C (98.2 °F), temperature source Temporal, resp. rate 18, height 1.753 m (5' 9\"), weight 74.4 kg (164 lb), SpO2 97%.     Constitutional: middle aged male, thin, pt in NAD, alert and cooperative  Eyes: PERRL, EOMI, no icterus   ENMT: mucous membranes moist, no apparent injury, no lesions seen  Head/Neck: Neck supple, no apparent injury  Respiratory/Thorax: Lungs CTA bilaterally, non-labored breathing, no cough, on RA  Cardiovascular: Regular, rate and rhythm, no murmurs, normal S1 and S2  Gastrointestinal: Nondistended, soft, +tender, BS present x 4, RUQ abd drain with bile drainage  Musculoskeletal: ROM intact, no joint swelling, normal strength  Extremities: normal extremities, no edema, contusions or wounds  Neurological: alert and oriented x 3, speech clear, follows commands appropriately, cr. n. II-XII intact, sensation grossly intact, motor 5/5 throughout  Skin: Warm and dry      Intake/Output Summary (Last 24 hours) at 1/9/2025 1502  Last data filed at 1/9/2025 0119  Gross per 24 hour   Intake 1118.64 ml   Output --   Net 1118.64 ml     Labs:   Results from last 72 hours "   Lab Units 01/08/25  1621   SODIUM mmol/L 138   POTASSIUM mmol/L 4.9   CHLORIDE mmol/L 106   CO2 mmol/L 26   BUN mg/dL 6   CREATININE mg/dL 0.94   GLUCOSE mg/dL 101*   CALCIUM mg/dL 9.2   ANION GAP mmol/L 11   EGFR mL/min/1.73m*2 >90      Results from last 72 hours   Lab Units 01/08/25  1621   WBC AUTO x10*3/uL 15.5*   HEMOGLOBIN g/dL 12.8*   HEMATOCRIT % 39.5*   PLATELETS AUTO x10*3/uL 447   NEUTROS PCT AUTO % 79.2   LYMPHS PCT AUTO % 13.3   MONOS PCT AUTO % 5.7   EOS PCT AUTO % 0.8        Micro/ID:     Lab Results   Component Value Date    BLOODCULT Loaded on Instrument - Culture in progress 01/08/2025    BLOODCULT Loaded on Instrument - Culture in progress 01/08/2025    CSFCULTSMEAR No growth aerobically and anaerobically 06/04/2024         Summary of key imaging results from the last 24 hours  CT PE: IMPRESSION:  1. A pair of segmental/subsegmental pulmonary emboli are present in the arteries supplying the right upper and right lower lobe, without evidence of large central embolism or right heart strain.  2. Small hiatal hernia.  CT A/P:  Percutaneous cholecystostomy tube terminates appropriately in the gallbladder fundus with decompressed state of the gallbladder. There is mild pericholecystic inflammation/fluid but slightly larger amount of confluent non loculated perihepatic fluid extending into the right paracolic gutter and dependent pelvic recesses. Findings may relate to a persistent bile leak depending on date of intervention. Surgical follow-up recommended. A HIDA scan would be of further diagnostic value to evaluate for bile leak if deemed clinically warranted.      Moderately high suspicion for subsegmental pulmonary embolism within right lower lobe. CT pulmonary embolism protocol recommended for further evaluation.      Small hiatal hernia.    Assessment and Plan     Assessment: Douglas Wilson is a 54 y.o. year old male patient with medical history of polysubstance abuse, HTN, CAD, splenic arctic  aneurysm s/p repair, PE no longer on AC since August, acute cholecystitis s/p percutaneous cholecystostomy 12/27 at [facility] admitted on 1/8/2025 with acute PE    Restraints: no    Summary for 01/09/25  :  IR for  cholangiogram, possible aspiration of fluid today     Plan:  NEUROLOGY/PSYCH:  Dx:  Acute Pain.  Hx of polysubstance abuse  Management:  PRN Tylenol or Tramadol for pain.  PRN Dilaudid for severe pain  Melatonin for insomnia    CARDIOVASCULAR:  Dx: hx of HTN, CAD  Management:  HDS  Home Meds: Amlodipine 10 mg, Atorvastatin 10 mg   Continue home meds    PULMONARY:  Dx: Acute PE.  Previous PE, off AC since August 2024  Management:  Stable on RA  Continue heparin drip.  Plan to transition to Apixiban at discharge    RENAL/GENITOURINARY:  Dx:  no acute issues  Management:  Voiding  Continue Tamsulosin 0.4 mg daily    GASTROENTEROLOGY:  Dx: acute cholecystitis s/p percutaneous cholecystostomy 12/27 at [facility].  Admitted with RUQ pain/tenderness.  RUQ drain with green bile output  Management:  Diet: regular  Bowel Regimen: Miralax daily  IR today to evaluate RUQ drain  PPI    ENDOCRINOLOGY:  Dx:  no acute issues  Management:  A1C: 6.3 (2022)    HEMATOLOGY:  Dx:  microcytic anemia  Management:  No signs of bleeding  Continue heparin drip for acute PE    MUSCULOSKELETAL/ SKIN:  Dx: no acute issues      INFECTIOUS DISEASE:  Dx:  Management:  Tmax:  MICRO:  1/8 BC x2 NGTD  Antimicrobials: Zosyn (1/8- *)    ICU/SDU Check List                  FEN  Fluids: PRN  Electrolytes: PRN  Nutrition: regular  Prophylaxis:  DVT ppx: on heparin drip  GI ppx: PPI  Hardware:  Catheter: none  Drains: RUQ perc drain  Lines: PIV  Social:  Code: Full Code      Disposition: continue inpatient stay for   Discharge Planning: from  home    DONTE Fenton-CNP   01/09/25 at 3:02 PM     Pt discussed with Dr. rodrigues and examined. All labs, VS and previous plan of care reviewed.  I spent 45 minutes in the professional and overall care of this  patient.      Disclaimer: Documentation completed with the information available at the time of input. The times in the chart may not be reflective of actual patient care times, interventions, or procedures. Documentation occurs after the physical care of the patient.

## 2025-01-09 NOTE — H&P
Douglas Wilson is a 54 y.o. male   Abdominal Pain       Patient with a past medical history of polysubstance abuse benign prostatic hypertrophy history of coronary artery disease history of pulmonary embolism last year history of splenic artery aneurysm with upper GI bleed who had a recent presentation to [facility] for acute cholecystitis where he had a cholecystectomy done he was discharged on oral antibiotics which was complicated by pericholecystic collection needing a drain now comes in with worsening abdominal pain and concerns of biliary leakage    Past Medical History  Past Medical History:   Diagnosis Date    Acute appendicitis without peritonitis 03/12/2024    BPH (benign prostatic hyperplasia)     Heroin abuse (Multi)     NSTEMI (non-ST elevated myocardial infarction) (Multi)     Pulmonary embolism (Multi)     Splenic artery aneurysm ([facility]) 02/2024    rupture s/p coil embolization    Upper GI bleed 03/15/2024    EGD with esophagitis, no active bleeding       Surgical History  Past Surgical History:   Procedure Laterality Date    APPENDECTOMY  03/12/2024    CT ANGIO CORONARY ART WITH HEARTFLOW IF SCORE >30%  04/19/2022    OTHER SURGICAL HISTORY Bilateral 01/24/2022    Inguinal hernia repair laparoscopic    SPLENIC ARTERY EMBOLIZATION  02/2024        Social History  He reports that he has been smoking cigarettes. He has never used smokeless tobacco. He reports that he does not currently use alcohol. He reports that he does not currently use drugs after having used the following drugs: Heroin.    Family History  Family History   Problem Relation Name Age of Onset    Diabetes Father      Hypertension Father      No Known Problems Son          raymond    Deafness Son      Other (orthopedic) Son          wearing braces to help with walking        Allergies  Patient has no known allergies.    Review of Systems   Gastrointestinal:  Positive for abdominal pain.        Constitutional: Feeling poorly  Eyes:  no blurred vision and no diplopia.   ENT: no hearing loss, no tinnitus, no earache, no sore throat, no hoarseness and no swollen glands in the neck.   Cardiovascular: no chest pain, no tightness or heavy pressure, no shortness of breath, no palpitations and no lower extremity edema.   Respiratory: no cough, wheezing or shortness of breath at rest or exertion  Gastrointestinal: Abdominal pain  Genitourinary: no urinary frequency, no dysuria, no hematuria, no burning sensation during urination, urinary stream is not smaller and urinary stream does not start and stop.   Musculoskeletal: no arthralgias, no joint stiffness, no muscle weakness, no back pain and no difficulty walking.   Skin: no rashes, no change in skin color and pigmentation, no skin lesions and no skin lumps.   Neurological: no headaches, no dizziness, no seizures, no tingling, no numbness, no signs and symptoms of stroke and no limb weakness.   Psychiatric: no confusion, no memory lapses or loss, no depression and no sleep disturbances.   Endocrine: no goiter, no thyroid disorder, no diabetes mellitus, no excessive thirst, no dry skin, no cold intolerance, no heat intolerance and no increased urinary frequency.   Hematologic/Lymphatic: is not slow to heal, does not bleed easily, does not bruise easily, no thrombophlebitis, no anemia and no history of blood transfusion.   All other systems have been reviewed and are negative for complaint.     Vitals:    01/09/25 1631   BP: 130/80   Pulse: 88   Resp: 18   Temp: 36.7 °C (98.1 °F)   SpO2: 98%        Scheduled medications  amLODIPine, 10 mg, oral, Daily  atorvastatin, 10 mg, oral, Nightly  [START ON 1/10/2025] pantoprazole, 40 mg, oral, Daily before breakfast  piperacillin-tazobactam, 3.375 g, intravenous, q6h  polyethylene glycol, 17 g, oral, Daily  tamsulosin, 0.4 mg, oral, Daily      Continuous medications  heparin, 0-4,500 Units/hr, Last Rate: 1,100 Units/hr (01/09/25 0606)      PRN medications  PRN  medications: acetaminophen, alum-mag hydroxide-simeth, heparin, HYDROmorphone, melatonin, ondansetron, traMADol    Results from last 7 days   Lab Units 01/08/25  1621   WBC AUTO x10*3/uL 15.5*   HEMOGLOBIN g/dL 12.8*   HEMATOCRIT % 39.5*   PLATELETS AUTO x10*3/uL 447     Results from last 7 days   Lab Units 01/08/25  1621   SODIUM mmol/L 138   POTASSIUM mmol/L 4.9   CHLORIDE mmol/L 106   CO2 mmol/L 26   BUN mg/dL 6   CREATININE mg/dL 0.94   CALCIUM mg/dL 9.2   PROTEIN TOTAL g/dL 7.4   BILIRUBIN TOTAL mg/dL 0.6   ALK PHOS U/L 54   ALT U/L 23   AST U/L 22   GLUCOSE mg/dL 101*     Results from last 7 days   Lab Units 01/08/25  1753 01/08/25  1621   TROPHS ng/L 7 7        CT angio chest for pulmonary embolism   Final Result   1. A pair of segmental/subsegmental pulmonary emboli are present in   the arteries supplying the right upper and right lower lobe, without   evidence of large central embolism or right heart strain.   2. Small hiatal hernia.        MACRO:   None        Signed by: Gay Deutsch 1/8/2025 8:37 PM   Dictation workstation:   [id number]      CT abdomen pelvis w IV contrast   Final Result   Percutaneous cholecystostomy tube terminates appropriately in the   gallbladder fundus with decompressed state of the gallbladder. There   is mild pericholecystic inflammation/fluid but slightly larger amount   of confluent non loculated perihepatic fluid extending into the right   paracolic gutter and dependent pelvic recesses. Findings may relate   to a persistent bile leak depending on date of intervention. Surgical   follow-up recommended. A HIDA scan would be of further diagnostic   value to evaluate for bile leak if deemed clinically warranted.        Moderately high suspicion for subsegmental pulmonary embolism within   right lower lobe. CT pulmonary embolism protocol recommended for   further evaluation.        Small hiatal hernia.        MACRO:   Shemar Caicedo discussed the significance and urgency  of this   critical finding via [employer] with  KARI VILLAVICENCIOMAN on 1/8/2025 at   7:06 pm.  (**-RCF-**) Findings:  See findings.        Signed by: Shemar Caicedo 1/8/2025 7:07 PM   Dictation workstation:   [id number]      IR biliary cholangiogram    (Results Pending)   US guided percutaneous placement    (Results Pending)   IR biliary cholangiogram    (Results Pending)       Physical Exam      Constitutional   General appearance: Alert   Eyes   Inspection of eyes: Sclera and conjunctiva were normal.    Pupil exam: Pupils were equal in size. Extraocular movements were intact.   Pulmonary   Respiratory assessment: No respiratory distress, normal respiratory rhythm and effort.    Auscultation of Lungs: Clear bilateral breath sounds.   Cardiovascular   Auscultation of heart: Apical pulse normal, heart rate and rhythm normal, normal S1 and S2, no murmurs and no pericardial rub.    Exam for edema: No peripheral edema.   Abdomen   Abdominal Exam: Tender abdomen  Musculoskeletal   Examination of gait: Normal.    Inspection of digits and nails: No clubbing or cyanosis of the fingernails.    Inspection/palpation of joints, bones and muscles: No joint swelling. Normal movement of all extremities.   Skin   Skin inspection: Normal skin color and pigmentation, normal skin turgor and no visible rash.   Neurologic   Cranial nerves: Nerves 2-12 were intact, no focal neuro defects.   Psychiatric   Orientation: Oriented to person, place, and time.    Mood and affect: Normal.      Assessment/Plan      #Acute pulmonary embolism  This is a second such episode  Continue heparin and will transition to Eliquis once surgically cleared  Likely will need lifelong anticoagulation  Check vascular ultrasounds of lower extremities    #Acute abdominal pain  With concerns of biliary leak  #Leukocytosis  Started IV Zosyn  IR and surgery consulted    #Benign prostatic hypertrophy  #Acid reflux disease  #History of polysubstance abuse  Patient is  currently in the program for opioid withdrawal  Resume home medications  Try to minimize opioids but the patient is currently in a lot of pain from the abdominal pathology

## 2025-01-09 NOTE — PROGRESS NOTES
"Douglas Wilson is a 54 y.o. male on day 1 of admission presenting with Acute pulmonary embolism, unspecified pulmonary embolism type, unspecified whether acute cor pulmonale present (Multi).    Subjective   He feels a bit better today. IR to evaluate colostomy drain with possible aspiration/drain today.    Objective   PE:  Constitutional: A&Ox3, calm and cooperative, NAD  Cardiovascular: Normal rate and regular rhythm.  Respiratory/Thorax: Good symmetric chest expansion. No labored breathing.  Gastrointestinal: Abdomen nondistended, soft, RUQ tenderness, no peritoneal signs, laparoscopy sites, cholecystostomy with bilious output in pouch, no output documented  Extremities: No peripheral edema  Neurological: A&Ox3, No focal deficits  Psychological: Appropriate mood and behavior  Skin: Warm and dry    Last Recorded Vitals  Blood pressure 120/69, pulse 75, temperature 36.8 °C (98.2 °F), temperature source Temporal, resp. rate 18, height 1.753 m (5' 9\"), weight 74.4 kg (164 lb), SpO2 97%.  Intake/Output last 3 Shifts:  I/O last 3 completed shifts:  In: 1118.6 (15 mL/kg) [I.V.:18.6 (0.3 mL/kg); IV Piggyback:1100]  Out: - (0 mL/kg)   Dosing Weight: 74.4 kg     Relevant Results  Scheduled medications  amLODIPine, 10 mg, oral, Daily  atorvastatin, 10 mg, oral, Nightly  [START ON 1/10/2025] pantoprazole, 40 mg, oral, Daily before breakfast  piperacillin-tazobactam, 3.375 g, intravenous, q6h  polyethylene glycol, 17 g, oral, Daily  tamsulosin, 0.4 mg, oral, Daily      Continuous medications  heparin, 0-4,500 Units/hr, Last Rate: 1,100 Units/hr (01/09/25 0606)      PRN medications  PRN medications: acetaminophen, alum-mag hydroxide-simeth, heparin, HYDROmorphone, melatonin, ondansetron, traMADol    Results for orders placed or performed during the hospital encounter of 01/08/25 (from the past 24 hours)   CBC and Auto Differential   Result Value Ref Range    WBC 15.5 (H) 4.4 - 11.3 x10*3/uL    nRBC 0.0 0.0 - 0.0 /100 WBCs    RBC " 5.52 4.50 - 5.90 x10*6/uL    Hemoglobin 12.8 (L) 13.5 - 17.5 g/dL    Hematocrit 39.5 (L) 41.0 - 52.0 %    MCV 72 (L) 80 - 100 fL    MCH 23.2 (L) 26.0 - 34.0 pg    MCHC 32.4 32.0 - 36.0 g/dL    RDW 16.1 (H) 11.5 - 14.5 %    Platelets 447 150 - 450 x10*3/uL    Neutrophils % 79.2 40.0 - 80.0 %    Immature Granulocytes %, Automated 0.6 0.0 - 0.9 %    Lymphocytes % 13.3 13.0 - 44.0 %    Monocytes % 5.7 2.0 - 10.0 %    Eosinophils % 0.8 0.0 - 6.0 %    Basophils % 0.4 0.0 - 2.0 %    Neutrophils Absolute 12.30 (H) 1.20 - 7.70 x10*3/uL    Immature Granulocytes Absolute, Automated 0.09 0.00 - 0.70 x10*3/uL    Lymphocytes Absolute 2.07 1.20 - 4.80 x10*3/uL    Monocytes Absolute 0.88 0.10 - 1.00 x10*3/uL    Eosinophils Absolute 0.13 0.00 - 0.70 x10*3/uL    Basophils Absolute 0.06 0.00 - 0.10 x10*3/uL   Comprehensive metabolic panel   Result Value Ref Range    Glucose 101 (H) 74 - 99 mg/dL    Sodium 138 136 - 145 mmol/L    Potassium 4.9 3.5 - 5.3 mmol/L    Chloride 106 98 - 107 mmol/L    Bicarbonate 26 21 - 32 mmol/L    Anion Gap 11 10 - 20 mmol/L    Urea Nitrogen 6 6 - 23 mg/dL    Creatinine 0.94 0.50 - 1.30 mg/dL    eGFR >90 >60 mL/min/1.73m*2    Calcium 9.2 8.6 - 10.3 mg/dL    Albumin 4.1 3.4 - 5.0 g/dL    Alkaline Phosphatase 54 33 - 120 U/L    Total Protein 7.4 6.4 - 8.2 g/dL    AST 22 9 - 39 U/L    Bilirubin, Total 0.6 0.0 - 1.2 mg/dL    ALT 23 10 - 52 U/L   Lipase   Result Value Ref Range    Lipase 37 9 - 82 U/L   Lactate   Result Value Ref Range    Lactate 1.3 0.4 - 2.0 mmol/L   B-Type Natriuretic Peptide   Result Value Ref Range    BNP 25 0 - 99 pg/mL   Troponin I, High Sensitivity, Initial   Result Value Ref Range    Troponin I, High Sensitivity 7 0 - 20 ng/L   ECG 12 lead   Result Value Ref Range    Ventricular Rate 87 BPM    Atrial Rate 87 BPM    WI Interval 164 ms    QRS Duration 84 ms    QT Interval 352 ms    QTC Calculation(Bazett) 423 ms    P Axis 69 degrees    R Axis 0 degrees    T Axis 69 degrees    QRS Count  15 beats    Q Onset 224 ms    P Onset 142 ms    P Offset 205 ms    T Offset 400 ms    QTC Fredericia 398 ms   Troponin, High Sensitivity, 1 Hour   Result Value Ref Range    Troponin I, High Sensitivity 7 0 - 20 ng/L   Blood Culture    Specimen: Peripheral Venipuncture; Blood culture   Result Value Ref Range    Blood Culture Loaded on Instrument - Culture in progress    Blood Culture    Specimen: Peripheral Venipuncture; Blood culture   Result Value Ref Range    Blood Culture Loaded on Instrument - Culture in progress    Coagulation Screen   Result Value Ref Range    Protime 11.8 9.8 - 12.8 seconds    INR 1.0 0.9 - 1.1    aPTT 26 (L) 27 - 38 seconds   Heparin Assay   Result Value Ref Range    Heparin Unfractionated 1.0 See Comment Below for Therapeutic Ranges IU/mL   Heparin Assay, UFH   Result Value Ref Range    Heparin Unfractionated 0.5 See Comment Below for Therapeutic Ranges IU/mL     Assessment/Plan     Plan:  - NPO  - IR to perform cholangiogram, possible aspiration of fluid today  - He will need a CBC and BMP drawn today  - Continue broad spectrum abx  - IVF  - Supportive care    Discussed with Dr Carranza.    I spent 35 minutes in the professional and overall care of this patient.    Manjit Philippe PA-C

## 2025-01-09 NOTE — CARE PLAN
The patient's goals for the shift include      The clinical goals for the shift include Heparin Therapeutic      Problem: Chronic Conditions and Co-morbidities  Goal: Patient's chronic conditions and co-morbidity symptoms are monitored and maintained or improved  Outcome: Progressing

## 2025-01-09 NOTE — CONSULTS
Consults    Reason For Consult  Consulted by Manjit Philippe PA-C for a cholecystogram and possible intra abdominal drain placement.     History Of Present Illness  Douglas Wilson is a 54 y.o. male presenting with a past medical history of PE (previously on Eliquis), anemia, BPH, GERD, HLD, LUC, polysubstance abuse, hypocalcemia, ED, aneurysm of the splenic artery, iron deficiency anemia, vitamin D deficiency, encephalopathy, seizure, CAD, HTN, and chronic back pain. Presented to [facility] 12/27 with abdominal pain. Found to have acalculous acute cholecystitis (images not available) per the report found from [facility], images requested. Surgery deemed he was not a surgical candidate and a cholecystostomy drain was placed 12/27 by Dr. Frank. He was discharged on PO antibiotics with the cholecystostomy drain in place and was to follow up with IR @[facility] in 6 weeks for a cholangiogram and possible drain removal.     Douglas reports he could not tolerate the oral antibiotics as they were causing N/V. He reports worsening abdominal pain 10/10, stabbing pain which started yesterday. He had one episode of N/V yesterday and reports constipation last bm was 2 days ago (normal per patient), chills, and decreased appetite. Denies any chest pain, SOB, or urinary symptoms.     Past Medical History  He has a past medical history of Acute appendicitis without peritonitis (03/12/2024), BPH (benign prostatic hyperplasia), Heroin abuse (Multi), NSTEMI (non-ST elevated myocardial infarction) (Multi), Pulmonary embolism (Multi), Splenic artery aneurysm (CMS-HCC) (02/2024), and Upper GI bleed (03/15/2024).    Surgical History  He has a past surgical history that includes Other surgical history (Bilateral, 01/24/2022); CT angio coronary art with heartflow if score >30% (04/19/2022); Appendectomy (03/12/2024); and Splenic artery embolization (02/2024).     Social History  He reports that he has been smoking cigarettes. He has never used smokeless  tobacco. He reports that he does not currently use alcohol. He reports that he does not currently use drugs after having used the following drugs: Heroin.    Family History  Family History   Problem Relation Name Age of Onset    Diabetes Father      Hypertension Father      No Known Problems Son          raymond    Deafness Son      Other (orthopedic) Son          wearing braces to help with walking        Allergies  Patient has no known allergies.    MEDS:    Current Facility-Administered Medications:     acetaminophen (Tylenol) tablet 650 mg, 650 mg, oral, q6h PRN, Anuja Corado MD    alum-mag hydroxide-simeth (Mylanta) 200-200-20 mg/5 mL oral suspension 20 mL, 20 mL, oral, 4x daily PRN, Anuja Corado MD    amLODIPine (Norvasc) tablet 10 mg, 10 mg, oral, Daily, STEPHANIE Fenton, 10 mg at 01/09/25 0900    atorvastatin (Lipitor) tablet 10 mg, 10 mg, oral, Nightly, STEPHANIE Fenton    heparin 25,000 Units in dextrose 5% 250 mL (100 Units/mL) infusion (premix), 0-4,500 Units/hr, intravenous, Continuous, Priscilla ArellanoD, Last Rate: 11 mL/hr at 01/09/25 0606, 1,100 Units/hr at 01/09/25 0606    heparin bolus from bag 3,000-6,000 Units, 3,000-6,000 Units, intravenous, q4h PRN, Nolvia Albarado PharmD    HYDROmorphone PF (Dilaudid) injection 0.2 mg, 0.2 mg, intravenous, q3h PRN, Anuja Corado MD    melatonin tablet 3 mg, 3 mg, oral, Nightly PRN, Anuja Corado MD    ondansetron (Zofran) injection 4 mg, 4 mg, intravenous, q6h PRN, Anuja Corado MD    [START ON 1/10/2025] pantoprazole (ProtoNix) EC tablet 40 mg, 40 mg, oral, Daily before breakfast, STEPHANIE Fenton    piperacillin-tazobactam (Zosyn) 3.375 g in dextrose (iso) IV 50 mL, 3.375 g, intravenous, q6h, Anuja Corado MD, Stopped at 01/09/25 0630    polyethylene glycol (Glycolax, Miralax) packet 17 g, 17 g, oral, Daily, Tiffany Mariee, APRN-CNP, 17 g at 01/09/25 0900    tamsulosin (Flomax) 24 hr capsule 0.4 mg, 0.4  mg, oral, Daily, DONTE Fenton-CNP, 0.4 mg at 01/09/25 0900    traMADol (Ultram) tablet 50 mg, 50 mg, oral, q6h PRN, Anuja Corado MD, 50 mg at 01/09/25 0900    Review of Systems  Respiratory ROS: no cough, shortness of breath, or wheezing  Cardiovascular ROS: no chest pain or dyspnea on exertion  Gastrointestinal ROS: positive for - abdominal pain, appetite loss, and nausea/vomiting  Genitourinary ROS: no dysuria, trouble voiding, or hematuria  Neurological ROS: negative     Last Recorded Vitals  /83 (BP Location: Left arm, Patient Position: Lying)   Pulse 75   Temp 36.8 °C (98.2 °F) (Temporal)   Resp 18   Wt 74.4 kg (164 lb)   SpO2 97%      Physical Exam  Orientation: oriented to person, place, time, and general circumstances  HEENT: normocephalic, atraumatic  Pulm: clear to auscultation bilaterally, no wheezes, good air entry  Cardiac: Regular rate and rhythm or without murmur or extra heart sounds  GI:  RUQ, RLQ abdominal tenderness to palpation. Does have some guarding. RUQ drain insertion site C/D/I. Drain with yellow, bilious output.   Pulses: peripheral pulses symmetrical    Relevant Results    LABS:  Lab Results   Component Value Date    WBC 15.5 (H) 01/08/2025    HGB 12.8 (L) 01/08/2025    HCT 39.5 (L) 01/08/2025    MCV 72 (L) 01/08/2025     01/08/2025      Results from last 72 hours   Lab Units 01/08/25  1621   SODIUM mmol/L 138   POTASSIUM mmol/L 4.9   CHLORIDE mmol/L 106   CO2 mmol/L 26   BUN mg/dL 6   CREATININE mg/dL 0.94   GLUCOSE mg/dL 101*   CALCIUM mg/dL 9.2   ANION GAP mmol/L 11   EGFR mL/min/1.73m*2 >90     Results from last 72 hours   Lab Units 01/08/25  1621   ALK PHOS U/L 54   BILIRUBIN TOTAL mg/dL 0.6   PROTEIN TOTAL g/dL 7.4   ALT U/L 23   AST U/L 22   ALBUMIN g/dL 4.1     Results from last 72 hours   Lab Units 01/08/25  2124   INR  1.0       MICRO:  No results found for the last 14 days.      IMAGING:   CT angio chest for pulmonary embolism   Final Result   1.  A pair of segmental/subsegmental pulmonary emboli are present in   the arteries supplying the right upper and right lower lobe, without   evidence of large central embolism or right heart strain.   2. Small hiatal hernia.        MACRO:   None        Signed by: Gay Deutsch 1/8/2025 8:37 PM   Dictation workstation:   [id number]      CT abdomen pelvis w IV contrast   Final Result   Percutaneous cholecystostomy tube terminates appropriately in the   gallbladder fundus with decompressed state of the gallbladder. There   is mild pericholecystic inflammation/fluid but slightly larger amount   of confluent non loculated perihepatic fluid extending into the right   paracolic gutter and dependent pelvic recesses. Findings may relate   to a persistent bile leak depending on date of intervention. Surgical   follow-up recommended. A HIDA scan would be of further diagnostic   value to evaluate for bile leak if deemed clinically warranted.        Moderately high suspicion for subsegmental pulmonary embolism within   right lower lobe. CT pulmonary embolism protocol recommended for   further evaluation.        Small hiatal hernia.        MACRO:   Shemar Caicedo discussed the significance and urgency of this   critical finding via [id number] with  KARI MILES on 1/8/2025 at   7:06 pm.  (**-RCF-**) Findings:  See findings.        Signed by: Shemar Caicedo 1/8/2025 7:07 PM   Dictation workstation:   [id number]      IR biliary cholangiogram    (Results Pending)          Assessment/Plan     This is a 54 y.o. male presenting with a past medical history of PE (previously on Eliquis), anemia, BPH, GERD, HLD, LUC, polysubstance abuse, hypocalcemia, ED, aneurysm of the splenic artery, iron deficiency anemia, vitamin D deficiency, encephalopathy, seizure, CAD, HTN, and chronic back pain. Presented to [facility] 12/27 with abdominal pain. Found to have acalculous acute cholecystitis (images not available) per the report found  from [facility], images requested. Surgery deemed he was not a surgical candidate and a cholecystostomy drain was placed 12/27 by Dr. Frank. He was discharged on PO antibiotics with the cholecystostomy drain in place and was to follow up with IR @[facility] in 6 weeks for a cholangiogram and possible drain removal.     ED work up was significant for an elevated WBC count 15.5, ANC 12.3, BC X 2 were drawn and are pending, and had CT A/P and CT angio chest which were personally reviewed and discussed with IR attending. CT A/P is significant for a cholecystostomy drain which terminates in a decompressed gallbladder fundus with mild pericholecystic inflammation/fluid which extends down to the right paracolic gutter and pelvic recesses which could reflect a bile leak vs biloma. CT angio chest was significant for segmental/subsegmental pulmonary emboli in the right upper and right lower arteries without evidence of right heart strain.     Douglas was started on Heparin drip and IV antibiotics. He is amenable to a percutaneous drain cholangiogram and percutaneous aspiration/drain placement into the paracolic gutter fluid collection. He does not want to go back to [facility] for cholecystostomy drain removal. We can see him here in 6 weeks for this procedure. Please make NPO. Okay to continue with AC.    Treatment options were discussed with the patient. Risks of a percutaneous drain insertion were reviewed including but not limited to bleeding, infection, or damage to surrounding structures were reviewed. Benefits of the procedure and alternatives to treatment were also reviewed. Patient verbalizes understanding and wishes to proceed. They will further discuss with IR attending prior to procedure.     Planned Sedation/Anesthesia: Moderate    Airway assessment: normal    Mallampati: II (hard and soft palate, upper portion of tonsils and uvula visible)    ASA Score: ASA 3 - Patient with moderate systemic disease with functional  limitations      Herbert Genao, DONTE-CNP    Time : Billing Time  Prep time on date of the patient encounter: 15 minutes.   Time spent directly with patient/family/caregiver: 15 minutes.   Documentation time: 15 minutes.   Total time (minutes):  45 minutes  Time Spent with this Patient (minutes).  More than 50% of This Time was Spent in Counseling and/or Coordination of Care

## 2025-01-10 ENCOUNTER — APPOINTMENT (OUTPATIENT)
Dept: VASCULAR MEDICINE | Facility: HOSPITAL | Age: 55
End: 2025-01-10
Payer: COMMERCIAL

## 2025-01-10 ENCOUNTER — PHARMACY VISIT (OUTPATIENT)
Dept: PHARMACY | Facility: CLINIC | Age: 55
End: 2025-01-10
Payer: COMMERCIAL

## 2025-01-10 VITALS
TEMPERATURE: 99.1 F | WEIGHT: 164 LBS | DIASTOLIC BLOOD PRESSURE: 77 MMHG | SYSTOLIC BLOOD PRESSURE: 130 MMHG | HEIGHT: 69 IN | OXYGEN SATURATION: 97 % | RESPIRATION RATE: 17 BRPM | BODY MASS INDEX: 24.29 KG/M2 | HEART RATE: 89 BPM

## 2025-01-10 LAB
ALBUMIN SERPL BCP-MCNC: 3.6 G/DL (ref 3.4–5)
ALP SERPL-CCNC: 53 U/L (ref 33–120)
ALT SERPL W P-5'-P-CCNC: 24 U/L (ref 10–52)
ANION GAP SERPL CALC-SCNC: 12 MMOL/L (ref 10–20)
AST SERPL W P-5'-P-CCNC: 15 U/L (ref 9–39)
BILIRUB SERPL-MCNC: 0.5 MG/DL (ref 0–1.2)
BUN SERPL-MCNC: 7 MG/DL (ref 6–23)
CALCIUM SERPL-MCNC: 8.8 MG/DL (ref 8.6–10.3)
CHLORIDE SERPL-SCNC: 105 MMOL/L (ref 98–107)
CO2 SERPL-SCNC: 26 MMOL/L (ref 21–32)
CREAT SERPL-MCNC: 1.02 MG/DL (ref 0.5–1.3)
EGFRCR SERPLBLD CKD-EPI 2021: 87 ML/MIN/1.73M*2
GLUCOSE SERPL-MCNC: 120 MG/DL (ref 74–99)
POTASSIUM SERPL-SCNC: 3.9 MMOL/L (ref 3.5–5.3)
PROT SERPL-MCNC: 6.2 G/DL (ref 6.4–8.2)
SODIUM SERPL-SCNC: 139 MMOL/L (ref 136–145)
UFH PPP CHRO-ACNC: 0.3 IU/ML

## 2025-01-10 PROCEDURE — 2500000001 HC RX 250 WO HCPCS SELF ADMINISTERED DRUGS (ALT 637 FOR MEDICARE OP): Performed by: NURSE PRACTITIONER

## 2025-01-10 PROCEDURE — 85520 HEPARIN ASSAY: CPT | Performed by: INTERNAL MEDICINE

## 2025-01-10 PROCEDURE — 2500000002 HC RX 250 W HCPCS SELF ADMINISTERED DRUGS (ALT 637 FOR MEDICARE OP, ALT 636 FOR OP/ED): Performed by: NURSE PRACTITIONER

## 2025-01-10 PROCEDURE — 93970 EXTREMITY STUDY: CPT

## 2025-01-10 PROCEDURE — 99233 SBSQ HOSP IP/OBS HIGH 50: CPT | Performed by: NURSE PRACTITIONER

## 2025-01-10 PROCEDURE — 2500000004 HC RX 250 GENERAL PHARMACY W/ HCPCS (ALT 636 FOR OP/ED): Performed by: NURSE PRACTITIONER

## 2025-01-10 PROCEDURE — 99239 HOSP IP/OBS DSCHRG MGMT >30: CPT | Performed by: INTERNAL MEDICINE

## 2025-01-10 PROCEDURE — 93970 EXTREMITY STUDY: CPT | Performed by: INTERNAL MEDICINE

## 2025-01-10 PROCEDURE — RXMED WILLOW AMBULATORY MEDICATION CHARGE

## 2025-01-10 PROCEDURE — 99232 SBSQ HOSP IP/OBS MODERATE 35: CPT

## 2025-01-10 PROCEDURE — 1800000001 HC LEAVE OF ABSENSE - GENERAL CLASSIFICATION

## 2025-01-10 PROCEDURE — 80053 COMPREHEN METABOLIC PANEL: CPT | Performed by: NURSE PRACTITIONER

## 2025-01-10 PROCEDURE — 36415 COLL VENOUS BLD VENIPUNCTURE: CPT | Performed by: NURSE PRACTITIONER

## 2025-01-10 RX ORDER — DOCUSATE SODIUM 100 MG/1
100 CAPSULE, LIQUID FILLED ORAL 2 TIMES DAILY
Status: DISCONTINUED | OUTPATIENT
Start: 2025-01-10 | End: 2025-01-10 | Stop reason: HOSPADM

## 2025-01-10 RX ORDER — ACETAMINOPHEN 325 MG/1
650 TABLET ORAL EVERY 6 HOURS PRN
COMMUNITY
Start: 2025-01-10 | End: 2025-01-10 | Stop reason: HOSPADM

## 2025-01-10 RX ORDER — SODIUM CHLORIDE 0.9 % (FLUSH) 0.9 %
10 SYRINGE (ML) INJECTION EVERY 8 HOURS SCHEDULED
Qty: 900 ML | Refills: 0 | Status: SHIPPED | OUTPATIENT
Start: 2025-01-10

## 2025-01-10 RX ORDER — OXYCODONE AND ACETAMINOPHEN 5; 325 MG/1; MG/1
1 TABLET ORAL EVERY 6 HOURS PRN
Qty: 8 TABLET | Refills: 0 | Status: SHIPPED | OUTPATIENT
Start: 2025-01-10

## 2025-01-10 RX ORDER — AMOXICILLIN AND CLAVULANATE POTASSIUM 875; 125 MG/1; MG/1
1 TABLET, FILM COATED ORAL 2 TIMES DAILY
Qty: 20 TABLET | Refills: 0 | Status: SHIPPED | OUTPATIENT
Start: 2025-01-10 | End: 2025-01-20

## 2025-01-10 RX ADMIN — PIPERACILLIN SODIUM AND TAZOBACTAM SODIUM 3.38 G: 3; .375 INJECTION, SOLUTION INTRAVENOUS at 11:37

## 2025-01-10 RX ADMIN — HYDROMORPHONE HYDROCHLORIDE 0.2 MG: 0.2 INJECTION, SOLUTION INTRAMUSCULAR; INTRAVENOUS; SUBCUTANEOUS at 08:15

## 2025-01-10 RX ADMIN — AMLODIPINE BESYLATE 10 MG: 10 TABLET ORAL at 08:15

## 2025-01-10 RX ADMIN — HYDROMORPHONE HYDROCHLORIDE 0.2 MG: 0.2 INJECTION, SOLUTION INTRAMUSCULAR; INTRAVENOUS; SUBCUTANEOUS at 11:37

## 2025-01-10 RX ADMIN — PIPERACILLIN SODIUM AND TAZOBACTAM SODIUM 3.38 G: 3; .375 INJECTION, SOLUTION INTRAVENOUS at 01:13

## 2025-01-10 RX ADMIN — DOCUSATE SODIUM 100 MG: 100 CAPSULE, LIQUID FILLED ORAL at 11:37

## 2025-01-10 RX ADMIN — HYDROMORPHONE HYDROCHLORIDE 0.2 MG: 0.2 INJECTION, SOLUTION INTRAMUSCULAR; INTRAVENOUS; SUBCUTANEOUS at 15:25

## 2025-01-10 RX ADMIN — PANTOPRAZOLE SODIUM 40 MG: 40 TABLET, DELAYED RELEASE ORAL at 06:32

## 2025-01-10 RX ADMIN — PIPERACILLIN SODIUM AND TAZOBACTAM SODIUM 3.38 G: 3; .375 INJECTION, SOLUTION INTRAVENOUS at 05:39

## 2025-01-10 RX ADMIN — TAMSULOSIN HYDROCHLORIDE 0.4 MG: 0.4 CAPSULE ORAL at 08:15

## 2025-01-10 RX ADMIN — HYDROMORPHONE HYDROCHLORIDE 0.2 MG: 0.2 INJECTION, SOLUTION INTRAMUSCULAR; INTRAVENOUS; SUBCUTANEOUS at 05:03

## 2025-01-10 RX ADMIN — HYDROMORPHONE HYDROCHLORIDE 0.2 MG: 0.2 INJECTION, SOLUTION INTRAMUSCULAR; INTRAVENOUS; SUBCUTANEOUS at 01:13

## 2025-01-10 ASSESSMENT — COGNITIVE AND FUNCTIONAL STATUS - GENERAL
MOBILITY SCORE: 24
DAILY ACTIVITIY SCORE: 24

## 2025-01-10 ASSESSMENT — PAIN SCALES - GENERAL
PAINLEVEL_OUTOF10: 8
PAINLEVEL_OUTOF10: 8
PAINLEVEL_OUTOF10: 0 - NO PAIN
PAINLEVEL_OUTOF10: 7
PAINLEVEL_OUTOF10: 8
PAINLEVEL_OUTOF10: 7
PAINLEVEL_OUTOF10: 0 - NO PAIN
PAINLEVEL_OUTOF10: 2
PAINLEVEL_OUTOF10: 0 - NO PAIN

## 2025-01-10 ASSESSMENT — PAIN DESCRIPTION - LOCATION
LOCATION: ABDOMEN
LOCATION: ABDOMEN

## 2025-01-10 ASSESSMENT — PAIN DESCRIPTION - ORIENTATION
ORIENTATION: RIGHT

## 2025-01-10 NOTE — DISCHARGE INSTRUCTIONS
Flushing Percutaneous Drain- Patient and Family Education    An Interventional Radiologist has placed a drain. Your doctor has indicated the need for your  drain to be flushed as regular maintenance. The following instruction will provide a guideline to  follow to decrease the risk of complications while caring for your drain.    Drain Type: Percutaneous Cholecystostomy tube    Care of your Device:  ? Your doctor has indicated that you may need a flush your drain tube.  ? Flush the drain: 2-3 times per day with 10ML normal saline    To Flush your Drain:  ? Clean hands with soap and water or antimicrobial skin .  ? You may need the following supplies:  o Saline  o 10cc syringe  o Alcohol wipes  o Extra stopcock  o Stopcock caps  o Extra drainage bag    To Flush with Stopcock:  ? Use 10ml saline filling syringe as prescribed.  ? Take cap off top of syringe and clean it with alcohol. Also do not let the open tip of the  syringe touch anything.  ? Clean top of stopcock with alcohol.  ? Remove cap covering open port on stopcock (place cap on clean surface).  ? Attach syringe to open port on stopcock.  ? Turn stopcock so that the “off” arm is facing the drainage bag.  ? Flush 5ml of saline into tube.  ? Push on the plunger of the syringe and flush 5cc of saline into the drainage bag.  ? Turn stopcock “off” arm back facing the open port where the syringe is attached.  ? Remove the syringe and place a new cap.  ? Clean hands with soap and water or antimicrobial skin .     How to Reach your Doctor:    Call [phone] with problems or questions.     This info is a general resource. It is not meant to replace your health care provider’s advice.  Ask your doctor or health care team any questions. Always follow their instructions.

## 2025-01-10 NOTE — PROGRESS NOTES
"Subjective   He is doing well clinically. IR up sized drain yesterday. Pain improved.    Objective   PE:  Constitutional: A&Ox3, calm and cooperative, NAD  Cardiovascular: Normal rate and regular rhythm.  Respiratory/Thorax: Good symmetric chest expansion. No labored breathing.  Gastrointestinal: Abdomen nondistended, soft, RUQ tenderness, no peritoneal signs, laparoscopy sites, new cholecystostomy with bilious output in pouch, 250cc documented today  Extremities: No peripheral edema  Neurological: A&Ox3, No focal deficits  Psychological: Appropriate mood and behavior  Skin: Warm and dry    Last Recorded Vitals  Blood pressure 130/77, pulse 79, temperature 37.3 °C (99.1 °F), temperature source Oral, resp. rate 18, height 1.753 m (5' 9\"), weight 74.4 kg (164 lb), SpO2 97%.  Intake/Output last 3 Shifts:  I/O last 3 completed shifts:  In: 1518.6 (20.4 mL/kg) [P.O.:350; I.V.:18.6 (0.3 mL/kg); IV Piggyback:1150]  Out: 0 (0 mL/kg)   Dosing Weight: 74.4 kg     Relevant Results  Scheduled medications  amLODIPine, 10 mg, oral, Daily  atorvastatin, 10 mg, oral, Nightly  docusate sodium, 100 mg, oral, BID  pantoprazole, 40 mg, oral, Daily before breakfast  piperacillin-tazobactam, 3.375 g, intravenous, q6h  polyethylene glycol, 17 g, oral, Daily  tamsulosin, 0.4 mg, oral, Daily      Continuous medications  heparin, 0-4,500 Units/hr, Last Rate: 1,100 Units/hr (01/10/25 0927)      PRN medications  PRN medications: acetaminophen, alum-mag hydroxide-simeth, heparin, HYDROmorphone, melatonin, ondansetron, traMADol    Results for orders placed or performed during the hospital encounter of 01/08/25 (from the past 24 hours)   Heparin Assay   Result Value Ref Range    Heparin Unfractionated 0.4 See Comment Below for Therapeutic Ranges IU/mL   SST TOP   Result Value Ref Range    Extra Tube Hold for add-ons.    Comprehensive Metabolic Panel   Result Value Ref Range    Glucose 120 (H) 74 - 99 mg/dL    Sodium 139 136 - 145 mmol/L    " Potassium 3.9 3.5 - 5.3 mmol/L    Chloride 105 98 - 107 mmol/L    Bicarbonate 26 21 - 32 mmol/L    Anion Gap 12 10 - 20 mmol/L    Urea Nitrogen 7 6 - 23 mg/dL    Creatinine 1.02 0.50 - 1.30 mg/dL    eGFR 87 >60 mL/min/1.73m*2    Calcium 8.8 8.6 - 10.3 mg/dL    Albumin 3.6 3.4 - 5.0 g/dL    Alkaline Phosphatase 53 33 - 120 U/L    Total Protein 6.2 (L) 6.4 - 8.2 g/dL    AST 15 9 - 39 U/L    Bilirubin, Total 0.5 0.0 - 1.2 mg/dL    ALT 24 10 - 52 U/L   Heparin Assay   Result Value Ref Range    Heparin Unfractionated 0.3 See Comment Below for Therapeutic Ranges IU/mL     Assessment/Plan     Pain with RUQ pain s/p cholecystostomy at [facility] was discovered to have possible biliary leak/biloma. He is s/p IR who exchanged the drain to a larger size.    Plan:  - Regular diet  - He has not gotten a CBC although ordered, I called phlebotomy who said they will be drawing CBC later today, would like to trend his WBC  - Continue broad spectrum abx  - IVF  - Supportive care    Discussed with Dr Maldonado.    I spent 35 minutes in the professional and overall care of this patient.    Manjit Philippe PA-C

## 2025-01-10 NOTE — DISCHARGE SUMMARY
Discharge Diagnosis  Acute pulmonary embolism, unspecified pulmonary embolism type, unspecified whether acute cor pulmonale present (Multi)    Issues Requiring Follow-Up  Follow-up with surgery  Follow-up with opioid withdrawal treatment program  Clinic    Test Results Pending At Discharge  Pending Labs       Order Current Status    Blood Culture Preliminary result    Blood Culture Preliminary result            Hospital Course  Patient with a past medical history of polysubstance abuse benign prostatic hypertrophy history of coronary artery disease history of pulmonary embolism last year history of splenic artery aneurysm with upper GI bleed who had a recent presentation to [facility] for acute cholecystitis where he had a cholecystectomy done he was discharged on oral antibiotics which was complicated by pericholecystic collection needing a drain now comes in with worsening abdominal pain and concerns of biliary leakage   Was biliary drain was fixed by interventional radiology  Patient did have leukocytosis but he declined to get any more blood draws done  The heparin drip was continued for pulmonary embolism  Dopplers will confirm a left lower leg DVT  This being his second episode of pulmonary embolism we would recommend lifelong anticoagulation  Will switch to oral Eliquis  Will discharge patient on oral Augmentin  Limited pain medications were given because of the patient's history of substance abuse  Encourage patient to reestablish with opioid withdrawal program at the clinic chronic    Pertinent Physical Exam At Time of Discharge  Physical Exam      Constitutional   General appearance: Alert and in no acute distress.     Pulmonary   Respiratory assessment: No respiratory distress, normal respiratory rhythm and effort.    Auscultation of Lungs: Clear bilateral breath sounds.   Cardiovascular   Auscultation of heart: Apical pulse normal, heart rate and rhythm normal, normal S1 and S2, no murmurs and no  pericardial rub.    Exam for edema: No peripheral edema.   Abdomen   Tenderness with drain intact  Musculoskeletal   Examination of gait: Normal.    Inspection of digits and nails: No clubbing or cyanosis of the fingernails.    Inspection/palpation of joints, bones and muscles: No joint swelling. Normal movement of all extremities.   Skin   Skin inspection: Normal skin color and pigmentation, normal skin turgor and no visible rash.   Neurologic   Cranial nerves: Nerves 2-12 were intact, no focal neuro defects.    Home Medications     Medication List      START taking these medications     amoxicillin-pot clavulanate 875-125 mg tablet; Commonly known as:   Augmentin; Take 1 tablet by mouth 2 times a day for 10 days.   apixaban 5 mg (74 tabs) tablet; Commonly known as: Eliquis; Take 2   tablets (10 mg) by mouth 2 times a day for 7 days, then take 1 tablet (5   mg) by mouth 2 times a day.   oxyCODONE-acetaminophen 5-325 mg tablet; Commonly known as: Percocet;   Take 1 tablet by mouth every 6 hours if needed for severe pain (7 - 10).   * sodium chloride 0.9% flush; Flush biliary drain twice daily using 10   mL by intra-catheter route as directed.   * Normal Saline Flush flush; Generic drug: sodium chloride 0.9%; Flush   biliary drain every 8 hours using 10 mL by intra-catheter route as   directed.  * This list has 2 medication(s) that are the same as other medications   prescribed for you. Read the directions carefully, and ask your doctor or   other care provider to review them with you.     CONTINUE taking these medications     amLODIPine 10 mg tablet; Commonly known as: Norvasc; Take 1 tablet (10   mg) by mouth once daily.   atorvastatin 10 mg tablet; Commonly known as: Lipitor; Take 1 tablet (10   mg) by mouth once daily at bedtime.   ferrous sulfate (325 mg ferrous sulfate) tablet; Commonly known as: Iron   (ferrous sulfate); Take 1 tablet by mouth once daily with breakfast.   omeprazole 40 mg DR capsule; Commonly  known as: PriLOSEC; Take 1 capsule   (40 mg) by mouth once daily in the morning. Take before meals. Do not   crush or chew.   pantoprazole 40 mg EC tablet; Commonly known as: ProtoNix   sildenafil 100 mg tablet; Commonly known as: Viagra; Take 1 tablet (100   mg) by mouth once daily as needed for erectile dysfunction.   tamsulosin 0.4 mg 24 hr capsule; Commonly known as: Flomax; Take 1   capsule (0.4 mg) by mouth once daily.     STOP taking these medications     levoFLOXacin 500 mg tablet; Commonly known as: Levaquin   tadalafil 5 mg tablet; Commonly known as: Cialis       Outpatient Follow-Up  Future Appointments   Date Time Provider Department Center   2/13/2025  8:00 AM Nubia Diego MD [id number] [facility]   3/18/2025  9:00 AM Analilia Webster MD [id number] [facility]   3/20/2025  8:00 AM Cherelle Davison MD, MS [id number] [place]     Patient seen at bedside. Events from the last visit reviewed. Discussed with staff. Results of tests and investigations from last visit reviewed and discussed with patient/Family. Electronic chart on [facility] reviewed. Input / Recommendations  from consultants  appreciated and reviewed and agreed with.     discharge summary and profile completed. medications reviewed and discussed with patient and family.  scripts completed and signed.     total discharge time in excess of 30 minutes.      Anuja Corado MD

## 2025-01-10 NOTE — PROGRESS NOTES
"Subjective   Interval History: complains of a \"pinching\" pain at the drain insertion site. Drain cholangiogram was completed yesterday without notable bile leak. Drain was exchanged, with small amount of bilious output today.     Objective   Vital signs in last 24 hours:  /81 (BP Location: Right arm, Patient Position: Lying)   Pulse 70   Temp 37.1 °C (98.8 °F) (Oral)   Resp 18   Wt 74.4 kg (164 lb)   SpO2 97%     Intake/Output last 3 shifts:  I/O last 3 completed shifts:  In: 1518.6 (20.4 mL/kg) [P.O.:350; I.V.:18.6 (0.3 mL/kg); IV Piggyback:1150]  Out: 0 (0 mL/kg)   Dosing Weight: 74.4 kg   Intake/Output this shift:  I/O this shift:  In: -   Out: 250 [Drains:250]    Physical Exam  Neuro: oriented to person, place, time, and general circumstances  HEENT: normocephalic, atraumatic  Pulm: normal  Cardiac: Regular rate and rhythm  Abdomen: soft, nontender, normal bowel sounds- Dressing changed. Flushes easily. Small amount of bilious output noted.   Pulses: 2+ and symmetric    Relevant Results  LABS:  Lab Results   Component Value Date    WBC 15.5 (H) 01/08/2025    HGB 12.8 (L) 01/08/2025    HCT 39.5 (L) 01/08/2025    MCV 72 (L) 01/08/2025     01/08/2025      Results from last 72 hours   Lab Units 01/10/25  0820   SODIUM mmol/L 139   POTASSIUM mmol/L 3.9   CHLORIDE mmol/L 105   CO2 mmol/L 26   BUN mg/dL 7   CREATININE mg/dL 1.02   GLUCOSE mg/dL 120*   CALCIUM mg/dL 8.8   ANION GAP mmol/L 12   EGFR mL/min/1.73m*2 87     Results from last 72 hours   Lab Units 01/10/25  0820   ALK PHOS U/L 53   BILIRUBIN TOTAL mg/dL 0.5   PROTEIN TOTAL g/dL 6.2*   ALT U/L 24   AST U/L 15   ALBUMIN g/dL 3.6     Results from last 72 hours   Lab Units 01/08/25 2124   INR  1.0       MICRO:  No results found for the last 14 days.      IMAGING:  Lower extremity venous duplex bilateral         CT angio chest for pulmonary embolism   Final Result   1. A pair of segmental/subsegmental pulmonary emboli are present in   the " arteries supplying the right upper and right lower lobe, without   evidence of large central embolism or right heart strain.   2. Small hiatal hernia.        MACRO:   None        Signed by: Gay Deutsch 1/8/2025 8:37 PM   Dictation workstation:   [id number]      CT abdomen pelvis w IV contrast   Final Result   Percutaneous cholecystostomy tube terminates appropriately in the   gallbladder fundus with decompressed state of the gallbladder. There   is mild pericholecystic inflammation/fluid but slightly larger amount   of confluent non loculated perihepatic fluid extending into the right   paracolic gutter and dependent pelvic recesses. Findings may relate   to a persistent bile leak depending on date of intervention. Surgical   follow-up recommended. A HIDA scan would be of further diagnostic   value to evaluate for bile leak if deemed clinically warranted.        Moderately high suspicion for subsegmental pulmonary embolism within   right lower lobe. CT pulmonary embolism protocol recommended for   further evaluation.        Small hiatal hernia.        MACRO:   Shemar Caicedo discussed the significance and urgency of this   critical finding via [employer] [facility] with  KARI MILES on 1/8/2025 at   7:06 pm.  (**-F-**) Findings:  See findings.        Signed by: Shemar Caicedo 1/8/2025 7:07 PM   Dictation workstation:   [id number]      IR biliary cholangiogram    (Results Pending)   US guided percutaneous placement    (Results Pending)   IR biliary cholangiogram    (Results Pending)       Assessment/Plan   This is a 54 y.o. male presenting with abdominal pain. Prior admission to [facility]  12/27 found to have acalculous acute cholecystitis. . Surgery deemed he was not a surgical candidate and a cholecystostomy drain was placed 12/27 by Dr. Frank. He was discharged on PO antibiotics with the cholecystostomy drain in place .    ED work up was significant for leukocytosis. CT A/P and CT angio chest which were  personally reviewed with mild pericholecystic inflammation/fluid which extends down to the right paracolic gutter which could reflect a bile leak vs biloma. CT angio chest was significant for segmental/subsegmental pulmonary emboli in the right upper and right lower arteries.      Started on heparin gtt and IV abx. Underwent a tube cholangiogram 1/9/24.   No bile leak or appreciable fluid collection seen. The cystic duct appeared patent. Drain was exchanged and placed to suction to prevent bile leak.    Continue to flush drain Q8rhs with 10ml NS.   Empty and record output Qshift.   Anticipate low drain output as the cystic duct is patent.     Follow-up with IR in 4-6 weeks for repeat cholangiogram and drain removal.   Discharge orders completed.      LOS: 2 days     Nora Garza, APRN-CNP

## 2025-01-10 NOTE — SIGNIFICANT EVENT
Discussed plan of care with Dr. Corado, Dr. Maldonado, IR (Nora [facility]).    Patient declining further blood draws despite noted leukocytosis on arrival.  He is feeling better, tolerating a diet without nausea/vomiting.    He had some pink tinged drainage in his biliary drain tubing this afternoon- discussed with IR and this is expected given the exchange yesterday and being on the heparin gtt.      He is cleared for discharge per Dr. Corado and Dr. Maldonado.  Discharged on oral augmentin and eliquis.  Discussed pain regimen with Dr. Corado due to patient's history of polysubstance abuse; will discharge on limited pain medications to treat abdominal pain/cholecystitis.

## 2025-01-12 LAB
BACTERIA BLD CULT: NORMAL
BACTERIA BLD CULT: NORMAL

## 2025-01-13 LAB
BACTERIA BLD CULT: NORMAL
BACTERIA BLD CULT: NORMAL

## 2025-01-14 ENCOUNTER — APPOINTMENT (OUTPATIENT)
Dept: RADIOLOGY | Facility: HOSPITAL | Age: 55
End: 2025-01-14
Payer: COMMERCIAL

## 2025-01-14 ENCOUNTER — APPOINTMENT (OUTPATIENT)
Dept: CARDIOLOGY | Facility: HOSPITAL | Age: 55
End: 2025-01-14
Payer: COMMERCIAL

## 2025-01-14 ENCOUNTER — HOSPITAL ENCOUNTER (EMERGENCY)
Facility: HOSPITAL | Age: 55
Discharge: HOME | End: 2025-01-14
Attending: EMERGENCY MEDICINE
Payer: COMMERCIAL

## 2025-01-14 VITALS
RESPIRATION RATE: 16 BRPM | OXYGEN SATURATION: 98 % | SYSTOLIC BLOOD PRESSURE: 102 MMHG | HEIGHT: 69 IN | HEART RATE: 65 BPM | WEIGHT: 165 LBS | BODY MASS INDEX: 24.44 KG/M2 | DIASTOLIC BLOOD PRESSURE: 92 MMHG | TEMPERATURE: 99.8 F

## 2025-01-14 DIAGNOSIS — R20.2 PARESTHESIA OF BOTH HANDS: ICD-10-CM

## 2025-01-14 DIAGNOSIS — R20.2 PARESTHESIA OF BOTH FEET: Primary | ICD-10-CM

## 2025-01-14 LAB
ALBUMIN SERPL BCP-MCNC: 4.2 G/DL (ref 3.4–5)
ALP SERPL-CCNC: 55 U/L (ref 33–120)
ALT SERPL W P-5'-P-CCNC: 16 U/L (ref 10–52)
ANION GAP SERPL CALC-SCNC: 12 MMOL/L (ref 10–20)
APPEARANCE UR: CLEAR
AST SERPL W P-5'-P-CCNC: 14 U/L (ref 9–39)
ATRIAL RATE: 79 BPM
BASOPHILS # BLD AUTO: 0.05 X10*3/UL (ref 0–0.1)
BASOPHILS NFR BLD AUTO: 0.4 %
BILIRUB SERPL-MCNC: 0.4 MG/DL (ref 0–1.2)
BILIRUB UR STRIP.AUTO-MCNC: NEGATIVE MG/DL
BNP SERPL-MCNC: 29 PG/ML (ref 0–99)
BUN SERPL-MCNC: 6 MG/DL (ref 6–23)
CALCIUM SERPL-MCNC: 9.2 MG/DL (ref 8.6–10.3)
CARDIAC TROPONIN I PNL SERPL HS: 3 NG/L (ref 0–20)
CARDIAC TROPONIN I PNL SERPL HS: <3 NG/L (ref 0–20)
CHLORIDE SERPL-SCNC: 105 MMOL/L (ref 98–107)
CO2 SERPL-SCNC: 28 MMOL/L (ref 21–32)
COLOR UR: ABNORMAL
CREAT SERPL-MCNC: 0.93 MG/DL (ref 0.5–1.3)
EGFRCR SERPLBLD CKD-EPI 2021: >90 ML/MIN/1.73M*2
EOSINOPHIL # BLD AUTO: 0.39 X10*3/UL (ref 0–0.7)
EOSINOPHIL NFR BLD AUTO: 3.5 %
ERYTHROCYTE [DISTWIDTH] IN BLOOD BY AUTOMATED COUNT: 17.4 % (ref 11.5–14.5)
FLUAV RNA RESP QL NAA+PROBE: NOT DETECTED
FLUBV RNA RESP QL NAA+PROBE: NOT DETECTED
GLUCOSE SERPL-MCNC: 105 MG/DL (ref 74–99)
GLUCOSE UR STRIP.AUTO-MCNC: NORMAL MG/DL
HCT VFR BLD AUTO: 39 % (ref 41–52)
HGB BLD-MCNC: 12.5 G/DL (ref 13.5–17.5)
IMM GRANULOCYTES # BLD AUTO: 0.04 X10*3/UL (ref 0–0.7)
IMM GRANULOCYTES NFR BLD AUTO: 0.4 % (ref 0–0.9)
KETONES UR STRIP.AUTO-MCNC: NEGATIVE MG/DL
LEUKOCYTE ESTERASE UR QL STRIP.AUTO: NEGATIVE
LIPASE SERPL-CCNC: 63 U/L (ref 9–82)
LYMPHOCYTES # BLD AUTO: 2.24 X10*3/UL (ref 1.2–4.8)
LYMPHOCYTES NFR BLD AUTO: 19.9 %
MCH RBC QN AUTO: 23.4 PG (ref 26–34)
MCHC RBC AUTO-ENTMCNC: 32.1 G/DL (ref 32–36)
MCV RBC AUTO: 73 FL (ref 80–100)
MONOCYTES # BLD AUTO: 0.66 X10*3/UL (ref 0.1–1)
MONOCYTES NFR BLD AUTO: 5.9 %
MUCOUS THREADS #/AREA URNS AUTO: ABNORMAL /LPF
NEUTROPHILS # BLD AUTO: 7.85 X10*3/UL (ref 1.2–7.7)
NEUTROPHILS NFR BLD AUTO: 69.9 %
NITRITE UR QL STRIP.AUTO: NEGATIVE
NRBC BLD-RTO: 0 /100 WBCS (ref 0–0)
P AXIS: 72 DEGREES
P OFFSET: 202 MS
P ONSET: 151 MS
PH UR STRIP.AUTO: 6 [PH]
PLATELET # BLD AUTO: 356 X10*3/UL (ref 150–450)
POTASSIUM SERPL-SCNC: 3.8 MMOL/L (ref 3.5–5.3)
PR INTERVAL: 148 MS
PROT SERPL-MCNC: 6.9 G/DL (ref 6.4–8.2)
PROT UR STRIP.AUTO-MCNC: ABNORMAL MG/DL
Q ONSET: 225 MS
QRS COUNT: 13 BEATS
QRS DURATION: 76 MS
QT INTERVAL: 362 MS
QTC CALCULATION(BAZETT): 415 MS
QTC FREDERICIA: 396 MS
R AXIS: -17 DEGREES
RBC # BLD AUTO: 5.34 X10*6/UL (ref 4.5–5.9)
RBC # UR STRIP.AUTO: ABNORMAL /UL
RBC #/AREA URNS AUTO: >20 /HPF
RSV RNA RESP QL NAA+PROBE: NOT DETECTED
SARS-COV-2 RNA RESP QL NAA+PROBE: NOT DETECTED
SODIUM SERPL-SCNC: 141 MMOL/L (ref 136–145)
SP GR UR STRIP.AUTO: >1.05
SQUAMOUS #/AREA URNS AUTO: ABNORMAL /HPF
T AXIS: 53 DEGREES
T OFFSET: 406 MS
TSH SERPL-ACNC: 0.56 MIU/L (ref 0.44–3.98)
UROBILINOGEN UR STRIP.AUTO-MCNC: NORMAL MG/DL
VENTRICULAR RATE: 79 BPM
WBC # BLD AUTO: 11.2 X10*3/UL (ref 4.4–11.3)
WBC #/AREA URNS AUTO: ABNORMAL /HPF

## 2025-01-14 PROCEDURE — 87637 SARSCOV2&INF A&B&RSV AMP PRB: CPT | Performed by: EMERGENCY MEDICINE

## 2025-01-14 PROCEDURE — 84484 ASSAY OF TROPONIN QUANT: CPT | Performed by: EMERGENCY MEDICINE

## 2025-01-14 PROCEDURE — 2500000001 HC RX 250 WO HCPCS SELF ADMINISTERED DRUGS (ALT 637 FOR MEDICARE OP): Performed by: EMERGENCY MEDICINE

## 2025-01-14 PROCEDURE — 71275 CT ANGIOGRAPHY CHEST: CPT

## 2025-01-14 PROCEDURE — 85025 COMPLETE CBC W/AUTO DIFF WBC: CPT | Performed by: EMERGENCY MEDICINE

## 2025-01-14 PROCEDURE — 93005 ELECTROCARDIOGRAM TRACING: CPT

## 2025-01-14 PROCEDURE — 74176 CT ABD & PELVIS W/O CONTRAST: CPT | Performed by: RADIOLOGY

## 2025-01-14 PROCEDURE — 81001 URINALYSIS AUTO W/SCOPE: CPT | Performed by: EMERGENCY MEDICINE

## 2025-01-14 PROCEDURE — 99285 EMERGENCY DEPT VISIT HI MDM: CPT | Mod: 25 | Performed by: EMERGENCY MEDICINE

## 2025-01-14 PROCEDURE — 36415 COLL VENOUS BLD VENIPUNCTURE: CPT | Performed by: EMERGENCY MEDICINE

## 2025-01-14 PROCEDURE — 70450 CT HEAD/BRAIN W/O DYE: CPT

## 2025-01-14 PROCEDURE — 71275 CT ANGIOGRAPHY CHEST: CPT | Performed by: RADIOLOGY

## 2025-01-14 PROCEDURE — 93970 EXTREMITY STUDY: CPT

## 2025-01-14 PROCEDURE — 83880 ASSAY OF NATRIURETIC PEPTIDE: CPT | Performed by: EMERGENCY MEDICINE

## 2025-01-14 PROCEDURE — 96374 THER/PROPH/DIAG INJ IV PUSH: CPT | Mod: 59 | Performed by: EMERGENCY MEDICINE

## 2025-01-14 PROCEDURE — 2500000004 HC RX 250 GENERAL PHARMACY W/ HCPCS (ALT 636 FOR OP/ED): Performed by: EMERGENCY MEDICINE

## 2025-01-14 PROCEDURE — 74176 CT ABD & PELVIS W/O CONTRAST: CPT

## 2025-01-14 PROCEDURE — 93970 EXTREMITY STUDY: CPT | Performed by: RADIOLOGY

## 2025-01-14 PROCEDURE — 84443 ASSAY THYROID STIM HORMONE: CPT | Performed by: EMERGENCY MEDICINE

## 2025-01-14 PROCEDURE — 84075 ASSAY ALKALINE PHOSPHATASE: CPT | Performed by: EMERGENCY MEDICINE

## 2025-01-14 PROCEDURE — 83690 ASSAY OF LIPASE: CPT | Performed by: EMERGENCY MEDICINE

## 2025-01-14 PROCEDURE — 70450 CT HEAD/BRAIN W/O DYE: CPT | Performed by: RADIOLOGY

## 2025-01-14 PROCEDURE — 2550000001 HC RX 255 CONTRASTS: Mod: 59 | Performed by: EMERGENCY MEDICINE

## 2025-01-14 RX ORDER — ACETAMINOPHEN 325 MG/1
975 TABLET ORAL ONCE
Status: COMPLETED | OUTPATIENT
Start: 2025-01-14 | End: 2025-01-14

## 2025-01-14 RX ORDER — AMOXICILLIN AND CLAVULANATE POTASSIUM 875; 125 MG/1; MG/1
1 TABLET, FILM COATED ORAL ONCE
Status: COMPLETED | OUTPATIENT
Start: 2025-01-14 | End: 2025-01-14

## 2025-01-14 RX ADMIN — HYDROMORPHONE HYDROCHLORIDE 0.5 MG: 1 INJECTION, SOLUTION INTRAMUSCULAR; INTRAVENOUS; SUBCUTANEOUS at 19:54

## 2025-01-14 RX ADMIN — IOHEXOL 75 ML: 350 INJECTION, SOLUTION INTRAVENOUS at 16:44

## 2025-01-14 RX ADMIN — APIXABAN 10 MG: 5 TABLET, FILM COATED ORAL at 22:37

## 2025-01-14 RX ADMIN — ACETAMINOPHEN 975 MG: 325 TABLET, FILM COATED ORAL at 19:54

## 2025-01-14 RX ADMIN — AMOXICILLIN AND CLAVULANATE POTASSIUM 1 TABLET: 875; 125 TABLET, FILM COATED ORAL at 22:37

## 2025-01-14 ASSESSMENT — COLUMBIA-SUICIDE SEVERITY RATING SCALE - C-SSRS
6. HAVE YOU EVER DONE ANYTHING, STARTED TO DO ANYTHING, OR PREPARED TO DO ANYTHING TO END YOUR LIFE?: NO
1. IN THE PAST MONTH, HAVE YOU WISHED YOU WERE DEAD OR WISHED YOU COULD GO TO SLEEP AND NOT WAKE UP?: NO
2. HAVE YOU ACTUALLY HAD ANY THOUGHTS OF KILLING YOURSELF?: NO

## 2025-01-14 ASSESSMENT — PAIN SCALES - GENERAL
PAINLEVEL_OUTOF10: 7
PAINLEVEL_OUTOF10: 0 - NO PAIN
PAINLEVEL_OUTOF10: 4
PAINLEVEL_OUTOF10: 4
PAINLEVEL_OUTOF10: 0 - NO PAIN
PAINLEVEL_OUTOF10: 0 - NO PAIN

## 2025-01-14 ASSESSMENT — PAIN DESCRIPTION - LOCATION: LOCATION: LEG

## 2025-01-14 ASSESSMENT — PAIN DESCRIPTION - ORIENTATION: ORIENTATION: RIGHT;LEFT

## 2025-01-14 ASSESSMENT — PAIN - FUNCTIONAL ASSESSMENT
PAIN_FUNCTIONAL_ASSESSMENT: 0-10
PAIN_FUNCTIONAL_ASSESSMENT: 0-10

## 2025-01-14 NOTE — ED PROVIDER NOTES
HPI   Chief Complaint   Patient presents with    Weakness, Gen    Numbness     X2 days    Shortness of Breath       The patient is a 54-year-old male past medical history of BPH, MI, splenic artery aneurysm, recent admission for complication of cholecystectomy requiring percutaneous manisha tube placement, also recently diagnosed pulmonary emboli on oral anticoagulation discharged on 1/10, notes that prior to discharge he had developed a painful tingling sensation in his bilateral hands and feet, notes that symptoms worsened since discharge.  Notes pain worse over his bilateral lower extremities mainly from the knees down.  Symptoms are not isolated to one side of his body or another.  Also notes feeling slightly generally weak and dyspneic with exertion since discharge from the hospital but denies any weakness on one side of his body compared to another.  Denies any falls, headache, vision changes, nausea or vomiting.  Notes compliance with home medication regimen and notes that his perc manisha drain has not demonstrated any change in output.  Denies any dysuria, hematuria, flank pain.  Denies any worsening extremity edema.  Denies symptoms being worse with lying flat.  Notes mild subjective fevers at home but otherwise denies any other acute complaints            Patient History   Past Medical History:   Diagnosis Date    Acute appendicitis without peritonitis 03/12/2024    BPH (benign prostatic hyperplasia)     Heroin abuse (Multi)     NSTEMI (non-ST elevated myocardial infarction) (Multi)     Pulmonary embolism (Multi)     Splenic artery aneurysm (CMS-HCC) 02/2024    rupture s/p coil embolization    Upper GI bleed 03/15/2024    EGD with esophagitis, no active bleeding     Past Surgical History:   Procedure Laterality Date    APPENDECTOMY  03/12/2024    CT ANGIO CORONARY ART WITH HEARTFLOW IF SCORE >30%  04/19/2022    OTHER SURGICAL HISTORY Bilateral 01/24/2022    Inguinal hernia repair laparoscopic    SPLENIC  ARTERY EMBOLIZATION  02/2024     Family History   Problem Relation Name Age of Onset    Diabetes Father      Hypertension Father      No Known Problems Son          raymond    Deafness Son      Other (orthopedic) Son          wearing braces to help with walking     Social History     Tobacco Use    Smoking status: Every Day     Current packs/day: 1.00     Types: Cigarettes    Smokeless tobacco: Never   Vaping Use    Vaping status: Not on file   Substance Use Topics    Alcohol use: Not Currently    Drug use: Not Currently     Types: Heroin     Comment: in past       Physical Exam   ED Triage Vitals   Temperature Heart Rate Respirations BP   01/14/25 1208 01/14/25 1208 01/14/25 1208 01/14/25 1209   36.5 °C (97.7 °F) 84 18 142/88      Pulse Ox Temp Source Heart Rate Source Patient Position   01/14/25 1208 01/14/25 1435 01/14/25 1435 --   98 % Oral Monitor       BP Location FiO2 (%)     01/14/25 1435 --     Left arm        Physical Exam  Vitals and nursing note reviewed.   Constitutional:       General: He is not in acute distress.     Appearance: Normal appearance. He is not ill-appearing or toxic-appearing.   HENT:      Head: Normocephalic and atraumatic.      Nose: No congestion or rhinorrhea.      Mouth/Throat:      Mouth: Mucous membranes are moist.      Pharynx: Oropharynx is clear. No oropharyngeal exudate or posterior oropharyngeal erythema.   Eyes:      Extraocular Movements: Extraocular movements intact.      Right eye: Normal extraocular motion.      Left eye: Normal extraocular motion.      Conjunctiva/sclera: Conjunctivae normal.      Pupils: Pupils are equal, round, and reactive to light.   Cardiovascular:      Rate and Rhythm: Normal rate and regular rhythm.      Pulses: Normal pulses.           Radial pulses are 2+ on the right side and 2+ on the left side.        Dorsalis pedis pulses are 2+ on the right side and 2+ on the left side.      Heart sounds: Normal heart sounds, S1 normal and S2 normal. No  murmur heard.     No friction rub. No gallop.   Pulmonary:      Effort: Pulmonary effort is normal. No respiratory distress.      Breath sounds: Normal breath sounds. No stridor. No decreased breath sounds, wheezing, rhonchi or rales.   Abdominal:      General: Abdomen is flat. Bowel sounds are normal. There is no distension.      Palpations: Abdomen is soft.      Tenderness: There is no abdominal tenderness. There is no right CVA tenderness, left CVA tenderness, guarding or rebound.       Musculoskeletal:      Cervical back: Full passive range of motion without pain.      Right lower leg: No edema.      Left lower leg: No edema.   Skin:     General: Skin is warm and dry.   Neurological:      General: No focal deficit present.      Mental Status: He is alert and oriented to person, place, and time.      GCS: GCS eye subscore is 4. GCS verbal subscore is 5. GCS motor subscore is 6.      Cranial Nerves: No cranial nerve deficit.      Sensory: Sensation is intact. No sensory deficit.      Motor: No weakness, tremor or abnormal muscle tone.      Comments: Diminished sensation over bilateral hands/ feet, intact sensation proximal to elbow/ knee bilaterally   Psychiatric:         Mood and Affect: Mood normal.           ED Course & MDM   ED Course as of 01/14/25 1708   Tue Jan 14, 2025   1518 ECG 12 lead  EKG ordered and interpreted by ED physician demonstrates sinus rhythm.  79 bpm.  Mild LVH but no acute ischemic findings.  Unremarkable ECG otherwise. [KS]      ED Course User Index  [KS] Willian Whalen MD MPH                 No data recorded     [place] Coma Scale Score: 15 (01/14/25 1209 : Johan Linares RN)                           Medical Decision Making  ECG: Sinus rhythm, rate 79, QRS 76, QTc 415.  No ST changes meeting STEMI criteria.  No evidence of preexcitation syndrome/dysrhythmia.  Peaked T waves laterally but appears unchanged from January 8, 2025 ECG.    Patient presenting with painful tingling over his  hands, legs ongoing over the last 2 days, on examination patient does have some diminished sensation over his hands and feet bilaterally although does not appear to have any sensory deficit proximal to the elbow/knee.  Has no motor deficit in any of his extremities.  Has good radial/dorsalis pedis pulses bilaterally suggesting against acute vascular pathology.  Suspicious for precipitating electrolyte/metabolic abnormality, given associated dyspnea/fatigue with recent discharge in the hospital also obtain CT angiography to assess for worsening pulmonary embolism burden.  Also plain CT abdomen and pelvis to assess for recurrent biliary leak/abscess given recent percutaneous manisha tube placement.  Will obtain urinalysis to assess for associated renal infection, will obtain viral testing as well.  Also obtain ECG, troponin to assess for acute cardiac pathology given reported fatigue/mild dyspnea.  Patient CT angiography shows improving pulmonary emboli, troponin/BNP unremarkable and ECG nonischemic.  CT angiography showing no pulmonary edema, focal infiltrate to explain symptoms.  CT abdomen and pelvis shows appropriately placed a drain without other acute intra-abdominal pathology.  Urinalysis showing no evidence of infection.  On reassessment patient does note some worsening pain in his feet, continues to demonstrate good pulses in the bilateral lower extremities, given recent diagnosis of DVT will add on duplex ultrasound of the bilateral lower extremities to assess for worsening clot burden explaining symptoms of these extremities, also obtain CT head as patient is anticoagulated with generalized weakness/sensory changes in his upper and lower extremities although distribution of symptoms seems highly atypical for an acute CNS process.  Otherwise would suspect peripheral neuropathy, if patient's duplex/CT of the head is unremarkable, feel it would be reasonable to manage as an outpatient although depends on  patient's ability to ambulate safely/tolerate oral food and fluids.  Will reassess after remainder of imaging results.  Signed out to oncoming physician pending remainder of imaging results, reassessment.          Procedure  Procedures     Winston Gaffney MD  01/14/25 1950

## 2025-01-14 NOTE — ED TRIAGE NOTES
TRIAGE NOTE   I saw the patient as the Clinician in Triage and performed a brief history and physical exam, established acuity, and ordered appropriate tests to develop basic plan of care. Patient will be seen by an [facility], resident and/or physician who will independently evaluate the patient. Please see subsequent provider notes for further details and disposition.     Brief HPI: In brief, Douglas Wilson is a 54 y.o. male that presents for feet tingling, shortness of breath.  54-year-old male, multiple medical problems, relatively recent cholecystectomy at outlying facility, eventual fluid collection found around that, seen at [facility] hospitalized for.  Cholecystic fluid collection with IR drain.  He developed apparently a DVT in the right lower extremity and PEs while in the hospital and was started on appropriate anticoagulation remains compliant with apixaban at this time.  He presents back saying that he is now since he is been discharged on 1/10/2024 develop bilateral feet tingly sensation in a stocking glove type distribution, as well as bilateral hand tingling again stocking glove type distribution.  He states he may have had slight sensation like this while hospitalized, but nothing was done about it and it seems to be somewhat worse.  He also complains of dyspnea feeling that has worsened since his discharge.  No cough sputum or hemoptysis denies chest pain.  Denies ongoing worsening abdominal pain states IR drain working well.    Focused Physical exam:   Vital signs relatively stable low-grade temperature 37.3.  No tachycardia.  No hypoxia 90% room air.  Heart regular.  Lungs to me are clear.  Abdomen soft and nontender.  Extremities are warm and dry.  DP pulses are palpable in both feet.  No gross asymmetry in size of the legs.  Subjective paresthesias in the feet and hands.  No obvious weakness in any location.  Cranial nerves grossly intact to assessment in triage room.  Plan/MDM:    Evaluate for paresthesia with baseline labs, evaluate for dyspnea with CT PE to ensure stability of the PE and not worsening.  Otherwise he appears nontoxic and likely if all unremarkable can be followed up as outpatient.       Please see subsequent provider note for further details and disposition

## 2025-01-14 NOTE — Clinical Note
Douglas Wilson was seen and treated in our emergency department on 1/14/2025.  He may return to work on 01/20/2025.       If you have any questions or concerns, please don't hesitate to call.      Winston Gaffney MD

## 2025-01-14 NOTE — ED TRIAGE NOTES
"Pt to ED with complaint of all four extremities feeling \"tingly\". St this has been ongoing for two days. St was recently DC from hospital and had bilateral PE. Also complaint of weakness and not having any energy. St was supposed to stay in the hospital longer but wanted to go home so left hospital early. Denies CP, endorses SOB. Arrives ambulatory and a/o x4. EKG completed.  "

## 2025-01-15 NOTE — PROGRESS NOTES
Emergency Medicine Transition of Care Note.    I received Douglas Wilson in signout from Dr. NELSON.  Please see the previous ED provider note for all HPI, PE and MDM up to the time of signout. This is in addition to the primary record.    In brief Douglas Wilson is an 54 y.o. male presenting for   Chief Complaint   Patient presents with    Weakness, Gen    Numbness     X2 days    Shortness of Breath     At the time of signout we were awaiting: CT us    ED Course as of 01/14/25 2313 Tue Jan 14, 2025   1518 ECG 12 lead  EKG ordered and interpreted by ED physician demonstrates sinus rhythm.  79 bpm.  Mild LVH but no acute ischemic findings.  Unremarkable ECG otherwise. [KS]      ED Course User Index  [KS] Willian Whalen MD MPH         Diagnoses as of 01/14/25 2313   Paresthesia of both feet   Paresthesia of both hands       Medical Decision Making  CT scan shows no larger clot burden in fact slightly smaller.  Ultrasound still shows a left leg DVT but has been less than 2 months since first diagnosis.  Patient prefers discharge home he requests a 5-day work note.    Final diagnoses:   [R20.2] Paresthesia of both feet   [R20.2] Paresthesia of both hands           Procedure  Procedures    José Luis Fuentes MD

## 2025-01-15 NOTE — DISCHARGE INSTRUCTIONS
Please return if you have weakness inability to walk or wiggle your toes.  Please return for evaluation at that time.  Otherwise follow-up with your primary doctor for outpatient MRI and eventually with a neurologist.

## 2025-01-16 ENCOUNTER — PATIENT OUTREACH (OUTPATIENT)
Dept: CARE COORDINATION | Facility: CLINIC | Age: 55
End: 2025-01-16
Payer: COMMERCIAL

## 2025-01-16 NOTE — PROGRESS NOTES
Outreach call to patient to support a smooth transition of care from recent admission.  Left voicemail message for patient with my contact information.    Ada Cole RN  [phone]

## 2025-01-18 LAB
ATRIAL RATE: 87 BPM
P AXIS: 69 DEGREES
P OFFSET: 205 MS
P ONSET: 142 MS
PR INTERVAL: 164 MS
Q ONSET: 224 MS
QRS COUNT: 15 BEATS
QRS DURATION: 84 MS
QT INTERVAL: 352 MS
QTC CALCULATION(BAZETT): 423 MS
QTC FREDERICIA: 398 MS
R AXIS: 0 DEGREES
T AXIS: 69 DEGREES
T OFFSET: 400 MS
VENTRICULAR RATE: 87 BPM

## 2025-01-24 ENCOUNTER — APPOINTMENT (OUTPATIENT)
Dept: RADIOLOGY | Facility: HOSPITAL | Age: 55
End: 2025-01-24
Payer: COMMERCIAL

## 2025-01-24 ENCOUNTER — HOSPITAL ENCOUNTER (INPATIENT)
Facility: HOSPITAL | Age: 55
End: 2025-01-24
Attending: EMERGENCY MEDICINE | Admitting: FAMILY MEDICINE
Payer: COMMERCIAL

## 2025-01-24 ENCOUNTER — APPOINTMENT (OUTPATIENT)
Dept: CARDIOLOGY | Facility: HOSPITAL | Age: 55
End: 2025-01-24
Payer: COMMERCIAL

## 2025-01-24 DIAGNOSIS — K81.0 CHOLECYSTITIS, ACUTE: ICD-10-CM

## 2025-01-24 DIAGNOSIS — R10.9 INTRACTABLE ABDOMINAL PAIN: Primary | ICD-10-CM

## 2025-01-24 DIAGNOSIS — I26.99 ACUTE PULMONARY EMBOLISM, UNSPECIFIED PULMONARY EMBOLISM TYPE, UNSPECIFIED WHETHER ACUTE COR PULMONALE PRESENT (MULTI): ICD-10-CM

## 2025-01-24 DIAGNOSIS — Z76.89 ENCOUNTER FOR CHOLECYSTECTOMY: ICD-10-CM

## 2025-01-24 LAB
ALBUMIN SERPL BCP-MCNC: 3.6 G/DL (ref 3.4–5)
ALP SERPL-CCNC: 53 U/L (ref 33–120)
ALT SERPL W P-5'-P-CCNC: 8 U/L (ref 10–52)
ANION GAP SERPL CALC-SCNC: 11 MMOL/L (ref 10–20)
AST SERPL W P-5'-P-CCNC: 21 U/L (ref 9–39)
BASOPHILS # BLD AUTO: 0.04 X10*3/UL (ref 0–0.1)
BASOPHILS NFR BLD AUTO: 0.3 %
BILIRUB DIRECT SERPL-MCNC: 0 MG/DL (ref 0–0.3)
BILIRUB SERPL-MCNC: 0.4 MG/DL (ref 0–1.2)
BNP SERPL-MCNC: 45 PG/ML (ref 0–99)
BUN SERPL-MCNC: 10 MG/DL (ref 6–23)
CALCIUM SERPL-MCNC: 8.6 MG/DL (ref 8.6–10.3)
CARDIAC TROPONIN I PNL SERPL HS: 3 NG/L (ref 0–20)
CHLORIDE SERPL-SCNC: 104 MMOL/L (ref 98–107)
CO2 SERPL-SCNC: 26 MMOL/L (ref 21–32)
CREAT SERPL-MCNC: 1.13 MG/DL (ref 0.5–1.3)
EGFRCR SERPLBLD CKD-EPI 2021: 77 ML/MIN/1.73M*2
EOSINOPHIL # BLD AUTO: 0.46 X10*3/UL (ref 0–0.7)
EOSINOPHIL NFR BLD AUTO: 3.7 %
ERYTHROCYTE [DISTWIDTH] IN BLOOD BY AUTOMATED COUNT: 17.8 % (ref 11.5–14.5)
GLUCOSE SERPL-MCNC: 83 MG/DL (ref 74–99)
HCT VFR BLD AUTO: 37.9 % (ref 41–52)
HGB BLD-MCNC: 11.6 G/DL (ref 13.5–17.5)
IMM GRANULOCYTES # BLD AUTO: 0.05 X10*3/UL (ref 0–0.7)
IMM GRANULOCYTES NFR BLD AUTO: 0.4 % (ref 0–0.9)
LACTATE SERPL-SCNC: 1 MMOL/L (ref 0.4–2)
LIPASE SERPL-CCNC: 57 U/L (ref 9–82)
LYMPHOCYTES # BLD AUTO: 2.07 X10*3/UL (ref 1.2–4.8)
LYMPHOCYTES NFR BLD AUTO: 16.7 %
MCH RBC QN AUTO: 22.9 PG (ref 26–34)
MCHC RBC AUTO-ENTMCNC: 30.6 G/DL (ref 32–36)
MCV RBC AUTO: 75 FL (ref 80–100)
MONOCYTES # BLD AUTO: 1.28 X10*3/UL (ref 0.1–1)
MONOCYTES NFR BLD AUTO: 10.3 %
NEUTROPHILS # BLD AUTO: 8.5 X10*3/UL (ref 1.2–7.7)
NEUTROPHILS NFR BLD AUTO: 68.6 %
NRBC BLD-RTO: 0 /100 WBCS (ref 0–0)
PLATELET # BLD AUTO: 313 X10*3/UL (ref 150–450)
POTASSIUM SERPL-SCNC: 4.6 MMOL/L (ref 3.5–5.3)
PROT SERPL-MCNC: 7 G/DL (ref 6.4–8.2)
RBC # BLD AUTO: 5.06 X10*6/UL (ref 4.5–5.9)
SODIUM SERPL-SCNC: 136 MMOL/L (ref 136–145)
WBC # BLD AUTO: 12.4 X10*3/UL (ref 4.4–11.3)

## 2025-01-24 PROCEDURE — 71275 CT ANGIOGRAPHY CHEST: CPT | Mod: FOREIGN READ | Performed by: RADIOLOGY

## 2025-01-24 PROCEDURE — 2550000001 HC RX 255 CONTRASTS: Performed by: EMERGENCY MEDICINE

## 2025-01-24 PROCEDURE — 85025 COMPLETE CBC W/AUTO DIFF WBC: CPT | Performed by: PHYSICIAN ASSISTANT

## 2025-01-24 PROCEDURE — 74177 CT ABD & PELVIS W/CONTRAST: CPT

## 2025-01-24 PROCEDURE — 36415 COLL VENOUS BLD VENIPUNCTURE: CPT | Performed by: EMERGENCY MEDICINE

## 2025-01-24 PROCEDURE — 80048 BASIC METABOLIC PNL TOTAL CA: CPT | Performed by: EMERGENCY MEDICINE

## 2025-01-24 PROCEDURE — 84075 ASSAY ALKALINE PHOSPHATASE: CPT | Performed by: EMERGENCY MEDICINE

## 2025-01-24 PROCEDURE — 71275 CT ANGIOGRAPHY CHEST: CPT

## 2025-01-24 PROCEDURE — 93005 ELECTROCARDIOGRAM TRACING: CPT

## 2025-01-24 PROCEDURE — 83690 ASSAY OF LIPASE: CPT | Performed by: PHYSICIAN ASSISTANT

## 2025-01-24 PROCEDURE — 83880 ASSAY OF NATRIURETIC PEPTIDE: CPT | Performed by: PHYSICIAN ASSISTANT

## 2025-01-24 PROCEDURE — 71046 X-RAY EXAM CHEST 2 VIEWS: CPT | Mod: FOREIGN READ | Performed by: RADIOLOGY

## 2025-01-24 PROCEDURE — 84484 ASSAY OF TROPONIN QUANT: CPT | Performed by: PHYSICIAN ASSISTANT

## 2025-01-24 PROCEDURE — 71046 X-RAY EXAM CHEST 2 VIEWS: CPT

## 2025-01-24 PROCEDURE — 99285 EMERGENCY DEPT VISIT HI MDM: CPT | Mod: 25 | Performed by: EMERGENCY MEDICINE

## 2025-01-24 PROCEDURE — 74177 CT ABD & PELVIS W/CONTRAST: CPT | Mod: FOREIGN READ | Performed by: RADIOLOGY

## 2025-01-24 PROCEDURE — 83605 ASSAY OF LACTIC ACID: CPT | Performed by: EMERGENCY MEDICINE

## 2025-01-24 PROCEDURE — 80053 COMPREHEN METABOLIC PANEL: CPT | Performed by: EMERGENCY MEDICINE

## 2025-01-24 RX ADMIN — IOHEXOL 75 ML: 350 INJECTION, SOLUTION INTRAVENOUS at 23:14

## 2025-01-24 ASSESSMENT — PAIN DESCRIPTION - LOCATION: LOCATION: ABDOMEN

## 2025-01-24 ASSESSMENT — PAIN DESCRIPTION - DESCRIPTORS: DESCRIPTORS: ACHING

## 2025-01-24 ASSESSMENT — PAIN DESCRIPTION - FREQUENCY: FREQUENCY: CONSTANT/CONTINUOUS

## 2025-01-24 ASSESSMENT — PAIN SCALES - GENERAL: PAINLEVEL_OUTOF10: 10 - WORST POSSIBLE PAIN

## 2025-01-24 ASSESSMENT — PAIN DESCRIPTION - PROGRESSION: CLINICAL_PROGRESSION: NOT CHANGED

## 2025-01-24 ASSESSMENT — PAIN - FUNCTIONAL ASSESSMENT: PAIN_FUNCTIONAL_ASSESSMENT: 0-10

## 2025-01-24 ASSESSMENT — PAIN DESCRIPTION - ONSET: ONSET: GRADUAL

## 2025-01-24 ASSESSMENT — PAIN DESCRIPTION - PAIN TYPE: TYPE: SURGICAL PAIN

## 2025-01-24 NOTE — ED TRIAGE NOTES
Patient presents to ED from home SOB and pain where his biliary draining tube is. Patient is positive for PE and DVT and is on Eliquis. The pain and SOB has increased. Procedure was 3 weeks ago.

## 2025-01-24 NOTE — ED TRIAGE NOTES
TRIAGE NOTE   I saw the patient as the Clinician in Triage and performed a brief history and physical exam, established acuity, and ordered appropriate tests to develop basic plan of care. Patient will be seen by an [facility], resident and/or physician who will independently evaluate the patient. Please see subsequent provider notes for further details and disposition.     Brief HPI: In brief, Douglas Wilson is a 54 y.o. male that presents for shortness of breath with known PE on Eliquis.  Gallbladder disease with biliary drain without fever.  Multiple ED visits with similar symptoms.  Multiple CT PE studies..     Focused Physical exam:   Afebrile vital signs are stable.  No tachycardia or tachypnea.  Pulse ox 98%.  Alert coherent ambulating independently.  Nontoxic.  No respiratory difficulty or increased work of breathing.  Abdomen soft nontender.    Plan/MDM:   Workup initiated.  EKG sinus rhythm rate 69.  No STEMI.  Stable pending bed availability and further evaluation.  Please see subsequent provider note for further details and disposition

## 2025-01-25 PROBLEM — R10.9 INTRACTABLE ABDOMINAL PAIN: Status: ACTIVE | Noted: 2025-01-25

## 2025-01-25 LAB
ALBUMIN SERPL BCP-MCNC: 3.4 G/DL (ref 3.4–5)
ALP SERPL-CCNC: 47 U/L (ref 33–120)
ALT SERPL W P-5'-P-CCNC: 6 U/L (ref 10–52)
ANION GAP SERPL CALC-SCNC: 8 MMOL/L (ref 10–20)
AST SERPL W P-5'-P-CCNC: 7 U/L (ref 9–39)
BILIRUB SERPL-MCNC: 0.3 MG/DL (ref 0–1.2)
BUN SERPL-MCNC: 8 MG/DL (ref 6–23)
CALCIUM SERPL-MCNC: 8.2 MG/DL (ref 8.6–10.3)
CARDIAC TROPONIN I PNL SERPL HS: 3 NG/L (ref 0–20)
CHLORIDE SERPL-SCNC: 105 MMOL/L (ref 98–107)
CO2 SERPL-SCNC: 28 MMOL/L (ref 21–32)
CREAT SERPL-MCNC: 0.95 MG/DL (ref 0.5–1.3)
EGFRCR SERPLBLD CKD-EPI 2021: >90 ML/MIN/1.73M*2
ERYTHROCYTE [DISTWIDTH] IN BLOOD BY AUTOMATED COUNT: 17.4 % (ref 11.5–14.5)
GLUCOSE SERPL-MCNC: 108 MG/DL (ref 74–99)
HCT VFR BLD AUTO: 33 % (ref 41–52)
HGB BLD-MCNC: 10.2 G/DL (ref 13.5–17.5)
MCH RBC QN AUTO: 23 PG (ref 26–34)
MCHC RBC AUTO-ENTMCNC: 30.9 G/DL (ref 32–36)
MCV RBC AUTO: 74 FL (ref 80–100)
NRBC BLD-RTO: 0 /100 WBCS (ref 0–0)
PLATELET # BLD AUTO: 282 X10*3/UL (ref 150–450)
POTASSIUM SERPL-SCNC: 3.9 MMOL/L (ref 3.5–5.3)
PROT SERPL-MCNC: 5.8 G/DL (ref 6.4–8.2)
RBC # BLD AUTO: 4.44 X10*6/UL (ref 4.5–5.9)
SODIUM SERPL-SCNC: 137 MMOL/L (ref 136–145)
WBC # BLD AUTO: 11.1 X10*3/UL (ref 4.4–11.3)

## 2025-01-25 PROCEDURE — 84075 ASSAY ALKALINE PHOSPHATASE: CPT

## 2025-01-25 PROCEDURE — 96375 TX/PRO/DX INJ NEW DRUG ADDON: CPT

## 2025-01-25 PROCEDURE — 2500000004 HC RX 250 GENERAL PHARMACY W/ HCPCS (ALT 636 FOR OP/ED): Performed by: FAMILY MEDICINE

## 2025-01-25 PROCEDURE — 2500000001 HC RX 250 WO HCPCS SELF ADMINISTERED DRUGS (ALT 637 FOR MEDICARE OP)

## 2025-01-25 PROCEDURE — 84484 ASSAY OF TROPONIN QUANT: CPT | Performed by: PHYSICIAN ASSISTANT

## 2025-01-25 PROCEDURE — G0378 HOSPITAL OBSERVATION PER HR: HCPCS

## 2025-01-25 PROCEDURE — 85027 COMPLETE CBC AUTOMATED: CPT

## 2025-01-25 PROCEDURE — 96374 THER/PROPH/DIAG INJ IV PUSH: CPT

## 2025-01-25 PROCEDURE — 36415 COLL VENOUS BLD VENIPUNCTURE: CPT | Performed by: PHYSICIAN ASSISTANT

## 2025-01-25 PROCEDURE — 2500000004 HC RX 250 GENERAL PHARMACY W/ HCPCS (ALT 636 FOR OP/ED): Mod: JZ | Performed by: EMERGENCY MEDICINE

## 2025-01-25 PROCEDURE — 2500000001 HC RX 250 WO HCPCS SELF ADMINISTERED DRUGS (ALT 637 FOR MEDICARE OP): Performed by: FAMILY MEDICINE

## 2025-01-25 PROCEDURE — 2500000002 HC RX 250 W HCPCS SELF ADMINISTERED DRUGS (ALT 637 FOR MEDICARE OP, ALT 636 FOR OP/ED)

## 2025-01-25 PROCEDURE — 36415 COLL VENOUS BLD VENIPUNCTURE: CPT

## 2025-01-25 PROCEDURE — 2500000004 HC RX 250 GENERAL PHARMACY W/ HCPCS (ALT 636 FOR OP/ED): Mod: JZ

## 2025-01-25 PROCEDURE — 80053 COMPREHEN METABOLIC PANEL: CPT

## 2025-01-25 PROCEDURE — 99222 1ST HOSP IP/OBS MODERATE 55: CPT

## 2025-01-25 RX ORDER — PANTOPRAZOLE SODIUM 40 MG/1
40 TABLET, DELAYED RELEASE ORAL
Status: DISCONTINUED | OUTPATIENT
Start: 2025-01-25 | End: 2025-01-30 | Stop reason: HOSPADM

## 2025-01-25 RX ORDER — FERROUS SULFATE 325(65) MG
65 TABLET ORAL
Status: DISCONTINUED | OUTPATIENT
Start: 2025-01-25 | End: 2025-01-30 | Stop reason: HOSPADM

## 2025-01-25 RX ORDER — ONDANSETRON HYDROCHLORIDE 2 MG/ML
4 INJECTION, SOLUTION INTRAVENOUS ONCE
Status: COMPLETED | OUTPATIENT
Start: 2025-01-25 | End: 2025-01-25

## 2025-01-25 RX ORDER — ACETAMINOPHEN 325 MG/1
650 TABLET ORAL EVERY 4 HOURS PRN
Status: DISCONTINUED | OUTPATIENT
Start: 2025-01-25 | End: 2025-01-30 | Stop reason: HOSPADM

## 2025-01-25 RX ORDER — ACETAMINOPHEN 650 MG/1
650 SUPPOSITORY RECTAL EVERY 4 HOURS PRN
Status: DISCONTINUED | OUTPATIENT
Start: 2025-01-25 | End: 2025-01-30 | Stop reason: HOSPADM

## 2025-01-25 RX ORDER — POLYETHYLENE GLYCOL 3350 17 G/17G
17 POWDER, FOR SOLUTION ORAL DAILY
Status: DISCONTINUED | OUTPATIENT
Start: 2025-01-25 | End: 2025-01-29 | Stop reason: SDUPTHER

## 2025-01-25 RX ORDER — GUAIFENESIN 600 MG/1
600 TABLET, EXTENDED RELEASE ORAL EVERY 12 HOURS PRN
Status: DISCONTINUED | OUTPATIENT
Start: 2025-01-25 | End: 2025-01-30 | Stop reason: HOSPADM

## 2025-01-25 RX ORDER — ONDANSETRON 4 MG/1
4 TABLET, FILM COATED ORAL EVERY 8 HOURS PRN
Status: DISCONTINUED | OUTPATIENT
Start: 2025-01-25 | End: 2025-01-30 | Stop reason: HOSPADM

## 2025-01-25 RX ORDER — DOCUSATE SODIUM 100 MG/1
100 CAPSULE, LIQUID FILLED ORAL 2 TIMES DAILY
Status: DISCONTINUED | OUTPATIENT
Start: 2025-01-25 | End: 2025-01-30 | Stop reason: HOSPADM

## 2025-01-25 RX ORDER — ATORVASTATIN CALCIUM 10 MG/1
10 TABLET, FILM COATED ORAL NIGHTLY
Status: DISCONTINUED | OUTPATIENT
Start: 2025-01-25 | End: 2025-01-30 | Stop reason: HOSPADM

## 2025-01-25 RX ORDER — ONDANSETRON HYDROCHLORIDE 2 MG/ML
4 INJECTION, SOLUTION INTRAVENOUS EVERY 8 HOURS PRN
Status: DISCONTINUED | OUTPATIENT
Start: 2025-01-25 | End: 2025-01-30 | Stop reason: HOSPADM

## 2025-01-25 RX ORDER — AMLODIPINE BESYLATE 10 MG/1
10 TABLET ORAL DAILY
Status: DISCONTINUED | OUTPATIENT
Start: 2025-01-25 | End: 2025-01-30 | Stop reason: HOSPADM

## 2025-01-25 RX ORDER — ACETAMINOPHEN 160 MG/5ML
650 SOLUTION ORAL EVERY 4 HOURS PRN
Status: DISCONTINUED | OUTPATIENT
Start: 2025-01-25 | End: 2025-01-30 | Stop reason: HOSPADM

## 2025-01-25 RX ORDER — TAMSULOSIN HYDROCHLORIDE 0.4 MG/1
0.4 CAPSULE ORAL DAILY
Status: DISCONTINUED | OUTPATIENT
Start: 2025-01-25 | End: 2025-01-30 | Stop reason: HOSPADM

## 2025-01-25 RX ORDER — OXYCODONE AND ACETAMINOPHEN 5; 325 MG/1; MG/1
1 TABLET ORAL EVERY 6 HOURS PRN
Status: DISCONTINUED | OUTPATIENT
Start: 2025-01-25 | End: 2025-01-30 | Stop reason: HOSPADM

## 2025-01-25 RX ADMIN — HYDROMORPHONE HYDROCHLORIDE 0.5 MG: 1 INJECTION, SOLUTION INTRAMUSCULAR; INTRAVENOUS; SUBCUTANEOUS at 03:49

## 2025-01-25 RX ADMIN — ONDANSETRON 4 MG: 2 INJECTION, SOLUTION INTRAMUSCULAR; INTRAVENOUS at 00:30

## 2025-01-25 RX ADMIN — HYDROMORPHONE HYDROCHLORIDE 0.5 MG: 1 INJECTION, SOLUTION INTRAMUSCULAR; INTRAVENOUS; SUBCUTANEOUS at 00:30

## 2025-01-25 RX ADMIN — PANTOPRAZOLE SODIUM 40 MG: 40 TABLET, DELAYED RELEASE ORAL at 06:27

## 2025-01-25 RX ADMIN — HYDROMORPHONE HYDROCHLORIDE 0.4 MG: 1 INJECTION, SOLUTION INTRAMUSCULAR; INTRAVENOUS; SUBCUTANEOUS at 13:04

## 2025-01-25 RX ADMIN — FERROUS SULFATE TAB 325 MG (65 MG ELEMENTAL FE) 325 MG: 325 (65 FE) TAB at 08:50

## 2025-01-25 RX ADMIN — DOCUSATE SODIUM 100 MG: 100 CAPSULE, LIQUID FILLED ORAL at 08:50

## 2025-01-25 RX ADMIN — DOCUSATE SODIUM 100 MG: 100 CAPSULE, LIQUID FILLED ORAL at 21:39

## 2025-01-25 RX ADMIN — OXYCODONE HYDROCHLORIDE AND ACETAMINOPHEN 1 TABLET: 5; 325 TABLET ORAL at 06:27

## 2025-01-25 RX ADMIN — POLYETHYLENE GLYCOL 3350 17 G: 17 POWDER, FOR SOLUTION ORAL at 16:04

## 2025-01-25 RX ADMIN — TAMSULOSIN HYDROCHLORIDE 0.4 MG: 0.4 CAPSULE ORAL at 08:49

## 2025-01-25 RX ADMIN — APIXABAN 5 MG: 5 TABLET, FILM COATED ORAL at 11:35

## 2025-01-25 RX ADMIN — HYDROMORPHONE HYDROCHLORIDE 0.4 MG: 1 INJECTION, SOLUTION INTRAMUSCULAR; INTRAVENOUS; SUBCUTANEOUS at 21:35

## 2025-01-25 RX ADMIN — APIXABAN 5 MG: 5 TABLET, FILM COATED ORAL at 21:38

## 2025-01-25 RX ADMIN — HYDROMORPHONE HYDROCHLORIDE 0.4 MG: 1 INJECTION, SOLUTION INTRAMUSCULAR; INTRAVENOUS; SUBCUTANEOUS at 08:50

## 2025-01-25 RX ADMIN — AMLODIPINE BESYLATE 10 MG: 10 TABLET ORAL at 08:49

## 2025-01-25 RX ADMIN — ATORVASTATIN CALCIUM 10 MG: 10 TABLET, FILM COATED ORAL at 21:38

## 2025-01-25 SDOH — ECONOMIC STABILITY: FOOD INSECURITY: WITHIN THE PAST 12 MONTHS, THE FOOD YOU BOUGHT JUST DIDN'T LAST AND YOU DIDN'T HAVE MONEY TO GET MORE.: NEVER TRUE

## 2025-01-25 SDOH — SOCIAL STABILITY: SOCIAL INSECURITY: HAVE YOU HAD ANY THOUGHTS OF HARMING ANYONE ELSE?: NO

## 2025-01-25 SDOH — SOCIAL STABILITY: SOCIAL INSECURITY: DOES ANYONE TRY TO KEEP YOU FROM HAVING/CONTACTING OTHER FRIENDS OR DOING THINGS OUTSIDE YOUR HOME?: NO

## 2025-01-25 SDOH — ECONOMIC STABILITY: FOOD INSECURITY: WITHIN THE PAST 12 MONTHS, YOU WORRIED THAT YOUR FOOD WOULD RUN OUT BEFORE YOU GOT THE MONEY TO BUY MORE.: NEVER TRUE

## 2025-01-25 SDOH — SOCIAL STABILITY: SOCIAL INSECURITY: WITHIN THE LAST YEAR, HAVE YOU BEEN HUMILIATED OR EMOTIONALLY ABUSED IN OTHER WAYS BY YOUR PARTNER OR EX-PARTNER?: NO

## 2025-01-25 SDOH — SOCIAL STABILITY: SOCIAL INSECURITY: ARE THERE ANY APPARENT SIGNS OF INJURIES/BEHAVIORS THAT COULD BE RELATED TO ABUSE/NEGLECT?: NO

## 2025-01-25 SDOH — HEALTH STABILITY: PHYSICAL HEALTH: ON AVERAGE, HOW MANY MINUTES DO YOU ENGAGE IN EXERCISE AT THIS LEVEL?: 0 MIN

## 2025-01-25 SDOH — ECONOMIC STABILITY: INCOME INSECURITY: IN THE PAST 12 MONTHS HAS THE ELECTRIC, GAS, OIL, OR WATER COMPANY THREATENED TO SHUT OFF SERVICES IN YOUR HOME?: NO

## 2025-01-25 SDOH — SOCIAL STABILITY: SOCIAL INSECURITY: HAS ANYONE EVER THREATENED TO HURT YOUR FAMILY OR YOUR PETS?: NO

## 2025-01-25 SDOH — SOCIAL STABILITY: SOCIAL INSECURITY: HAVE YOU HAD THOUGHTS OF HARMING ANYONE ELSE?: NO

## 2025-01-25 SDOH — ECONOMIC STABILITY: HOUSING INSECURITY: AT ANY TIME IN THE PAST 12 MONTHS, WERE YOU HOMELESS OR LIVING IN A SHELTER (INCLUDING NOW)?: NO

## 2025-01-25 SDOH — ECONOMIC STABILITY: HOUSING INSECURITY: IN THE LAST 12 MONTHS, WAS THERE A TIME WHEN YOU WERE NOT ABLE TO PAY THE MORTGAGE OR RENT ON TIME?: NO

## 2025-01-25 SDOH — SOCIAL STABILITY: SOCIAL INSECURITY: WITHIN THE LAST YEAR, HAVE YOU BEEN AFRAID OF YOUR PARTNER OR EX-PARTNER?: NO

## 2025-01-25 SDOH — SOCIAL STABILITY: SOCIAL INSECURITY: DO YOU FEEL ANYONE HAS EXPLOITED OR TAKEN ADVANTAGE OF YOU FINANCIALLY OR OF YOUR PERSONAL PROPERTY?: NO

## 2025-01-25 SDOH — HEALTH STABILITY: PHYSICAL HEALTH: ON AVERAGE, HOW MANY DAYS PER WEEK DO YOU ENGAGE IN MODERATE TO STRENUOUS EXERCISE (LIKE A BRISK WALK)?: 0 DAYS

## 2025-01-25 SDOH — SOCIAL STABILITY: SOCIAL INSECURITY: ABUSE: ADULT

## 2025-01-25 SDOH — SOCIAL STABILITY: SOCIAL INSECURITY: ARE YOU OR HAVE YOU BEEN THREATENED OR ABUSED PHYSICALLY, EMOTIONALLY, OR SEXUALLY BY ANYONE?: NO

## 2025-01-25 SDOH — ECONOMIC STABILITY: FOOD INSECURITY: HOW HARD IS IT FOR YOU TO PAY FOR THE VERY BASICS LIKE FOOD, HOUSING, MEDICAL CARE, AND HEATING?: NOT HARD AT ALL

## 2025-01-25 SDOH — ECONOMIC STABILITY: TRANSPORTATION INSECURITY: IN THE PAST 12 MONTHS, HAS LACK OF TRANSPORTATION KEPT YOU FROM MEDICAL APPOINTMENTS OR FROM GETTING MEDICATIONS?: NO

## 2025-01-25 SDOH — ECONOMIC STABILITY: HOUSING INSECURITY: IN THE PAST 12 MONTHS, HOW MANY TIMES HAVE YOU MOVED WHERE YOU WERE LIVING?: 0

## 2025-01-25 SDOH — SOCIAL STABILITY: SOCIAL INSECURITY: WERE YOU ABLE TO COMPLETE ALL THE BEHAVIORAL HEALTH SCREENINGS?: YES

## 2025-01-25 SDOH — SOCIAL STABILITY: SOCIAL INSECURITY: DO YOU FEEL UNSAFE GOING BACK TO THE PLACE WHERE YOU ARE LIVING?: NO

## 2025-01-25 ASSESSMENT — LIFESTYLE VARIABLES
SKIP TO QUESTIONS 9-10: 1
HOW OFTEN DO YOU HAVE A DRINK CONTAINING ALCOHOL: NEVER
HOW OFTEN DO YOU HAVE 6 OR MORE DRINKS ON ONE OCCASION: NEVER
AUDIT-C TOTAL SCORE: 0
AUDIT-C TOTAL SCORE: 0
HOW MANY STANDARD DRINKS CONTAINING ALCOHOL DO YOU HAVE ON A TYPICAL DAY: PATIENT DOES NOT DRINK

## 2025-01-25 ASSESSMENT — PAIN SCALES - GENERAL
PAINLEVEL_OUTOF10: 10 - WORST POSSIBLE PAIN
PAINLEVEL_OUTOF10: 5 - MODERATE PAIN
PAINLEVEL_OUTOF10: 10 - WORST POSSIBLE PAIN
PAINLEVEL_OUTOF10: 10 - WORST POSSIBLE PAIN
PAINLEVEL_OUTOF10: 5 - MODERATE PAIN
PAINLEVEL_OUTOF10: 10 - WORST POSSIBLE PAIN
PAINLEVEL_OUTOF10: 5 - MODERATE PAIN

## 2025-01-25 ASSESSMENT — COGNITIVE AND FUNCTIONAL STATUS - GENERAL
MOBILITY SCORE: 24
PATIENT BASELINE BEDBOUND: NO
DAILY ACTIVITIY SCORE: 24
DAILY ACTIVITIY SCORE: 24
MOBILITY SCORE: 24

## 2025-01-25 ASSESSMENT — PAIN DESCRIPTION - LOCATION
LOCATION: ABDOMEN

## 2025-01-25 ASSESSMENT — ACTIVITIES OF DAILY LIVING (ADL)
HEARING - LEFT EAR: FUNCTIONAL
BATHING: INDEPENDENT
GROOMING: INDEPENDENT
ADEQUATE_TO_COMPLETE_ADL: YES
JUDGMENT_ADEQUATE_SAFELY_COMPLETE_DAILY_ACTIVITIES: YES
PATIENT'S MEMORY ADEQUATE TO SAFELY COMPLETE DAILY ACTIVITIES?: YES
TOILETING: INDEPENDENT
LACK_OF_TRANSPORTATION: NO
WALKS IN HOME: INDEPENDENT
DRESSING YOURSELF: INDEPENDENT
FEEDING YOURSELF: INDEPENDENT
LACK_OF_TRANSPORTATION: NO
HEARING - RIGHT EAR: FUNCTIONAL
LACK_OF_TRANSPORTATION: NO

## 2025-01-25 ASSESSMENT — PAIN DESCRIPTION - ORIENTATION
ORIENTATION: RIGHT

## 2025-01-25 ASSESSMENT — PAIN - FUNCTIONAL ASSESSMENT
PAIN_FUNCTIONAL_ASSESSMENT: 0-10

## 2025-01-25 NOTE — PROGRESS NOTES
01/25/25 1130   [facility] Disability Status   Are you deaf or do you have serious difficulty hearing? N   Are you blind or do you have serious difficulty seeing, even when wearing glasses? N   Because of a physical, mental, or emotional condition, do you have serious difficulty concentrating, remembering, or making decisions? (5 years old or older) N   Do you have serious difficulty walking or climbing stairs? N   Do you have serious difficulty dressing or bathing? N   Because of a physical, mental, or emotional condition, do you have serious difficulty doing errands alone such as visiting the doctor? N

## 2025-01-25 NOTE — SIGNIFICANT EVENT
"54-year-old male with multiple medical comorbid conditions.  Admitted to outside hospital back on 12/27/2024 with abdominal pain found to have a calculus cholecystitis.  This was treated with a cholecystostomy tube.  Plan as outlined by the surgeon at [facility] was to take for gallbladder removal in 6 weeks    Since placement of this drain he has had ongoing issues with right upper quadrant abdominal pain for which he has been admitted several times.    Comes now with pain around the site of the cholecystostomy tube in the right upper abdomen.  It hurts for him to take of breath.  CT scan shows the catheter to be in good position.  He continues to have roughly 200 cc of bile into the bile bag on a daily basis.      PMH:  PE,  polysubstance abuse, hypocalcemia, ED, aneurysm of the splenic artery, iron deficiency anemia, vitamin D deficiency, encephalopathy, seizure, CAD, HTN, and chronic back pain.    PSH: Appendectomy, IR placement gallbladder drain, Splenic artery embolization (02/2024)    EXAM:   /64 (BP Location: Left arm, Patient Position: Lying)   Pulse 67   Temp 36.9 °C (98.4 °F) (Temporal)   Resp 17   Ht 1.753 m (5' 9\")   Wt 74.8 kg (165 lb)   SpO2 97%   BMI 24.37 kg/m²    Well-nourished, well-developed. In no distress  Alert and oriented x 3  Lungs are clear to auscultation bilaterally.  Cardiac exam is regular rhythm and rate.  Abdomen is soft nontender nondistended.  Neurologic exam is without focal deficit.   Lab Results   Component Value Date    GLUCOSE 108 (H) 01/25/2025     01/25/2025    K 3.9 01/25/2025     01/25/2025    CO2 28 01/25/2025    ANIONGAP 8 (L) 01/25/2025    BUN 8 01/25/2025    CREATININE 0.95 01/25/2025    EGFR >90 01/25/2025    CALCIUM 8.2 (L) 01/25/2025    ALBUMIN 3.4 01/25/2025    ALKPHOS 47 01/25/2025    PROT 5.8 (L) 01/25/2025    AST 7 (L) 01/25/2025    BILITOT 0.3 01/25/2025    ALT 6 (L) 01/25/2025         Impression;   Has pain around drain site.   By imaging " drain appears to be in good place, continues to function with adequate drainage of bile    Ideally would agree with recommendation to a 6-week interval between placement of cholecystostomy tube (12/27/24) and attempted laparoscopic cholecystectomy.     As much as possible would treat his pain. Apply warm compress to the area     No surgical indications currently

## 2025-01-25 NOTE — PROGRESS NOTES
01/25/25 1130   Discharge Planning   Living Arrangements Spouse/significant other   Support Systems Spouse/significant other;Parent   Assistance Needed Independent, drives   Type of Residence Private residence   Number of Stairs to Enter Residence 2   Number of Stairs Within Residence 0   Do you have animals or pets at home? No   Who is requesting discharge planning? Provider   Home or Post Acute Services None   Expected Discharge Disposition Home   Does the patient need discharge transport arranged? Yes   RoundTrip coordination needed? Yes   Has discharge transport been arranged? No   Financial Resource Strain   How hard is it for you to pay for the very basics like food, housing, medical care, and heating? Not hard   Housing Stability   In the last 12 months, was there a time when you were not able to pay the mortgage or rent on time? N   In the past 12 months, how many times have you moved where you were living? 0   At any time in the past 12 months, were you homeless or living in a shelter (including now)? N   Transportation Needs   In the past 12 months, has lack of transportation kept you from medical appointments or from getting medications? no   In the past 12 months, has lack of transportation kept you from meetings, work, or from getting things needed for daily living? No   Patient Choice   Provider Choice list and CMS website (https://medicare.gov/care-compare#search) for post-acute Quality and Resource Measure Data were provided and reviewed with: Patient   Patient / Family choosing to utilize agency / facility established prior to hospitalization No   Stroke Family Assessment   Stroke Family Assessment Needed No   Intensity of Service   Intensity of Service 0-30 min         Met with patient at bedside and explained my role as care coordinator. He lives in the house with his significant other, Shelby. He is independent with his care at home. He drives. Patient denies use of any ambulatory devices. No  oxygen in use at home, no HD. His new PCP is Dr. Nubia Diego and he has appointment scheduled already. Pharmacy he uses is [facility] in [place]. Patient came in with abdominal pain where his biliary drain is. Surgery is consulted for drain. Patient denies any needs going home.

## 2025-01-25 NOTE — CONSULTS
Reason For Consult  Decreased biliary drainage/ pain at site    History Of Present Illness  Douglas Wilson is a 54 y.o. male presenting with increased abdominal pain around the perc manihsa tube. This was originally placed at [facility] st the end of December 2024 at that time the doctors at the [facility] decided based on co-morbidities that surgery would not be best served in the acute setting. This [place] [facility] He reports increased pain now around the incision site that comes and goes. He reports he has been flushing his cholecystostomy tube and noticed some decreased output.     Lab work show no increase in LFTs.     Past Medical History  He has a past medical history of Acute appendicitis without peritonitis (03/12/2024), BPH (benign prostatic hyperplasia), Heroin abuse (Multi), NSTEMI (non-ST elevated myocardial infarction) (Multi), Pulmonary embolism (Multi), Splenic artery aneurysm (CMS-HCC) (02/2024), and Upper GI bleed (03/15/2024).    Surgical History  He has a past surgical history that includes Other surgical history (Bilateral, 01/24/2022); CT angio coronary art with heartflow if score >30% (04/19/2022); Appendectomy (03/12/2024); and Splenic artery embolization (02/2024).     Social History  He reports that he has been smoking cigarettes. He has never used smokeless tobacco. He reports that he does not currently use alcohol. He reports that he does not currently use drugs after having used the following drugs: Heroin.    Family History  Family History   Problem Relation Name Age of Onset    Diabetes Father      Hypertension Father      No Known Problems Son          raymond    Deafness Son      Other (orthopedic) Son          wearing braces to help with walking        Allergies  Pork derived (porcine)    Review of Systems  A full 10 point ROS was completed. Nothing positive other than what was mentioned in the HPI.      Physical Exam  PE:  Constitutional: A&Ox3, calm  "and cooperative  Head/Neck: Neck supple  Respiratory/Thorax: Non labored breathing on RA  Gastrointestinal: Abdomen nondistended, soft. Tender in RUQ around cholecystostomy tube  Genitourinary: Voiding independently   Musculoskeletal: ROM intact, no joint swelling, normal strength  Extremities: RODRIGUEZ, No peripheral edema  Neurological: No focal deficits   Psychological: Appropriate mood and behavior  Skin: Warm and dry.       Last Recorded Vitals  Blood pressure 114/67, pulse 75, temperature 36.9 °C (98.4 °F), temperature source Temporal, resp. rate 17, height 1.753 m (5' 9\"), weight 74.8 kg (165 lb), SpO2 96%.    Relevant Results  Results for orders placed or performed during the hospital encounter of 01/24/25 (from the past 24 hours)   CBC and Auto Differential   Result Value Ref Range    WBC 12.4 (H) 4.4 - 11.3 x10*3/uL    nRBC 0.0 0.0 - 0.0 /100 WBCs    RBC 5.06 4.50 - 5.90 x10*6/uL    Hemoglobin 11.6 (L) 13.5 - 17.5 g/dL    Hematocrit 37.9 (L) 41.0 - 52.0 %    MCV 75 (L) 80 - 100 fL    MCH 22.9 (L) 26.0 - 34.0 pg    MCHC 30.6 (L) 32.0 - 36.0 g/dL    RDW 17.8 (H) 11.5 - 14.5 %    Platelets 313 150 - 450 x10*3/uL    Neutrophils % 68.6 40.0 - 80.0 %    Immature Granulocytes %, Automated 0.4 0.0 - 0.9 %    Lymphocytes % 16.7 13.0 - 44.0 %    Monocytes % 10.3 2.0 - 10.0 %    Eosinophils % 3.7 0.0 - 6.0 %    Basophils % 0.3 0.0 - 2.0 %    Neutrophils Absolute 8.50 (H) 1.20 - 7.70 x10*3/uL    Immature Granulocytes Absolute, Automated 0.05 0.00 - 0.70 x10*3/uL    Lymphocytes Absolute 2.07 1.20 - 4.80 x10*3/uL    Monocytes Absolute 1.28 (H) 0.10 - 1.00 x10*3/uL    Eosinophils Absolute 0.46 0.00 - 0.70 x10*3/uL    Basophils Absolute 0.04 0.00 - 0.10 x10*3/uL   B-Type Natriuretic Peptide   Result Value Ref Range    BNP 45 0 - 99 pg/mL   Lipase   Result Value Ref Range    Lipase 57 9 - 82 U/L   Troponin I, High Sensitivity, Initial   Result Value Ref Range    Troponin I, High Sensitivity 3 0 - 20 ng/L   Hepatic function " panel   Result Value Ref Range    Albumin 3.6 3.4 - 5.0 g/dL    Bilirubin, Total 0.4 0.0 - 1.2 mg/dL    Bilirubin, Direct 0.0 0.0 - 0.3 mg/dL    Alkaline Phosphatase 53 33 - 120 U/L    ALT 8 (L) 10 - 52 U/L    AST 21 9 - 39 U/L    Total Protein 7.0 6.4 - 8.2 g/dL   Basic metabolic panel   Result Value Ref Range    Glucose 83 74 - 99 mg/dL    Sodium 136 136 - 145 mmol/L    Potassium 4.6 3.5 - 5.3 mmol/L    Chloride 104 98 - 107 mmol/L    Bicarbonate 26 21 - 32 mmol/L    Anion Gap 11 10 - 20 mmol/L    Urea Nitrogen 10 6 - 23 mg/dL    Creatinine 1.13 0.50 - 1.30 mg/dL    eGFR 77 >60 mL/min/1.73m*2    Calcium 8.6 8.6 - 10.3 mg/dL   Lactate   Result Value Ref Range    Lactate 1.0 0.4 - 2.0 mmol/L   Troponin, High Sensitivity, 1 Hour   Result Value Ref Range    Troponin I, High Sensitivity 3 0 - 20 ng/L   CBC   Result Value Ref Range    WBC 11.1 4.4 - 11.3 x10*3/uL    nRBC 0.0 0.0 - 0.0 /100 WBCs    RBC 4.44 (L) 4.50 - 5.90 x10*6/uL    Hemoglobin 10.2 (L) 13.5 - 17.5 g/dL    Hematocrit 33.0 (L) 41.0 - 52.0 %    MCV 74 (L) 80 - 100 fL    MCH 23.0 (L) 26.0 - 34.0 pg    MCHC 30.9 (L) 32.0 - 36.0 g/dL    RDW 17.4 (H) 11.5 - 14.5 %    Platelets 282 150 - 450 x10*3/uL   Comprehensive metabolic panel   Result Value Ref Range    Glucose 108 (H) 74 - 99 mg/dL    Sodium 137 136 - 145 mmol/L    Potassium 3.9 3.5 - 5.3 mmol/L    Chloride 105 98 - 107 mmol/L    Bicarbonate 28 21 - 32 mmol/L    Anion Gap 8 (L) 10 - 20 mmol/L    Urea Nitrogen 8 6 - 23 mg/dL    Creatinine 0.95 0.50 - 1.30 mg/dL    eGFR >90 >60 mL/min/1.73m*2    Calcium 8.2 (L) 8.6 - 10.3 mg/dL    Albumin 3.4 3.4 - 5.0 g/dL    Alkaline Phosphatase 47 33 - 120 U/L    Total Protein 5.8 (L) 6.4 - 8.2 g/dL    AST 7 (L) 9 - 39 U/L    Bilirubin, Total 0.3 0.0 - 1.2 mg/dL    ALT 6 (L) 10 - 52 U/L      CT angio chest for pulmonary embolism   Final Result   No definite acute thoracic, abdominal, or pelvic pathology identified.   Postoperative changes of recent cholecystectomy with  a drainage   catheter in place.   Signed by Joe Castrejon      CT abdomen pelvis w IV contrast   Final Result   No definite acute thoracic, abdominal, or pelvic pathology identified.   Postoperative changes of recent cholecystectomy with a drainage   catheter in place.   Signed by Joe Castrejon      XR chest 2 views   Final Result   No acute process.   Signed by Tyrone Seth MD            Assessment/Plan     Plan: Upper abdominal pain  - Cholecystostomy tube is well functioning, continue to flush drain daily  - All co-morbid conditions are still present for this patient and would still favor a planned outpatient surgery to be safest option.  - Has follow up scheduled with Dr. Carranza 1/28 or with the Surgery doctors from the [facility] 2/13 to discuss formal surgical planning     Discussed with Dr. Maldonado. Surgery to sign off.    I spent 60 minutes in the professional and overall care of this patient.      Abdoulaye Plummer PA-C

## 2025-01-25 NOTE — CARE PLAN
The patient's goals for the shift include  pain <4/10    The clinical goals for the shift include pain score <4/10      Problem: Safety - Adult  Goal: Free from fall injury  Outcome: Progressing     Problem: Discharge Planning  Goal: Discharge to home or other facility with appropriate resources  Outcome: Progressing     Problem: Chronic Conditions and Co-morbidities  Goal: Patient's chronic conditions and co-morbidity symptoms are monitored and maintained or improved  Outcome: Progressing     Problem: Nutrition  Goal: Nutrient intake appropriate for maintaining nutritional needs  Outcome: Progressing     Problem: Fall/Injury  Goal: Not fall by end of shift  Outcome: Progressing  Goal: Be free from injury by end of the shift  Outcome: Progressing  Goal: Pace activities to prevent fatigue by end of the shift  Outcome: Progressing     Problem: Pain  Goal: Takes deep breaths with improved pain control throughout the shift  Outcome: Progressing  Goal: Turns in bed with improved pain control throughout the shift  Outcome: Progressing  Goal: Walks with improved pain control throughout the shift  Outcome: Progressing  Goal: Performs ADL's with improved pain control throughout shift  Outcome: Progressing  Goal: Participates in PT with improved pain control throughout the shift  Outcome: Progressing  Goal: Free from opioid side effects throughout the shift  Outcome: Progressing  Goal: Free from acute confusion related to pain meds throughout the shift  Outcome: Progressing

## 2025-01-25 NOTE — CARE PLAN
The clinical goals for the shift include pain score <4/10    Over the shift, the patient did not make progress toward the following goals. Barriers to progression include pain. Recommendations to address these barriers include pain control.    Problem: Safety - Adult  Goal: Free from fall injury  Outcome: Progressing     Problem: Discharge Planning  Goal: Discharge to home or other facility with appropriate resources  Outcome: Progressing     Problem: Chronic Conditions and Co-morbidities  Goal: Patient's chronic conditions and co-morbidity symptoms are monitored and maintained or improved  Outcome: Progressing     Problem: Nutrition  Goal: Nutrient intake appropriate for maintaining nutritional needs  Outcome: Progressing     Problem: Fall/Injury  Goal: Not fall by end of shift  Outcome: Progressing  Goal: Be free from injury by end of the shift  Outcome: Progressing  Goal: Pace activities to prevent fatigue by end of the shift  Outcome: Progressing     Problem: Pain  Goal: Takes deep breaths with improved pain control throughout the shift  Outcome: Progressing  Goal: Turns in bed with improved pain control throughout the shift  Outcome: Progressing  Goal: Walks with improved pain control throughout the shift  Outcome: Progressing  Goal: Performs ADL's with improved pain control throughout shift  Outcome: Progressing  Goal: Participates in PT with improved pain control throughout the shift  Outcome: Progressing  Goal: Free from opioid side effects throughout the shift  Outcome: Progressing  Goal: Free from acute confusion related to pain meds throughout the shift  Outcome: Progressing

## 2025-01-25 NOTE — ED PROVIDER NOTES
Emergency Department Provider Note             History of Present Illness   CC: Post-op Problem    History provided by: Patient and Family Member  Limitations to History: None  External Records Reviewed: prior ED provider notes, clinic notes, discharge summaries    HPI:  Douglas Wilson is a 54 y.o. male with history including DVT/PE on eliquis, CAD, splenic artery aneurysm with upper GI bleed, recent acute cholecystitis s/p cholecystostomy complicated by biliary leakage requiring intervention by IR presenting to the emergency department for right upper quadrant pain and worsening shortness of breath. He was seen in the ED on 1/14 and workup was reassuring so he was discharged home. Has continued to have pain but it acutely worsened last night. Had decreased biliary drainage since this morning but no change in appearance. Denies fever, chills, vomiting, chest pain, diarrhea, constipation.     ---  Past Medical History:   Diagnosis Date    Acute appendicitis without peritonitis 03/12/2024    BPH (benign prostatic hyperplasia)     Heroin abuse (Multi)     NSTEMI (non-ST elevated myocardial infarction) (Multi)     Pulmonary embolism (Multi)     Splenic artery aneurysm (CMS-HCC) 02/2024    rupture s/p coil embolization    Upper GI bleed 03/15/2024    EGD with esophagitis, no active bleeding     Past Surgical History:   Procedure Laterality Date    APPENDECTOMY  03/12/2024    CT ANGIO CORONARY ART WITH HEARTFLOW IF SCORE >30%  04/19/2022    OTHER SURGICAL HISTORY Bilateral 01/24/2022    Inguinal hernia repair laparoscopic    SPLENIC ARTERY EMBOLIZATION  02/2024       Allergies   Allergen Reactions    Pork Derived (Porcine) Other     Not tolerated       Physical Exam   Triage vitals:  T 36.3 °C (97.4 °F)  HR 72  /70  RR 18  O2 98 % None (Room air)    General: awake, uncomfortable-appearing, no distress  Head: normocephalic, atraumatic  Eyes: pupils equal, extraocular movements grossly intact, no conjunctival  injection or scleral icterus  ENT: nares patent, moist mucous membranes  Neck: supple, trachea midline, no masses  CV: regular rate and rhythm, well-perfused  Resp: breathing is non-labored, speaking in full sentences. Lungs are clear to auscultation bilaterally  GI: soft, non-distended, biliary drain in RUQ draining dark brown fluid, moderate tenderness to surrounding area, no rebound or guarding  Extremities: no edema, no gross deformity   Neuro: alert, oriented, speech is fluent, face is symmetric, moving all extremities   Psych: Appropriate mood and affect    ED Course & Medical Decision Making     54 y.o. male with history including DVT/PE on eliquis, CAD, splenic artery aneurysm with upper GI bleed, recent acute cholecystitis s/p cholecystostomy complicated by biliary leakage requiring intervention by IR presenting to the emergency department for right upper quadrant pain and worsening shortness of breath. He's had multiple presentations for similar symptoms and it appears that his pain is not well-controlled at home. Given his complex history and presenting symptoms, a broad workup initiated. As he's missed a couple doses of eliquis and has had multiple VTEs, repeating CT PE in addition to CT abdomen/pelvis to evaluate for additional complications, though his tube appears to be draining appropriately. He was initially seen by the provider in triage but declined workup initiation until he was in a room, so there was a delay. He received symptomatic management. See ED course.     Results were discussed with the patient and family. He continues to have ongoing pain despite reassuring findings. Through shared decision making, will admit for further management.     EKG: per my interpretation reveals normal sinus rhythm, rate 69, normal axis and intervals, no significant ST changes     Results: Independently reviewed and interpreted by me. Please see ED course and MDM for my full interpretation.     Chronic Medical  Conditions Significantly Affecting Care: as per [facility]    Patient was discussed with the following consultants/services:  Dr. Ramirez    Care Considerations: as per [facility]    ED Course as of 01/25/25 1558   Fri Jan 24, 2025   2331 Labs are notable for mild leukocytosis (relatively stable compared to recent labs), otherwise unremarkable including bilirubin, LFTs, lactate, troponin. I reviewed his chest x-ray which shows no acute findings.  [LM]   Sat Jan 25, 2025   0057 CT shows no acute process in chest or abdomen; manisha tube in place without signs of obstruction. No PE.  [LM]      ED Course User Index  [LM] Nae Askew MD         Diagnoses as of 01/25/25 1558   Intractable abdominal pain       Disposition   Admission     Procedures   Procedures    MD Nae Sloan MD  01/25/25 1600

## 2025-01-26 VITALS
WEIGHT: 165 LBS | OXYGEN SATURATION: 94 % | DIASTOLIC BLOOD PRESSURE: 79 MMHG | SYSTOLIC BLOOD PRESSURE: 107 MMHG | TEMPERATURE: 97.9 F | HEART RATE: 80 BPM | RESPIRATION RATE: 18 BRPM | HEIGHT: 69 IN | BODY MASS INDEX: 24.44 KG/M2

## 2025-01-26 LAB
ANION GAP SERPL CALC-SCNC: 11 MMOL/L (ref 10–20)
BUN SERPL-MCNC: 5 MG/DL (ref 6–23)
CALCIUM SERPL-MCNC: 8.4 MG/DL (ref 8.6–10.3)
CHLORIDE SERPL-SCNC: 103 MMOL/L (ref 98–107)
CO2 SERPL-SCNC: 28 MMOL/L (ref 21–32)
CREAT SERPL-MCNC: 0.93 MG/DL (ref 0.5–1.3)
EGFRCR SERPLBLD CKD-EPI 2021: >90 ML/MIN/1.73M*2
ERYTHROCYTE [DISTWIDTH] IN BLOOD BY AUTOMATED COUNT: 17.2 % (ref 11.5–14.5)
GLUCOSE SERPL-MCNC: 96 MG/DL (ref 74–99)
HCT VFR BLD AUTO: 36 % (ref 41–52)
HGB BLD-MCNC: 11.3 G/DL (ref 13.5–17.5)
MCH RBC QN AUTO: 23.2 PG (ref 26–34)
MCHC RBC AUTO-ENTMCNC: 31.4 G/DL (ref 32–36)
MCV RBC AUTO: 74 FL (ref 80–100)
NRBC BLD-RTO: 0 /100 WBCS (ref 0–0)
PLATELET # BLD AUTO: 286 X10*3/UL (ref 150–450)
POTASSIUM SERPL-SCNC: 3.9 MMOL/L (ref 3.5–5.3)
RBC # BLD AUTO: 4.88 X10*6/UL (ref 4.5–5.9)
SODIUM SERPL-SCNC: 138 MMOL/L (ref 136–145)
WBC # BLD AUTO: 9.3 X10*3/UL (ref 4.4–11.3)

## 2025-01-26 PROCEDURE — 36415 COLL VENOUS BLD VENIPUNCTURE: CPT | Performed by: FAMILY MEDICINE

## 2025-01-26 PROCEDURE — 1100000001 HC PRIVATE ROOM DAILY

## 2025-01-26 PROCEDURE — 85027 COMPLETE CBC AUTOMATED: CPT | Performed by: FAMILY MEDICINE

## 2025-01-26 PROCEDURE — 2500000004 HC RX 250 GENERAL PHARMACY W/ HCPCS (ALT 636 FOR OP/ED): Performed by: FAMILY MEDICINE

## 2025-01-26 PROCEDURE — 2500000001 HC RX 250 WO HCPCS SELF ADMINISTERED DRUGS (ALT 637 FOR MEDICARE OP)

## 2025-01-26 PROCEDURE — 82374 ASSAY BLOOD CARBON DIOXIDE: CPT | Performed by: FAMILY MEDICINE

## 2025-01-26 PROCEDURE — 80048 BASIC METABOLIC PNL TOTAL CA: CPT | Performed by: FAMILY MEDICINE

## 2025-01-26 PROCEDURE — 2500000002 HC RX 250 W HCPCS SELF ADMINISTERED DRUGS (ALT 637 FOR MEDICARE OP, ALT 636 FOR OP/ED)

## 2025-01-26 RX ORDER — ENOXAPARIN SODIUM 100 MG/ML
1 INJECTION SUBCUTANEOUS EVERY 12 HOURS SCHEDULED
Status: DISCONTINUED | OUTPATIENT
Start: 2025-01-26 | End: 2025-01-29

## 2025-01-26 RX ADMIN — TAMSULOSIN HYDROCHLORIDE 0.4 MG: 0.4 CAPSULE ORAL at 08:41

## 2025-01-26 RX ADMIN — HYDROMORPHONE HYDROCHLORIDE 0.4 MG: 1 INJECTION, SOLUTION INTRAMUSCULAR; INTRAVENOUS; SUBCUTANEOUS at 03:56

## 2025-01-26 RX ADMIN — DOCUSATE SODIUM 100 MG: 100 CAPSULE, LIQUID FILLED ORAL at 08:41

## 2025-01-26 RX ADMIN — AMLODIPINE BESYLATE 10 MG: 10 TABLET ORAL at 08:41

## 2025-01-26 RX ADMIN — HYDROMORPHONE HYDROCHLORIDE 0.4 MG: 1 INJECTION, SOLUTION INTRAMUSCULAR; INTRAVENOUS; SUBCUTANEOUS at 21:04

## 2025-01-26 RX ADMIN — PANTOPRAZOLE SODIUM 40 MG: 40 TABLET, DELAYED RELEASE ORAL at 06:11

## 2025-01-26 RX ADMIN — APIXABAN 5 MG: 5 TABLET, FILM COATED ORAL at 08:41

## 2025-01-26 RX ADMIN — HYDROMORPHONE HYDROCHLORIDE 0.4 MG: 1 INJECTION, SOLUTION INTRAMUSCULAR; INTRAVENOUS; SUBCUTANEOUS at 08:42

## 2025-01-26 RX ADMIN — FERROUS SULFATE TAB 325 MG (65 MG ELEMENTAL FE) 325 MG: 325 (65 FE) TAB at 08:41

## 2025-01-26 RX ADMIN — DOCUSATE SODIUM 100 MG: 100 CAPSULE, LIQUID FILLED ORAL at 21:04

## 2025-01-26 RX ADMIN — HYDROMORPHONE HYDROCHLORIDE 0.4 MG: 1 INJECTION, SOLUTION INTRAMUSCULAR; INTRAVENOUS; SUBCUTANEOUS at 15:22

## 2025-01-26 RX ADMIN — ATORVASTATIN CALCIUM 10 MG: 10 TABLET, FILM COATED ORAL at 21:04

## 2025-01-26 ASSESSMENT — COGNITIVE AND FUNCTIONAL STATUS - GENERAL
MOBILITY SCORE: 24
DAILY ACTIVITIY SCORE: 24

## 2025-01-26 ASSESSMENT — PAIN - FUNCTIONAL ASSESSMENT
PAIN_FUNCTIONAL_ASSESSMENT: 0-10

## 2025-01-26 ASSESSMENT — PAIN SCALES - GENERAL
PAINLEVEL_OUTOF10: 5 - MODERATE PAIN
PAINLEVEL_OUTOF10: 4
PAINLEVEL_OUTOF10: 9
PAINLEVEL_OUTOF10: 9
PAINLEVEL_OUTOF10: 10 - WORST POSSIBLE PAIN
PAINLEVEL_OUTOF10: 9

## 2025-01-26 ASSESSMENT — PAIN DESCRIPTION - LOCATION
LOCATION: ABDOMEN
LOCATION: ABDOMEN

## 2025-01-26 ASSESSMENT — PAIN DESCRIPTION - ORIENTATION
ORIENTATION: RIGHT
ORIENTATION: RIGHT

## 2025-01-26 NOTE — PROGRESS NOTES
Transitional Care Coordination Progress Note:  Plan per Medical/Surgical team: treatment of abd pain @ bili drain site with IV Dilaudid   Status: Observation  Payor source: [facility]  Discharge disposition: Home with sign other  Potential Barriers: no surgery for 6 weeks  ADOD: 1/26/2025  RICARDO Viramontes RN, BSN Transitional Care Coordinator ED# [phone]      01/26/25 0721   Discharge Planning   Assistance Needed pain management   Stroke Family Assessment   Stroke Family Assessment Needed No   Intensity of Service   Intensity of Service 0-30 min        oriented to person, place and time. Grossly normal motor function and sensation, facial cranial nerves grossly normal

## 2025-01-26 NOTE — CARE PLAN
The patient's goals for the shift include  pain <4/10    The clinical goals for the shift include pain management      Problem: Safety - Adult  Goal: Free from fall injury  Outcome: Progressing     Problem: Discharge Planning  Goal: Discharge to home or other facility with appropriate resources  Outcome: Progressing     Problem: Chronic Conditions and Co-morbidities  Goal: Patient's chronic conditions and co-morbidity symptoms are monitored and maintained or improved  Outcome: Progressing     Problem: Nutrition  Goal: Nutrient intake appropriate for maintaining nutritional needs  Outcome: Progressing     Problem: Fall/Injury  Goal: Not fall by end of shift  Outcome: Progressing  Goal: Be free from injury by end of the shift  Outcome: Progressing  Goal: Pace activities to prevent fatigue by end of the shift  Outcome: Progressing     Problem: Pain  Goal: Takes deep breaths with improved pain control throughout the shift  Outcome: Progressing  Goal: Turns in bed with improved pain control throughout the shift  Outcome: Progressing  Goal: Walks with improved pain control throughout the shift  Outcome: Progressing  Goal: Performs ADL's with improved pain control throughout shift  Outcome: Progressing  Goal: Participates in PT with improved pain control throughout the shift  Outcome: Progressing  Goal: Free from opioid side effects throughout the shift  Outcome: Progressing  Goal: Free from acute confusion related to pain meds throughout the shift  Outcome: Progressing

## 2025-01-26 NOTE — CARE PLAN
The patient's goals for the shift include      The clinical goals for the shift include pain management    Over the shift, the patient did not make progress toward the following goals.       Problem: Discharge Planning  Goal: Discharge to home or other facility with appropriate resources  Outcome: Progressing     Problem: Chronic Conditions and Co-morbidities  Goal: Patient's chronic conditions and co-morbidity symptoms are monitored and maintained or improved  Outcome: Progressing     Problem: Pain  Goal: Takes deep breaths with improved pain control throughout the shift  Outcome: Progressing

## 2025-01-26 NOTE — H&P
History Of Present Illness  Douglas Wilson is a 54 y.o. male presenting with rt sided abdo pain  Pt has h/o cholecystitis and was admitted to [facility] for same and did have cholecystostomy placed on 12/27/24    At [facility] he was treated for opioid withdrawal and has had opioid dependency   And discharged to home   He has been to ER few times for abdo pain and had the cholecystostomy tube changed  on 1/9/2025    Yesterday and last night he had increased pain in the rt side and came back to ER  CT chest and abdo with no PE, no acute issues  However he does have pain in rt upper qt  Admitted  Seen surgery no further interventio n     Past Medical History  He has a past medical history of Acute appendicitis without peritonitis (03/12/2024), BPH (benign prostatic hyperplasia), Heroin abuse ([facility]), NSTEMI (non-ST elevated myocardial infarction) (Multi), Pulmonary embolism (Multi), Splenic artery aneurysm (CMS-HCC) (02/2024), and Upper GI bleed (03/15/2024).    Surgical History  He has a past surgical history that includes Other surgical history (Bilateral, 01/24/2022); CT angio coronary art with heartflow if score >30% (04/19/2022); Appendectomy (03/12/2024); and Splenic artery embolization (02/2024).     Social History  He reports that he has been smoking cigarettes. He has never used smokeless tobacco. He reports that he does not currently use alcohol. He reports that he does not currently use drugs after having used the following drugs: Heroin.    Family History  Family History   Problem Relation Name Age of Onset    Diabetes Father      Hypertension Father      No Known Problems Son          raymond    Deafness Son      Other (orthopedic) Son          wearing braces to help with walking        Allergies  Pork derived (porcine)    Review of Systems     No chest pain      No cough  No fever  No chills  Has had wt loss  No change in urine     Physical Exam     Well developed well nurished  No distress  Ao 3  Face  symmetrical   Neck no jvd no bruit  Chest clear  CVS regular  Ext no edema  Abdo soft tender rt side  bs active, no masses  Drain in place  Cns alert appropriate able to move ext   Skin intact  Psych normal affect    Last Recorded Vitals  /85 (BP Location: Left arm, Patient Position: Lying)   Pulse 63   Temp 36.6 °C (97.9 °F) (Temporal)   Resp 18   Wt 74.8 kg (165 lb)   SpO2 96%     Relevant Results    Scheduled medications  amLODIPine, 10 mg, oral, Daily  apixaban, 5 mg, oral, BID  atorvastatin, 10 mg, oral, Nightly  docusate sodium, 100 mg, oral, BID  ferrous sulfate (325 mg ferrous sulfate), 65 mg of iron, oral, Daily with breakfast  pantoprazole, 40 mg, oral, Daily before breakfast  polyethylene glycol, 17 g, oral, Daily  tamsulosin, 0.4 mg, oral, Daily      Continuous medications     PRN medications  PRN medications: acetaminophen **OR** acetaminophen **OR** acetaminophen, guaiFENesin, HYDROmorphone, ondansetron **OR** ondansetron, oxyCODONE-acetaminophen  Results for orders placed or performed during the hospital encounter of 01/24/25 (from the past 96 hours)   CBC and Auto Differential   Result Value Ref Range    WBC 12.4 (H) 4.4 - 11.3 x10*3/uL    nRBC 0.0 0.0 - 0.0 /100 WBCs    RBC 5.06 4.50 - 5.90 x10*6/uL    Hemoglobin 11.6 (L) 13.5 - 17.5 g/dL    Hematocrit 37.9 (L) 41.0 - 52.0 %    MCV 75 (L) 80 - 100 fL    MCH 22.9 (L) 26.0 - 34.0 pg    MCHC 30.6 (L) 32.0 - 36.0 g/dL    RDW 17.8 (H) 11.5 - 14.5 %    Platelets 313 150 - 450 x10*3/uL    Neutrophils % 68.6 40.0 - 80.0 %    Immature Granulocytes %, Automated 0.4 0.0 - 0.9 %    Lymphocytes % 16.7 13.0 - 44.0 %    Monocytes % 10.3 2.0 - 10.0 %    Eosinophils % 3.7 0.0 - 6.0 %    Basophils % 0.3 0.0 - 2.0 %    Neutrophils Absolute 8.50 (H) 1.20 - 7.70 x10*3/uL    Immature Granulocytes Absolute, Automated 0.05 0.00 - 0.70 x10*3/uL    Lymphocytes Absolute 2.07 1.20 - 4.80 x10*3/uL    Monocytes Absolute 1.28 (H) 0.10 - 1.00 x10*3/uL    Eosinophils  Absolute 0.46 0.00 - 0.70 x10*3/uL    Basophils Absolute 0.04 0.00 - 0.10 x10*3/uL   B-Type Natriuretic Peptide   Result Value Ref Range    BNP 45 0 - 99 pg/mL   Lipase   Result Value Ref Range    Lipase 57 9 - 82 U/L   Troponin I, High Sensitivity, Initial   Result Value Ref Range    Troponin I, High Sensitivity 3 0 - 20 ng/L   Hepatic function panel   Result Value Ref Range    Albumin 3.6 3.4 - 5.0 g/dL    Bilirubin, Total 0.4 0.0 - 1.2 mg/dL    Bilirubin, Direct 0.0 0.0 - 0.3 mg/dL    Alkaline Phosphatase 53 33 - 120 U/L    ALT 8 (L) 10 - 52 U/L    AST 21 9 - 39 U/L    Total Protein 7.0 6.4 - 8.2 g/dL   Basic metabolic panel   Result Value Ref Range    Glucose 83 74 - 99 mg/dL    Sodium 136 136 - 145 mmol/L    Potassium 4.6 3.5 - 5.3 mmol/L    Chloride 104 98 - 107 mmol/L    Bicarbonate 26 21 - 32 mmol/L    Anion Gap 11 10 - 20 mmol/L    Urea Nitrogen 10 6 - 23 mg/dL    Creatinine 1.13 0.50 - 1.30 mg/dL    eGFR 77 >60 mL/min/1.73m*2    Calcium 8.6 8.6 - 10.3 mg/dL   Lactate   Result Value Ref Range    Lactate 1.0 0.4 - 2.0 mmol/L   Troponin, High Sensitivity, 1 Hour   Result Value Ref Range    Troponin I, High Sensitivity 3 0 - 20 ng/L   CBC   Result Value Ref Range    WBC 11.1 4.4 - 11.3 x10*3/uL    nRBC 0.0 0.0 - 0.0 /100 WBCs    RBC 4.44 (L) 4.50 - 5.90 x10*6/uL    Hemoglobin 10.2 (L) 13.5 - 17.5 g/dL    Hematocrit 33.0 (L) 41.0 - 52.0 %    MCV 74 (L) 80 - 100 fL    MCH 23.0 (L) 26.0 - 34.0 pg    MCHC 30.9 (L) 32.0 - 36.0 g/dL    RDW 17.4 (H) 11.5 - 14.5 %    Platelets 282 150 - 450 x10*3/uL   Comprehensive metabolic panel   Result Value Ref Range    Glucose 108 (H) 74 - 99 mg/dL    Sodium 137 136 - 145 mmol/L    Potassium 3.9 3.5 - 5.3 mmol/L    Chloride 105 98 - 107 mmol/L    Bicarbonate 28 21 - 32 mmol/L    Anion Gap 8 (L) 10 - 20 mmol/L    Urea Nitrogen 8 6 - 23 mg/dL    Creatinine 0.95 0.50 - 1.30 mg/dL    eGFR >90 >60 mL/min/1.73m*2    Calcium 8.2 (L) 8.6 - 10.3 mg/dL    Albumin 3.4 3.4 - 5.0 g/dL     Alkaline Phosphatase 47 33 - 120 U/L    Total Protein 5.8 (L) 6.4 - 8.2 g/dL    AST 7 (L) 9 - 39 U/L    Bilirubin, Total 0.3 0.0 - 1.2 mg/dL    ALT 6 (L) 10 - 52 U/L   CBC   Result Value Ref Range    WBC 9.3 4.4 - 11.3 x10*3/uL    nRBC 0.0 0.0 - 0.0 /100 WBCs    RBC 4.88 4.50 - 5.90 x10*6/uL    Hemoglobin 11.3 (L) 13.5 - 17.5 g/dL    Hematocrit 36.0 (L) 41.0 - 52.0 %    MCV 74 (L) 80 - 100 fL    MCH 23.2 (L) 26.0 - 34.0 pg    MCHC 31.4 (L) 32.0 - 36.0 g/dL    RDW 17.2 (H) 11.5 - 14.5 %    Platelets 286 150 - 450 x10*3/uL            Assessment/Plan   Assessment & Plan  Intractable abdominal pain    BPH (benign prostatic hyperplasia)    Gastro-esophageal reflux disease without esophagitis    Hyperlipidemia    Obstructive sleep apnea syndrome    Seen dw surgery   And family   Can cont to [facility]   No surg intervention   No pneumonai   No PE  Will resume home meds and reassess          Nilton Ramirez MD

## 2025-01-26 NOTE — PROGRESS NOTES
Doing better    Comfortable walking about the avilez    Discussed with Dr Carranza a plan to take to OR this admission for lap manisha and removal of cholecystostomy tube.  Surgery planned for early this week.     Please hold Eliquis

## 2025-01-27 PROBLEM — K81.0 CHOLECYSTITIS, ACUTE: Status: ACTIVE | Noted: 2025-01-24

## 2025-01-27 LAB
ALBUMIN SERPL BCP-MCNC: 3.2 G/DL (ref 3.4–5)
ALP SERPL-CCNC: 46 U/L (ref 33–120)
ALT SERPL W P-5'-P-CCNC: 5 U/L (ref 10–52)
ANION GAP SERPL CALC-SCNC: 11 MMOL/L (ref 10–20)
AST SERPL W P-5'-P-CCNC: 7 U/L (ref 9–39)
ATRIAL RATE: 69 BPM
BILIRUB SERPL-MCNC: 0.3 MG/DL (ref 0–1.2)
BUN SERPL-MCNC: 7 MG/DL (ref 6–23)
CALCIUM SERPL-MCNC: 8.6 MG/DL (ref 8.6–10.3)
CHLORIDE SERPL-SCNC: 104 MMOL/L (ref 98–107)
CO2 SERPL-SCNC: 27 MMOL/L (ref 21–32)
CREAT SERPL-MCNC: 0.85 MG/DL (ref 0.5–1.3)
EGFRCR SERPLBLD CKD-EPI 2021: >90 ML/MIN/1.73M*2
ERYTHROCYTE [DISTWIDTH] IN BLOOD BY AUTOMATED COUNT: 17 % (ref 11.5–14.5)
GLUCOSE SERPL-MCNC: 100 MG/DL (ref 74–99)
HCT VFR BLD AUTO: 34.1 % (ref 41–52)
HGB BLD-MCNC: 10.8 G/DL (ref 13.5–17.5)
MCH RBC QN AUTO: 23.1 PG (ref 26–34)
MCHC RBC AUTO-ENTMCNC: 31.7 G/DL (ref 32–36)
MCV RBC AUTO: 73 FL (ref 80–100)
NRBC BLD-RTO: 0 /100 WBCS (ref 0–0)
P AXIS: 53 DEGREES
P OFFSET: 199 MS
P ONSET: 165 MS
PLATELET # BLD AUTO: 287 X10*3/UL (ref 150–450)
POTASSIUM SERPL-SCNC: 3.9 MMOL/L (ref 3.5–5.3)
PR INTERVAL: 122 MS
PROT SERPL-MCNC: 6.1 G/DL (ref 6.4–8.2)
Q ONSET: 226 MS
QRS COUNT: 11 BEATS
QRS DURATION: 74 MS
QT INTERVAL: 360 MS
QTC CALCULATION(BAZETT): 385 MS
QTC FREDERICIA: 377 MS
R AXIS: -11 DEGREES
RBC # BLD AUTO: 4.67 X10*6/UL (ref 4.5–5.9)
SODIUM SERPL-SCNC: 138 MMOL/L (ref 136–145)
T AXIS: 43 DEGREES
T OFFSET: 406 MS
VENTRICULAR RATE: 69 BPM
WBC # BLD AUTO: 6.9 X10*3/UL (ref 4.4–11.3)

## 2025-01-27 PROCEDURE — 85027 COMPLETE CBC AUTOMATED: CPT

## 2025-01-27 PROCEDURE — 2500000001 HC RX 250 WO HCPCS SELF ADMINISTERED DRUGS (ALT 637 FOR MEDICARE OP): Performed by: NURSE PRACTITIONER

## 2025-01-27 PROCEDURE — 2500000004 HC RX 250 GENERAL PHARMACY W/ HCPCS (ALT 636 FOR OP/ED): Performed by: FAMILY MEDICINE

## 2025-01-27 PROCEDURE — 36415 COLL VENOUS BLD VENIPUNCTURE: CPT

## 2025-01-27 PROCEDURE — 99232 SBSQ HOSP IP/OBS MODERATE 35: CPT | Performed by: INTERNAL MEDICINE

## 2025-01-27 PROCEDURE — 2500000001 HC RX 250 WO HCPCS SELF ADMINISTERED DRUGS (ALT 637 FOR MEDICARE OP)

## 2025-01-27 PROCEDURE — 1100000001 HC PRIVATE ROOM DAILY

## 2025-01-27 PROCEDURE — 2500000002 HC RX 250 W HCPCS SELF ADMINISTERED DRUGS (ALT 637 FOR MEDICARE OP, ALT 636 FOR OP/ED)

## 2025-01-27 PROCEDURE — 84075 ASSAY ALKALINE PHOSPHATASE: CPT

## 2025-01-27 RX ADMIN — ENOXAPARIN SODIUM 70 MG: 80 INJECTION SUBCUTANEOUS at 00:09

## 2025-01-27 RX ADMIN — ENOXAPARIN SODIUM 70 MG: 80 INJECTION SUBCUTANEOUS at 20:25

## 2025-01-27 RX ADMIN — HYDROMORPHONE HYDROCHLORIDE 0.4 MG: 1 INJECTION, SOLUTION INTRAMUSCULAR; INTRAVENOUS; SUBCUTANEOUS at 22:06

## 2025-01-27 RX ADMIN — HYDROMORPHONE HYDROCHLORIDE 0.4 MG: 1 INJECTION, SOLUTION INTRAMUSCULAR; INTRAVENOUS; SUBCUTANEOUS at 18:21

## 2025-01-27 RX ADMIN — HYDROMORPHONE HYDROCHLORIDE 0.4 MG: 1 INJECTION, SOLUTION INTRAMUSCULAR; INTRAVENOUS; SUBCUTANEOUS at 05:26

## 2025-01-27 RX ADMIN — HYDROMORPHONE HYDROCHLORIDE 0.4 MG: 1 INJECTION, SOLUTION INTRAMUSCULAR; INTRAVENOUS; SUBCUTANEOUS at 09:42

## 2025-01-27 RX ADMIN — DOCUSATE SODIUM 100 MG: 100 CAPSULE, LIQUID FILLED ORAL at 20:25

## 2025-01-27 RX ADMIN — FERROUS SULFATE TAB 325 MG (65 MG ELEMENTAL FE) 325 MG: 325 (65 FE) TAB at 09:42

## 2025-01-27 RX ADMIN — PANTOPRAZOLE SODIUM 40 MG: 40 TABLET, DELAYED RELEASE ORAL at 06:08

## 2025-01-27 RX ADMIN — ATORVASTATIN CALCIUM 10 MG: 10 TABLET, FILM COATED ORAL at 20:25

## 2025-01-27 RX ADMIN — HYDROMORPHONE HYDROCHLORIDE 0.4 MG: 1 INJECTION, SOLUTION INTRAMUSCULAR; INTRAVENOUS; SUBCUTANEOUS at 13:34

## 2025-01-27 RX ADMIN — ENOXAPARIN SODIUM 70 MG: 80 INJECTION SUBCUTANEOUS at 13:33

## 2025-01-27 RX ADMIN — HYDROMORPHONE HYDROCHLORIDE 0.4 MG: 1 INJECTION, SOLUTION INTRAMUSCULAR; INTRAVENOUS; SUBCUTANEOUS at 01:14

## 2025-01-27 RX ADMIN — TAMSULOSIN HYDROCHLORIDE 0.4 MG: 0.4 CAPSULE ORAL at 09:43

## 2025-01-27 RX ADMIN — DOCUSATE SODIUM 100 MG: 100 CAPSULE, LIQUID FILLED ORAL at 09:42

## 2025-01-27 RX ADMIN — AMLODIPINE BESYLATE 10 MG: 10 TABLET ORAL at 09:42

## 2025-01-27 ASSESSMENT — PAIN - FUNCTIONAL ASSESSMENT
PAIN_FUNCTIONAL_ASSESSMENT: 0-10

## 2025-01-27 ASSESSMENT — COGNITIVE AND FUNCTIONAL STATUS - GENERAL
DAILY ACTIVITIY SCORE: 24
MOBILITY SCORE: 24

## 2025-01-27 ASSESSMENT — PAIN SCALES - GENERAL
PAINLEVEL_OUTOF10: 8
PAINLEVEL_OUTOF10: 10 - WORST POSSIBLE PAIN
PAINLEVEL_OUTOF10: 5 - MODERATE PAIN
PAINLEVEL_OUTOF10: 8
PAINLEVEL_OUTOF10: 9
PAINLEVEL_OUTOF10: 5 - MODERATE PAIN
PAINLEVEL_OUTOF10: 4

## 2025-01-27 ASSESSMENT — PAIN DESCRIPTION - ORIENTATION: ORIENTATION: RIGHT

## 2025-01-27 ASSESSMENT — PAIN DESCRIPTION - LOCATION: LOCATION: ABDOMEN

## 2025-01-27 NOTE — CONSULTS
"Nutrition Consult Note  Nutrition Assessment      Reason for Assessment: Admission nursing screening    Douglas Wilson is a 54 y.o. year old male patient with Intractable abdominal pain [R10.9]    referred for MST-2 wt loss    .   Chart reviewed and pt visited.  Past Medical History:   Diagnosis Date    Acute appendicitis without peritonitis 03/12/2024    BPH (benign prostatic hyperplasia)     Heroin abuse (Multi)     NSTEMI (non-ST elevated myocardial infarction) (Multi)     Pulmonary embolism (Multi)     Splenic artery aneurysm (CMS-HCC) 02/2024    rupture s/p coil embolization    Upper GI bleed 03/15/2024    EGD with esophagitis, no active bleeding     Per chart review:  -75% of meals documented  -Upper abd pain since biliary drain placed (replaced 1/9)  -Lap manisha planned for this admission    Per pt:  -No food allergies  -No difficulties chewing/swallowing  -Normally a very good eater, good appetite- \"depends on what I'm having\"  -Did not like lunch today; helped pt order something different    Scheduled medications  amLODIPine, 10 mg, oral, Daily  [Held by provider] apixaban, 5 mg, oral, BID  atorvastatin, 10 mg, oral, Nightly  docusate sodium, 100 mg, oral, BID  enoxaparin, 1 mg/kg, subcutaneous, q12h ZAY  ferrous sulfate (325 mg ferrous sulfate), 65 mg of iron, oral, Daily with breakfast  pantoprazole, 40 mg, oral, Daily before breakfast  polyethylene glycol, 17 g, oral, Daily  tamsulosin, 0.4 mg, oral, Daily      Continuous medications     PRN medications  PRN medications: acetaminophen **OR** acetaminophen **OR** acetaminophen, guaiFENesin, HYDROmorphone, ondansetron **OR** ondansetron, oxyCODONE-acetaminophen    Nutrition Significant Labs:  BMP Trend:   Results from last 7 days   Lab Units 01/27/25  0459 01/26/25  0458 01/25/25  0418 01/24/25  2147   GLUCOSE mg/dL 100* 96 108* 83   CALCIUM mg/dL 8.6 8.4* 8.2* 8.6   SODIUM mmol/L 138 138 137 136   POTASSIUM mmol/L 3.9 3.9 3.9 4.6   CO2 mmol/L 27 28 28 26 " "  CHLORIDE mmol/L 104 103 105 104   BUN mg/dL 7 5* 8 10   CREATININE mg/dL 0.85 0.93 0.95 1.13    , Liver Function Trend:   Results from last 7 days   Lab Units 01/27/25  0459 01/25/25 0418 01/24/25  2147   ALK PHOS U/L 46 47 53   AST U/L 7* 7* 21   ALT U/L 5* 6* 8*   BILIRUBIN TOTAL mg/dL 0.3 0.3 0.4    , Renal Lab Trend:   Results from last 7 days   Lab Units 01/27/25  0459 01/26/25  0458 01/25/25 0418 01/24/25  2147   POTASSIUM mmol/L 3.9 3.9 3.9 4.6   SODIUM mmol/L 138 138 137 136   EGFR mL/min/1.73m*2 >90 >90 >90 77   BUN mg/dL 7 5* 8 10   CREATININE mg/dL 0.85 0.93 0.95 1.13    , TPN/PPN Labs:   Results from last 7 days   Lab Units 01/27/25 0459 01/26/25 0458 01/25/25 0418 01/24/25  2147   GLUCOSE mg/dL 100* 96 108* 83   POTASSIUM mmol/L 3.9 3.9 3.9 4.6   SODIUM mmol/L 138 138 137 136   CHLORIDE mmol/L 104 103 105 104   ALT U/L 5*  --  6* 8*   AST U/L 7*  --  7* 21   ALK PHOS U/L 46  --  47 53   BILIRUBIN TOTAL mg/dL 0.3  --  0.3 0.4    , Lipid Panel:   Lab Results   Component Value Date    CHOL 200 (H) 05/31/2024    HDL 53.3 05/31/2024    CHHDL 3.8 05/31/2024    VLDL 31 05/31/2024    TRIG 157 (H) 05/31/2024    , Vit D:   Lab Results   Component Value Date    VITD25 67 06/18/2024        Dietary Orders (From admission, onward)       Start     Ordered    01/25/25 1101  Adult diet Regular, 2-3 grams sodium, Cholesterol restricted 200 mg  Diet effective now        Question Answer Comment   Diet type Regular    Diet type 2-3 grams sodium    Diet type Cholesterol restricted 200 mg        01/25/25 1100    01/25/25 0333  May Participate in Room Service  ( ROOM SERVICE MAY PARTICIPATE)  Once        Question:  .  Answer:  Yes    01/25/25 0332                  History:  Energy Intake: Good > 75 %, Fair 50-75 %, Poor < 50 %  Food and Nutrient History: Pt pt- intake depends on what is being served  Food Allergy: Other (Comment) (Pork)    Anthropometrics:  Height: 175.3 cm (5' 9.02\")  Weight: 74.8 kg (164 lb 14.5 " "oz)  BMI (Calculated): 24.34    Weight Change: -0.06    Wt Readings from Last 22 Encounters:  01/27/25 74.8 kg (164 lb 14.5 oz)  01/14/25 74.8 kg (165 lb)  01/08/25 74.4 kg (164 lb)  11/27/24 84.9 kg (187 lb 3.2 oz)- Edema noted  09/29/24 79.4 kg (175 lb)  08/28/24 76.8 kg (169 lb 6.4 oz)  08/20/24 78 kg (172 lb)  08/01/24 77.1 kg (170 lb)  07/23/24 77.1 kg (170 lb)  07/23/24 77.1 kg (170 lb)  07/20/24 77.1 kg (170 lb)  06/18/24 79.7 kg (175 lb 12.8 oz)  06/07/24 77.1 kg (170 lb)  06/04/24 76.4 kg (168 lb 6.9 oz)  05/08/24 84.8 kg (187 lb)  05/08/24 86.4 kg (190 lb 6.4 oz)  04/23/24 82.1 kg (181 lb)  04/04/24 82.8 kg (182 lb 9.6 oz)  03/25/24 81.6 kg (180 lb)  03/16/24 76.1 kg (167 lb 12.3 oz)  03/12/24 76.2 kg (168 lb)  02/26/24 76.2 kg (168 lb)    Significant Weight Loss: No       IBW/kg (Dietitian Calculated): 72.7 kg  Percent of IBW: 103 %       Energy Needs:  Height: 175.3 cm (5' 9.02\")  Temp: 36.3 °C (97.3 °F)    Total Energy Estimated Needs in 24 hours (kCal): 1875 kCal  Energy Estimated Needs per kg Body Weight in 24 hours (kCal/kg): 2250 kCal/kg  Method for Estimating Needs: 25-30kcal/kg    Total Protein Estimated Needs in 24 Hours (g): 60 g  Protein Estimated Needs per kg Body Weight in 24 Hours (g/kg): 75 g/kg  Method for Estimating 24 Hour Protein Needs: 0.8-1.0g/kg    Method for Estimating 24 Hour Fluid Needs: 1mL/kcal or MD recommendations       Nutrition Focused Physical Findings:  Orbital Fat Pads: Well nourished (slightly bulging fat pads)  Buccal Fat Pads: Well nourished (full, rounded cheeks)    Temporalis: Well nourished (well-defined muscle)    Edema: none       Skin: Negative  Digestive System Findings: Abdominal pain       Nutrition Diagnosis        Patient has Nutrition Diagnosis: Yes  Nutrition Diagnosis 1: Unintended weight loss  Diagnosis Status (1): New  Related to (1): acute illness  As Evidenced by (1): 6.3% weight loss in 4 months       Nutrition Interventions/Recommendations      Food " and/or Nutrient Delivery Interventions  Meals and Snacks: Mineral-modified diet, Fat-modified diet     -Should po intake/appetite decline, consider offering ONS.      Nutrition Monitoring and Evaluation   Food and Nutrient Related History  Estimated Energy Intake: Energy intake greater or equal to 75% of estimated energy needs    Fluid Intake: Estimated fluid intake    Intake / Amount of food: Meets > 75% estimated energy needs    Anthropometrics: Body Composition/Growth/Weight History  Body Weight: Body weight - Maintain stable weight    Biochemical Data, Medical Tests and Procedures  Electrolyte and Renal Panel: Other (Comment), Calcium, ionized, Calcium, serum, BUN  Criteria: As clinically indicated    Gastrointestinal Profile: Other (Comment), Aspartate aminotransferase (AST), Alanine aminotransferase (ALT)  Criteria: As clinically indicated    Glucose/Endocrine Profile: Glucose within normal limits ( mg/dL)  Criteria: As clinically indicated    Nutrition Focused Physical Findings     Digestive System Finding: Abdominal pain    Other: overall appearance and I/Os    Time Spent (min): 60 minutes  Last Date of Nutrition Visit: 01/27/25  Nutrition Follow-Up Needed?: Dietitian to reassess per policy  Follow up Comment: LAUREL Shore

## 2025-01-27 NOTE — PROGRESS NOTES
Douglas Wilson is a 54 y.o. male on day 0 of admission presenting with Intractable abdominal pain.      Subjective   Pain improving   Does notice pain with breathing on the rt side  Earlier dw surgery and will plan for surgery   Pt is on eliquis   Holding for now       Objective     Last Recorded Vitals  /74 (BP Location: Right arm, Patient Position: Lying)   Pulse 69   Temp 36.3 °C (97.3 °F) (Temporal)   Resp 18   Wt 74.8 kg (165 lb)   SpO2 94%   Intake/Output last 3 Shifts:    Intake/Output Summary (Last 24 hours) at 1/27/2025 0251  Last data filed at 1/26/2025 1500  Gross per 24 hour   Intake 250 ml   Output --   Net 250 ml       Admission Weight  Weight: 74.8 kg (165 lb) (01/24/25 1508)    Daily Weight  01/24/25 : 74.8 kg (165 lb)    Image Results  CT abdomen pelvis w IV contrast  Narrative: STUDY:  CT Angiogram of the Chest, CT Abdomen and Pelvis with IV Contrast;  1/24/2025 11:15 PM.  INDICATION:  Shortness of breath.  Cough.  Evaluate for pulmonary embolism.  Right  upper quadrant abdominal pain.  Biliary drain with decreased output.  COMPARISON:  None.  ACCESSION NUMBER(S):  [id number], [id number]  ORDERING CLINICIAN:  AKI ROSA  TECHNIQUE:  CTA of the chest was performed following rapid injection  of intravenous contrast.  Images are reviewed and processed at a  workstation according to the CT angiogram protocol with 3-D and/or MIP  post processing imaging generated.  CT of the abdomen and pelvis was  performed with intravenous contrast.  Omnipaque 350 75 mL was  administered intravenously; positive oral contrast was given.  Automated mA/kV exposure control was utilized and patient examination  was performed in strict accordance with principles of ALARA.  FINDINGS:    CTA CHEST:  Pulmonary arteries are adequately opacified without acute or chronic  filling defects.  The thoracic aorta is normal in course and caliber  without dissection or aneurysm.  Cardiac size is normal without  pericardial effusion.  Thoracic lymph  nodes are not enlarged.  Residual thymic tissue is noted in the  anterior mediastinum.    There is no pleural effusion, pleural thickening, or pneumothorax.   The airways are patent.  Minimal dependent atelectasis is seen in the lung bases.    ABDOMEN:     LIVER:  No hepatomegaly.  Smooth surface contour.  Normal attenuation.     BILE DUCTS:  No intrahepatic or extrahepatic biliary ductal dilatation.     GALLBLADDER:  The patient is status post cholecystectomy.  Drainage catheter is seen  in the gallbladder fossa.       STOMACH:  No abnormalities identified.  Vascular coils are seen in the region of  the GE junction.       PANCREAS:  No masses or ductal dilatation.     SPLEEN:  No splenomegaly or focal splenic lesion.     ADRENAL GLANDS:  No thickening or nodules.     KIDNEYS AND URETERS:  Kidneys are normal in size and location.  No renal or ureteral  calculi.     PELVIS:     BLADDER:  No abnormalities identified.     REPRODUCTIVE ORGANS:  No abnormalities identified.     BOWEL:  No abnormalities identified. Appendix appears to be absent.  Terminal  ileum is unremarkable.     VESSELS:  No abnormalities identified.  Abdominal aorta is normal in caliber.      PERITONEUM/RETROPERITONEUM/LYMPH NODES:  No free fluid.  No pneumoperitoneum.  No lymphadenopathy.     ABDOMINAL WALL:  No abnormalities identified.  SOFT TISSUES:   No abnormalities identified.     BONES:  No acute fracture or aggressive osseous lesion.  Impression: No definite acute thoracic, abdominal, or pelvic pathology identified.  Postoperative changes of recent cholecystectomy with a drainage  catheter in place.  Signed by Joe Castrejon  CT angio chest for pulmonary embolism  Narrative: STUDY:  CT Angiogram of the Chest, CT Abdomen and Pelvis with IV Contrast;  1/24/2025 11:15 PM.  INDICATION:  Shortness of breath.  Cough.  Evaluate for pulmonary embolism.  Right  upper quadrant abdominal pain.  Biliary drain with  decreased output.  COMPARISON:  None.  ACCESSION NUMBER(S):  [id number], [id number]  ORDERING CLINICIAN:  AKI ROSA  TECHNIQUE:  CTA of the chest was performed following rapid injection  of intravenous contrast.  Images are reviewed and processed at a  workstation according to the CT angiogram protocol with 3-D and/or MIP  post processing imaging generated.  CT of the abdomen and pelvis was  performed with intravenous contrast.  Omnipaque 350 75 mL was  administered intravenously; positive oral contrast was given.  Automated mA/kV exposure control was utilized and patient examination  was performed in strict accordance with principles of ALARA.  FINDINGS:    CTA CHEST:  Pulmonary arteries are adequately opacified without acute or chronic  filling defects.  The thoracic aorta is normal in course and caliber  without dissection or aneurysm.  Cardiac size is normal without pericardial effusion.  Thoracic lymph  nodes are not enlarged.  Residual thymic tissue is noted in the  anterior mediastinum.    There is no pleural effusion, pleural thickening, or pneumothorax.   The airways are patent.  Minimal dependent atelectasis is seen in the lung bases.    ABDOMEN:     LIVER:  No hepatomegaly.  Smooth surface contour.  Normal attenuation.     BILE DUCTS:  No intrahepatic or extrahepatic biliary ductal dilatation.     GALLBLADDER:  The patient is status post cholecystectomy.  Drainage catheter is seen  in the gallbladder fossa.       STOMACH:  No abnormalities identified.  Vascular coils are seen in the region of  the GE junction.       PANCREAS:  No masses or ductal dilatation.     SPLEEN:  No splenomegaly or focal splenic lesion.     ADRENAL GLANDS:  No thickening or nodules.     KIDNEYS AND URETERS:  Kidneys are normal in size and location.  No renal or ureteral  calculi.     PELVIS:     BLADDER:  No abnormalities identified.     REPRODUCTIVE ORGANS:  No abnormalities identified.     BOWEL:  No abnormalities  identified. Appendix appears to be absent.  Terminal  ileum is unremarkable.     VESSELS:  No abnormalities identified.  Abdominal aorta is normal in caliber.      PERITONEUM/RETROPERITONEUM/LYMPH NODES:  No free fluid.  No pneumoperitoneum.  No lymphadenopathy.     ABDOMINAL WALL:  No abnormalities identified.  SOFT TISSUES:   No abnormalities identified.     BONES:  No acute fracture or aggressive osseous lesion.  Impression: No definite acute thoracic, abdominal, or pelvic pathology identified.  Postoperative changes of recent cholecystectomy with a drainage  catheter in place.  Signed by Joe Castrejon      Physical Exam    Well developed well nurished  No distress  Ao  Face symmetrical   Neck no jvd no bruit  Chest clear  CVS regular  Ext no edema  Abdo soft nontender bs active, no masses  Cns alert appropriate able to move ext   Skin intact  Psych normal affect  Rt upper qt tender    Relevant Results  Scheduled medications  amLODIPine, 10 mg, oral, Daily  [Held by provider] apixaban, 5 mg, oral, BID  atorvastatin, 10 mg, oral, Nightly  docusate sodium, 100 mg, oral, BID  enoxaparin, 1 mg/kg, subcutaneous, q12h ZAY  ferrous sulfate (325 mg ferrous sulfate), 65 mg of iron, oral, Daily with breakfast  pantoprazole, 40 mg, oral, Daily before breakfast  polyethylene glycol, 17 g, oral, Daily  tamsulosin, 0.4 mg, oral, Daily      Continuous medications     PRN medications  PRN medications: acetaminophen **OR** acetaminophen **OR** acetaminophen, guaiFENesin, HYDROmorphone, ondansetron **OR** ondansetron, oxyCODONE-acetaminophen  Results for orders placed or performed during the hospital encounter of 01/24/25 (from the past 96 hours)   CBC and Auto Differential   Result Value Ref Range    WBC 12.4 (H) 4.4 - 11.3 x10*3/uL    nRBC 0.0 0.0 - 0.0 /100 WBCs    RBC 5.06 4.50 - 5.90 x10*6/uL    Hemoglobin 11.6 (L) 13.5 - 17.5 g/dL    Hematocrit 37.9 (L) 41.0 - 52.0 %    MCV 75 (L) 80 - 100 fL    MCH 22.9 (L) 26.0 - 34.0 pg     MCHC 30.6 (L) 32.0 - 36.0 g/dL    RDW 17.8 (H) 11.5 - 14.5 %    Platelets 313 150 - 450 x10*3/uL    Neutrophils % 68.6 40.0 - 80.0 %    Immature Granulocytes %, Automated 0.4 0.0 - 0.9 %    Lymphocytes % 16.7 13.0 - 44.0 %    Monocytes % 10.3 2.0 - 10.0 %    Eosinophils % 3.7 0.0 - 6.0 %    Basophils % 0.3 0.0 - 2.0 %    Neutrophils Absolute 8.50 (H) 1.20 - 7.70 x10*3/uL    Immature Granulocytes Absolute, Automated 0.05 0.00 - 0.70 x10*3/uL    Lymphocytes Absolute 2.07 1.20 - 4.80 x10*3/uL    Monocytes Absolute 1.28 (H) 0.10 - 1.00 x10*3/uL    Eosinophils Absolute 0.46 0.00 - 0.70 x10*3/uL    Basophils Absolute 0.04 0.00 - 0.10 x10*3/uL   B-Type Natriuretic Peptide   Result Value Ref Range    BNP 45 0 - 99 pg/mL   Lipase   Result Value Ref Range    Lipase 57 9 - 82 U/L   Troponin I, High Sensitivity, Initial   Result Value Ref Range    Troponin I, High Sensitivity 3 0 - 20 ng/L   Hepatic function panel   Result Value Ref Range    Albumin 3.6 3.4 - 5.0 g/dL    Bilirubin, Total 0.4 0.0 - 1.2 mg/dL    Bilirubin, Direct 0.0 0.0 - 0.3 mg/dL    Alkaline Phosphatase 53 33 - 120 U/L    ALT 8 (L) 10 - 52 U/L    AST 21 9 - 39 U/L    Total Protein 7.0 6.4 - 8.2 g/dL   Basic metabolic panel   Result Value Ref Range    Glucose 83 74 - 99 mg/dL    Sodium 136 136 - 145 mmol/L    Potassium 4.6 3.5 - 5.3 mmol/L    Chloride 104 98 - 107 mmol/L    Bicarbonate 26 21 - 32 mmol/L    Anion Gap 11 10 - 20 mmol/L    Urea Nitrogen 10 6 - 23 mg/dL    Creatinine 1.13 0.50 - 1.30 mg/dL    eGFR 77 >60 mL/min/1.73m*2    Calcium 8.6 8.6 - 10.3 mg/dL   Lactate   Result Value Ref Range    Lactate 1.0 0.4 - 2.0 mmol/L   Troponin, High Sensitivity, 1 Hour   Result Value Ref Range    Troponin I, High Sensitivity 3 0 - 20 ng/L   CBC   Result Value Ref Range    WBC 11.1 4.4 - 11.3 x10*3/uL    nRBC 0.0 0.0 - 0.0 /100 WBCs    RBC 4.44 (L) 4.50 - 5.90 x10*6/uL    Hemoglobin 10.2 (L) 13.5 - 17.5 g/dL    Hematocrit 33.0 (L) 41.0 - 52.0 %    MCV 74 (L) 80 -  100 fL    MCH 23.0 (L) 26.0 - 34.0 pg    MCHC 30.9 (L) 32.0 - 36.0 g/dL    RDW 17.4 (H) 11.5 - 14.5 %    Platelets 282 150 - 450 x10*3/uL   Comprehensive metabolic panel   Result Value Ref Range    Glucose 108 (H) 74 - 99 mg/dL    Sodium 137 136 - 145 mmol/L    Potassium 3.9 3.5 - 5.3 mmol/L    Chloride 105 98 - 107 mmol/L    Bicarbonate 28 21 - 32 mmol/L    Anion Gap 8 (L) 10 - 20 mmol/L    Urea Nitrogen 8 6 - 23 mg/dL    Creatinine 0.95 0.50 - 1.30 mg/dL    eGFR >90 >60 mL/min/1.73m*2    Calcium 8.2 (L) 8.6 - 10.3 mg/dL    Albumin 3.4 3.4 - 5.0 g/dL    Alkaline Phosphatase 47 33 - 120 U/L    Total Protein 5.8 (L) 6.4 - 8.2 g/dL    AST 7 (L) 9 - 39 U/L    Bilirubin, Total 0.3 0.0 - 1.2 mg/dL    ALT 6 (L) 10 - 52 U/L   Basic metabolic panel   Result Value Ref Range    Glucose 96 74 - 99 mg/dL    Sodium 138 136 - 145 mmol/L    Potassium 3.9 3.5 - 5.3 mmol/L    Chloride 103 98 - 107 mmol/L    Bicarbonate 28 21 - 32 mmol/L    Anion Gap 11 10 - 20 mmol/L    Urea Nitrogen 5 (L) 6 - 23 mg/dL    Creatinine 0.93 0.50 - 1.30 mg/dL    eGFR >90 >60 mL/min/1.73m*2    Calcium 8.4 (L) 8.6 - 10.3 mg/dL   CBC   Result Value Ref Range    WBC 9.3 4.4 - 11.3 x10*3/uL    nRBC 0.0 0.0 - 0.0 /100 WBCs    RBC 4.88 4.50 - 5.90 x10*6/uL    Hemoglobin 11.3 (L) 13.5 - 17.5 g/dL    Hematocrit 36.0 (L) 41.0 - 52.0 %    MCV 74 (L) 80 - 100 fL    MCH 23.2 (L) 26.0 - 34.0 pg    MCHC 31.4 (L) 32.0 - 36.0 g/dL    RDW 17.2 (H) 11.5 - 14.5 %    Platelets 286 150 - 450 x10*3/uL                Assessment/Plan         Assessment & Plan  Intractable abdominal pain    BPH (benign prostatic hyperplasia)    Gastro-esophageal reflux disease without esophagitis    Hyperlipidemia    Obstructive sleep apnea syndrome    Dw surg for choli  Hold eliquis  Start lovenox dw surgery   Cont with pain control              Nilton Ramirez MD

## 2025-01-27 NOTE — PROGRESS NOTES
Transitional Care Coordination Progress Note:  Plan per Medical/Surgical team: treatment of abd pain @ bili drain site with IV Dilaudid & surgery consult   Status: Inpatient  Payor source: [facility]  Discharge disposition: Home with sign other  Potential Barriers:surgery this admit, holding Eliquis  ADOD: 1/29/2025  RICARDO Viramontes RN, BSN Transitional Care Coordinator ED# [phone]      01/27/25 0730   Discharge Planning   Assistance Needed now having surgery- lap manisha & removal of bili tube, holding Eliquis   Stroke Family Assessment   Stroke Family Assessment Needed No   Intensity of Service   Intensity of Service 0-30 min

## 2025-01-27 NOTE — PROGRESS NOTES
"Douglas Wilson is a 54 y.o. male on day 1 of admission presenting with Intractable abdominal pain.    Assessment/Plan   I discussed with the patient taking his gallbladder out.  He has had his percutaneous drain for about a month now.  Reasonable to proceed.  CT scan shows a very decompressed gallbladder.  No active inflammation.  Continue to hold Eliquis.  I have tentatively scheduled him for surgery on Wednesday    Subjective   Patient without complaints       Objective     Physical Exam  NAD  A&Ox3  Non icteric  CTA  RR  Abdomen soft drain with bilious output  Extremities warm, well perfused     Last Recorded Vitals  Blood pressure 109/79, pulse 64, temperature 36.3 °C (97.3 °F), temperature source Temporal, resp. rate 18, height 1.753 m (5' 9\"), weight 74.8 kg (165 lb), SpO2 98%.  Intake/Output last 3 Shifts:  I/O last 3 completed shifts:  In: 250 (3.3 mL/kg) [P.O.:250]  Out: - (0 mL/kg)   Weight: 74.8 kg     Relevant Results    Scheduled medications  amLODIPine, 10 mg, oral, Daily  [Held by provider] apixaban, 5 mg, oral, BID  atorvastatin, 10 mg, oral, Nightly  docusate sodium, 100 mg, oral, BID  enoxaparin, 1 mg/kg, subcutaneous, q12h ZAY  ferrous sulfate (325 mg ferrous sulfate), 65 mg of iron, oral, Daily with breakfast  pantoprazole, 40 mg, oral, Daily before breakfast  polyethylene glycol, 17 g, oral, Daily  tamsulosin, 0.4 mg, oral, Daily      Continuous medications     PRN medications  PRN medications: acetaminophen **OR** acetaminophen **OR** acetaminophen, guaiFENesin, HYDROmorphone, ondansetron **OR** ondansetron, oxyCODONE-acetaminophen    Results for orders placed or performed during the hospital encounter of 01/24/25 (from the past 24 hours)   CBC   Result Value Ref Range    WBC 6.9 4.4 - 11.3 x10*3/uL    nRBC 0.0 0.0 - 0.0 /100 WBCs    RBC 4.67 4.50 - 5.90 x10*6/uL    Hemoglobin 10.8 (L) 13.5 - 17.5 g/dL    Hematocrit 34.1 (L) 41.0 - 52.0 %    MCV 73 (L) 80 - 100 fL    MCH 23.1 (L) 26.0 - 34.0 pg "    MCHC 31.7 (L) 32.0 - 36.0 g/dL    RDW 17.0 (H) 11.5 - 14.5 %    Platelets 287 150 - 450 x10*3/uL   Comprehensive Metabolic Panel   Result Value Ref Range    Glucose 100 (H) 74 - 99 mg/dL    Sodium 138 136 - 145 mmol/L    Potassium 3.9 3.5 - 5.3 mmol/L    Chloride 104 98 - 107 mmol/L    Bicarbonate 27 21 - 32 mmol/L    Anion Gap 11 10 - 20 mmol/L    Urea Nitrogen 7 6 - 23 mg/dL    Creatinine 0.85 0.50 - 1.30 mg/dL    eGFR >90 >60 mL/min/1.73m*2    Calcium 8.6 8.6 - 10.3 mg/dL    Albumin 3.2 (L) 3.4 - 5.0 g/dL    Alkaline Phosphatase 46 33 - 120 U/L    Total Protein 6.1 (L) 6.4 - 8.2 g/dL    AST 7 (L) 9 - 39 U/L    Bilirubin, Total 0.3 0.0 - 1.2 mg/dL    ALT 5 (L) 10 - 52 U/L           I spent 25 minutes in the professional and overall care of this patient.      Lauro Carranza MD

## 2025-01-27 NOTE — CARE PLAN
The patient's goals for the shift include  pain management    The clinical goals for the shift include remain hds    Problem: Fall/Injury  Goal: Not fall by end of shift  Outcome: Progressing     Problem: Fall/Injury  Goal: Be free from injury by end of the shift  Outcome: Progressing     Problem: Fall/Injury  Goal: Be free from injury by end of the shift  Outcome: Progressing     Problem: Pain  Goal: Takes deep breaths with improved pain control throughout the shift  Outcome: Progressing     Problem: Pain  Goal: Turns in bed with improved pain control throughout the shift  Outcome: Progressing     Problem: Pain  Goal: Walks with improved pain control throughout the shift  Outcome: Progressing     Problem: Pain  Goal: Performs ADL's with improved pain control throughout shift  Outcome: Progressing     Problem: Pain  Goal: Participates in PT with improved pain control throughout the shift  Outcome: Progressing     Problem: Pain  Goal: Free from opioid side effects throughout the shift  Outcome: Progressing     Problem: Pain  Goal: Free from acute confusion related to pain meds throughout the shift  Outcome: Progressing     Problem: Safety - Adult  Goal: Free from fall injury  Outcome: Progressing

## 2025-01-27 NOTE — CARE PLAN
The patient's goals for the shift include      The clinical goals for the shift include pain management    Over the shift, the patient did not make progress toward the following goals.       Problem: Discharge Planning  Goal: Discharge to home or other facility with appropriate resources  Outcome: Progressing     Problem: Chronic Conditions and Co-morbidities  Goal: Patient's chronic conditions and co-morbidity symptoms are monitored and maintained or improved  Outcome: Progressing     Problem: Pain  Goal: Takes deep breaths with improved pain control throughout the shift  Outcome: Progressing     Problem: Pain  Goal: Turns in bed with improved pain control throughout the shift  Outcome: Progressing     Problem: Pain  Goal: Walks with improved pain control throughout the shift  Outcome: Progressing     Problem: Pain  Goal: Participates in PT with improved pain control throughout the shift  Outcome: Progressing

## 2025-01-27 NOTE — PROGRESS NOTES
Pharmacy Medication History     Source of Information: Per patient     Additional concerns with the patient's PTA list.   N/A  The following updates were made to the Prior to Admission medication list:     Medications ADDED:   N/A  Medications CHANGED:  N/A  Medications REMOVED:   N/A  Medications NOT TAKING:   Percocet 5-325 tablet   Pantoprazole 40 mg EC tablet     Allergy reviewed : Yes    Meds 2 Beds : Yes    Outpatient pharmacy confirmed and updated in chart : Yes    Pharm[facility] name: [facility] ( [address] [facility])    The list below reflectives the updated PTA list. Please review each medication in order reconciliation for additional clarification and justification.    Prior to Admission Medications   Prescriptions Last Dose Informant   amLODIPine (Norvasc) 10 mg tablet 1/23/2025 Morning    Sig: Take 1 tablet (10 mg) by mouth once daily.   amoxicillin-pot clavulanate (Augmentin) 875-125 mg tablet     Sig: Take 1 tablet by mouth 2 times a day for 10 days.   apixaban (Eliquis) 5 mg (74 tabs) tablet 1/23/2025 Morning    Sig: Take 2 tablets (10 mg) by mouth 2 times a day for 7 days, then take 1 tablet (5 mg) by mouth 2 times a day.   atorvastatin (Lipitor) 10 mg tablet 1/23/2025 Evening    Sig: Take 1 tablet (10 mg) by mouth once daily at bedtime.   ferrous sulfate, 325 mg ferrous sulfate, (Iron, ferrous sulfate,) tablet 1/23/2025 Morning    Sig: Take 1 tablet by mouth once daily with breakfast.   omeprazole (PriLOSEC) 40 mg DR capsule 1/23/2025 Morning    Sig: Take 1 capsule (40 mg) by mouth once daily in the morning. Take before meals. Do not crush or chew.                         sildenafil (Viagra) 100 mg tablet Unknown    Sig: Take 1 tablet (100 mg) by mouth once daily as needed for erectile dysfunction.   sodium chloride 0.9% (Normal Saline Flush) flush Unknown    Sig: Flush biliary drain every 8 hours using 10 mL by intra-catheter route as directed.   sodium chloride 0.9% (sodium chloride) flush Unknown    Sig: Flush  biliary drain twice daily using 10 mL by intra-catheter route as directed.   tamsulosin (Flomax) 0.4 mg 24 hr capsule 1/23/2025 Morning    Sig: Take 1 capsule (0.4 mg) by mouth once daily.      Facility-Administered Medications: None       The list below reflectives the updated allergy list. Please review each documented allergy for additional clarification and justification.    Allergies   Allergen Reactions    Pork Derived (Porcine) Other     Not tolerated          01/27/25 at 10:47 AM - Jaky Chaudhary

## 2025-01-27 NOTE — PROGRESS NOTES
Douglas Wilson is a 54 y.o. male     Patient ambulating in the hallway without any difficulty  Wants to go out  Advised him that he cannot leave the hospital premises  Recently received Dilaudid for abdominal pain    Review of Systems     Constitutional: no fever, no chills, not feeling poorly, not feeling tired   Cardiovascular: no chest pain   Respiratory: no cough, wheezing or shortness of breath a  Gastrointestinal:  no nausea, no diarrhea, no vomiting and no blood in stools.   Neurological: no headache,   All other systems have been reviewed and are negative for complaint.       Vitals:    01/27/25 1218   BP: 109/79   Pulse: 64   Resp: 18   Temp: 36.3 °C (97.3 °F)   SpO2: 98%        Scheduled medications  amLODIPine, 10 mg, oral, Daily  [Held by provider] apixaban, 5 mg, oral, BID  atorvastatin, 10 mg, oral, Nightly  docusate sodium, 100 mg, oral, BID  enoxaparin, 1 mg/kg, subcutaneous, q12h ZAY  ferrous sulfate (325 mg ferrous sulfate), 65 mg of iron, oral, Daily with breakfast  pantoprazole, 40 mg, oral, Daily before breakfast  polyethylene glycol, 17 g, oral, Daily  tamsulosin, 0.4 mg, oral, Daily      Continuous medications     PRN medications  PRN medications: acetaminophen **OR** acetaminophen **OR** acetaminophen, guaiFENesin, HYDROmorphone, ondansetron **OR** ondansetron, oxyCODONE-acetaminophen    Lab Review   Results from last 7 days   Lab Units 01/27/25 0459 01/26/25 0458 01/25/25 0418   WBC AUTO x10*3/uL 6.9 9.3 11.1   HEMOGLOBIN g/dL 10.8* 11.3* 10.2*   HEMATOCRIT % 34.1* 36.0* 33.0*   PLATELETS AUTO x10*3/uL 287 286 282     Results from last 7 days   Lab Units 01/27/25 0459 01/26/25 0458 01/25/25 0418 01/24/25  2147   SODIUM mmol/L 138 138 137 136   POTASSIUM mmol/L 3.9 3.9 3.9 4.6   CHLORIDE mmol/L 104 103 105 104   CO2 mmol/L 27 28 28 26   BUN mg/dL 7 5* 8 10   CREATININE mg/dL 0.85 0.93 0.95 1.13   CALCIUM mg/dL 8.6 8.4* 8.2* 8.6   PROTEIN TOTAL g/dL 6.1*  --  5.8* 7.0   BILIRUBIN TOTAL  mg/dL 0.3  --  0.3 0.4   ALK PHOS U/L 46  --  47 53   ALT U/L 5*  --  6* 8*   AST U/L 7*  --  7* 21   GLUCOSE mg/dL 100* 96 108* 83     Results from last 7 days   Lab Units 01/25/25  0037 01/24/25  2147   TROPHS ng/L 3 3        CT angio chest for pulmonary embolism   Final Result   No definite acute thoracic, abdominal, or pelvic pathology identified.   Postoperative changes of recent cholecystectomy with a drainage   catheter in place.   Signed by Joe Castrejon      CT abdomen pelvis w IV contrast   Final Result   No definite acute thoracic, abdominal, or pelvic pathology identified.   Postoperative changes of recent cholecystectomy with a drainage   catheter in place.   Signed by Joe Castrejon      XR chest 2 views   Final Result   No acute process.   Signed by Tyrone Seth MD            Physical Exam    Constitutional   General appearance: Alert and in no acute distress.   Pulmonary   Respiratory assessment: No respiratory distress, normal respiratory rhythm and effort.    Auscultation of Lungs: Clear bilateral breath sounds.   Cardiovascular   Auscultation of heart: Apical pulse normal, heart rate and rhythm normal, normal S1 and S2, no murmurs and no pericardial rub.    Exam for edema: No peripheral edema.   Abdomen   Abdominal Exam: No bruits, normal bowel sounds, soft, non-tender, no abdominal mass palpated.  Drain intact  Liver and Spleen exam: No hepato-splenomegaly.   Musculoskeletal   Examination of gait: Normal.    Inspection of digits and nails: No clubbing or cyanosis of the fingernails.    Inspection/palpation of joints, bones and muscles: No joint swelling. Normal movement of all extremities.   Neurologic   Cranial nerves: Nerves 2-12 were intact, no focal neuro defects.         Assessment/Plan      #Intractable abdominal pain  Recently received Dilaudid and is ambulating in the halls appearing very comfortable  Noted surgery recommendations that he be taken to the OR for removal of cholecystostomy  tube and lap manisha  Surgery is planned for Wednesday  Continue to hold Eliquis that he is taking for pulmonary embolism    #Hypertension  Stable continue home medications    #Dyslipidemia  Continue statins with target LDL less than 70    #Benign prostatic hypertrophy  Stable with minimal nocturia

## 2025-01-28 ENCOUNTER — APPOINTMENT (OUTPATIENT)
Dept: SURGERY | Facility: HOSPITAL | Age: 55
End: 2025-01-28
Payer: COMMERCIAL

## 2025-01-28 LAB
ALBUMIN SERPL BCP-MCNC: 3.3 G/DL (ref 3.4–5)
ALP SERPL-CCNC: 46 U/L (ref 33–120)
ALT SERPL W P-5'-P-CCNC: 6 U/L (ref 10–52)
ANION GAP SERPL CALC-SCNC: 11 MMOL/L (ref 10–20)
AST SERPL W P-5'-P-CCNC: 7 U/L (ref 9–39)
BILIRUB SERPL-MCNC: 0.2 MG/DL (ref 0–1.2)
BUN SERPL-MCNC: 7 MG/DL (ref 6–23)
CALCIUM SERPL-MCNC: 8.6 MG/DL (ref 8.6–10.3)
CHLORIDE SERPL-SCNC: 104 MMOL/L (ref 98–107)
CO2 SERPL-SCNC: 29 MMOL/L (ref 21–32)
CREAT SERPL-MCNC: 0.85 MG/DL (ref 0.5–1.3)
EGFRCR SERPLBLD CKD-EPI 2021: >90 ML/MIN/1.73M*2
ERYTHROCYTE [DISTWIDTH] IN BLOOD BY AUTOMATED COUNT: 16.9 % (ref 11.5–14.5)
GLUCOSE SERPL-MCNC: 97 MG/DL (ref 74–99)
HCT VFR BLD AUTO: 32.9 % (ref 41–52)
HGB BLD-MCNC: 10.4 G/DL (ref 13.5–17.5)
MCH RBC QN AUTO: 23.3 PG (ref 26–34)
MCHC RBC AUTO-ENTMCNC: 31.6 G/DL (ref 32–36)
MCV RBC AUTO: 74 FL (ref 80–100)
NRBC BLD-RTO: 0 /100 WBCS (ref 0–0)
PLATELET # BLD AUTO: 304 X10*3/UL (ref 150–450)
POTASSIUM SERPL-SCNC: 4 MMOL/L (ref 3.5–5.3)
PROT SERPL-MCNC: 6.2 G/DL (ref 6.4–8.2)
RBC # BLD AUTO: 4.47 X10*6/UL (ref 4.5–5.9)
SODIUM SERPL-SCNC: 140 MMOL/L (ref 136–145)
WBC # BLD AUTO: 5.7 X10*3/UL (ref 4.4–11.3)

## 2025-01-28 PROCEDURE — 2500000004 HC RX 250 GENERAL PHARMACY W/ HCPCS (ALT 636 FOR OP/ED): Performed by: FAMILY MEDICINE

## 2025-01-28 PROCEDURE — 2500000001 HC RX 250 WO HCPCS SELF ADMINISTERED DRUGS (ALT 637 FOR MEDICARE OP): Performed by: NURSE PRACTITIONER

## 2025-01-28 PROCEDURE — 2500000002 HC RX 250 W HCPCS SELF ADMINISTERED DRUGS (ALT 637 FOR MEDICARE OP, ALT 636 FOR OP/ED)

## 2025-01-28 PROCEDURE — 2500000001 HC RX 250 WO HCPCS SELF ADMINISTERED DRUGS (ALT 637 FOR MEDICARE OP)

## 2025-01-28 PROCEDURE — 1100000001 HC PRIVATE ROOM DAILY

## 2025-01-28 PROCEDURE — 36415 COLL VENOUS BLD VENIPUNCTURE: CPT | Performed by: NURSE PRACTITIONER

## 2025-01-28 PROCEDURE — 85027 COMPLETE CBC AUTOMATED: CPT | Performed by: NURSE PRACTITIONER

## 2025-01-28 PROCEDURE — 80053 COMPREHEN METABOLIC PANEL: CPT

## 2025-01-28 PROCEDURE — 99231 SBSQ HOSP IP/OBS SF/LOW 25: CPT | Performed by: INTERNAL MEDICINE

## 2025-01-28 RX ORDER — NALOXONE HYDROCHLORIDE 0.4 MG/ML
0.2 INJECTION, SOLUTION INTRAMUSCULAR; INTRAVENOUS; SUBCUTANEOUS EVERY 5 MIN PRN
Status: DISCONTINUED | OUTPATIENT
Start: 2025-01-28 | End: 2025-01-30 | Stop reason: HOSPADM

## 2025-01-28 RX ORDER — TALC
5 POWDER (GRAM) TOPICAL NIGHTLY
Status: DISCONTINUED | OUTPATIENT
Start: 2025-01-28 | End: 2025-01-30 | Stop reason: HOSPADM

## 2025-01-28 RX ADMIN — AMLODIPINE BESYLATE 10 MG: 10 TABLET ORAL at 08:25

## 2025-01-28 RX ADMIN — FERROUS SULFATE TAB 325 MG (65 MG ELEMENTAL FE) 325 MG: 325 (65 FE) TAB at 08:25

## 2025-01-28 RX ADMIN — HYDROMORPHONE HYDROCHLORIDE 0.4 MG: 1 INJECTION, SOLUTION INTRAMUSCULAR; INTRAVENOUS; SUBCUTANEOUS at 19:48

## 2025-01-28 RX ADMIN — PANTOPRAZOLE SODIUM 40 MG: 40 TABLET, DELAYED RELEASE ORAL at 06:09

## 2025-01-28 RX ADMIN — DOCUSATE SODIUM 100 MG: 100 CAPSULE, LIQUID FILLED ORAL at 08:24

## 2025-01-28 RX ADMIN — DOCUSATE SODIUM 100 MG: 100 CAPSULE, LIQUID FILLED ORAL at 20:11

## 2025-01-28 RX ADMIN — TAMSULOSIN HYDROCHLORIDE 0.4 MG: 0.4 CAPSULE ORAL at 08:25

## 2025-01-28 RX ADMIN — HYDROMORPHONE HYDROCHLORIDE 0.4 MG: 1 INJECTION, SOLUTION INTRAMUSCULAR; INTRAVENOUS; SUBCUTANEOUS at 15:32

## 2025-01-28 RX ADMIN — ENOXAPARIN SODIUM 70 MG: 80 INJECTION SUBCUTANEOUS at 08:25

## 2025-01-28 RX ADMIN — ATORVASTATIN CALCIUM 10 MG: 10 TABLET, FILM COATED ORAL at 20:11

## 2025-01-28 RX ADMIN — HYDROMORPHONE HYDROCHLORIDE 0.4 MG: 1 INJECTION, SOLUTION INTRAMUSCULAR; INTRAVENOUS; SUBCUTANEOUS at 08:40

## 2025-01-28 ASSESSMENT — COGNITIVE AND FUNCTIONAL STATUS - GENERAL
DAILY ACTIVITIY SCORE: 24
MOBILITY SCORE: 24
DAILY ACTIVITIY SCORE: 24
DAILY ACTIVITIY SCORE: 24
MOBILITY SCORE: 24
MOBILITY SCORE: 24

## 2025-01-28 ASSESSMENT — PAIN SCALES - GENERAL
PAINLEVEL_OUTOF10: 8
PAINLEVEL_OUTOF10: 10 - WORST POSSIBLE PAIN
PAINLEVEL_OUTOF10: 0 - NO PAIN

## 2025-01-28 ASSESSMENT — PAIN DESCRIPTION - LOCATION
LOCATION: ABDOMEN
LOCATION: ABDOMEN

## 2025-01-28 ASSESSMENT — PAIN DESCRIPTION - ORIENTATION
ORIENTATION: RIGHT
ORIENTATION: RIGHT

## 2025-01-28 ASSESSMENT — PAIN SCALES - WONG BAKER: WONGBAKER_NUMERICALRESPONSE: NO HURT

## 2025-01-28 NOTE — PROGRESS NOTES
01/28/25 0713   Discharge Planning   Home or Post Acute Services None   Expected Discharge Disposition Home     Patient to have lap manisha and remove of cholecystostomy tube on Wednesday.  Plan remains for patient to return home upon discharge.  No home going needs identified at this time.  Will continue to follow for discharge planning needs.

## 2025-01-28 NOTE — CARE PLAN
The patient's goals for the shift include pain control     The clinical goals for the shift include remain hds    Over the shift, the patient did not make progress toward the following goals. Barriers to progression include. Recommendations to address these barriers include   Problem: Pain  Goal: Walks with improved pain control throughout the shift  Outcome: Progressing     Problem: Pain  Goal: Takes deep breaths with improved pain control throughout the shift  Outcome: Progressing     Problem: Pain  Goal: Free from opioid side effects throughout the shift  Outcome: Progressing   .

## 2025-01-28 NOTE — PROGRESS NOTES
Douglas Wilson is a 54 y.o. male     Continues on a regular regimen of Dilaudid  Physically appearing pretty comfortable otherwise  Scheduled for surgery tomorrow      Review of Systems     Constitutional: no fever, no chills, not feeling poorly, not feeling tired   Cardiovascular: no chest pain   Respiratory: no cough, wheezing or shortness of breath a  Gastrointestinal:  no nausea, no diarrhea, no vomiting and no blood in stools.   Neurological: no headache,   All other systems have been reviewed and are negative for complaint.       Vitals:    01/28/25 1100   BP: 122/78   Pulse: 59   Resp: 18   Temp: 36.4 °C (97.5 °F)   SpO2: 98%        Scheduled medications  amLODIPine, 10 mg, oral, Daily  [Held by provider] apixaban, 5 mg, oral, BID  atorvastatin, 10 mg, oral, Nightly  docusate sodium, 100 mg, oral, BID  enoxaparin, 1 mg/kg, subcutaneous, q12h ZAY  ferrous sulfate (325 mg ferrous sulfate), 65 mg of iron, oral, Daily with breakfast  pantoprazole, 40 mg, oral, Daily before breakfast  polyethylene glycol, 17 g, oral, Daily  tamsulosin, 0.4 mg, oral, Daily      Continuous medications     PRN medications  PRN medications: acetaminophen **OR** acetaminophen **OR** acetaminophen, guaiFENesin, HYDROmorphone, naloxone, ondansetron **OR** ondansetron, oxyCODONE-acetaminophen    Lab Review   Results from last 7 days   Lab Units 01/28/25 0423 01/27/25 0459 01/26/25 0458   WBC AUTO x10*3/uL 5.7 6.9 9.3   HEMOGLOBIN g/dL 10.4* 10.8* 11.3*   HEMATOCRIT % 32.9* 34.1* 36.0*   PLATELETS AUTO x10*3/uL 304 287 286     Results from last 7 days   Lab Units 01/28/25 0423 01/27/25 0459 01/26/25 0458 01/25/25  0418   SODIUM mmol/L 140 138 138 137   POTASSIUM mmol/L 4.0 3.9 3.9 3.9   CHLORIDE mmol/L 104 104 103 105   CO2 mmol/L 29 27 28 28   BUN mg/dL 7 7 5* 8   CREATININE mg/dL 0.85 0.85 0.93 0.95   CALCIUM mg/dL 8.6 8.6 8.4* 8.2*   PROTEIN TOTAL g/dL 6.2* 6.1*  --  5.8*   BILIRUBIN TOTAL mg/dL 0.2 0.3  --  0.3   ALK PHOS U/L 46  46  --  47   ALT U/L 6* 5*  --  6*   AST U/L 7* 7*  --  7*   GLUCOSE mg/dL 97 100* 96 108*     Results from last 7 days   Lab Units 01/25/25  0037 01/24/25  2147   TROPHS ng/L 3 3        CT angio chest for pulmonary embolism   Final Result   No definite acute thoracic, abdominal, or pelvic pathology identified.   Postoperative changes of recent cholecystectomy with a drainage   catheter in place.   Signed by Joe Castrejon      CT abdomen pelvis w IV contrast   Final Result   No definite acute thoracic, abdominal, or pelvic pathology identified.   Postoperative changes of recent cholecystectomy with a drainage   catheter in place.   Signed by Joe Castrejon      XR chest 2 views   Final Result   No acute process.   Signed by Tyrone Seth MD            Physical Exam    Constitutional   General appearance: Alert and in no acute distress.   Pulmonary   Respiratory assessment: No respiratory distress, normal respiratory rhythm and effort.    Auscultation of Lungs: Clear bilateral breath sounds.   Cardiovascular   Auscultation of heart: Apical pulse normal, heart rate and rhythm normal, normal S1 and S2, no murmurs and no pericardial rub.    Exam for edema: No peripheral edema.   Abdomen   Abdominal Exam: No bruits, normal bowel sounds, soft, non-tender, no abdominal mass palpated.  Drain intact  Liver and Spleen exam: No hepato-splenomegaly.   Musculoskeletal   Examination of gait: Normal.    Inspection of digits and nails: No clubbing or cyanosis of the fingernails.    Inspection/palpation of joints, bones and muscles: No joint swelling. Normal movement of all extremities.   Neurologic   Cranial nerves: Nerves 2-12 were intact, no focal neuro defects.         Assessment/Plan      #Intractable abdominal pain  Recently received Dilaudid and is ambulating in the halls appearing very comfortable  Surgery planned for tomorrow    #Hypertension  Stable continue home medications    #Dyslipidemia  Continue statins with target LDL  less than 70    #Benign prostatic hypertrophy  Stable with minimal nocturia

## 2025-01-28 NOTE — ED NOTES
Community Resource Name: No primary care physician.  Phone Number:   Staff Member:  Marquita Sheth    Discussed the following topics on behalf of the patient:  []  Behavioral Health Assistance     []  Case Management  []   Assistance  []  Digital Equity Assistance  []  Dental Health Assistance  []  Education Assistance  []  Employment Assistance  []  Financial Strain Relief Assistance  []  Food Insecurity Assistance  [x]  Healthcare Coverage Assistance  []  Housing Stability Assistance  []  IP Violence Relief Assistance  []  Legal Assistance  []  Physical Activity Assistance  []  Social Connection Assistance  []  Stress Relief Assistance   []  Substance Abuse Assistance  []  Transportation Assistance  []  Utility Assistance  [x]  Other: [insert comment here]  Patient does have a PCP. W updated the patient chart.  Next Steps:         LUCHO Win  rack patients for [facility] healthcare services. CHW talked to patient regarding not having a primary care physician. Patient expressed that he has an upcoming appointment in February with a new physician named Dr. Nubia Diego and Care source insurance. CHW updated the patient chart with a new physician name added. Patient expressed appreciation.  Responsible Staff  LUCHO Win CCHW  01/28/25 1634

## 2025-01-29 ENCOUNTER — APPOINTMENT (OUTPATIENT)
Dept: RADIOLOGY | Facility: HOSPITAL | Age: 55
End: 2025-01-29
Payer: COMMERCIAL

## 2025-01-29 ENCOUNTER — ANESTHESIA (OUTPATIENT)
Dept: OPERATING ROOM | Facility: HOSPITAL | Age: 55
End: 2025-01-29
Payer: COMMERCIAL

## 2025-01-29 ENCOUNTER — ANESTHESIA EVENT (OUTPATIENT)
Dept: OPERATING ROOM | Facility: HOSPITAL | Age: 55
End: 2025-01-29
Payer: COMMERCIAL

## 2025-01-29 ENCOUNTER — PATIENT OUTREACH (OUTPATIENT)
Dept: CARE COORDINATION | Facility: CLINIC | Age: 55
End: 2025-01-29
Payer: COMMERCIAL

## 2025-01-29 LAB
ABO GROUP (TYPE) IN BLOOD: NORMAL
ALBUMIN SERPL BCP-MCNC: 3.2 G/DL (ref 3.4–5)
ALP SERPL-CCNC: 47 U/L (ref 33–120)
ALT SERPL W P-5'-P-CCNC: 6 U/L (ref 10–52)
ANION GAP SERPL CALC-SCNC: 8 MMOL/L (ref 10–20)
ANTIBODY SCREEN: NORMAL
AST SERPL W P-5'-P-CCNC: 8 U/L (ref 9–39)
BILIRUB SERPL-MCNC: 0.2 MG/DL (ref 0–1.2)
BUN SERPL-MCNC: 8 MG/DL (ref 6–23)
CALCIUM SERPL-MCNC: 8.8 MG/DL (ref 8.6–10.3)
CHLORIDE SERPL-SCNC: 103 MMOL/L (ref 98–107)
CO2 SERPL-SCNC: 31 MMOL/L (ref 21–32)
CREAT SERPL-MCNC: 1.18 MG/DL (ref 0.5–1.3)
EGFRCR SERPLBLD CKD-EPI 2021: 73 ML/MIN/1.73M*2
ERYTHROCYTE [DISTWIDTH] IN BLOOD BY AUTOMATED COUNT: 16.6 % (ref 11.5–14.5)
GLUCOSE SERPL-MCNC: 104 MG/DL (ref 74–99)
HCT VFR BLD AUTO: 33.4 % (ref 41–52)
HGB BLD-MCNC: 10.3 G/DL (ref 13.5–17.5)
MCH RBC QN AUTO: 22.8 PG (ref 26–34)
MCHC RBC AUTO-ENTMCNC: 30.8 G/DL (ref 32–36)
MCV RBC AUTO: 74 FL (ref 80–100)
NRBC BLD-RTO: 0 /100 WBCS (ref 0–0)
PLATELET # BLD AUTO: 306 X10*3/UL (ref 150–450)
POTASSIUM SERPL-SCNC: 4.2 MMOL/L (ref 3.5–5.3)
PROT SERPL-MCNC: 6 G/DL (ref 6.4–8.2)
RBC # BLD AUTO: 4.52 X10*6/UL (ref 4.5–5.9)
RH FACTOR (ANTIGEN D): NORMAL
SODIUM SERPL-SCNC: 138 MMOL/L (ref 136–145)
WBC # BLD AUTO: 6.2 X10*3/UL (ref 4.4–11.3)

## 2025-01-29 PROCEDURE — 2500000004 HC RX 250 GENERAL PHARMACY W/ HCPCS (ALT 636 FOR OP/ED): Performed by: FAMILY MEDICINE

## 2025-01-29 PROCEDURE — 2720000007 HC OR 272 NO HCPCS: Performed by: SURGERY

## 2025-01-29 PROCEDURE — A47563 PR LAP,CHOLECYSTECTOMY/GRAPH: Performed by: NURSE ANESTHETIST, CERTIFIED REGISTERED

## 2025-01-29 PROCEDURE — 3700000001 HC GENERAL ANESTHESIA TIME - INITIAL BASE CHARGE: Performed by: SURGERY

## 2025-01-29 PROCEDURE — 74300 X-RAY BILE DUCTS/PANCREAS: CPT

## 2025-01-29 PROCEDURE — 3700000002 HC GENERAL ANESTHESIA TIME - EACH INCREMENTAL 1 MINUTE: Performed by: SURGERY

## 2025-01-29 PROCEDURE — 88304 TISSUE EXAM BY PATHOLOGIST: CPT | Performed by: STUDENT IN AN ORGANIZED HEALTH CARE EDUCATION/TRAINING PROGRAM

## 2025-01-29 PROCEDURE — 85027 COMPLETE CBC AUTOMATED: CPT | Performed by: NURSE PRACTITIONER

## 2025-01-29 PROCEDURE — 1100000001 HC PRIVATE ROOM DAILY

## 2025-01-29 PROCEDURE — 2500000005 HC RX 250 GENERAL PHARMACY W/O HCPCS: Performed by: SURGERY

## 2025-01-29 PROCEDURE — 88304 TISSUE EXAM BY PATHOLOGIST: CPT | Mod: TC,AHULAB | Performed by: SURGERY

## 2025-01-29 PROCEDURE — 47563 LAPARO CHOLECYSTECTOMY/GRAPH: CPT | Performed by: SURGERY

## 2025-01-29 PROCEDURE — 2500000004 HC RX 250 GENERAL PHARMACY W/ HCPCS (ALT 636 FOR OP/ED): Performed by: NURSE ANESTHETIST, CERTIFIED REGISTERED

## 2025-01-29 PROCEDURE — 2500000004 HC RX 250 GENERAL PHARMACY W/ HCPCS (ALT 636 FOR OP/ED): Performed by: SURGERY

## 2025-01-29 PROCEDURE — 2500000001 HC RX 250 WO HCPCS SELF ADMINISTERED DRUGS (ALT 637 FOR MEDICARE OP): Performed by: SURGERY

## 2025-01-29 PROCEDURE — 3600000008 HC OR TIME - EACH INCREMENTAL 1 MINUTE - PROCEDURE LEVEL THREE: Performed by: SURGERY

## 2025-01-29 PROCEDURE — 2500000002 HC RX 250 W HCPCS SELF ADMINISTERED DRUGS (ALT 637 FOR MEDICARE OP, ALT 636 FOR OP/ED)

## 2025-01-29 PROCEDURE — 86901 BLOOD TYPING SEROLOGIC RH(D): CPT | Performed by: NURSE PRACTITIONER

## 2025-01-29 PROCEDURE — 99231 SBSQ HOSP IP/OBS SF/LOW 25: CPT | Performed by: INTERNAL MEDICINE

## 2025-01-29 PROCEDURE — 3600000003 HC OR TIME - INITIAL BASE CHARGE - PROCEDURE LEVEL THREE: Performed by: SURGERY

## 2025-01-29 PROCEDURE — 2500000005 HC RX 250 GENERAL PHARMACY W/O HCPCS: Performed by: ANESTHESIOLOGY

## 2025-01-29 PROCEDURE — 7100000001 HC RECOVERY ROOM TIME - INITIAL BASE CHARGE: Performed by: SURGERY

## 2025-01-29 PROCEDURE — 0FT44ZZ RESECTION OF GALLBLADDER, PERCUTANEOUS ENDOSCOPIC APPROACH: ICD-10-PCS | Performed by: SURGERY

## 2025-01-29 PROCEDURE — 84075 ASSAY ALKALINE PHOSPHATASE: CPT

## 2025-01-29 PROCEDURE — 2550000001 HC RX 255 CONTRASTS: Performed by: SURGERY

## 2025-01-29 PROCEDURE — 2500000005 HC RX 250 GENERAL PHARMACY W/O HCPCS: Performed by: INTERNAL MEDICINE

## 2025-01-29 PROCEDURE — 7100000002 HC RECOVERY ROOM TIME - EACH INCREMENTAL 1 MINUTE: Performed by: SURGERY

## 2025-01-29 PROCEDURE — 2780000003 HC OR 278 NO HCPCS: Performed by: SURGERY

## 2025-01-29 PROCEDURE — 2500000004 HC RX 250 GENERAL PHARMACY W/ HCPCS (ALT 636 FOR OP/ED): Performed by: ANESTHESIOLOGY

## 2025-01-29 PROCEDURE — 2500000001 HC RX 250 WO HCPCS SELF ADMINISTERED DRUGS (ALT 637 FOR MEDICARE OP)

## 2025-01-29 PROCEDURE — 36415 COLL VENOUS BLD VENIPUNCTURE: CPT | Performed by: NURSE PRACTITIONER

## 2025-01-29 RX ORDER — KETOROLAC TROMETHAMINE 30 MG/ML
15 INJECTION, SOLUTION INTRAMUSCULAR; INTRAVENOUS EVERY 6 HOURS
Status: DISCONTINUED | OUTPATIENT
Start: 2025-01-29 | End: 2025-01-30 | Stop reason: HOSPADM

## 2025-01-29 RX ORDER — KETOROLAC TROMETHAMINE 30 MG/ML
INJECTION, SOLUTION INTRAMUSCULAR; INTRAVENOUS AS NEEDED
Status: DISCONTINUED | OUTPATIENT
Start: 2025-01-29 | End: 2025-01-29

## 2025-01-29 RX ORDER — OXYCODONE AND ACETAMINOPHEN 5; 325 MG/1; MG/1
1 TABLET ORAL EVERY 4 HOURS PRN
Status: DISCONTINUED | OUTPATIENT
Start: 2025-01-29 | End: 2025-01-30 | Stop reason: HOSPADM

## 2025-01-29 RX ORDER — MIDAZOLAM HYDROCHLORIDE 1 MG/ML
INJECTION INTRAMUSCULAR; INTRAVENOUS AS NEEDED
Status: DISCONTINUED | OUTPATIENT
Start: 2025-01-29 | End: 2025-01-29

## 2025-01-29 RX ORDER — CEFAZOLIN SODIUM 2 G/100ML
2 INJECTION, SOLUTION INTRAVENOUS EVERY 6 HOURS
Status: COMPLETED | OUTPATIENT
Start: 2025-01-29 | End: 2025-01-30

## 2025-01-29 RX ORDER — LIDOCAINE HYDROCHLORIDE 20 MG/ML
INJECTION, SOLUTION INFILTRATION; PERINEURAL AS NEEDED
Status: DISCONTINUED | OUTPATIENT
Start: 2025-01-29 | End: 2025-01-29

## 2025-01-29 RX ORDER — FENTANYL CITRATE 50 UG/ML
INJECTION, SOLUTION INTRAMUSCULAR; INTRAVENOUS AS NEEDED
Status: DISCONTINUED | OUTPATIENT
Start: 2025-01-29 | End: 2025-01-29

## 2025-01-29 RX ORDER — CEFAZOLIN 1 G/1
INJECTION, POWDER, FOR SOLUTION INTRAVENOUS AS NEEDED
Status: DISCONTINUED | OUTPATIENT
Start: 2025-01-29 | End: 2025-01-29

## 2025-01-29 RX ORDER — FENTANYL CITRATE 50 UG/ML
50 INJECTION, SOLUTION INTRAMUSCULAR; INTRAVENOUS EVERY 5 MIN PRN
Status: DISCONTINUED | OUTPATIENT
Start: 2025-01-29 | End: 2025-01-29 | Stop reason: HOSPADM

## 2025-01-29 RX ORDER — SODIUM CHLORIDE, SODIUM LACTATE, POTASSIUM CHLORIDE, CALCIUM CHLORIDE 600; 310; 30; 20 MG/100ML; MG/100ML; MG/100ML; MG/100ML
100 INJECTION, SOLUTION INTRAVENOUS CONTINUOUS
Status: DISCONTINUED | OUTPATIENT
Start: 2025-01-29 | End: 2025-01-29 | Stop reason: HOSPADM

## 2025-01-29 RX ORDER — BUPIVACAINE HYDROCHLORIDE 5 MG/ML
INJECTION, SOLUTION PERINEURAL AS NEEDED
Status: DISCONTINUED | OUTPATIENT
Start: 2025-01-29 | End: 2025-01-29 | Stop reason: HOSPADM

## 2025-01-29 RX ORDER — LIDOCAINE HYDROCHLORIDE 10 MG/ML
0.1 INJECTION, SOLUTION EPIDURAL; INFILTRATION; INTRACAUDAL; PERINEURAL ONCE
Status: DISCONTINUED | OUTPATIENT
Start: 2025-01-29 | End: 2025-01-29 | Stop reason: HOSPADM

## 2025-01-29 RX ORDER — ONDANSETRON HYDROCHLORIDE 2 MG/ML
4 INJECTION, SOLUTION INTRAVENOUS ONCE AS NEEDED
Status: DISCONTINUED | OUTPATIENT
Start: 2025-01-29 | End: 2025-01-29 | Stop reason: HOSPADM

## 2025-01-29 RX ORDER — POLYETHYLENE GLYCOL 3350 17 G/17G
17 POWDER, FOR SOLUTION ORAL DAILY
Status: DISCONTINUED | OUTPATIENT
Start: 2025-01-29 | End: 2025-01-30 | Stop reason: HOSPADM

## 2025-01-29 RX ORDER — METOCLOPRAMIDE HYDROCHLORIDE 5 MG/ML
10 INJECTION INTRAMUSCULAR; INTRAVENOUS ONCE AS NEEDED
Status: DISCONTINUED | OUTPATIENT
Start: 2025-01-29 | End: 2025-01-29 | Stop reason: HOSPADM

## 2025-01-29 RX ORDER — OXYCODONE HYDROCHLORIDE 5 MG/1
5 TABLET ORAL EVERY 4 HOURS PRN
Status: DISCONTINUED | OUTPATIENT
Start: 2025-01-29 | End: 2025-01-29 | Stop reason: HOSPADM

## 2025-01-29 RX ORDER — ROCURONIUM BROMIDE 10 MG/ML
INJECTION, SOLUTION INTRAVENOUS AS NEEDED
Status: DISCONTINUED | OUTPATIENT
Start: 2025-01-29 | End: 2025-01-29

## 2025-01-29 RX ORDER — PROPOFOL 10 MG/ML
INJECTION, EMULSION INTRAVENOUS AS NEEDED
Status: DISCONTINUED | OUTPATIENT
Start: 2025-01-29 | End: 2025-01-29

## 2025-01-29 RX ORDER — SODIUM CHLORIDE, SODIUM LACTATE, POTASSIUM CHLORIDE, CALCIUM CHLORIDE 600; 310; 30; 20 MG/100ML; MG/100ML; MG/100ML; MG/100ML
INJECTION, SOLUTION INTRAVENOUS CONTINUOUS PRN
Status: DISCONTINUED | OUTPATIENT
Start: 2025-01-29 | End: 2025-01-29

## 2025-01-29 RX ADMIN — HYDROMORPHONE HYDROCHLORIDE 0.2 MG: 1 INJECTION, SOLUTION INTRAMUSCULAR; INTRAVENOUS; SUBCUTANEOUS at 14:20

## 2025-01-29 RX ADMIN — OXYCODONE HYDROCHLORIDE AND ACETAMINOPHEN 1 TABLET: 5; 325 TABLET ORAL at 20:17

## 2025-01-29 RX ADMIN — SODIUM CHLORIDE, SODIUM LACTATE, POTASSIUM CHLORIDE, AND CALCIUM CHLORIDE: .6; .31; .03; .02 INJECTION, SOLUTION INTRAVENOUS at 12:58

## 2025-01-29 RX ADMIN — MELATONIN 4.5 MG: 3 TAB ORAL at 00:29

## 2025-01-29 RX ADMIN — FENTANYL CITRATE 50 MCG: 50 INJECTION, SOLUTION INTRAMUSCULAR; INTRAVENOUS at 13:03

## 2025-01-29 RX ADMIN — OXYCODONE HYDROCHLORIDE AND ACETAMINOPHEN 1 TABLET: 5; 325 TABLET ORAL at 16:03

## 2025-01-29 RX ADMIN — KETOROLAC TROMETHAMINE 15 MG: 30 INJECTION, SOLUTION INTRAMUSCULAR at 20:17

## 2025-01-29 RX ADMIN — HYDROMORPHONE HYDROCHLORIDE 0.4 MG: 1 INJECTION, SOLUTION INTRAMUSCULAR; INTRAVENOUS; SUBCUTANEOUS at 08:39

## 2025-01-29 RX ADMIN — HYDROMORPHONE HYDROCHLORIDE 0.2 MG: 1 INJECTION, SOLUTION INTRAMUSCULAR; INTRAVENOUS; SUBCUTANEOUS at 15:00

## 2025-01-29 RX ADMIN — DOCUSATE SODIUM 100 MG: 100 CAPSULE, LIQUID FILLED ORAL at 20:22

## 2025-01-29 RX ADMIN — MELATONIN 4.5 MG: 3 TAB ORAL at 21:33

## 2025-01-29 RX ADMIN — HYDROMORPHONE HYDROCHLORIDE 0.4 MG: 1 INJECTION, SOLUTION INTRAMUSCULAR; INTRAVENOUS; SUBCUTANEOUS at 00:30

## 2025-01-29 RX ADMIN — PANTOPRAZOLE SODIUM 40 MG: 40 TABLET, DELAYED RELEASE ORAL at 08:32

## 2025-01-29 RX ADMIN — DOCUSATE SODIUM 100 MG: 100 CAPSULE, LIQUID FILLED ORAL at 08:17

## 2025-01-29 RX ADMIN — KETOROLAC TROMETHAMINE 30 MG: 30 INJECTION, SOLUTION INTRAMUSCULAR at 13:50

## 2025-01-29 RX ADMIN — LIDOCAINE HYDROCHLORIDE 100 MG: 20 INJECTION, SOLUTION INFILTRATION; PERINEURAL at 13:04

## 2025-01-29 RX ADMIN — ATORVASTATIN CALCIUM 10 MG: 10 TABLET, FILM COATED ORAL at 20:22

## 2025-01-29 RX ADMIN — CEFAZOLIN SODIUM 2 G: 2 INJECTION, SOLUTION INTRAVENOUS at 20:17

## 2025-01-29 RX ADMIN — SUGAMMADEX 200 MG: 100 INJECTION, SOLUTION INTRAVENOUS at 14:02

## 2025-01-29 RX ADMIN — ROCURONIUM BROMIDE 50 MG: 10 INJECTION, SOLUTION INTRAVENOUS at 13:04

## 2025-01-29 RX ADMIN — TAMSULOSIN HYDROCHLORIDE 0.4 MG: 0.4 CAPSULE ORAL at 08:17

## 2025-01-29 RX ADMIN — MIDAZOLAM HYDROCHLORIDE 2 MG: 1 INJECTION, SOLUTION INTRAMUSCULAR; INTRAVENOUS at 13:00

## 2025-01-29 RX ADMIN — ONDANSETRON 4 MG: 2 INJECTION, SOLUTION INTRAMUSCULAR; INTRAVENOUS at 13:50

## 2025-01-29 RX ADMIN — CEFAZOLIN 2 G: 1 INJECTION, POWDER, FOR SOLUTION INTRAMUSCULAR; INTRAVENOUS at 13:14

## 2025-01-29 RX ADMIN — ROCURONIUM BROMIDE 30 MG: 10 INJECTION, SOLUTION INTRAVENOUS at 13:14

## 2025-01-29 RX ADMIN — FENTANYL CITRATE 50 MCG: 0.05 INJECTION, SOLUTION INTRAMUSCULAR; INTRAVENOUS at 14:37

## 2025-01-29 RX ADMIN — FENTANYL CITRATE 50 MCG: 0.05 INJECTION, SOLUTION INTRAMUSCULAR; INTRAVENOUS at 14:45

## 2025-01-29 RX ADMIN — FERROUS SULFATE TAB 325 MG (65 MG ELEMENTAL FE) 325 MG: 325 (65 FE) TAB at 08:17

## 2025-01-29 RX ADMIN — PROPOFOL 200 MG: 10 INJECTION, EMULSION INTRAVENOUS at 13:04

## 2025-01-29 RX ADMIN — FENTANYL CITRATE 50 MCG: 50 INJECTION, SOLUTION INTRAMUSCULAR; INTRAVENOUS at 14:09

## 2025-01-29 RX ADMIN — Medication 2 L/MIN: at 15:52

## 2025-01-29 SDOH — HEALTH STABILITY: MENTAL HEALTH: CURRENT SMOKER: 1

## 2025-01-29 ASSESSMENT — COGNITIVE AND FUNCTIONAL STATUS - GENERAL
MOBILITY SCORE: 24
MOBILITY SCORE: 24
DAILY ACTIVITIY SCORE: 24
MOBILITY SCORE: 24

## 2025-01-29 ASSESSMENT — PAIN DESCRIPTION - LOCATION
LOCATION: ABDOMEN

## 2025-01-29 ASSESSMENT — PAIN DESCRIPTION - DESCRIPTORS
DESCRIPTORS: ACHING
DESCRIPTORS: ACHING

## 2025-01-29 ASSESSMENT — PAIN - FUNCTIONAL ASSESSMENT
PAIN_FUNCTIONAL_ASSESSMENT: 0-10

## 2025-01-29 ASSESSMENT — PAIN SCALES - GENERAL
PAINLEVEL_OUTOF10: 0 - NO PAIN
PAINLEVEL_OUTOF10: 8
PAINLEVEL_OUTOF10: 8
PAINLEVEL_OUTOF10: 3
PAINLEVEL_OUTOF10: 8
PAINLEVEL_OUTOF10: 7
PAINLEVEL_OUTOF10: 5 - MODERATE PAIN
PAIN_LEVEL: 5
PAINLEVEL_OUTOF10: 8
PAINLEVEL_OUTOF10: 8
PAINLEVEL_OUTOF10: 5 - MODERATE PAIN
PAINLEVEL_OUTOF10: 7
PAINLEVEL_OUTOF10: 7
PAINLEVEL_OUTOF10: 5 - MODERATE PAIN

## 2025-01-29 ASSESSMENT — PAIN DESCRIPTION - ORIENTATION
ORIENTATION: RIGHT

## 2025-01-29 ASSESSMENT — PAIN SCALES - PAIN ASSESSMENT IN ADVANCED DEMENTIA (PAINAD): TOTALSCORE: MEDICATION (SEE MAR)

## 2025-01-29 NOTE — PROGRESS NOTES
Outreach call to patient to support a smooth transition of care from recent admission.  Unable to leave a message for the patient because his voicemail was full.    Ada Cole RN, Care Manager  [facility], [facility]  [phone]

## 2025-01-29 NOTE — ANESTHESIA PREPROCEDURE EVALUATION
Patient: Douglas Wilson    Procedure Information       Date/Time: 01/29/25 1145    Procedure: Laparoscopic Cholecystectomy; Cholangiogram (Abdomen)    Location: [facility] / [facility]    Surgeons: Lauro Carranza MD            Relevant Problems   Anesthesia (within normal limits)      Cardiac   (+) Hyperlipidemia   (+) Hypertension      Pulmonary   (+) Acute pulmonary embolism without acute cor pulmonale (Multi)   (+) Acute pulmonary embolism, unspecified pulmonary embolism type, unspecified whether acute cor pulmonale present (Multi)      Neuro   (+) Seizure (Multi)      GI   (+) Gastro-esophageal reflux disease without esophagitis   (+) Hiatal hernia      /Renal   (+) BPH (benign prostatic hyperplasia)      Liver   (+) Cholecystitis, acute      Endocrine (within normal limits)      Hematology   (+) Acute blood loss anemia   (+) Anemia   (+) Iron deficiency anemia      Musculoskeletal (within normal limits)      HEENT (within normal limits)      ID (within normal limits)      Skin (within normal limits)       Clinical information reviewed:    Allergies  Meds  Problems              NPO Detail:  NPO/Void Status  Carbohydrate Drink Given Prior to Surgery? : N  Date of Last Liquid: 01/28/25  Time of Last Liquid: 2300  Date of Last Solid: 01/28/25  Time of Last Solid: 2300  Last Intake Type: Clear fluids         Physical Exam    Airway  Mallampati: I  TM distance: >3 FB  Neck ROM: full     Cardiovascular - normal exam     Dental   Comments: Multiple missing teeth   Pulmonary - normal exam     Abdominal - normal exam             Anesthesia Plan    History of general anesthesia?: yes  History of complications of general anesthesia?: no    ASA 2     general     The patient is a current smoker.  Patient was previously instructed to abstain from smoking on day of procedure.  Patient smoked on day of procedure.    intravenous induction   Anesthetic plan and risks discussed with patient.  Use of blood products  discussed with patient who consented to blood products.

## 2025-01-29 NOTE — ANESTHESIA PROCEDURE NOTES
Airway  Date/Time: 1/29/2025 1:07 PM  Urgency: elective    Airway not difficult    Staffing  Performed: CRNA   Authorized by: Mane Bates MD    Performed by: SUPRIYA Gauthier  Patient location during procedure: OR    Indications and Patient Condition  Indications for airway management: anesthesia  Spontaneous ventilation: present  Sedation level: deep  Preoxygenated: yes  Patient position: sniffing  MILS maintained throughout  Mask difficulty assessment: 1 - vent by mask    Final Airway Details  Final airway type: endotracheal airway      Successful airway: ETT  Cuffed: yes   Successful intubation technique: direct laryngoscopy  Facilitating devices/methods: intubating stylet  Endotracheal tube insertion site: oral  Blade: Genaro  Blade size: #3  ETT size (mm): 7.0  Cormack-Lehane Classification: grade IIa - partial view of glottis  Placement verified by: capnometry   Measured from: lips  ETT to lips (cm): 22  Number of attempts at approach: 1

## 2025-01-29 NOTE — CARE PLAN
The patient's goals for the shift include  pain management    The clinical goals for the shift include Report effective pain relief throughout shift    Over the shift, the patient did not make progress toward the following goals. Barriers to progression include   Problem: Discharge Planning  Goal: Discharge to home or other facility with appropriate resources  1/28/2025 1923 by Karen Ibanez RN  Outcome: Progressing  1/28/2025 1922 by Karen Ibanez RN  Outcome: Progressing  Flowsheets (Taken 1/28/2025 1922)  Discharge to home or other facility with appropriate resources:   Identify barriers to discharge with patient and caregiver   Arrange for needed discharge resources and transportation as appropriate   Identify discharge learning needs (meds, wound care, etc)   Arrange for interpreters to assist at discharge as needed   Refer to discharge planning if patient needs post-hospital services based on physician order or complex needs related to functional status, cognitive ability or social support system   . Recommendations to address these barriers include   Problem: Pain  Goal: Takes deep breaths with improved pain control throughout the shift  1/28/2025 1923 by Karen Ibanez RN  Outcome: Progressing  1/28/2025 1922 by Karen Ibanez RN  Outcome: Progressing  Goal: Turns in bed with improved pain control throughout the shift  1/28/2025 1923 by Karen Ibanez RN  Outcome: Progressing  1/28/2025 1922 by Karen Ibanez RN  Outcome: Progressing  Goal: Walks with improved pain control throughout the shift  1/28/2025 1923 by Karen Ibanez RN  Outcome: Progressing  1/28/2025 1922 by Karen Ibanez RN  Outcome: Progressing  Goal: Performs ADL's with improved pain control throughout shift  1/28/2025 1923 by Karen Ibanez RN  Outcome: Progressing  1/28/2025 1922 by Karen Ibanez RN  Outcome: Progressing  Goal: Participates in PT with improved pain control throughout the shift  1/28/2025 1923 by Karen Ibanez  RN  Outcome: Progressing  1/28/2025 1922 by Karen Ibanez RN  Outcome: Progressing  Goal: Free from opioid side effects throughout the shift  1/28/2025 1923 by Karen Ibanez RN  Outcome: Progressing  1/28/2025 1922 by Karen Ibanez RN  Outcome: Progressing  Goal: Free from acute confusion related to pain meds throughout the shift  1/28/2025 1923 by Karen Ibanez RN  Outcome: Progressing  1/28/2025 1922 by Karen Ibanez RN  Outcome: Progressing   .

## 2025-01-29 NOTE — ANESTHESIA POSTPROCEDURE EVALUATION
Patient: Douglas Wilson    Procedure Summary       Date: 01/29/25 Room / Location: [facility] OR 02 / [facility]    Anesthesia Start: 1259 Anesthesia Stop: 1417    Procedure: Laparoscopic Cholecystectomy; Cholangiogram (Abdomen) Diagnosis:       Cholecystitis, acute      (Cholecystitis, acute [K81.0])    Surgeons: Lauro Carranza MD Responsible Provider: Mane Bates MD    Anesthesia Type: general ASA Status: 2            Anesthesia Type: general    Vitals Value Taken Time   /67 01/29/25 1446   Temp 36 °C (96.8 °F) 01/29/25 1409   Pulse 73 01/29/25 1456   Resp 12 01/29/25 1445   SpO2 100 % 01/29/25 1456   Vitals shown include unfiled device data.    Anesthesia Post Evaluation    Patient location during evaluation: bedside  Patient participation: complete - patient participated  Level of consciousness: awake and alert  Pain score: 5  Pain management: adequate  Multimodal analgesia pain management approach  Airway patency: patent  Cardiovascular status: acceptable  Respiratory status: acceptable  Hydration status: acceptable  Postoperative Nausea and Vomiting: none        No notable events documented.    
Lesbian, Lopez

## 2025-01-29 NOTE — PROGRESS NOTES
01/29/25 0755   Discharge Planning   Home or Post Acute Services None   Expected Discharge Disposition Home     Plan is for patient to have lap manisha with cholecystostomy tube removal today.  Anticipate discharge tomorrow if patient is able to tolerate PO intake, labs are WNL and pain is controlled.  Plan remains for patient to return home upon discharge.  No home going needs identified at this time. Will continue to follow for discharge planning needs.

## 2025-01-29 NOTE — CARE PLAN
Problem: Safety - Adult  Goal: Free from fall injury  1/29/2025 1353 by Lulu Leone RN  Outcome: Progressing  1/29/2025 1353 by Lulu Leone RN  Outcome: Progressing   The patient's goals for the shift include pain management    The clinical goals for the shift include Report effective pain relief throughout shift    Problem: Safety - Adult  Goal: Free from fall injury  1/29/2025 1353 by Lulu Leone RN  Outcome: Progressing  1/29/2025 1353 by Lulu Leone RN  Outcome: Progressing     Problem: Discharge Planning  Goal: Discharge to home or other facility with appropriate resources  1/29/2025 1353 by Lulu Leone RN  Outcome: Progressing  1/29/2025 1353 by Lulu Leone RN  Outcome: Progressing     Problem: Chronic Conditions and Co-morbidities  Goal: Patient's chronic conditions and co-morbidity symptoms are monitored and maintained or improved  1/29/2025 1353 by Lulu Leone RN  Outcome: Progressing  1/29/2025 1353 by Lulu Leone RN  Outcome: Progressing     Problem: Nutrition  Goal: Nutrient intake appropriate for maintaining nutritional needs  1/29/2025 1353 by uLlu Leone RN  Outcome: Progressing  1/29/2025 1353 by Lulu Leone RN  Outcome: Progressing     Problem: Fall/Injury  Goal: Not fall by end of shift  1/29/2025 1353 by Lulu Leone RN  Outcome: Progressing  1/29/2025 1353 by Lulu Leone RN  Outcome: Progressing  Goal: Be free from injury by end of the shift  1/29/2025 1353 by Lulu Leone RN  Outcome: Progressing  1/29/2025 1353 by Lulu Leone RN  Outcome: Progressing  Goal: Pace activities to prevent fatigue by end of the shift  1/29/2025 1353 by Lulu Leone RN  Outcome: Progressing  1/29/2025 1353 by Lulu Leone RN  Outcome: Progressing     Problem: Pain  Goal: Takes deep breaths with improved pain control throughout the shift  1/29/2025 1353 by Lulu Leone RN  Outcome: Progressing  1/29/2025 1353 by Lulu Leone  RN  Outcome: Progressing  Goal: Turns in bed with improved pain control throughout the shift  1/29/2025 1353 by Lulu eLone RN  Outcome: Progressing  1/29/2025 1353 by Lulu Leone RN  Outcome: Progressing  Goal: Walks with improved pain control throughout the shift  1/29/2025 1353 by Lulu Leone RN  Outcome: Progressing  1/29/2025 1353 by Lulu Leone RN  Outcome: Progressing  Goal: Performs ADL's with improved pain control throughout shift  1/29/2025 1353 by Lulu Leone RN  Outcome: Progressing  1/29/2025 1353 by Lulu Leone RN  Outcome: Progressing  Goal: Participates in PT with improved pain control throughout the shift  1/29/2025 1353 by Lulu Leone RN  Outcome: Progressing  1/29/2025 1353 by Lulu Leone RN  Outcome: Progressing  Goal: Free from opioid side effects throughout the shift  1/29/2025 1353 by Lulu Leone RN  Outcome: Progressing  1/29/2025 1353 by Lulu Leone RN  Outcome: Progressing  Goal: Free from acute confusion related to pain meds throughout the shift  1/29/2025 1353 by Lulu Leone RN  Outcome: Progressing  1/29/2025 1353 by Lulu Leone RN  Outcome: Progressing

## 2025-01-29 NOTE — OP NOTE
Laparoscopic Cholecystectomy; Cholangiogram Operative Note     Date: 2025 - 2025  OR Location: [facility] A OR    Name: oDuglas Wilson : 1970, Age: 54 y.o., MRN: [id number], Sex: male    Diagnosis  Pre-op Diagnosis      * Cholecystitis, acute [K81.0] Post-op Diagnosis     * Cholecystitis, acute [K81.0]     Procedures  Laparoscopic Cholecystectomy; Cholangiogram  00087 - TN LAPS SURG CHOLECYSTECTOMY W/CHOLANGIOGRAPHY      Surgeons      * Lauro Carranza - Primary    Resident/Fellow/Other Assistant:  Surgeons and Role:  * No surgeons found with a matching role *    Staff:   Circulator: Pavithra  Scrub Person: Heidy  Scrub Person: Norma Philipub Person: Annabella Lopez Circulator: Namita    Anesthesia Staff: Anesthesiologist: Mane Bates MD  CRNA: DONTE Gauthier-CRNA    Procedure Summary  Anesthesia: General  ASA: II  Estimated Blood Loss: 10mL  Intra-op Medications:   Administrations occurring from 1145 to 1300 on 25:   Medication Name Total Dose   lactated Ringer's infusion Cannot be calculated   midazolam PF (Versed) injection 1 mg/mL 2 mg              Anesthesia Record               Intraprocedure I/O Totals       None           Specimen:   ID Type Source Tests Collected by Time   1 : GALLBLADDER Tissue GALLBLADDER CHOLECYSTECTOMY SURGICAL PATHOLOGY EXAM Lauro Carranza MD 2025 1334                 Drains and/or Catheters:   Biliary Tube RLQ (Active)   Site Description Healing 25 0835   Dressing Status Clean;Dry 25 0835   Drainage Color Brown 25 0835   Drain Status Open to gravity drainage 25 0835   Output (mL) 125 mL 25 0835       Tourniquet Times:         Implants:     Findings: Chronic cholecystitis with percutaneous cholecystostomy drain within the gallbladder    Indications: Douglas Wilson is an 54 y.o. male who is having surgery for Cholecystitis, acute [K81.0].  Previously had a PERC cholecystostomy tube placed.  His anticoagulation was held.   Plan for gallbladder removal surgery today    The patient was seen in the preoperative area. The risks, benefits, complications, treatment options, non-operative alternatives, expected recovery and outcomes were discussed with the patient. The possibilities of reaction to medication, pulmonary aspiration, injury to surrounding structures, bleeding, recurrent infection, the need for additional procedures, failure to diagnose a condition, and creating a complication requiring transfusion or operation were discussed with the patient. The patient concurred with the proposed plan, giving informed consent.  The site of surgery was properly noted/marked if necessary per policy. The patient has been actively warmed in preoperative area. Preoperative antibiotics have been ordered and given within 1 hours of incision. Venous thrombosis prophylaxis have been ordered including bilateral sequential compression devices    Procedure Details:  Description:  Patient was brought to the operating room placed supine on the table.  Timeout was performed which confirmed patient and procedure.  Perioperative antibiotics were administered.  Gen. anesthesia was administered through an endotracheal tube.  The abdomen was prepped and draped sterilely.    I injected half percent Marcaine and then made a sharp infraumbilical incision with the knife.  Sharp incision was made in the fascia.  The peritoneal cavity was entered under direct visualization and a Priya port was placed.  The abdomen was insufflated and the patient was placed in steep reverse Trendelenburg.  A 5 mm 30° camera was introduced.  I placed a right upper quadrant 5 mm port and another 5 mL port in the midline subxiphoid area.  The gallbladder was visualized.  It was grasped and retracted superiorly into the right.  Meticulous dissection was then performed in the area of Calot triangle.  Critical view was achieved.  Posterior cystic plate visualized.  The cystic artery and  cystic duct were skeletonized.  The artery was divided between hemoclips.  Endo Clip placed on the gallbladder side and then made a ductotomy with EndoShears.  Ranfac cholangiocatheter was inserted through a separate angiocatheter sheath.  Cholangiogram was obtained using C-arm fluoroscopy.  The anatomy was noted to be normal.  No obvious filling defects seen. Ranfac catheter removed.  I placed 3 clips on the distal cystic duct and one toward the gallbladder and divided with EndoShears.  The gallbladder was then dissected atraumatically out of the liver bed with hook cautery.  We did encounter the percutaneous cholecystostomy tube coming from the liver and the tube was removed externally.  Once it was liberated it was placed in the Endo Catch bag and brought out through the umbilicus.  Liver bed was inspected and noted to be hemostatic.  Clips were noted to be in good position.  Gentle irrigation was performed.  The effluent was noted to be clear.  Surgicel powder was sprayed in the liver bed.  The ports were removed under direct visualization.  The abdomen was desufflated.  The fascia at the umbilicus was closed with 0 Vicryl.  Wounds were irrigated.  Skin incisions were closed with 4-0 Biosyn and Dermabond.  Patient was ultimately extubated and transferred to the recovery room in satisfactory condition.      Complications:  None; patient tolerated the procedure well.    Disposition: PACU - hemodynamically stable.  Condition: stable                 Additional Details:     Attending Attestation: I was present for the entire procedure.    Lauro Carranza  Phone Number: [phone]

## 2025-01-29 NOTE — PROGRESS NOTES
Douglas Wilson is a 54 y.o. male     Patient appears comfortable  Going to the OR today      Review of Systems     Constitutional: no fever, no chills, not feeling poorly, not feeling tired   Cardiovascular: no chest pain   Respiratory: no cough, wheezing or shortness of breath a  Gastrointestinal:  no nausea, no diarrhea, no vomiting and no blood in stools.   Neurological: no headache,   All other systems have been reviewed and are negative for complaint.       Vitals:    01/29/25 1051   BP: 122/74   Pulse: 58   Resp: 17   Temp: 36.4 °C (97.5 °F)   SpO2: 98%        Scheduled medications  [Transfer Hold] amLODIPine, 10 mg, oral, Daily  [Held by provider] apixaban, 5 mg, oral, BID  [Transfer Hold] atorvastatin, 10 mg, oral, Nightly  [Transfer Hold] docusate sodium, 100 mg, oral, BID  [Transfer Hold] ferrous sulfate (325 mg ferrous sulfate), 65 mg of iron, oral, Daily with breakfast  [Transfer Hold] melatonin, 4.5 mg, oral, Nightly  [Transfer Hold] pantoprazole, 40 mg, oral, Daily before breakfast  [Transfer Hold] polyethylene glycol, 17 g, oral, Daily  [Transfer Hold] tamsulosin, 0.4 mg, oral, Daily      Continuous medications     PRN medications  PRN medications: [Transfer Hold] acetaminophen **OR** [Transfer Hold] acetaminophen **OR** [Transfer Hold] acetaminophen, [Transfer Hold] guaiFENesin, [Transfer Hold] HYDROmorphone, [Transfer Hold] naloxone, [Transfer Hold] ondansetron **OR** [Transfer Hold] ondansetron, [Transfer Hold] oxyCODONE-acetaminophen    Lab Review   Results from last 7 days   Lab Units 01/29/25 0442 01/28/25  0423 01/27/25  0459   WBC AUTO x10*3/uL 6.2 5.7 6.9   HEMOGLOBIN g/dL 10.3* 10.4* 10.8*   HEMATOCRIT % 33.4* 32.9* 34.1*   PLATELETS AUTO x10*3/uL 306 304 287     Results from last 7 days   Lab Units 01/29/25 0442 01/28/25  0423 01/27/25  0459   SODIUM mmol/L 138 140 138   POTASSIUM mmol/L 4.2 4.0 3.9   CHLORIDE mmol/L 103 104 104   CO2 mmol/L 31 29 27   BUN mg/dL 8 7 7   CREATININE mg/dL  1.18 0.85 0.85   CALCIUM mg/dL 8.8 8.6 8.6   PROTEIN TOTAL g/dL 6.0* 6.2* 6.1*   BILIRUBIN TOTAL mg/dL 0.2 0.2 0.3   ALK PHOS U/L 47 46 46   ALT U/L 6* 6* 5*   AST U/L 8* 7* 7*   GLUCOSE mg/dL 104* 97 100*     Results from last 7 days   Lab Units 01/25/25  0037 01/24/25  2147   TROPHS ng/L 3 3        CT angio chest for pulmonary embolism   Final Result   No definite acute thoracic, abdominal, or pelvic pathology identified.   Postoperative changes of recent cholecystectomy with a drainage   catheter in place.   Signed by Joe Castrejon      CT abdomen pelvis w IV contrast   Final Result   No definite acute thoracic, abdominal, or pelvic pathology identified.   Postoperative changes of recent cholecystectomy with a drainage   catheter in place.   Signed by Joe Castrejon      XR chest 2 views   Final Result   No acute process.   Signed by Tyrone Seth MD      FL GI cholangiography intraop    (Results Pending)         Physical Exam    Constitutional   General appearance: Alert and in no acute distress.   Pulmonary   Respiratory assessment: No respiratory distress, normal respiratory rhythm and effort.    Auscultation of Lungs: Clear bilateral breath sounds.   Cardiovascular   Auscultation of heart: Apical pulse normal, heart rate and rhythm normal, normal S1 and S2, no murmurs and no pericardial rub.    Exam for edema: No peripheral edema.   Abdomen   Abdominal Exam: No bruits, normal bowel sounds, soft, non-tender, no abdominal mass palpated.  Drain intact  Liver and Spleen exam: No hepato-splenomegaly.   Musculoskeletal   Examination of gait: Normal.    Inspection of digits and nails: No clubbing or cyanosis of the fingernails.    Inspection/palpation of joints, bones and muscles: No joint swelling. Normal movement of all extremities.   Neurologic   Cranial nerves: Nerves 2-12 were intact, no focal neuro defects.         Assessment/Plan      #Intractable abdominal pain  Cholecystitis going to the OR  today    #Hypertension  Stable continue home medications    #Dyslipidemia  Continue statins with target LDL less than 70    #Benign prostatic hypertrophy  Stable with minimal nocturia

## 2025-01-30 ENCOUNTER — PHARMACY VISIT (OUTPATIENT)
Dept: PHARMACY | Facility: CLINIC | Age: 55
End: 2025-01-30
Payer: MEDICAID

## 2025-01-30 VITALS
OXYGEN SATURATION: 96 % | HEART RATE: 72 BPM | BODY MASS INDEX: 24.42 KG/M2 | TEMPERATURE: 98.5 F | RESPIRATION RATE: 16 BRPM | SYSTOLIC BLOOD PRESSURE: 125 MMHG | HEIGHT: 69 IN | DIASTOLIC BLOOD PRESSURE: 80 MMHG | WEIGHT: 164.9 LBS

## 2025-01-30 LAB
ALBUMIN SERPL BCP-MCNC: 3.8 G/DL (ref 3.4–5)
ALP SERPL-CCNC: 51 U/L (ref 33–120)
ALT SERPL W P-5'-P-CCNC: 17 U/L (ref 10–52)
ANION GAP SERPL CALC-SCNC: 14 MMOL/L (ref 10–20)
AST SERPL W P-5'-P-CCNC: 25 U/L (ref 9–39)
BASOPHILS # BLD AUTO: 0.03 X10*3/UL (ref 0–0.1)
BASOPHILS NFR BLD AUTO: 0.3 %
BILIRUB SERPL-MCNC: 0.4 MG/DL (ref 0–1.2)
BUN SERPL-MCNC: 11 MG/DL (ref 6–23)
CALCIUM SERPL-MCNC: 9.1 MG/DL (ref 8.6–10.3)
CHLORIDE SERPL-SCNC: 103 MMOL/L (ref 98–107)
CO2 SERPL-SCNC: 24 MMOL/L (ref 21–32)
CREAT SERPL-MCNC: 0.95 MG/DL (ref 0.5–1.3)
EGFRCR SERPLBLD CKD-EPI 2021: >90 ML/MIN/1.73M*2
EOSINOPHIL # BLD AUTO: 0.06 X10*3/UL (ref 0–0.7)
EOSINOPHIL NFR BLD AUTO: 0.6 %
ERYTHROCYTE [DISTWIDTH] IN BLOOD BY AUTOMATED COUNT: 16.9 % (ref 11.5–14.5)
GLUCOSE SERPL-MCNC: 104 MG/DL (ref 74–99)
HCT VFR BLD AUTO: 37.8 % (ref 41–52)
HGB BLD-MCNC: 11.8 G/DL (ref 13.5–17.5)
IMM GRANULOCYTES # BLD AUTO: 0.03 X10*3/UL (ref 0–0.7)
IMM GRANULOCYTES NFR BLD AUTO: 0.3 % (ref 0–0.9)
LYMPHOCYTES # BLD AUTO: 1.99 X10*3/UL (ref 1.2–4.8)
LYMPHOCYTES NFR BLD AUTO: 19 %
MCH RBC QN AUTO: 23 PG (ref 26–34)
MCHC RBC AUTO-ENTMCNC: 31.2 G/DL (ref 32–36)
MCV RBC AUTO: 74 FL (ref 80–100)
MONOCYTES # BLD AUTO: 0.83 X10*3/UL (ref 0.1–1)
MONOCYTES NFR BLD AUTO: 7.9 %
NEUTROPHILS # BLD AUTO: 7.55 X10*3/UL (ref 1.2–7.7)
NEUTROPHILS NFR BLD AUTO: 71.9 %
NRBC BLD-RTO: 0 /100 WBCS (ref 0–0)
PLATELET # BLD AUTO: 357 X10*3/UL (ref 150–450)
POTASSIUM SERPL-SCNC: 4.5 MMOL/L (ref 3.5–5.3)
PROT SERPL-MCNC: 6.8 G/DL (ref 6.4–8.2)
RBC # BLD AUTO: 5.13 X10*6/UL (ref 4.5–5.9)
SODIUM SERPL-SCNC: 136 MMOL/L (ref 136–145)
WBC # BLD AUTO: 10.5 X10*3/UL (ref 4.4–11.3)

## 2025-01-30 PROCEDURE — 2500000004 HC RX 250 GENERAL PHARMACY W/ HCPCS (ALT 636 FOR OP/ED): Performed by: SURGERY

## 2025-01-30 PROCEDURE — RXMED WILLOW AMBULATORY MEDICATION CHARGE

## 2025-01-30 PROCEDURE — 2500000002 HC RX 250 W HCPCS SELF ADMINISTERED DRUGS (ALT 637 FOR MEDICARE OP, ALT 636 FOR OP/ED): Performed by: SURGERY

## 2025-01-30 PROCEDURE — 2500000001 HC RX 250 WO HCPCS SELF ADMINISTERED DRUGS (ALT 637 FOR MEDICARE OP): Performed by: SURGERY

## 2025-01-30 PROCEDURE — 85025 COMPLETE CBC W/AUTO DIFF WBC: CPT | Performed by: SURGERY

## 2025-01-30 PROCEDURE — 80053 COMPREHEN METABOLIC PANEL: CPT | Performed by: SURGERY

## 2025-01-30 PROCEDURE — 99238 HOSP IP/OBS DSCHRG MGMT 30/<: CPT | Performed by: INTERNAL MEDICINE

## 2025-01-30 PROCEDURE — 36415 COLL VENOUS BLD VENIPUNCTURE: CPT | Performed by: SURGERY

## 2025-01-30 RX ORDER — ONDANSETRON 4 MG/1
4 TABLET, ORALLY DISINTEGRATING ORAL EVERY 8 HOURS PRN
Qty: 9 TABLET | Refills: 0 | Status: SHIPPED | OUTPATIENT
Start: 2025-01-30 | End: 2025-02-02

## 2025-01-30 RX ORDER — OXYCODONE HYDROCHLORIDE 5 MG/1
5 TABLET ORAL EVERY 6 HOURS PRN
Qty: 12 TABLET | Refills: 0 | Status: SHIPPED | OUTPATIENT
Start: 2025-01-30 | End: 2025-02-02

## 2025-01-30 RX ORDER — NALOXONE HYDROCHLORIDE 4 MG/.1ML
4 SPRAY NASAL AS NEEDED
Qty: 2 EACH | Refills: 11 | Status: SHIPPED | OUTPATIENT
Start: 2025-01-30

## 2025-01-30 RX ORDER — DOCUSATE SODIUM 100 MG/1
200 CAPSULE, LIQUID FILLED ORAL 2 TIMES DAILY
Qty: 12 CAPSULE | Refills: 0 | Status: SHIPPED | OUTPATIENT
Start: 2025-01-30 | End: 2025-02-02

## 2025-01-30 RX ORDER — POLYETHYLENE GLYCOL 3350 17 G/17G
17 POWDER, FOR SOLUTION ORAL DAILY PRN
Qty: 3 PACKET | Refills: 0 | Status: CANCELLED | OUTPATIENT
Start: 2025-01-30 | End: 2025-02-02

## 2025-01-30 RX ADMIN — PANTOPRAZOLE SODIUM 40 MG: 40 TABLET, DELAYED RELEASE ORAL at 08:06

## 2025-01-30 RX ADMIN — KETOROLAC TROMETHAMINE 15 MG: 30 INJECTION, SOLUTION INTRAMUSCULAR at 02:31

## 2025-01-30 RX ADMIN — TAMSULOSIN HYDROCHLORIDE 0.4 MG: 0.4 CAPSULE ORAL at 08:07

## 2025-01-30 RX ADMIN — KETOROLAC TROMETHAMINE 15 MG: 30 INJECTION, SOLUTION INTRAMUSCULAR at 08:06

## 2025-01-30 RX ADMIN — OXYCODONE HYDROCHLORIDE AND ACETAMINOPHEN 1 TABLET: 5; 325 TABLET ORAL at 10:25

## 2025-01-30 RX ADMIN — DOCUSATE SODIUM 100 MG: 100 CAPSULE, LIQUID FILLED ORAL at 08:06

## 2025-01-30 RX ADMIN — FERROUS SULFATE TAB 325 MG (65 MG ELEMENTAL FE) 325 MG: 325 (65 FE) TAB at 08:07

## 2025-01-30 RX ADMIN — CEFAZOLIN SODIUM 2 G: 2 INJECTION, SOLUTION INTRAVENOUS at 08:07

## 2025-01-30 RX ADMIN — AMLODIPINE BESYLATE 10 MG: 10 TABLET ORAL at 08:06

## 2025-01-30 RX ADMIN — CEFAZOLIN SODIUM 2 G: 2 INJECTION, SOLUTION INTRAVENOUS at 02:31

## 2025-01-30 RX ADMIN — HYDROMORPHONE HYDROCHLORIDE 0.4 MG: 1 INJECTION, SOLUTION INTRAMUSCULAR; INTRAVENOUS; SUBCUTANEOUS at 00:18

## 2025-01-30 ASSESSMENT — COGNITIVE AND FUNCTIONAL STATUS - GENERAL
MOBILITY SCORE: 24
DAILY ACTIVITIY SCORE: 24

## 2025-01-30 ASSESSMENT — PAIN - FUNCTIONAL ASSESSMENT: PAIN_FUNCTIONAL_ASSESSMENT: 0-10

## 2025-01-30 ASSESSMENT — PAIN SCALES - GENERAL
PAINLEVEL_OUTOF10: 10 - WORST POSSIBLE PAIN
PAINLEVEL_OUTOF10: 10 - WORST POSSIBLE PAIN
PAINLEVEL_OUTOF10: 8
PAINLEVEL_OUTOF10: 8

## 2025-01-30 ASSESSMENT — PAIN DESCRIPTION - LOCATION
LOCATION: ABDOMEN

## 2025-01-30 ASSESSMENT — PAIN DESCRIPTION - ORIENTATION
ORIENTATION: RIGHT

## 2025-01-30 ASSESSMENT — PAIN SCALES - PAIN ASSESSMENT IN ADVANCED DEMENTIA (PAINAD): TOTALSCORE: MEDICATION (SEE MAR)

## 2025-01-30 NOTE — CARE PLAN
The patient's goals for the shift include pain management    The clinical goals for the shift include Patient pain will be maintained throughout shift      Problem: Safety - Adult  Goal: Free from fall injury  Outcome: Met     Problem: Discharge Planning  Goal: Discharge to home or other facility with appropriate resources  Outcome: Met     Problem: Chronic Conditions and Co-morbidities  Goal: Patient's chronic conditions and co-morbidity symptoms are monitored and maintained or improved  Outcome: Met     Problem: Nutrition  Goal: Nutrient intake appropriate for maintaining nutritional needs  Outcome: Met     Problem: Fall/Injury  Goal: Not fall by end of shift  Outcome: Met  Goal: Be free from injury by end of the shift  Outcome: Met  Goal: Pace activities to prevent fatigue by end of the shift  Outcome: Met

## 2025-01-30 NOTE — PROGRESS NOTES
Care Coordinator Note:    Plan:s/p lap choley with perc manisha drain removal. Eliquis contd. Med ready for dc today.   Status: inpatient  [facility]  Disposition: Home with spouse. No needs  Barrier: none  ADOD: today    Iman Jeremiah [facility]      01/30/25 1441   Discharge Planning   Living Arrangements Spouse/significant other   Expected Discharge Disposition Home   Intensity of Service   Intensity of Service 0-30 min

## 2025-01-30 NOTE — POST-PROCEDURE NOTE
54 y.o. male presenting with rt sided abdo pain  Pt has h/o cholecystitis and was admitted to [facility] for same and did have cholecystostomy placed on 12/27/24, admitted on 1/25 for worsening abdominal pain now s/p  laparoscopic cholecystectomy, cholangiogram, removal of perc manisha tube with Dr. Carranza    Subjective: Resting comfortably in bed, pain well controlled, Tolerating diet, Denies any fever, chills, night sweats, CP, SOB, palpitations, nausea, vomiting, diarrhea, constipation, dysuria, hematuria, hematochezia, hematemesis, flank pain.     Objective:   PE:  Constitutional: A&Ox3, calm and cooperative, NAD  Eyes: PERRL, clear sclera   ENMT: moist mucous membranes, no apparent injuries or lesions  Head/Neck: Neck supple, no JVD  Cardiovascular: Normal rate and regular rhythm. No murmurs, rubs, or gallops. 2+ equal pulses of the distal extremities.  Respiratory/Thorax: CTAB, No rales, rhonchi, or wheezes. Good symmetric chest expansion.   Gastrointestinal: abdomen slightly distended, soft, appropriately tender, no peritoneal signs, laparotomy sites c/d/i, well approximate with Dermabond, no erythema or drainage, old perc manisha tube dressing to RUQ c/d/i  Genitourinary: voiding  Musculoskeletal: ROM intact, no joint swelling, normal strength  Extremities: No peripheral edema, contusions, or wounds, tattoos to BLE  Neurological: A&Ox3, cranial nerves II-XII grossly intact. Sensation grossly intact  Psychological: Appropriate mood and behavior  Skin: Warm and dry, no rashes or lesions    Chronic cholecystitis with perc manisha tube s/p  laparoscopic cholecystectomy, cholangiogram, removal of perc manisha tube:  A/P:  - POD #0  - doing much better, pain well controlled and improved since before surgery   - encourage PO pain medication  - tolerating diet  - as needed antiemetics  - Operative findings: Chronic cholecystitis with percutaneous cholecystostomy drain within the gallbladder   - clinically doing well,  hemodynamically stable  - monitor labs  - DVT prophylaxis: SCD/per primary  - IS  - encourage ambulation  - instructed on post operative care, s/s of infection  - continue supportive care  - Surgery to follow    DONTE Evans-CNP  General Surgery  Available via [employer]

## 2025-01-30 NOTE — PROGRESS NOTES
"Douglas Wilson is a 54 y.o. male on day 4 of admission presenting with Intractable abdominal pain.    Subjective   Doing well         Objective     Physical Exam  Comfortable  Incisions c/d/i  Last Recorded Vitals  Blood pressure 135/80, pulse 56, temperature 36.3 °C (97.3 °F), temperature source Temporal, resp. rate 18, height 1.753 m (5' 9.02\"), weight 74.8 kg (164 lb 14.5 oz), SpO2 97%.  Intake/Output last 3 Shifts:  I/O last 3 completed shifts:  In: 1130 (15.1 mL/kg) [P.O.:930; I.V.:200 (2.7 mL/kg)]  Out: 600 (8 mL/kg) [Urine:275 (0.1 mL/kg/hr); Drains:325]  Weight: 74.8 kg     Relevant Results  Lab Results   Component Value Date    WBC 10.5 01/30/2025    HGB 11.8 (L) 01/30/2025    HCT 37.8 (L) 01/30/2025    MCV 74 (L) 01/30/2025     01/30/2025     Lab Results   Component Value Date    GLUCOSE 104 (H) 01/30/2025     01/30/2025    K 4.5 01/30/2025     01/30/2025    CO2 24 01/30/2025    ANIONGAP 14 01/30/2025    BUN 11 01/30/2025    CREATININE 0.95 01/30/2025    EGFR >90 01/30/2025    CALCIUM 9.1 01/30/2025    ALBUMIN 3.8 01/30/2025    ALKPHOS 51 01/30/2025    PROT 6.8 01/30/2025    AST 25 01/30/2025    BILITOT 0.4 01/30/2025    ALT 17 01/30/2025         Postoperative day #1 laparoscopic cholecystectomy, removal of cholecystostomy tube.  Ch although today with a heart olangiogram negative for filling defects    Doing well. Resume anticoagulation     Okay to discharge from surgical perspective.       Doni Maldonado MD      "

## 2025-01-30 NOTE — DISCHARGE SUMMARY
Discharge Diagnosis  Intractable abdominal pain    Issues Requiring Follow-Up      Test Results Pending At Discharge  Pending Labs       Order Current Status    Surgical Pathology Exam In process            Hospital Course      Pertinent Physical Exam At Time of Discharge  Physical Exam    Home Medications     Medication List      ASK your doctor about these medications     amLODIPine 10 mg tablet; Commonly known as: Norvasc; Take 1 tablet (10   mg) by mouth once daily.   amoxicillin-pot clavulanate 875-125 mg tablet; Commonly known as:   Augmentin; Take 1 tablet by mouth 2 times a day for 10 days.; Ask about:   Should I take this medication?   atorvastatin 10 mg tablet; Commonly known as: Lipitor; Take 1 tablet (10   mg) by mouth once daily at bedtime.   Eliquis DVT-PE Treat 30D Start 5 mg (74 tabs) tablet; Generic drug:   apixaban; Take 2 tablets (10 mg) by mouth 2 times a day for 7 days, then   take 1 tablet (5 mg) by mouth 2 times a day.   ferrous sulfate (325 mg ferrous sulfate) tablet; Commonly known as: Iron   (ferrous sulfate); Take 1 tablet by mouth once daily with breakfast.   omeprazole 40 mg DR capsule; Commonly known as: PriLOSEC; Take 1 capsule   (40 mg) by mouth once daily in the morning. Take before meals. Do not   crush or chew.   oxyCODONE-acetaminophen 5-325 mg tablet; Commonly known as: Percocet;   Take 1 tablet by mouth every 6 hours if needed for severe pain (7 - 10).   pantoprazole 40 mg EC tablet; Commonly known as: ProtoNix   sildenafil 100 mg tablet; Commonly known as: Viagra; Take 1 tablet (100   mg) by mouth once daily as needed for erectile dysfunction.   * sodium chloride 0.9% flush; Flush biliary drain twice daily using 10   mL by intra-catheter route as directed.   * sodium chloride 0.9% flush; Flush biliary drain every 8 hours using 10   mL by intra-catheter route as directed.   tamsulosin 0.4 mg 24 hr capsule; Commonly known as: Flomax; Take 1   capsule (0.4 mg) by mouth once  daily.  * This list has 2 medication(s) that are the same as other medications   prescribed for you. Read the directions carefully, and ask your doctor or   other care provider to review them with you.       Outpatient Follow-Up  Future Appointments   Date Time Provider Department [place]   2/13/2025  8:00 AM Nubia Diego MD [id number] [facility]   3/18/2025  9:00 AM Analilia Webster MD [id number] [facility]   3/20/2025  8:00 AM Cherelle Davison MD, MS [id number] [facility]   7/18/2025  2:00 PM Bin Lopez MD [id number] [facility]       Anuja Corado MD

## 2025-01-30 NOTE — DISCHARGE SUMMARY
Discharge Diagnosis  Intractable abdominal pain    Issues Requiring Follow-Up  Follow-up with surgery in 2 weeks    Test Results Pending At Discharge  Pending Labs       Order Current Status    Surgical Pathology Exam In process            Hospital Course   Patient admitted with worsening abdominal pain  Surgery was consulted and recommended doing a cholecystectomy for chronic cholecystitis  Eliquis was held for 2 days and he underwent successful surgery  Tolerating diet and ambulating without any discomfort  Will discharge him in stable condition    Pertinent Physical Exam At Time of Discharge  Physical Exam    Constitutional   General appearance: Alert and in no acute distress.     Pulmonary   Respiratory assessment: No respiratory distress, normal respiratory rhythm and effort.    Auscultation of Lungs: Clear bilateral breath sounds.   Cardiovascular   Auscultation of heart: Apical pulse normal, heart rate and rhythm normal, normal S1 and S2, no murmurs and no pericardial rub.    Exam for edema: No peripheral edema.   Abdomen   Abdominal Exam: No bruits, normal bowel sounds, soft, non-tender, no abdominal mass palpated.    Liver and Spleen exam: No hepato-splenomegaly.   Musculoskeletal   Examination of gait: Normal.    Inspection of digits and nails: No clubbing or cyanosis of the fingernails.    Inspection/palpation of joints, bones and muscles: No joint swelling. Normal movement of all extremities.   Skin   Skin inspection: Normal skin color and pigmentation, normal skin turgor and no visible rash.   Neurologic   Cranial nerves: Nerves 2-12 were intact, no focal neuro defects.    Home Medications     Medication List      START taking these medications     apixaban 5 mg tablet; Commonly known as: Eliquis; Take 1 tablet (5 mg)   by mouth 2 times a day.; Replaces: Eliquis DVT-PE Treat 30D Start 5 mg (74   tabs) tablet   docusate sodium 100 mg capsule; Commonly known as: Colace; Take 2   capsules (200 mg) by mouth  2 times a day for 3 days.   naloxone 4 mg/0.1 mL nasal spray; Commonly known as: Narcan; Administer   1 spray (4 mg) into affected nostril(s) if needed for opioid reversal or   respiratory depression. May repeat every 2-3 minutes if needed,   alternating nostrils, until medical assistance becomes available.   ondansetron ODT 4 mg disintegrating tablet; Commonly known as:   Zofran-ODT; Dissolve 1 tablet (4 mg) in the mouth every 8 hours if needed   for nausea or vomiting for up to 3 days.   oxyCODONE 5 mg immediate release tablet; Commonly known as: Roxicodone;   Take 1 tablet (5 mg) by mouth every 6 hours if needed for severe pain (7 -   10) for up to 3 days.     CONTINUE taking these medications     amLODIPine 10 mg tablet; Commonly known as: Norvasc; Take 1 tablet (10   mg) by mouth once daily.   atorvastatin 10 mg tablet; Commonly known as: Lipitor; Take 1 tablet (10   mg) by mouth once daily at bedtime.   ferrous sulfate (325 mg ferrous sulfate) tablet; Commonly known as: Iron   (ferrous sulfate); Take 1 tablet by mouth once daily with breakfast.   omeprazole 40 mg DR capsule; Commonly known as: PriLOSEC; Take 1 capsule   (40 mg) by mouth once daily in the morning. Take before meals. Do not   crush or chew.   sildenafil 100 mg tablet; Commonly known as: Viagra; Take 1 tablet (100   mg) by mouth once daily as needed for erectile dysfunction.   tamsulosin 0.4 mg 24 hr capsule; Commonly known as: Flomax; Take 1   capsule (0.4 mg) by mouth once daily.     STOP taking these medications     amoxicillin-pot clavulanate 875-125 mg tablet; Commonly known as:   Augmentin   Eliquis DVT-PE Treat 30D Start 5 mg (74 tabs) tablet; Generic drug:   apixaban; Replaced by: apixaban 5 mg tablet   oxyCODONE-acetaminophen 5-325 mg tablet; Commonly known as: Percocet   pantoprazole 40 mg EC tablet; Commonly known as: ProtoNix   sodium chloride 0.9% flush       Outpatient Follow-Up  Future Appointments   Date Time Provider Department  [place]   2/13/2025  8:00 AM Nubia Diego MD [id number] [facility]   3/18/2025  9:00 AM Analilia Webster MD [id number] [facility]   3/20/2025  8:00 AM Cherelle Davison MD, MS [id number] [facility]   7/18/2025  2:00 PM Bin Lopez MD [id number] [facility]       Anuja Corado MD

## 2025-01-30 NOTE — CARE PLAN
The patient's goals for the shift include pain management    The clinical goals for the shift include pain control, ambulate

## 2025-01-30 NOTE — DISCHARGE INSTRUCTIONS
Instructions After Gallbladder Surgery    Wound Care:  -Ice packs to wounds every hour the first day  -Ok to shower in 24 hours  -no baths or swimming for 2 weeks  -keep wound clean and dry  -do not apply topical creams or ointments  -call if you notice redness around wound, foul-smelling drainage, or increasing pain     Diet:   -Avoid greasy, fatty foods first few weeks   -[place], soft meals first day or two after surgery    Activity   -Take it easy for the first 48 hours   -stairs and walks are fine   -resume activities gradually over the first week   -ask Dr Carranza before resuming strenuous physical exertions   -No driving while on pain medication    Medications   -Pain medicine prescription attached for severe pain   -You can also take Motrin/Ibuprofen 400mg every 6 hours for mild pain   -Resume your home medications unless otherwise directed   -To avoid constipation please take Colace 100mg twice a day (over the counter)    Other Instructions   -Call to make appointment within 1-2 days:  [phone]   -Call the doctor for the following:  severe unrelieved pain  fevers > 101F  Nausea/vomiting  wound issues  insurance/return to work forms  shortness of breath  chest pains   -Feedback on your surgical experience is appreciated!

## 2025-01-30 NOTE — NURSING NOTE
Discharge instructions provided using teachback method.  Patient's health-related risk factors discussed with patient.  Patient educated to look for worsening signs and symptoms and educated to seek medical attention if experiencing medical emergency.  Patient aware of needs to follow up with outpatient clinics as scheduled. Home going meds reviewed with patient.  Patient verbalized understanding of disposition and discharge instructions.  All questions answered to patient's satisfaction and within nursing scope of practice.  Vitals stable; IV(s) removed.  Eliquis homegoing order clarified with NP and patient educated.  RTW form provided.

## 2025-02-04 LAB
LABORATORY COMMENT REPORT: NORMAL
PATH REPORT.FINAL DX SPEC: NORMAL
PATH REPORT.GROSS SPEC: NORMAL
PATH REPORT.RELEVANT HX SPEC: NORMAL
PATH REPORT.TOTAL CANCER: NORMAL
RESIDENT REVIEW: NORMAL

## 2025-02-08 LAB
ATRIAL RATE: 79 BPM
P AXIS: 72 DEGREES
P OFFSET: 202 MS
P ONSET: 151 MS
PR INTERVAL: 148 MS
Q ONSET: 225 MS
QRS COUNT: 13 BEATS
QRS DURATION: 76 MS
QT INTERVAL: 362 MS
QTC CALCULATION(BAZETT): 415 MS
QTC FREDERICIA: 396 MS
R AXIS: -17 DEGREES
T AXIS: 53 DEGREES
T OFFSET: 406 MS
VENTRICULAR RATE: 79 BPM

## 2025-02-12 PROBLEM — E83.51 HYPOCALCEMIA: Status: RESOLVED | Noted: 2024-04-04 | Resolved: 2025-02-12

## 2025-02-12 PROBLEM — I26.99 ACUTE PULMONARY EMBOLISM, UNSPECIFIED PULMONARY EMBOLISM TYPE, UNSPECIFIED WHETHER ACUTE COR PULMONALE PRESENT (MULTI): Status: RESOLVED | Noted: 2025-01-08 | Resolved: 2025-02-12

## 2025-02-12 PROBLEM — R53.83 OTHER FATIGUE: Status: RESOLVED | Noted: 2024-06-18 | Resolved: 2025-02-12

## 2025-02-12 PROBLEM — M54.9 CHRONIC MIDLINE BACK PAIN: Status: RESOLVED | Noted: 2024-11-29 | Resolved: 2025-02-12

## 2025-02-12 PROBLEM — K44.9 HIATAL HERNIA: Status: RESOLVED | Noted: 2021-10-08 | Resolved: 2025-02-12

## 2025-02-12 PROBLEM — R10.9 ABDOMINAL PAIN: Status: RESOLVED | Noted: 2024-02-22 | Resolved: 2025-02-12

## 2025-02-12 PROBLEM — K59.00 CONSTIPATION: Status: RESOLVED | Noted: 2024-05-11 | Resolved: 2025-02-12

## 2025-02-12 PROBLEM — K21.9 GASTRO-ESOPHAGEAL REFLUX DISEASE WITHOUT ESOPHAGITIS: Status: RESOLVED | Noted: 2022-04-19 | Resolved: 2025-02-12

## 2025-02-12 PROBLEM — G89.29 CHRONIC MIDLINE BACK PAIN: Status: RESOLVED | Noted: 2024-11-29 | Resolved: 2025-02-12

## 2025-02-12 PROBLEM — R60.0 PERIPHERAL EDEMA: Status: RESOLVED | Noted: 2024-05-11 | Resolved: 2025-02-12

## 2025-02-12 PROBLEM — D64.9 ANEMIA: Status: RESOLVED | Noted: 2024-06-18 | Resolved: 2025-02-12

## 2025-02-12 PROBLEM — E55.9 VITAMIN D DEFICIENCY: Status: RESOLVED | Noted: 2024-05-11 | Resolved: 2025-02-12

## 2025-02-12 PROBLEM — G93.40 ENCEPHALOPATHY: Status: RESOLVED | Noted: 2024-05-30 | Resolved: 2025-02-12

## 2025-02-12 PROBLEM — D62 ACUTE BLOOD LOSS ANEMIA: Status: RESOLVED | Noted: 2024-02-27 | Resolved: 2025-02-12

## 2025-02-12 PROBLEM — I72.8 ANEURYSM OF SPLENIC ARTERY (CMS-HCC): Status: RESOLVED | Noted: 2024-05-07 | Resolved: 2025-02-12

## 2025-02-12 PROBLEM — R19.7 DIARRHEA: Status: RESOLVED | Noted: 2024-06-18 | Resolved: 2025-02-12

## 2025-02-12 PROBLEM — G47.33 OBSTRUCTIVE SLEEP APNEA SYNDROME: Status: RESOLVED | Noted: 2020-10-22 | Resolved: 2025-02-12

## 2025-02-12 PROBLEM — M25.50 ARTHRALGIA: Status: RESOLVED | Noted: 2024-06-18 | Resolved: 2025-02-12

## 2025-02-12 PROBLEM — Z90.49 S/P LAPAROSCOPIC CHOLECYSTECTOMY: Status: ACTIVE | Noted: 2025-02-12

## 2025-02-12 NOTE — ASSESSMENT & PLAN NOTE
-BP Goal <130/80.  -Patient and I discussed dietary lifestyle modifications, including DASH diet.  -Continue amlodipine 10 mg daily for now.

## 2025-02-12 NOTE — ASSESSMENT & PLAN NOTE
-Through chart review/Care Everywhere, it was thought that the patient was going through heroin withdrawal during admission at [facility] in December 2024.  Patient denies usage since.

## 2025-02-12 NOTE — ASSESSMENT & PLAN NOTE
"Through chart review/cardiology notes, the PE noted in Feb 2024 was initially thought to be provoked due to recent acute illness with splenic artery aneurysm and hemoperitoneum s/p surgery in February 2024.  -Patient was on Eliquis therapy after 6 months, which would have corresponded to August 2024.                    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   -CT Angio Chest Jan 8, 2025: \"A pair of segmental/subsegmental pulmonary emboli are present in the arteries supplying the right upper and right lower lobe, without evidence of large central embolism or right heart strain.\"  For hospital discharge summary during that admission, this was noted to be patient's second PE, therefore, it was determined that patient should be on lifelong anticoagulation for recurring PE.   -CT Angio Chest/Abd/Pelvis Performed 7/20/2024 for the indication of \"AMS/Abd Pain/Hypertension\" did not show PE.   -CT Angio Chest w/ wo/ Contrast Performed 5/30/2024: No signs of PE.   -Lower Venous Duplex performed May 14, 2024 for the indication of bilateral lower limb swelling: No DVTs detected bilaterally.  -CT Angio Abd/Pelvis w/ wo Contrast Feb 25, 2024: Acute segmental and subsegmental pulmonary emboli within the right lower lobe, right middle lobe, and left lower lobe. No evidence of  right heart strain.2. Status post splenic artery embolization with coils involving the mid splenic artery; however, distal to the last embolization \", the splenic artery remains opacified and the known splenic artery pseudoaneurysm remains opacified, again measuring ~ 0.7 cm.\"  Of note, the PE during this incident was considered to be provoked due to status post recent splenic surgery.  -CT angio chest/abdomen/pelvis ?with/wo " contrast Feb 22, 2024: No PE Present.   -CT Angio Chest PE w/IV Contrast Jan 4, 2024: No PE present.    -Continue Eliquis 5 mg twice daily for now.

## 2025-02-12 NOTE — ASSESSMENT & PLAN NOTE
-Stable.  Will obtain updated CBC.  -Restart iron supplementation.  -Patient will schedule colonoscopy procedure.  -Will continue to monitor.

## 2025-02-12 NOTE — PROGRESS NOTES
"Subjective     Patient ID: Douglas Wilson is a 54 y.o. male who presents today for a Preventative Visit/Physical and hospital admission follow-up for acute cholecystitis status post cholecystostomy    Initial PCP history 2/12/2025:  This is the first time I am meeting the patient.  Patient was admitted to [facility] January 24, 2025 to January 30, 2025 for intractable abdominal pain secondary to acute cholecystitis.  He underwent laparoscopic cholecystectomy January 29, 2025 without complications. Patient feels good after the surgery. He has a low appetite after the surgery, but can tolerate meat, oatmeal. Patient has had \"pudding-like\" stools 2-3 daily since surgery. He urinates and ambulates.     Left Lower Venous: There is acute occlusive deep vein thrombosis visualized in the soleal and posterior tibial veins.     Tobacco/Alcohol/Drug Use:   Social History     Tobacco Use    Smoking status: Every Day     Current packs/day: 1.00     Types: Cigarettes    Smokeless tobacco: Never   Substance Use Topics    Alcohol use: Not Currently       Required Screenings/Immunizations:   Health Maintenance Due   Topic Date Due    HIV Screening  Never done    Colorectal Cancer Screening  Never done    MMR Vaccines (1 of 1 - Standard series) Never done    Hepatitis C Screening  Never done    Hepatitis B Vaccines (1 of 3 - 19+ 3-dose series) Never done    Hepatitis A Vaccines (1 of 2 - Risk 2-dose series) Never done    Pneumococcal Vaccine (1 of 2 - PCV) Never done    DTaP/Tdap/Td Vaccines (1 - Tdap) Never done    Zoster Vaccines (1 of 2) Never done    Influenza Vaccine (1) Never done    COVID-19 Vaccine (1 - 2024-25 season) Never done       Problems to be addressed today in addition to Preventative Services: As stated in orders.     Review of Systems   Constitutional:  Negative for chills, fever and unexpected weight change.   HENT:  Negative for rhinorrhea.    Eyes:  Negative for pain.   Respiratory:  Negative for shortness " "of breath.    Cardiovascular:  Negative for chest pain.   Gastrointestinal:  Positive for diarrhea. Negative for abdominal pain, anal bleeding, blood in stool, nausea and vomiting.   Genitourinary:  Negative for hematuria.   Skin:  Negative for rash.   Neurological:  Negative for dizziness, syncope and light-headedness.   Psychiatric/Behavioral:  Negative for behavioral problems and suicidal ideas.        /66   Pulse 64   Ht 1.753 m (5' 9.02\")   Wt 77.6 kg (171 lb)   SpO2 99%   BMI 25.24 kg/m²      Objective   Physical Exam  Vitals reviewed.   Constitutional:       General: He is not in acute distress.  HENT:      Head: Normocephalic.      Right Ear: External ear normal.      Left Ear: External ear normal.      Nose: No rhinorrhea.      Mouth/Throat:      Mouth: Mucous membranes are moist.   Eyes:      Conjunctiva/sclera: Conjunctivae normal.   Cardiovascular:      Rate and Rhythm: Normal rate and regular rhythm.      Heart sounds: No murmur heard.     No friction rub. No gallop.   Pulmonary:      Effort: No respiratory distress.      Breath sounds: No wheezing, rhonchi or rales.   Abdominal:      General: Bowel sounds are normal. There is no distension.      Palpations: Abdomen is soft.      Tenderness: There is no abdominal tenderness.      Comments: Well-healed laparoscopic scars.  No signs of drainage.   Musculoskeletal:      Cervical back: Neck supple.      Right lower leg: No edema.      Left lower leg: No edema.   Skin:     General: Skin is warm and dry.      Capillary Refill: Capillary refill takes less than 2 seconds.   Neurological:      Mental Status: He is alert.      Gait: Gait normal.           Labs:   Lab Results   Component Value Date    WBC 10.5 01/30/2025    HGB 11.8 (L) 01/30/2025    HCT 37.8 (L) 01/30/2025     01/30/2025    TSH 0.56 01/14/2025    PSA 0.34 05/03/2024    INR 1.0 01/08/2025     Lab Results   Component Value Date     01/30/2025    K 4.5 01/30/2025     " "01/30/2025    BUN 11 01/30/2025    CREATININE 0.95 01/30/2025    GLUCOSE 104 (H) 01/30/2025    CALCIUM 9.1 01/30/2025    PROT 6.8 01/30/2025    BILITOT 0.4 01/30/2025    ALKPHOS 51 01/30/2025    AST 25 01/30/2025    ALT 17 01/30/2025     Lab Results   Component Value Date    CHOL 200 (H) 05/31/2024    CHOL 198 02/22/2024    CHOL 141 01/05/2022      Lab Results   Component Value Date    TRIG 157 (H) 05/31/2024      Lab Results   Component Value Date    HDL 53.3 05/31/2024    HDL 35.3 02/22/2024    HDL 41.9 01/05/2022     Lab Results   Component Value Date    LDLCALC 115 (H) 05/31/2024      Lab Results   Component Value Date    VLDL 31 05/31/2024    No components found for: \"[id number]\"    Imaging/Testing: ECG 12 lead  Normal sinus rhythm  Moderate voltage criteria for LVH, may be normal variant ( R in aVL , Sokolow-Miranda )  Borderline ECG  When compared with ECG of 08-JAN-2025 16:43, (unconfirmed)  No significant change was found  See ED provider note for full interpretation and clinical correlation  Confirmed by Mireille Tarango ([id number]) on 2/8/2025 3:38:15 PM      Problem List Items Addressed This Visit          Medium    Acute pulmonary embolism without acute cor pulmonale (Multi)    Current Assessment & Plan     Through chart review/cardiology notes, the PE noted in Feb 2024 was initially thought to be provoked due to recent acute illness with splenic artery aneurysm and hemoperitoneum s/p surgery in February 2024.  -Patient was on Eliquis therapy after 6 months, which would have corresponded to August 2024.                    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   -CT Angio Chest Jan 8, 2025: \"A pair of segmental/subsegmental pulmonary emboli are present in the arteries supplying " "the right upper and right lower lobe, without evidence of large central embolism or right heart strain.\"  For hospital discharge summary during that admission, this was noted to be patient's second PE, therefore, it was determined that patient should be on lifelong anticoagulation for recurring PE.   -CT Angio Chest/Abd/Pelvis Performed 7/20/2024 for the indication of \"AMS/Abd Pain/Hypertension\" did not show PE.   -CT Angio Chest w/ wo/ Contrast Performed 5/30/2024: No signs of PE.   -Lower Venous Duplex performed May 14, 2024 for the indication of bilateral lower limb swelling: No DVTs detected bilaterally.  -CT Angio Abd/Pelvis w/ wo Contrast Feb 25, 2024: Acute segmental and subsegmental pulmonary emboli within the right lower lobe, right middle lobe, and left lower lobe. No evidence of  right heart strain.2. Status post splenic artery embolization with coils involving the mid splenic artery; however, distal to the last embolization \", the splenic artery remains opacified and the known splenic artery pseudoaneurysm remains opacified, again measuring ~ 0.7 cm.\"  Of note, the PE during this incident was considered to be provoked due to status post recent splenic surgery.  -CT angio chest/abdomen/pelvis ?with/wo contrast Feb 22, 2024: No PE Present.   -CT Angio Chest PE w/IV Contrast Jan 4, 2024: No PE present.    -Continue Eliquis 5 mg twice daily for now.         BPH (benign prostatic hyperplasia)    Current Assessment & Plan     -PSA due now.  Continue Flomax.         Hyperlipidemia    Current Assessment & Plan     -Lipids at target.  Continue current medications.          Polysubstance abuse (Multi)    Current Assessment & Plan     -Through chart review/Care Everywhere, it was thought that the patient was going through heroin withdrawal during admission at [facility] in December 2024.  Patient denies usage since.         Erectile dysfunction    Current Assessment & Plan     -Continue sildenafil as " "needed.         Iron deficiency anemia    Current Assessment & Plan     -Stable.  Will obtain updated CBC.  -Restart iron supplementation.  -Patient will schedule colonoscopy procedure.  -Will continue to monitor.         Seizure (Multi)    Current Assessment & Plan     As noted in EMR by prior PCP \"recent loss of consciousness with no in response to Narcan suggests something other than fentanyl poisoning.  EEG normal, but there was subtle white matter changes on his MRI.  LP was not suggestive of meningitis or encephalitis.  I encouraged her to follow-up with neurology to review his MRI and discuss of antiseizure medication as indicated based on his clinical presentation and MRI findings.\"  -Patient already had a neurology appointment scheduled for July 18, 2025.         Hypertension    Current Assessment & Plan     -BP Goal <130/80.  -Patient and I discussed dietary lifestyle modifications, including DASH diet.  -Continue amlodipine 10 mg daily for now.         S/P laparoscopic cholecystectomy    Overview     -Performed at [facility] 1/29/2025 for cholecystitis.         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   -CT Angio Chest Jan 8, 2025: \"A pair of segmental/subsegmental pulmonary emboli are present in the arteries supplying the right upper and right lower lobe, without evidence of large central embolism or right heart strain.\"  For hospital discharge summary during that admission, this was noted to be patient's second PE, therefore, it was determined that patient should be on lifelong anticoagulation for recurring PE.   -CT Angio Chest/Abd/Pelvis Performed 7/20/2024 for the indication " "of \"AMS/Abd Pain/Hypertension\" did not show PE.   -CT Angio Chest w/ wo/ Contrast Performed 5/30/2024: No signs of PE.   -Lower Venous Duplex performed May 14, 2024 for the indication of bilateral lower limb swelling: No DVTs detected bilaterally.  -CT Angio Abd/Pelvis w/ wo Contrast Feb 25, 2024: Acute segmental and subsegmental pulmonary emboli within the right lower lobe, right middle lobe, and left lower lobe. No evidence of  right heart strain.2. Status post splenic artery embolization with coils involving the mid splenic artery; however, distal to the last embolization \", the splenic artery remains opacified and the known splenic artery pseudoaneurysm remains opacified, again measuring ~ 0.7 cm.\"  Of note, the PE during this incident was considered to be provoked due to status post recent splenic surgery.  -CT angio chest/abdomen/pelvis ?with/wo contrast Feb 22, 2024: No PE Present.   -CT Angio Chest PE w/IV Contrast Jan 4, 2024: No PE present.    -Continue Eliquis 5 mg twice daily for now.         S/P appendectomy     Other Visit Diagnoses       Routine general medical examination at a health care facility    -  Primary    Relevant Orders    CBC and Auto Differential    Comprehensive Metabolic Panel    HIV 1/2 Antigen/Antibody Screen with Reflex to Confirmation    Hepatitis C antibody    Screening for malignant neoplasm of colon        Relevant Orders    Colonoscopy Screening; Average Risk Patient    Encounter for medication monitoring        Relevant Orders    CBC and Auto Differential    Comprehensive Metabolic Panel    Screening for HIV (human immunodeficiency virus)        Relevant Orders    HIV 1/2 Antigen/Antibody Screen with Reflex to Confirmation    Need for hepatitis C screening test        Relevant Orders    Hepatitis C antibody              As part of today's Preventative Visit, an age and gender-appropriate history and physical was performed, as documented below. Counseling and anticipatory " guidance were performed, and risk factor reduction interventions (including [place] Preventative Services Task Force recommended screening tests) were utilized/ordered as outlined in the above Assessment and Plan. All patient medications were reviewed, and refilled if necessary.    Patient and I discussed diet/nutrition, lifestyle modifications, safety, medication indications and side effects, and health goals.    current treatment plan is effective, no change in therapy, orders and follow up as documented in EMR, lab results reviewed with patient, repeat labs ordered prior to next appointment, reviewed compliance with lifestyle measures, reviewed diet, exercise and weight control, reviewed medications and side effects in detail     Follow-up end of July 2025 after patient has had follow-ups with general surgery, cardiology, and neurology.      I have reviewed OARRS report, consistent with prescribed medication. I have considered risks of abuse, diversion, side effects and feel clinically benefit of medication outweighs risks at this time.     I spent 60 minutes in duration for this visit today. This time consisted of chart review, obtaining history, and/or performing the exam as documented above as well as documenting the clinical information for the encounter in the electronic record, discussing treatment options, plans, and/or goals with patient, family, and/or caregiver, refilling medications, updating the electronic record, ordering medicines, lab work, imaging, referrals, and/or procedures as documented above and communicating with other [facility] professionals.

## 2025-02-13 ENCOUNTER — PATIENT OUTREACH (OUTPATIENT)
Dept: CARE COORDINATION | Facility: CLINIC | Age: 55
End: 2025-02-13

## 2025-02-13 ENCOUNTER — APPOINTMENT (OUTPATIENT)
Dept: PRIMARY CARE | Facility: CLINIC | Age: 55
End: 2025-02-13
Payer: COMMERCIAL

## 2025-02-13 VITALS
HEIGHT: 69 IN | OXYGEN SATURATION: 99 % | DIASTOLIC BLOOD PRESSURE: 66 MMHG | SYSTOLIC BLOOD PRESSURE: 100 MMHG | HEART RATE: 64 BPM | WEIGHT: 171 LBS | BODY MASS INDEX: 25.33 KG/M2

## 2025-02-13 DIAGNOSIS — Z11.4 SCREENING FOR HIV (HUMAN IMMUNODEFICIENCY VIRUS): ICD-10-CM

## 2025-02-13 DIAGNOSIS — Z11.59 NEED FOR HEPATITIS C SCREENING TEST: ICD-10-CM

## 2025-02-13 DIAGNOSIS — Z51.81 ENCOUNTER FOR MEDICATION MONITORING: ICD-10-CM

## 2025-02-13 DIAGNOSIS — F19.10 POLYSUBSTANCE ABUSE (MULTI): ICD-10-CM

## 2025-02-13 DIAGNOSIS — D50.9 IRON DEFICIENCY ANEMIA, UNSPECIFIED IRON DEFICIENCY ANEMIA TYPE: ICD-10-CM

## 2025-02-13 DIAGNOSIS — R56.9 SEIZURE (MULTI): ICD-10-CM

## 2025-02-13 DIAGNOSIS — N40.1 BENIGN PROSTATIC HYPERPLASIA WITH NOCTURIA: ICD-10-CM

## 2025-02-13 DIAGNOSIS — Z90.49 S/P APPENDECTOMY: ICD-10-CM

## 2025-02-13 DIAGNOSIS — R35.1 BENIGN PROSTATIC HYPERPLASIA WITH NOCTURIA: ICD-10-CM

## 2025-02-13 DIAGNOSIS — E78.49 OTHER HYPERLIPIDEMIA: ICD-10-CM

## 2025-02-13 DIAGNOSIS — I10 PRIMARY HYPERTENSION: ICD-10-CM

## 2025-02-13 DIAGNOSIS — N52.9 ERECTILE DYSFUNCTION, UNSPECIFIED ERECTILE DYSFUNCTION TYPE: ICD-10-CM

## 2025-02-13 DIAGNOSIS — Z90.49 S/P LAPAROSCOPIC CHOLECYSTECTOMY: ICD-10-CM

## 2025-02-13 DIAGNOSIS — Z00.00 ROUTINE GENERAL MEDICAL EXAMINATION AT A HEALTH CARE FACILITY: Primary | ICD-10-CM

## 2025-02-13 DIAGNOSIS — Z12.11 SCREENING FOR MALIGNANT NEOPLASM OF COLON: ICD-10-CM

## 2025-02-13 DIAGNOSIS — I26.99 ACUTE PULMONARY EMBOLISM WITHOUT ACUTE COR PULMONALE, UNSPECIFIED PULMONARY EMBOLISM TYPE (MULTI): ICD-10-CM

## 2025-02-13 DIAGNOSIS — I82.442 ACUTE DEEP VEIN THROMBOSIS (DVT) OF TIBIAL VEIN OF LEFT LOWER EXTREMITY (MULTI): ICD-10-CM

## 2025-02-13 PROBLEM — R10.9 INTRACTABLE ABDOMINAL PAIN: Status: RESOLVED | Noted: 2025-01-25 | Resolved: 2025-02-13

## 2025-02-13 PROBLEM — K81.0 CHOLECYSTITIS, ACUTE: Status: RESOLVED | Noted: 2025-01-24 | Resolved: 2025-02-13

## 2025-02-13 LAB
ATRIAL RATE: 69 BPM
P AXIS: 53 DEGREES
P OFFSET: 199 MS
P ONSET: 165 MS
PR INTERVAL: 122 MS
Q ONSET: 226 MS
QRS COUNT: 11 BEATS
QRS DURATION: 74 MS
QT INTERVAL: 360 MS
QTC CALCULATION(BAZETT): 385 MS
QTC FREDERICIA: 377 MS
R AXIS: -11 DEGREES
T AXIS: 43 DEGREES
T OFFSET: 406 MS
VENTRICULAR RATE: 69 BPM

## 2025-02-13 PROCEDURE — 3074F SYST BP LT 130 MM HG: CPT | Performed by: STUDENT IN AN ORGANIZED HEALTH CARE EDUCATION/TRAINING PROGRAM

## 2025-02-13 PROCEDURE — 99396 PREV VISIT EST AGE 40-64: CPT | Performed by: STUDENT IN AN ORGANIZED HEALTH CARE EDUCATION/TRAINING PROGRAM

## 2025-02-13 PROCEDURE — 3008F BODY MASS INDEX DOCD: CPT | Performed by: STUDENT IN AN ORGANIZED HEALTH CARE EDUCATION/TRAINING PROGRAM

## 2025-02-13 PROCEDURE — 3078F DIAST BP <80 MM HG: CPT | Performed by: STUDENT IN AN ORGANIZED HEALTH CARE EDUCATION/TRAINING PROGRAM

## 2025-02-13 PROCEDURE — 99215 OFFICE O/P EST HI 40 MIN: CPT | Performed by: STUDENT IN AN ORGANIZED HEALTH CARE EDUCATION/TRAINING PROGRAM

## 2025-02-13 ASSESSMENT — ENCOUNTER SYMPTOMS
DEPRESSION: 0
BLOOD IN STOOL: 0
OCCASIONAL FEELINGS OF UNSTEADINESS: 0
SHORTNESS OF BREATH: 0
UNEXPECTED WEIGHT CHANGE: 0
NAUSEA: 0
HEMATURIA: 0
RHINORRHEA: 0
CHILLS: 0
VOMITING: 0
ABDOMINAL PAIN: 0
DIARRHEA: 1
EYE PAIN: 0
LIGHT-HEADEDNESS: 0
LOSS OF SENSATION IN FEET: 0
FEVER: 0
ANAL BLEEDING: 0
DIZZINESS: 0

## 2025-02-13 ASSESSMENT — PATIENT HEALTH QUESTIONNAIRE - PHQ9
1. LITTLE INTEREST OR PLEASURE IN DOING THINGS: NOT AT ALL
1. LITTLE INTEREST OR PLEASURE IN DOING THINGS: NOT AT ALL
SUM OF ALL RESPONSES TO PHQ9 QUESTIONS 1 AND 2: 0
2. FEELING DOWN, DEPRESSED OR HOPELESS: NOT AT ALL
SUM OF ALL RESPONSES TO PHQ9 QUESTIONS 1 AND 2: 0
2. FEELING DOWN, DEPRESSED OR HOPELESS: NOT AT ALL

## 2025-02-13 NOTE — ASSESSMENT & PLAN NOTE
"As noted in EMR by prior PCP \"recent loss of consciousness with no in response to Narcan suggests something other than fentanyl poisoning.  EEG normal, but there was subtle white matter changes on his MRI.  LP was not suggestive of meningitis or encephalitis.  I encouraged her to follow-up with neurology to review his MRI and discuss of antiseizure medication as indicated based on his clinical presentation and MRI findings.\"  -Patient already had a neurology appointment scheduled for July 18, 2025.  "

## 2025-02-13 NOTE — PROGRESS NOTES
Outreach call to patient to check in 30 days after hospital discharge to support smooth transition of care.     Spoke with Mr. Wilson today, who stated that he is feeling much better.   He has had appointments with his surgeon and also his PCP and has now returned to work.    Patient with no additional needs noted. No additional outreach needed at this time.     Ada Cole RN, Care Manager  [facility], [facility]  [phone]

## 2025-02-13 NOTE — ASSESSMENT & PLAN NOTE
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   -CT Angio Chest Jan 8, 2025: \"A pair of segmental/subsegmental pulmonary emboli are present in the arteries supplying the right upper and right lower lobe, without evidence of large central embolism or right heart strain.\"  For hospital discharge summary during that admission, this was noted to be patient's second PE, therefore, it was determined that patient should be on lifelong anticoagulation for recurring PE.   -CT Angio Chest/Abd/Pelvis Performed 7/20/2024 for the indication of \"AMS/Abd Pain/Hypertension\" did not show PE.   -CT Angio Chest w/ wo/ Contrast Performed 5/30/2024: No signs of PE.   -Lower Venous Duplex performed May 14, 2024 for the indication of bilateral lower limb swelling: No DVTs detected bilaterally.  -CT Angio Abd/Pelvis w/ wo Contrast Feb 25, 2024: Acute segmental and subsegmental pulmonary emboli within the right lower lobe, right middle lobe, and left lower lobe. No evidence of  right heart strain.2. Status post splenic artery embolization with coils involving the mid splenic artery; however, distal to the last embolization \", the splenic artery remains opacified and the known splenic artery pseudoaneurysm remains opacified, again measuring ~ 0.7 cm.\"  Of note, the PE during this incident was considered to be provoked due to status post recent splenic surgery.  -CT angio chest/abdomen/pelvis ?with/wo contrast Feb 22, 2024: No PE Present.   -CT Angio Chest PE w/IV Contrast Jan 4, 2024: No PE present.    -Continue Eliquis 5 mg twice daily for now.  "

## 2025-02-14 LAB
ALBUMIN SERPL-MCNC: 4.2 G/DL (ref 3.6–5.1)
ALP SERPL-CCNC: 67 U/L (ref 35–144)
ALT SERPL-CCNC: 9 U/L (ref 9–46)
ANION GAP SERPL CALCULATED.4IONS-SCNC: 6 MMOL/L (CALC) (ref 7–17)
AST SERPL-CCNC: 9 U/L (ref 10–35)
BASOPHILS # BLD AUTO: 50 CELLS/UL (ref 0–200)
BASOPHILS NFR BLD AUTO: 0.7 %
BILIRUB SERPL-MCNC: 0.3 MG/DL (ref 0.2–1.2)
BUN SERPL-MCNC: 7 MG/DL (ref 7–25)
CALCIUM SERPL-MCNC: 9.7 MG/DL (ref 8.6–10.3)
CHLORIDE SERPL-SCNC: 102 MMOL/L (ref 98–110)
CO2 SERPL-SCNC: 31 MMOL/L (ref 20–32)
CREAT SERPL-MCNC: 0.86 MG/DL (ref 0.7–1.3)
EGFRCR SERPLBLD CKD-EPI 2021: 102 ML/MIN/1.73M2
EOSINOPHIL # BLD AUTO: 298 CELLS/UL (ref 15–500)
EOSINOPHIL NFR BLD AUTO: 4.2 %
ERYTHROCYTE [DISTWIDTH] IN BLOOD BY AUTOMATED COUNT: 15.7 % (ref 11–15)
GLUCOSE SERPL-MCNC: 96 MG/DL (ref 65–99)
HCT VFR BLD AUTO: 42.1 % (ref 38.5–50)
HCV AB SERPL QL IA: NORMAL
HGB BLD-MCNC: 12.4 G/DL (ref 13.2–17.1)
HIV 1+2 AB+HIV1 P24 AG SERPL QL IA: NORMAL
LYMPHOCYTES # BLD AUTO: 1612 CELLS/UL (ref 850–3900)
LYMPHOCYTES NFR BLD AUTO: 22.7 %
MCH RBC QN AUTO: 23.4 PG (ref 27–33)
MCHC RBC AUTO-ENTMCNC: 29.5 G/DL (ref 32–36)
MCV RBC AUTO: 79.4 FL (ref 80–100)
MONOCYTES # BLD AUTO: 660 CELLS/UL (ref 200–950)
MONOCYTES NFR BLD AUTO: 9.3 %
NEUTROPHILS # BLD AUTO: 4480 CELLS/UL (ref 1500–7800)
NEUTROPHILS NFR BLD AUTO: 63.1 %
PLATELET # BLD AUTO: 437 THOUSAND/UL (ref 140–400)
PMV BLD REES-ECKER: 10.5 FL (ref 7.5–12.5)
POTASSIUM SERPL-SCNC: 4.1 MMOL/L (ref 3.5–5.3)
PROT SERPL-MCNC: 7.2 G/DL (ref 6.1–8.1)
RBC # BLD AUTO: 5.3 MILLION/UL (ref 4.2–5.8)
SODIUM SERPL-SCNC: 139 MMOL/L (ref 135–146)
WBC # BLD AUTO: 7.1 THOUSAND/UL (ref 3.8–10.8)

## 2025-02-18 ENCOUNTER — OFFICE VISIT (OUTPATIENT)
Dept: SURGERY | Facility: HOSPITAL | Age: 55
End: 2025-02-18
Payer: COMMERCIAL

## 2025-02-18 VITALS — HEART RATE: 62 BPM | DIASTOLIC BLOOD PRESSURE: 86 MMHG | TEMPERATURE: 97.1 F | SYSTOLIC BLOOD PRESSURE: 135 MMHG

## 2025-02-18 DIAGNOSIS — Z76.89 ENCOUNTER FOR CHOLECYSTECTOMY: ICD-10-CM

## 2025-02-18 DIAGNOSIS — K81.9 CHOLECYSTITIS: ICD-10-CM

## 2025-02-18 DIAGNOSIS — Z09 SURGERY FOLLOW-UP: Primary | ICD-10-CM

## 2025-02-18 PROCEDURE — 3075F SYST BP GE 130 - 139MM HG: CPT | Performed by: SURGERY

## 2025-02-18 PROCEDURE — 99211 OFF/OP EST MAY X REQ PHY/QHP: CPT | Performed by: SURGERY

## 2025-02-18 PROCEDURE — 3079F DIAST BP 80-89 MM HG: CPT | Performed by: SURGERY

## 2025-02-18 ASSESSMENT — PAIN SCALES - GENERAL: PAINLEVEL_OUTOF10: 0-NO PAIN

## 2025-02-18 NOTE — PROGRESS NOTES
Assessment/Plan   Excellent recovery following recent laparoscopic cholecystectomy  -We reviewed intraoperative findings and final pathology report  -Patient encouraged to resume regular activities including exercise as tolerated  -Avoid greasy and fatty foods first couple months after surgery  -Follow-up with me as needed    Subjective   S/p lap manisha for acute on chronic cholecystitis.  He is feeling really well.  No pain.  Already back to work.       Objective     Physical Exam  NAD  A&Ox3  Non icteric  CTA  RR  Abdomen soft min tender. Wounds clean, intact  Extremities warm, well perfused         Relevant Results  6  Order: [id number]   Collected 1/29/2025 13:34       Status: Final result       Visible to patient: Yes (seen)       Dx: Cholecystitis, acute    0 Result Notes       Component  Resulting Agency   FINAL DIAGNOSIS   A. GALLBLADDER, CHOLECYSTECTOMY:   - Chronic cholecystitis     Electronically signed by Wagner Cantrell MD on 2/4/2025 at 1225      [facility]   By the signature on this report, the individual or group listed as making the Final Interpretation/Diagnosis certifies that they have reviewed this case.    Resident Review  [facility]          No results found for this or any previous visit (from the past 24 hours).        I spent 25 minutes in the professional and overall care of this patient.      Lauro Carranza MD

## 2025-02-18 NOTE — LETTER
February 18, 2025     Nubia Diego MD  [address]   [facility] [address]  [facility] [address]    Patient: Douglas Wilson   YOB: 1970   Date of Visit: 2/18/2025       Dear Dr. Nuiba Diego MD:    Thank you for referring Douglas Wilson to me for evaluation. Below are my notes for this consultation.  If you have questions, please do not hesitate to call me. I look forward to following your patient along with you.       Sincerely,     Lauro Carranza MD      CC: No Recipients  ______________________________________________________________________________________    Assessment/Plan  Excellent recovery following recent laparoscopic cholecystectomy  -We reviewed intraoperative findings and final pathology report  -Patient encouraged to resume regular activities including exercise as tolerated  -Avoid greasy and fatty foods first couple months after surgery  -Follow-up with me as needed    Subjective  S/p lap manisha for acute on chronic cholecystitis.  He is feeling really well.  No pain.  Already back to work.       Objective    Physical Exam  NAD  A&Ox3  Non icteric  CTA  RR  Abdomen soft min tender. Wounds clean, intact  Extremities warm, well perfused         Relevant Results  6  Order: [id number]   Collected 1/29/2025 13:34       Status: Final result       Visible to patient: Yes (seen)       Dx: Cholecystitis, acute    0 Result Notes       Component  Resulting Agency   FINAL DIAGNOSIS   A. GALLBLADDER, CHOLECYSTECTOMY:   - Chronic cholecystitis     Electronically signed by Wagner Cantrell MD on 2/4/2025 at 1225      [facility]   By the signature on this report, the individual or group listed as making the Final Interpretation/Diagnosis certifies that they have reviewed this case.    Resident Review  [facility]          No results found for this or any previous visit (from the past 24 hours).        I spent 25 minutes in the professional and overall care of this patient.      Lauro Carranza MD

## 2025-03-12 ENCOUNTER — APPOINTMENT (OUTPATIENT)
Dept: RADIOLOGY | Facility: HOSPITAL | Age: 55
End: 2025-03-12
Payer: COMMERCIAL

## 2025-03-12 ENCOUNTER — HOSPITAL ENCOUNTER (EMERGENCY)
Facility: HOSPITAL | Age: 55
Discharge: HOME | End: 2025-03-12
Attending: EMERGENCY MEDICINE
Payer: COMMERCIAL

## 2025-03-12 VITALS
BODY MASS INDEX: 24.44 KG/M2 | HEIGHT: 69 IN | WEIGHT: 165 LBS | HEART RATE: 86 BPM | OXYGEN SATURATION: 96 % | RESPIRATION RATE: 14 BRPM | TEMPERATURE: 98 F | SYSTOLIC BLOOD PRESSURE: 124 MMHG | DIASTOLIC BLOOD PRESSURE: 84 MMHG

## 2025-03-12 DIAGNOSIS — I10 PRIMARY HYPERTENSION: ICD-10-CM

## 2025-03-12 DIAGNOSIS — I26.99 ACUTE PULMONARY EMBOLISM, UNSPECIFIED PULMONARY EMBOLISM TYPE, UNSPECIFIED WHETHER ACUTE COR PULMONALE PRESENT (MULTI): ICD-10-CM

## 2025-03-12 DIAGNOSIS — R60.0 PERIPHERAL EDEMA: Primary | ICD-10-CM

## 2025-03-12 DIAGNOSIS — K21.9 GASTROESOPHAGEAL REFLUX DISEASE, UNSPECIFIED WHETHER ESOPHAGITIS PRESENT: ICD-10-CM

## 2025-03-12 LAB
ALBUMIN SERPL BCP-MCNC: 3.9 G/DL (ref 3.4–5)
ALP SERPL-CCNC: 51 U/L (ref 33–120)
ALT SERPL W P-5'-P-CCNC: 12 U/L (ref 10–52)
ANION GAP SERPL CALC-SCNC: 11 MMOL/L (ref 10–20)
AST SERPL W P-5'-P-CCNC: 36 U/L (ref 9–39)
BASOPHILS # BLD AUTO: 0.02 X10*3/UL (ref 0–0.1)
BASOPHILS NFR BLD AUTO: 0.3 %
BILIRUB SERPL-MCNC: 0.5 MG/DL (ref 0–1.2)
BNP SERPL-MCNC: 12 PG/ML (ref 0–99)
BUN SERPL-MCNC: 9 MG/DL (ref 6–23)
CALCIUM SERPL-MCNC: 9.1 MG/DL (ref 8.6–10.3)
CHLORIDE SERPL-SCNC: 101 MMOL/L (ref 98–107)
CO2 SERPL-SCNC: 29 MMOL/L (ref 21–32)
CREAT SERPL-MCNC: 0.86 MG/DL (ref 0.5–1.3)
EGFRCR SERPLBLD CKD-EPI 2021: >90 ML/MIN/1.73M*2
EOSINOPHIL # BLD AUTO: 0.31 X10*3/UL (ref 0–0.7)
EOSINOPHIL NFR BLD AUTO: 4.3 %
ERYTHROCYTE [DISTWIDTH] IN BLOOD BY AUTOMATED COUNT: 16.7 % (ref 11.5–14.5)
GLUCOSE SERPL-MCNC: 96 MG/DL (ref 74–99)
HCT VFR BLD AUTO: 34.7 % (ref 41–52)
HGB BLD-MCNC: 11 G/DL (ref 13.5–17.5)
IMM GRANULOCYTES # BLD AUTO: 0.02 X10*3/UL (ref 0–0.7)
IMM GRANULOCYTES NFR BLD AUTO: 0.3 % (ref 0–0.9)
LYMPHOCYTES # BLD AUTO: 2.05 X10*3/UL (ref 1.2–4.8)
LYMPHOCYTES NFR BLD AUTO: 28.2 %
MCH RBC QN AUTO: 23 PG (ref 26–34)
MCHC RBC AUTO-ENTMCNC: 31.7 G/DL (ref 32–36)
MCV RBC AUTO: 72 FL (ref 80–100)
MONOCYTES # BLD AUTO: 0.68 X10*3/UL (ref 0.1–1)
MONOCYTES NFR BLD AUTO: 9.4 %
NEUTROPHILS # BLD AUTO: 4.19 X10*3/UL (ref 1.2–7.7)
NEUTROPHILS NFR BLD AUTO: 57.5 %
NRBC BLD-RTO: 0 /100 WBCS (ref 0–0)
PLATELET # BLD AUTO: 304 X10*3/UL (ref 150–450)
POTASSIUM SERPL-SCNC: 4 MMOL/L (ref 3.5–5.3)
PROT SERPL-MCNC: 6.7 G/DL (ref 6.4–8.2)
RBC # BLD AUTO: 4.79 X10*6/UL (ref 4.5–5.9)
SODIUM SERPL-SCNC: 137 MMOL/L (ref 136–145)
WBC # BLD AUTO: 7.3 X10*3/UL (ref 4.4–11.3)

## 2025-03-12 PROCEDURE — 93971 EXTREMITY STUDY: CPT | Performed by: RADIOLOGY

## 2025-03-12 PROCEDURE — 93970 EXTREMITY STUDY: CPT

## 2025-03-12 PROCEDURE — 80053 COMPREHEN METABOLIC PANEL: CPT

## 2025-03-12 PROCEDURE — 36415 COLL VENOUS BLD VENIPUNCTURE: CPT

## 2025-03-12 PROCEDURE — 83880 ASSAY OF NATRIURETIC PEPTIDE: CPT

## 2025-03-12 PROCEDURE — 85025 COMPLETE CBC W/AUTO DIFF WBC: CPT

## 2025-03-12 PROCEDURE — 99284 EMERGENCY DEPT VISIT MOD MDM: CPT | Mod: 25 | Performed by: EMERGENCY MEDICINE

## 2025-03-12 RX ORDER — FUROSEMIDE 40 MG/1
40 TABLET ORAL DAILY
Qty: 5 TABLET | Refills: 0 | Status: SHIPPED | OUTPATIENT
Start: 2025-03-12 | End: 2025-03-17

## 2025-03-12 RX ORDER — AMLODIPINE BESYLATE 10 MG/1
10 TABLET ORAL DAILY
Qty: 90 TABLET | Refills: 0 | Status: SHIPPED | OUTPATIENT
Start: 2025-03-12

## 2025-03-12 RX ORDER — OMEPRAZOLE 40 MG/1
40 CAPSULE, DELAYED RELEASE ORAL
Qty: 90 CAPSULE | Refills: 0 | Status: SHIPPED | OUTPATIENT
Start: 2025-03-12

## 2025-03-12 ASSESSMENT — PAIN - FUNCTIONAL ASSESSMENT: PAIN_FUNCTIONAL_ASSESSMENT: 0-10

## 2025-03-12 NOTE — ED TRIAGE NOTES
Pt to the ED from home for right ankle pain/swelling, pt states it feels like he has a knot on the bottom of his right ankle, hx of blood clots, takes eliquis daily, denies any injury to ankle

## 2025-03-12 NOTE — Clinical Note
Douglas Wilson was seen and treated in our emergency department on 3/12/2025.  He may return to work on 03/13/2025.       If you have any questions or concerns, please don't hesitate to call.      Audi Porras MD

## 2025-03-12 NOTE — ED PROVIDER NOTES
HPI   Chief Complaint   Patient presents with    Leg Swelling       Patient is a 54-year-old male with PMH hypertension, hyperlipidemia, BPH, previous PE on Eliquis presenting to the ED with concern for leg swelling.  Patient states today he noticed that his bilateral ankles appeared swollen and firm.  Patient notes that the firmness and swelling has gone down since earlier today.  He notes slight discomfort in his ankles rated 3-4/10.  Patient recently had a gallbladder surgery a couple weeks ago.  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  He denies any chest pain, shortness with, headache, dizziness, nausea, vomiting, diarrhea, Bryn pain, cough, congestion, rhinorrhea, fevers, chills.  He denies any redness or warmth to his ankles.  He denies any injury.          Patient History   Past Medical History:   Diagnosis Date    Acute appendicitis without peritonitis 03/12/2024    BPH (benign prostatic hyperplasia)     Heroin abuse (Multi)     NSTEMI (non-ST elevated myocardial infarction) (Multi)     Pulmonary embolism     Splenic artery aneurysm (CMS-HCC) 02/2024    rupture s/p coil embolization    Upper GI bleed 03/15/2024    EGD with esophagitis, no active bleeding     Past Surgical History:   Procedure Laterality Date    APPENDECTOMY  03/12/2024    CT ANGIO CORONARY ART WITH HEARTFLOW IF SCORE >30%  04/19/2022    OTHER SURGICAL HISTORY Bilateral 01/24/2022    Inguinal hernia repair laparoscopic    SPLENIC ARTERY EMBOLIZATION  02/2024     Family History   Problem Relation Name Age of Onset    Diabetes Father      Hypertension Father      No Known Problems Son          raymond    Deafness Son      Other (orthopedic) Son          wearing braces to help with walking     Social History     Tobacco Use    Smoking status: Every Day     Current packs/day: 1.00     Types: Cigarettes    Smokeless tobacco: Never   Vaping Use    Vaping status: Not on file   Substance Use Topics    Alcohol use: Not Currently    Drug use:  Not Currently     Types: Heroin     Comment: in past       Physical Exam   ED Triage Vitals [03/12/25 1551]   Temperature Heart Rate Respirations BP   36.7 °C (98 °F) 86 14 124/84      Pulse Ox Temp Source Heart Rate Source Patient Position   96 % Temporal -- --      BP Location FiO2 (%)     -- --       Physical Exam  Constitutional:       General: He is not in acute distress.     Appearance: Normal appearance. He is not ill-appearing, toxic-appearing or diaphoretic.   HENT:      Head: Normocephalic and atraumatic.      Nose: Nose normal.      Mouth/Throat:      Mouth: Mucous membranes are moist.      Pharynx: Oropharynx is clear. No oropharyngeal exudate or posterior oropharyngeal erythema.   Eyes:      General: No scleral icterus.        Right eye: No discharge.         Left eye: No discharge.      Extraocular Movements: Extraocular movements intact.      Conjunctiva/sclera: Conjunctivae normal.      Pupils: Pupils are equal, round, and reactive to light.   Cardiovascular:      Rate and Rhythm: Normal rate and regular rhythm.      Pulses: Normal pulses.      Heart sounds: Normal heart sounds. No murmur heard.     No friction rub. No gallop.      Comments: Trace edema noted at bilateral ankles.  There is no calf tenderness, redness, warmth.   Pulmonary:      Effort: Pulmonary effort is normal. No respiratory distress.      Breath sounds: Normal breath sounds. No stridor. No wheezing, rhonchi or rales.   Abdominal:      General: Abdomen is flat. Bowel sounds are normal. There is no distension.      Palpations: Abdomen is soft.      Tenderness: There is no abdominal tenderness.   Musculoskeletal:         General: Normal range of motion.      Cervical back: Normal range of motion and neck supple. No rigidity or tenderness.      Comments: Negative Frank test bilaterally.  Patient has full range of motion to bilateral ankle dorsi/plantarflexion, knee flexion/extension.   Lymphadenopathy:      Cervical: No cervical  adenopathy.   Skin:     General: Skin is warm and dry.      Capillary Refill: Capillary refill takes less than 2 seconds.   Neurological:      General: No focal deficit present.      Mental Status: He is alert and oriented to person, place, and time.   Psychiatric:         Mood and Affect: Mood normal.         Behavior: Behavior normal.           ED Course & MDM   ED Course as of 03/12/25 1936   Wed Mar 12, 2025   1935 Patient is a 54-year-old male presenting to the ED with concern for bilateral ankle swelling.  Vital signs are stable, patient is nontoxic-appearing.  Patient does have full range of motion of his bilateral lower extremities.  Frank test is negative.  There is no erythema, warmth to the lower extremities.  I have lower suspicion for cellulitis.  Given patient's prior history of DVTs and PE duplex bilateral shoulders obtained and was negative.  CBC, CMP, and BNP were unremarkable.  Patient staffed with attending physician Dr. Porras.  Patient is appropriate for outpatient management with prescription for Lasix and PCP follow-up.  Patient told to return to ED for any new or worsening symptoms. [VS]      ED Course User Index  [VS] Clarissa Alberto PA-C         Diagnoses as of 03/12/25 1936   Peripheral edema                 No data recorded     [place] Coma Scale Score: 15 (03/12/25 1552 : Marcy Hunt RN)                     Medical Decision Making  Chronic Medical Conditions Significantly Affecting Care:      Escalation of Care: Appropriate for outpatient management      Discussion of Management with Other Providers:  I discussed the patient/results with: Attending physician     Counseling: Spoke with the patient and discussed today´s findings, in addition to providing specific details for the plan of care and expected course.  Patient was given the opportunity to ask questions.    Discussed return precautions and importance of follow-up.  Advised to follow-up with PCP.  Advised  to return to the ED for changing or worsening symptoms, new symptoms, complaint specific precautions, and precautions listed on the discharge paperwork.  Educated on the common potential side effects of medications prescribed.    I advised the patient that the emergency evaluation and treatment provided today doesn't end their need for medical care. It is very important that they follow-up with their primary care provider or other specialist as instructed.    The plan of care was mutually agreed upon with the patient. The patient and/or family were given the opportunity to ask questions. All questions asked today in the ED were answered to the best of my ability with today's information.    I specifically advised the patient to return to the ED for changing or worsening symptoms, worrisome new symptoms, or for any complaint specific precautions listed on the discharge paperwork.    Procedure  Procedures     Clarissa Alberto PA-C  03/12/25 1936

## 2025-03-18 ENCOUNTER — APPOINTMENT (OUTPATIENT)
Dept: OPHTHALMOLOGY | Facility: CLINIC | Age: 55
End: 2025-03-18
Payer: COMMERCIAL

## 2025-04-03 NOTE — ED PROVIDER NOTES
I saw and evaluated the patient.  I personally obtained the key and critical portions of the history and physical exam or was physically present for key and critical portions performed by the resident/midlevel. I reviewed the resident’s/midlevel's documentation and discussed the patient with the resident.  I agree with the resident’s medical decision making as documented in the resident’s/midlevel's note. This note supercedes documentation by midlevel/resident.    chief complaint: Leg swelling    History of present illness: Patient is a 54-year-old male with history of hypertension hyperlipidemia presenting to the emergency department with complaints of lower extremity swelling.  According to the patient, he noticed today that his ankles were swollen and firm.  The patient states that he did have a surgery recently.  The patient does have a history of previous DVTs and a pulmonary embolus.  The patient takes Eliquis for these and states that he has been compliant with his medications.  The patient denies any shortness of breath or chest pain.  Concern for this, the patient presents to the emergency department for further evaluation.    Physical examination: General: Patient is an age-appropriate well-appearing male resting comfortably in the examination table  Cardiovascular: Patient has a regular rate and rhythm.  The patient has 1+ pitting edema in his bilateral lower extremities  Lungs: Lungs are clear to auscultation bilaterally  Abdomen: Patient's abdomen is soft nontender nondistended.  Patient has normal bowel sounds. There is no guarding or rebound tenderness.  Neuro: Patient is alert he moves all 4 extremities spontaneously there are no lateralizing neurologic deficits cranial nerves III through XII are intact.    Medical Decision Making patient remained stable during my time with him in the emergency department.  CBC demonstrated no significant abnormalities Chem-7 and LFTs were all within normal limits  BNP was within normal limits venous duplex of the patient's lower extremities demonstrated no significant abnormalities.    Patient presents to the emergency department with complaints of peripheral edema.  Workup was performed as above and demonstrated no significant abnormalities particularly no evidence of blood clot in his lower extremities.  As result I am comfortable the patient being discharged home to follow-up with a primary care physician as needed.  Patient was given a prescription for several days of Lasix to be taken for this issue he was instructed to follow-up with his primary care physician and to return immediately to the emergency department for any worsening symptoms particularly shortness of breath or chest pain.  Patient expressed understanding and agreement.  The patient was then discharged home in otherwise stable condition.     Audi Porras MD  04/03/25 3516

## 2025-04-17 ENCOUNTER — APPOINTMENT (OUTPATIENT)
Dept: PRIMARY CARE | Facility: CLINIC | Age: 55
End: 2025-04-17
Payer: COMMERCIAL

## 2025-05-21 DIAGNOSIS — I26.99 ACUTE PULMONARY EMBOLISM, UNSPECIFIED PULMONARY EMBOLISM TYPE, UNSPECIFIED WHETHER ACUTE COR PULMONALE PRESENT (MULTI): ICD-10-CM

## 2025-05-21 RX ORDER — APIXABAN 5 MG/1
5 TABLET, FILM COATED ORAL 2 TIMES DAILY
Qty: 60 TABLET | Refills: 0 | Status: SHIPPED | OUTPATIENT
Start: 2025-05-21

## 2025-07-10 DIAGNOSIS — I26.99 ACUTE PULMONARY EMBOLISM, UNSPECIFIED PULMONARY EMBOLISM TYPE, UNSPECIFIED WHETHER ACUTE COR PULMONALE PRESENT (MULTI): ICD-10-CM

## 2025-07-10 RX ORDER — APIXABAN 5 MG/1
5 TABLET, FILM COATED ORAL 2 TIMES DAILY
Qty: 60 TABLET | Refills: 2 | Status: SHIPPED | OUTPATIENT
Start: 2025-07-10

## 2025-07-25 DIAGNOSIS — K21.9 GASTROESOPHAGEAL REFLUX DISEASE, UNSPECIFIED WHETHER ESOPHAGITIS PRESENT: ICD-10-CM

## 2025-07-28 ENCOUNTER — CLINICAL SUPPORT (OUTPATIENT)
Dept: EMERGENCY MEDICINE | Facility: HOSPITAL | Age: 55
End: 2025-07-28
Payer: COMMERCIAL

## 2025-07-28 ENCOUNTER — APPOINTMENT (OUTPATIENT)
Dept: RADIOLOGY | Facility: HOSPITAL | Age: 55
End: 2025-07-28
Payer: COMMERCIAL

## 2025-07-28 ENCOUNTER — HOSPITAL ENCOUNTER (EMERGENCY)
Facility: HOSPITAL | Age: 55
Discharge: HOME | End: 2025-07-28
Attending: EMERGENCY MEDICINE
Payer: COMMERCIAL

## 2025-07-28 VITALS
HEART RATE: 78 BPM | OXYGEN SATURATION: 98 % | WEIGHT: 165 LBS | DIASTOLIC BLOOD PRESSURE: 78 MMHG | BODY MASS INDEX: 25.01 KG/M2 | SYSTOLIC BLOOD PRESSURE: 100 MMHG | TEMPERATURE: 98 F | HEIGHT: 68 IN | RESPIRATION RATE: 16 BRPM

## 2025-07-28 DIAGNOSIS — T50.901A ACCIDENTAL DRUG OVERDOSE, INITIAL ENCOUNTER: Primary | ICD-10-CM

## 2025-07-28 LAB
ALBUMIN SERPL BCP-MCNC: 4 G/DL (ref 3.4–5)
ALP SERPL-CCNC: 49 U/L (ref 33–120)
ALT SERPL W P-5'-P-CCNC: 9 U/L (ref 10–52)
ANION GAP BLDV CALCULATED.4IONS-SCNC: 3 MMOL/L (ref 10–25)
ANION GAP SERPL CALC-SCNC: 12 MMOL/L (ref 10–20)
AST SERPL W P-5'-P-CCNC: 22 U/L (ref 9–39)
BASE EXCESS BLDV CALC-SCNC: 9.2 MMOL/L (ref -2–3)
BILIRUB SERPL-MCNC: 0.6 MG/DL (ref 0–1.2)
BODY TEMPERATURE: 37 DEGREES CELSIUS
BUN SERPL-MCNC: 11 MG/DL (ref 6–23)
CA-I BLDV-SCNC: 1.22 MMOL/L (ref 1.1–1.33)
CALCIUM SERPL-MCNC: 8.7 MG/DL (ref 8.6–10.6)
CHLORIDE BLDV-SCNC: 101 MMOL/L (ref 98–107)
CHLORIDE SERPL-SCNC: 101 MMOL/L (ref 98–107)
CO2 SERPL-SCNC: 29 MMOL/L (ref 21–32)
CREAT SERPL-MCNC: 0.93 MG/DL (ref 0.5–1.3)
EGFRCR SERPLBLD CKD-EPI 2021: >90 ML/MIN/1.73M*2
ERYTHROCYTE [DISTWIDTH] IN BLOOD BY AUTOMATED COUNT: 17.5 % (ref 11.5–14.5)
GLUCOSE BLDV-MCNC: 153 MG/DL (ref 74–99)
GLUCOSE SERPL-MCNC: 144 MG/DL (ref 74–99)
HCO3 BLDV-SCNC: 37 MMOL/L (ref 22–26)
HCT VFR BLD AUTO: 34.5 % (ref 41–52)
HCT VFR BLD EST: 35 % (ref 41–52)
HGB BLD-MCNC: 11.2 G/DL (ref 13.5–17.5)
HGB BLDV-MCNC: 11.8 G/DL (ref 13.5–17.5)
LACTATE BLDV-SCNC: 1.6 MMOL/L (ref 0.4–2)
MCH RBC QN AUTO: 23 PG (ref 26–34)
MCHC RBC AUTO-ENTMCNC: 32.5 G/DL (ref 32–36)
MCV RBC AUTO: 71 FL (ref 80–100)
NRBC BLD-RTO: 0 /100 WBCS (ref 0–0)
OXYHGB MFR BLDV: 40.7 % (ref 45–75)
PCO2 BLDV: 67 MM HG (ref 41–51)
PH BLDV: 7.35 PH (ref 7.33–7.43)
PLATELET # BLD AUTO: 166 X10*3/UL (ref 150–450)
PO2 BLDV: 29 MM HG (ref 35–45)
POTASSIUM BLDV-SCNC: 3.5 MMOL/L (ref 3.5–5.3)
POTASSIUM SERPL-SCNC: 3.9 MMOL/L (ref 3.5–5.3)
PROT SERPL-MCNC: 7.1 G/DL (ref 6.4–8.2)
RBC # BLD AUTO: 4.86 X10*6/UL (ref 4.5–5.9)
SAO2 % BLDV: 44 % (ref 45–75)
SODIUM BLDV-SCNC: 137 MMOL/L (ref 136–145)
SODIUM SERPL-SCNC: 138 MMOL/L (ref 136–145)
WBC # BLD AUTO: 11.1 X10*3/UL (ref 4.4–11.3)

## 2025-07-28 PROCEDURE — 85027 COMPLETE CBC AUTOMATED: CPT | Performed by: EMERGENCY MEDICINE

## 2025-07-28 PROCEDURE — 84132 ASSAY OF SERUM POTASSIUM: CPT | Performed by: EMERGENCY MEDICINE

## 2025-07-28 PROCEDURE — 84132 ASSAY OF SERUM POTASSIUM: CPT

## 2025-07-28 PROCEDURE — 93005 ELECTROCARDIOGRAM TRACING: CPT

## 2025-07-28 PROCEDURE — 36415 COLL VENOUS BLD VENIPUNCTURE: CPT | Performed by: EMERGENCY MEDICINE

## 2025-07-28 PROCEDURE — 99285 EMERGENCY DEPT VISIT HI MDM: CPT | Performed by: EMERGENCY MEDICINE

## 2025-07-28 PROCEDURE — 99284 EMERGENCY DEPT VISIT MOD MDM: CPT | Performed by: EMERGENCY MEDICINE

## 2025-07-28 RX ORDER — OMEPRAZOLE 40 MG/1
40 CAPSULE, DELAYED RELEASE ORAL
Qty: 90 CAPSULE | Refills: 3 | Status: SHIPPED | OUTPATIENT
Start: 2025-07-28

## 2025-07-28 ASSESSMENT — PAIN - FUNCTIONAL ASSESSMENT: PAIN_FUNCTIONAL_ASSESSMENT: 0-10

## 2025-07-28 ASSESSMENT — PAIN SCALES - GENERAL
PAINLEVEL_OUTOF10: 0 - NO PAIN
PAINLEVEL_OUTOF10: 0 - NO PAIN

## 2025-07-28 ASSESSMENT — LIFESTYLE VARIABLES
HAVE YOU EVER FELT YOU SHOULD CUT DOWN ON YOUR DRINKING: NO
TOTAL SCORE: 0
EVER FELT BAD OR GUILTY ABOUT YOUR DRINKING: NO
EVER HAD A DRINK FIRST THING IN THE MORNING TO STEADY YOUR NERVES TO GET RID OF A HANGOVER: NO
HAVE PEOPLE ANNOYED YOU BY CRITICIZING YOUR DRINKING: NO

## 2025-07-28 NOTE — DISCHARGE INSTRUCTIONS
Discharge home with reassurance.  If you develop worsening confusion, pain, trouble breathing, or other concerning symptoms please return to the emergency department.

## 2025-07-28 NOTE — ED PROVIDER NOTES
Emergency Department Provider Note       History of Present Illness     History provided by: Patient and EMS  Limitations to History: Altered Mental Status  External Records Reviewed with Brief Summary: Prior notes showing that patient has had cocaine and fentanyl on various UDS's before    HPI:  Douglas Wilson is a 54 y.o. male presenting to the emergency department as a critical activation.  Patient came in initially because he was found unresponsive in his car.  He was breathing, and had a pulse.  He just would not respond to anything.  Patient was given 4 mg of Narcan by fire squad, he woke up at that time.  He remained awake throughout and did not require redosing of medication.  Upon arrival, patient is just wanting to sleep.  He can tell me his name, the year, where he is at.  He says he is not in pain.  Patient declines to mention if he took anything today.  We reiterated we are not long enforcement we just want a make sure that he is safe, however he still said he is good and does not want to discuss it at this time.    Physical Exam   Triage vitals:  T 36.7 °C (98 °F)  HR 79  /68  RR 16  O2 100 % None (Room air)    Physical Exam    Eyes:      Comments: Pupils initially 3-2 reactive to light bilaterally, and throughout the course of his stay increased to 4-3 reactive to light bilaterally     Cardiovascular:      Rate and Rhythm: Normal rate.   Pulmonary:      Effort: Pulmonary effort is normal. No respiratory distress.   Abdominal:      General: Abdomen is flat.      Tenderness: There is no abdominal tenderness. There is no guarding.     Neurological:      Comments: Patient initially moving all 4 extremities.  Randomly.  Patient not is awake and able to intermittently communicate but then falls asleep repeatedly.  no dysarthria, no facial droop.    -----    Repeat exam after patient sobered up, ANO x 4, following directions, no dysarthria, ambulating without difficulty.  Endorsing no concerns.          Medical Decision Making & ED Course   Medical Decision Makin y.o. male Presenting as per [facility], differential is broad and includes but not limited to accidental overdose, UTI, brain bleed, metabolic encephalopathy.   As a result, workup was ordered targeting these diagnoses including CTs,  Blood work, EKG, and urine.  Blood work came back nonactionable.  Patient was adamantly refusing CT scan in order to provide a urine.  Patient was allowed to sleep with close monitoring.  Vital signs been stable, patient's pupils did not change in size on repeat assessments.  After nap time patient sobered up.  He was ANO x 4, acting appropriately, denying any symptoms.  Endorsed taking some substances today.  Has no concerns at this time, patient is able to ambulate with no difficulty, as a result the rest of the workup was deferred as patient is back to baseline.  Return precautions given, patient discharged home in stable condition.  ----      Differential diagnoses considered include but are not limited to: As per [facility]    Care Considerations: As documented above in [facility]    ED Course:  ED Course as of 25 2316   Mon 2025   1640 Patient declined CT scan.  When discussing with the patient, patient noted that he does not want a CT scan.  He notes that he would have to wait for several hours for the results.  I discussed with him that I could review the CT head as we are mainly looking for a brain bleed, however he still adamantly refused.  Patient falling asleep multiple times during the conversation every few seconds.  Discussed with him that it would not be safe for him to go home until he vic up without this CT head which could end up taking more time.  Patient agreeable and would prefer to wait at this time.  As result we will continue to closely monitor and defer CT imaging at this time, patient's pupils are still equal reactive bilaterally without word slurring [GUS]      ED Course User Index  [GUS]  José Manuel Piedra DO         Diagnoses as of 07/28/25 9246   Accidental drug overdose, initial encounter       Disposition   As a result of the work-up, the patient was discharged home.  he was informed of his diagnosis and instructed to come back with any concerns or worsening of condition.  he and was agreeable to the plan as discussed above.  he was given the opportunity to ask questions.  All of the patient's questions were answered.    Procedures   Procedures    José Manuel Piedra DO  Emergency Medicine                                                       José Manuel Piedra DO  Resident  07/28/25 8680

## 2025-07-28 NOTE — ED TRIAGE NOTES
Pt to ed as a level 2 critical. Pt was found down at a gas station, bystander called 911 . Pt was given 4mg intranasal narcan, and was responsive more with that. Pt now alert and awake , and oriented x 4. Pt denying any substance use.  Pt 100% ra, 44 c02.

## 2025-07-29 PROBLEM — R31.29 MICROSCOPIC HEMATURIA: Status: RESOLVED | Noted: 2024-09-01 | Resolved: 2025-07-29

## 2025-07-29 PROBLEM — M54.6 ACUTE MIDLINE THORACIC BACK PAIN: Status: RESOLVED | Noted: 2024-09-01 | Resolved: 2025-07-29

## 2025-07-29 LAB
ATRIAL RATE: 68 BPM
HOLD SPECIMEN: NORMAL
HOLD SPECIMEN: NORMAL
P AXIS: 68 DEGREES
P OFFSET: 194 MS
P ONSET: 139 MS
PR INTERVAL: 166 MS
Q ONSET: 222 MS
QRS COUNT: 12 BEATS
QRS DURATION: 94 MS
QT INTERVAL: 382 MS
QTC CALCULATION(BAZETT): 406 MS
QTC FREDERICIA: 398 MS
R AXIS: 39 DEGREES
T AXIS: 22 DEGREES
T OFFSET: 413 MS
VENTRICULAR RATE: 68 BPM

## 2025-07-31 ENCOUNTER — APPOINTMENT (OUTPATIENT)
Dept: PRIMARY CARE | Facility: CLINIC | Age: 55
End: 2025-07-31
Payer: COMMERCIAL

## 2025-08-26 ASSESSMENT — ENCOUNTER SYMPTOMS
NAUSEA: 0
RHINORRHEA: 0
ANAL BLEEDING: 0
EYE PAIN: 0
LIGHT-HEADEDNESS: 0
DIARRHEA: 1
DIZZINESS: 0
BLOOD IN STOOL: 0
UNEXPECTED WEIGHT CHANGE: 0
HEMATURIA: 0
CHILLS: 0
SHORTNESS OF BREATH: 0
VOMITING: 0
FEVER: 0
ABDOMINAL PAIN: 0

## 2025-08-27 ENCOUNTER — APPOINTMENT (OUTPATIENT)
Dept: PRIMARY CARE | Facility: CLINIC | Age: 55
End: 2025-08-27
Payer: COMMERCIAL

## 2025-08-27 VITALS
OXYGEN SATURATION: 96 % | HEIGHT: 68 IN | HEART RATE: 56 BPM | TEMPERATURE: 98.8 F | DIASTOLIC BLOOD PRESSURE: 64 MMHG | BODY MASS INDEX: 26.61 KG/M2 | WEIGHT: 175.6 LBS | SYSTOLIC BLOOD PRESSURE: 102 MMHG

## 2025-08-27 DIAGNOSIS — E78.49 OTHER HYPERLIPIDEMIA: ICD-10-CM

## 2025-08-27 DIAGNOSIS — I10 PRIMARY HYPERTENSION: Primary | ICD-10-CM

## 2025-08-27 DIAGNOSIS — D50.9 IRON DEFICIENCY ANEMIA, UNSPECIFIED IRON DEFICIENCY ANEMIA TYPE: ICD-10-CM

## 2025-08-27 DIAGNOSIS — E78.5 DYSLIPIDEMIA: ICD-10-CM

## 2025-08-27 DIAGNOSIS — I26.99 ACUTE PULMONARY EMBOLISM, UNSPECIFIED PULMONARY EMBOLISM TYPE, UNSPECIFIED WHETHER ACUTE COR PULMONALE PRESENT (MULTI): ICD-10-CM

## 2025-08-27 DIAGNOSIS — R35.0 BENIGN PROSTATIC HYPERPLASIA WITH URINARY FREQUENCY: ICD-10-CM

## 2025-08-27 DIAGNOSIS — Z90.49 S/P APPENDECTOMY: ICD-10-CM

## 2025-08-27 DIAGNOSIS — N52.9 ERECTILE DYSFUNCTION, UNSPECIFIED ERECTILE DYSFUNCTION TYPE: ICD-10-CM

## 2025-08-27 DIAGNOSIS — R35.1 BENIGN PROSTATIC HYPERPLASIA WITH NOCTURIA: ICD-10-CM

## 2025-08-27 DIAGNOSIS — N40.1 BENIGN PROSTATIC HYPERPLASIA WITH NOCTURIA: ICD-10-CM

## 2025-08-27 DIAGNOSIS — N40.1 BENIGN PROSTATIC HYPERPLASIA WITH URINARY FREQUENCY: ICD-10-CM

## 2025-08-27 DIAGNOSIS — R56.9 SEIZURE (MULTI): ICD-10-CM

## 2025-08-27 DIAGNOSIS — I82.442 ACUTE DEEP VEIN THROMBOSIS (DVT) OF TIBIAL VEIN OF LEFT LOWER EXTREMITY: ICD-10-CM

## 2025-08-27 DIAGNOSIS — I26.99 ACUTE PULMONARY EMBOLISM WITHOUT ACUTE COR PULMONALE, UNSPECIFIED PULMONARY EMBOLISM TYPE (MULTI): ICD-10-CM

## 2025-08-27 DIAGNOSIS — Z12.11 SCREENING FOR MALIGNANT NEOPLASM OF COLON: ICD-10-CM

## 2025-08-27 DIAGNOSIS — F19.10 POLYSUBSTANCE ABUSE: ICD-10-CM

## 2025-08-27 DIAGNOSIS — Z12.11 ENCOUNTER FOR SCREENING COLONOSCOPY: ICD-10-CM

## 2025-08-27 DIAGNOSIS — Z90.49 S/P LAPAROSCOPIC CHOLECYSTECTOMY: ICD-10-CM

## 2025-08-27 DIAGNOSIS — K21.9 GASTROESOPHAGEAL REFLUX DISEASE, UNSPECIFIED WHETHER ESOPHAGITIS PRESENT: ICD-10-CM

## 2025-08-27 PROCEDURE — 3074F SYST BP LT 130 MM HG: CPT | Performed by: STUDENT IN AN ORGANIZED HEALTH CARE EDUCATION/TRAINING PROGRAM

## 2025-08-27 PROCEDURE — 99214 OFFICE O/P EST MOD 30 MIN: CPT | Performed by: STUDENT IN AN ORGANIZED HEALTH CARE EDUCATION/TRAINING PROGRAM

## 2025-08-27 PROCEDURE — 3078F DIAST BP <80 MM HG: CPT | Performed by: STUDENT IN AN ORGANIZED HEALTH CARE EDUCATION/TRAINING PROGRAM

## 2025-08-27 PROCEDURE — 3008F BODY MASS INDEX DOCD: CPT | Performed by: STUDENT IN AN ORGANIZED HEALTH CARE EDUCATION/TRAINING PROGRAM

## 2025-08-27 RX ORDER — SILDENAFIL 100 MG/1
100 TABLET, FILM COATED ORAL DAILY PRN
Qty: 12 TABLET | Refills: 3 | Status: SHIPPED | OUTPATIENT
Start: 2025-08-27

## 2025-08-27 RX ORDER — POLYETHYLENE GLYCOL 3350, SODIUM CHLORIDE, SODIUM BICARBONATE, POTASSIUM CHLORIDE 420; 11.2; 5.72; 1.48 G/4L; G/4L; G/4L; G/4L
POWDER, FOR SOLUTION ORAL
Qty: 4000 ML | Refills: 0 | Status: SHIPPED | OUTPATIENT
Start: 2025-08-27

## 2025-08-27 RX ORDER — OMEPRAZOLE 40 MG/1
40 CAPSULE, DELAYED RELEASE ORAL
Qty: 90 CAPSULE | Refills: 3 | Status: SHIPPED | OUTPATIENT
Start: 2025-08-27

## 2025-08-27 RX ORDER — ATORVASTATIN CALCIUM 10 MG/1
10 TABLET, FILM COATED ORAL NIGHTLY
Qty: 90 TABLET | Refills: 3 | Status: SHIPPED | OUTPATIENT
Start: 2025-08-27

## 2025-08-27 RX ORDER — TAMSULOSIN HYDROCHLORIDE 0.4 MG/1
0.4 CAPSULE ORAL DAILY
Qty: 90 CAPSULE | Refills: 3 | Status: SHIPPED | OUTPATIENT
Start: 2025-08-27

## 2025-08-27 ASSESSMENT — ENCOUNTER SYMPTOMS
OCCASIONAL FEELINGS OF UNSTEADINESS: 0
DEPRESSION: 0
LOSS OF SENSATION IN FEET: 0

## 2025-11-28 ENCOUNTER — APPOINTMENT (OUTPATIENT)
Dept: PRIMARY CARE | Facility: CLINIC | Age: 55
End: 2025-11-28
Payer: COMMERCIAL

## (undated) DEVICE — TROCAR SYSTEM, BALLOON, KII GELPORT, 12 X 100MM

## (undated) DEVICE — Device

## (undated) DEVICE — APPLICATOR, ENDOSCOPIC, F/SURGICEL POWDER

## (undated) DEVICE — SHEAR, W/UNIPOLAR CAUTERY, ENDOSHEAR, 5 MM

## (undated) DEVICE — CATHETER, IV, ANGIOCATH, 14 G X 5.25 IN, FEP POLYMER

## (undated) DEVICE — GLOVE, SURGICAL, PROTEXIS PI MICRO, 7.5, PF, LF

## (undated) DEVICE — TUBE SET, PNEUMOLAR HEATED, SMOKE EVACU, HIGH-FLOW

## (undated) DEVICE — PENCIL, SMOKE EVACUATION, VALLEYLAB

## (undated) DEVICE — PUMP, STRYKERFLOW 2 & HANDPIECE W/10FT. IRRIGATION TUBING

## (undated) DEVICE — SYRINGE, 20 CC, LUER LOCK

## (undated) DEVICE — APPLIER, 5MM ENDOSCOPIC, HEM-O-LOCK, W/15 ML CLIPS

## (undated) DEVICE — SCOPE WARMER, LAPAROSCOPE, BAG ONLY, LF

## (undated) DEVICE — CANNULA, KII ADVANCED FIXATION, 5X100MM W/SEAL

## (undated) DEVICE — GLOVE, SURGICAL, PROTEXIS PI ORTHO, 7.0, PF, LF

## (undated) DEVICE — LIGASURE, V SEALER/DIVIDER  5MM BLUNT TIP

## (undated) DEVICE — GLOVE, SURGICAL, PROTEXIS PI W/NEU-THERA, 8.0, PF, LF

## (undated) DEVICE — STAPLER,  ENDO ECHELON 45MM RELOAD, BLUE

## (undated) DEVICE — CORD, MONOPOLAR HIGH FREQUENCY, 8MM PLUG, 300CM

## (undated) DEVICE — SUTURE, MONOCRYL, 4-0, 18 IN, PS2, UNDYED

## (undated) DEVICE — TUBE SET, PNEUMOCLEAR, SMOKE EVACU, HIGH-FLOW

## (undated) DEVICE — TOWEL, SURGICAL, NEURO, O/R, 16 X 26, BLUE, STERILE

## (undated) DEVICE — SUTURE, VICRYL, 0, 27 IN, UR-6, VIOLET

## (undated) DEVICE — DRAPE, C-ARM, W/12 IN COVER, LI XTRAY TUBE

## (undated) DEVICE — ADHESIVE, SKIN, LIQUIBAND EXCEED

## (undated) DEVICE — SUTURE, MONOCRYL, 4-0, 27 IN, PS-2, UNDYED

## (undated) DEVICE — STAPLER, EF POWERED PLUS, CUTTER 340MM, 45MM EFFECTOR, DISP

## (undated) DEVICE — CATHETER, CHOLANGIOGRAM, 18 G X 11

## (undated) DEVICE — HEMOSTAT, SURGICEL POWDER 3.0GRAMS

## (undated) DEVICE — ADHESIVE, SKIN, DERMABOND ADVANCED, 15CM, PEN-STYLE

## (undated) DEVICE — HEMOSTAT, ARISTA, ABSORBABLE, 3 GRAMS

## (undated) DEVICE — RETRIEVAL SYSTEM, MONARCH, 10MM DISP ENDOSCOPIC

## (undated) DEVICE — SUTURE, VICRYL PLUS, 0, 27IN, UR-6, VIOLET, BRAIDED

## (undated) DEVICE — SYSTEM, TROCAR LAP, 5X100MM, SHIELD BLADED, KII ADVANCED FIX ABDOMINAL

## (undated) DEVICE — HOLSTER, JET SAFETY

## (undated) DEVICE — CLIP, ENDO, CLINCH II, W/RATCHET, ON/OFF, CLINCH II, 5 MM

## (undated) DEVICE — ELECTRODE, ELECTROSURGICAL, BLADE, INSULATED, ENT/IMA, STERILE

## (undated) DEVICE — ASSEMBLY, STRYKER FLOW 2, SUCTION IRRIGATOR, WITH TIP